# Patient Record
Sex: FEMALE | Race: WHITE | Employment: OTHER | URBAN - METROPOLITAN AREA
[De-identification: names, ages, dates, MRNs, and addresses within clinical notes are randomized per-mention and may not be internally consistent; named-entity substitution may affect disease eponyms.]

---

## 2019-03-08 PROBLEM — R30.0 DYSURIA: Status: ACTIVE | Noted: 2019-03-08

## 2019-03-08 PROBLEM — M19.90 ARTHRITIS: Status: ACTIVE | Noted: 2019-03-08

## 2019-03-08 PROBLEM — K21.00 GASTROESOPHAGEAL REFLUX DISEASE WITH ESOPHAGITIS: Status: ACTIVE | Noted: 2019-03-08

## 2019-03-08 PROBLEM — K92.1 BLOOD IN THE STOOL: Status: ACTIVE | Noted: 2019-03-08

## 2019-03-08 PROBLEM — K64.9 HEMORRHOIDS: Status: ACTIVE | Noted: 2019-03-08

## 2019-03-08 PROBLEM — K62.5 RECTAL BLEEDING: Status: ACTIVE | Noted: 2019-03-08

## 2019-08-27 ENCOUNTER — APPOINTMENT (OUTPATIENT)
Dept: CT IMAGING | Age: 69
DRG: 871 | End: 2019-08-27
Attending: EMERGENCY MEDICINE
Payer: MEDICARE

## 2019-08-27 ENCOUNTER — APPOINTMENT (OUTPATIENT)
Dept: GENERAL RADIOLOGY | Age: 69
DRG: 871 | End: 2019-08-27
Attending: EMERGENCY MEDICINE
Payer: MEDICARE

## 2019-08-27 ENCOUNTER — HOSPITAL ENCOUNTER (INPATIENT)
Age: 69
LOS: 8 days | Discharge: NURSING, REHAB OR LONG-TERM CARE | DRG: 871 | End: 2019-09-04
Attending: EMERGENCY MEDICINE | Admitting: INTERNAL MEDICINE
Payer: MEDICARE

## 2019-08-27 DIAGNOSIS — R51.9 ACUTE NONINTRACTABLE HEADACHE, UNSPECIFIED HEADACHE TYPE: ICD-10-CM

## 2019-08-27 DIAGNOSIS — N17.9 ACUTE KIDNEY INJURY (HCC): ICD-10-CM

## 2019-08-27 DIAGNOSIS — E87.6 HYPOKALEMIA: ICD-10-CM

## 2019-08-27 DIAGNOSIS — D72.829 LEUKOCYTOSIS, UNSPECIFIED TYPE: ICD-10-CM

## 2019-08-27 DIAGNOSIS — K75.0 HEPATIC ABSCESS: ICD-10-CM

## 2019-08-27 DIAGNOSIS — I21.4 NON-STEMI (NON-ST ELEVATED MYOCARDIAL INFARCTION) (HCC): Primary | ICD-10-CM

## 2019-08-27 DIAGNOSIS — E83.42 HYPOMAGNESEMIA: ICD-10-CM

## 2019-08-27 PROBLEM — R16.0 LIVER MASS: Status: ACTIVE | Noted: 2019-08-27

## 2019-08-27 PROBLEM — G45.9 TIA (TRANSIENT ISCHEMIC ATTACK): Status: ACTIVE | Noted: 2019-08-27

## 2019-08-27 PROBLEM — R77.8 ELEVATED TROPONIN: Status: ACTIVE | Noted: 2019-08-27

## 2019-08-27 PROBLEM — M79.7 FIBROMYALGIA: Status: ACTIVE | Noted: 2019-08-27

## 2019-08-27 PROBLEM — R07.9 CHEST PAIN: Status: ACTIVE | Noted: 2019-08-27

## 2019-08-27 LAB
ALBUMIN SERPL-MCNC: 1.9 G/DL (ref 3.2–4.6)
ALBUMIN/GLOB SERPL: 0.4 {RATIO} (ref 1.2–3.5)
ALP SERPL-CCNC: 166 U/L (ref 50–136)
ALT SERPL-CCNC: 25 U/L (ref 12–65)
AMMONIA PLAS-SCNC: 34 UMOL/L (ref 11–32)
ANION GAP SERPL CALC-SCNC: 11 MMOL/L (ref 7–16)
AST SERPL-CCNC: 51 U/L (ref 15–37)
ATRIAL RATE: 71 BPM
ATRIAL RATE: 98 BPM
BASOPHILS # BLD: 0 K/UL (ref 0–0.2)
BASOPHILS NFR BLD: 0 % (ref 0–2)
BILIRUB SERPL-MCNC: 0.9 MG/DL (ref 0.2–1.1)
BUN SERPL-MCNC: 34 MG/DL (ref 8–23)
CALCIUM SERPL-MCNC: 8.2 MG/DL (ref 8.3–10.4)
CALCULATED P AXIS, ECG09: 62 DEGREES
CALCULATED P AXIS, ECG09: 73 DEGREES
CALCULATED R AXIS, ECG10: 42 DEGREES
CALCULATED R AXIS, ECG10: 9 DEGREES
CALCULATED T AXIS, ECG11: 57 DEGREES
CALCULATED T AXIS, ECG11: 68 DEGREES
CHLORIDE SERPL-SCNC: 99 MMOL/L (ref 98–107)
CO2 SERPL-SCNC: 27 MMOL/L (ref 21–32)
COLLECTION COMMENT, COLCM: NORMAL
CREAT SERPL-MCNC: 2.2 MG/DL (ref 0.6–1)
DIAGNOSIS, 93000: NORMAL
DIAGNOSIS, 93000: NORMAL
DIFFERENTIAL METHOD BLD: ABNORMAL
EOSINOPHIL # BLD: 0 K/UL (ref 0–0.8)
EOSINOPHIL NFR BLD: 0 % (ref 0.5–7.8)
ERYTHROCYTE [DISTWIDTH] IN BLOOD BY AUTOMATED COUNT: 14.3 % (ref 11.9–14.6)
GLOBULIN SER CALC-MCNC: 5 G/DL (ref 2.3–3.5)
GLUCOSE SERPL-MCNC: 123 MG/DL (ref 65–100)
HCT VFR BLD AUTO: 32.1 % (ref 35.8–46.3)
HGB BLD-MCNC: 10.7 G/DL (ref 11.7–15.4)
IMM GRANULOCYTES # BLD AUTO: 0.6 K/UL (ref 0–0.5)
IMM GRANULOCYTES NFR BLD AUTO: 3 % (ref 0–5)
LACTATE BLD-SCNC: 1.36 MMOL/L (ref 0.5–1.9)
LIPASE SERPL-CCNC: 151 U/L (ref 73–393)
LYMPHOCYTES # BLD: 1.1 K/UL (ref 0.5–4.6)
LYMPHOCYTES NFR BLD: 6 % (ref 13–44)
MAGNESIUM SERPL-MCNC: 1.6 MG/DL (ref 1.8–2.4)
MCH RBC QN AUTO: 31.3 PG (ref 26.1–32.9)
MCHC RBC AUTO-ENTMCNC: 33.3 G/DL (ref 31.4–35)
MCV RBC AUTO: 93.9 FL (ref 79.6–97.8)
MONOCYTES # BLD: 0.7 K/UL (ref 0.1–1.3)
MONOCYTES NFR BLD: 4 % (ref 4–12)
NEUTS SEG # BLD: 16 K/UL (ref 1.7–8.2)
NEUTS SEG NFR BLD: 87 % (ref 43–78)
NRBC # BLD: 0 K/UL (ref 0–0.2)
P-R INTERVAL, ECG05: 120 MS
P-R INTERVAL, ECG05: 134 MS
PHOSPHATE SERPL-MCNC: 2 MG/DL (ref 2.3–3.7)
PLATELET # BLD AUTO: 168 K/UL (ref 150–450)
PLATELET COMMENTS,PCOM: ADEQUATE
PMV BLD AUTO: 8.7 FL (ref 9.4–12.3)
POTASSIUM SERPL-SCNC: 2.4 MMOL/L (ref 3.5–5.1)
PROT SERPL-MCNC: 6.9 G/DL (ref 6.3–8.2)
Q-T INTERVAL, ECG07: 412 MS
Q-T INTERVAL, ECG07: 484 MS
QRS DURATION, ECG06: 78 MS
QRS DURATION, ECG06: 92 MS
QTC CALCULATION (BEZET), ECG08: 525 MS
QTC CALCULATION (BEZET), ECG08: 525 MS
RBC # BLD AUTO: 3.42 M/UL (ref 4.05–5.2)
RBC MORPH BLD: ABNORMAL
SODIUM SERPL-SCNC: 137 MMOL/L (ref 136–145)
TROPONIN I SERPL-MCNC: 0.37 NG/ML (ref 0.02–0.05)
TROPONIN I SERPL-MCNC: 0.76 NG/ML (ref 0.02–0.05)
TROPONIN I SERPL-MCNC: 0.82 NG/ML (ref 0.02–0.05)
VENTRICULAR RATE, ECG03: 71 BPM
VENTRICULAR RATE, ECG03: 98 BPM
WBC # BLD AUTO: 18.4 K/UL (ref 4.3–11.1)
WBC MORPH BLD: ABNORMAL

## 2019-08-27 PROCEDURE — 74011250637 HC RX REV CODE- 250/637: Performed by: NURSE PRACTITIONER

## 2019-08-27 PROCEDURE — 81003 URINALYSIS AUTO W/O SCOPE: CPT | Performed by: EMERGENCY MEDICINE

## 2019-08-27 PROCEDURE — 82140 ASSAY OF AMMONIA: CPT

## 2019-08-27 PROCEDURE — 74011250636 HC RX REV CODE- 250/636: Performed by: NURSE PRACTITIONER

## 2019-08-27 PROCEDURE — 77030011943

## 2019-08-27 PROCEDURE — 84100 ASSAY OF PHOSPHORUS: CPT

## 2019-08-27 PROCEDURE — 77030019605

## 2019-08-27 PROCEDURE — 96360 HYDRATION IV INFUSION INIT: CPT | Performed by: EMERGENCY MEDICINE

## 2019-08-27 PROCEDURE — 84484 ASSAY OF TROPONIN QUANT: CPT

## 2019-08-27 PROCEDURE — 86580 TB INTRADERMAL TEST: CPT | Performed by: NURSE PRACTITIONER

## 2019-08-27 PROCEDURE — 71046 X-RAY EXAM CHEST 2 VIEWS: CPT

## 2019-08-27 PROCEDURE — 51701 INSERT BLADDER CATHETER: CPT | Performed by: EMERGENCY MEDICINE

## 2019-08-27 PROCEDURE — 93005 ELECTROCARDIOGRAM TRACING: CPT | Performed by: EMERGENCY MEDICINE

## 2019-08-27 PROCEDURE — 80053 COMPREHEN METABOLIC PANEL: CPT

## 2019-08-27 PROCEDURE — 83690 ASSAY OF LIPASE: CPT

## 2019-08-27 PROCEDURE — 83735 ASSAY OF MAGNESIUM: CPT

## 2019-08-27 PROCEDURE — 85025 COMPLETE CBC W/AUTO DIFF WBC: CPT

## 2019-08-27 PROCEDURE — 74011000302 HC RX REV CODE- 302: Performed by: NURSE PRACTITIONER

## 2019-08-27 PROCEDURE — 77030020263 HC SOL INJ SOD CL0.9% LFCR 1000ML

## 2019-08-27 PROCEDURE — 74011250636 HC RX REV CODE- 250/636: Performed by: EMERGENCY MEDICINE

## 2019-08-27 PROCEDURE — 99285 EMERGENCY DEPT VISIT HI MDM: CPT | Performed by: EMERGENCY MEDICINE

## 2019-08-27 PROCEDURE — 83605 ASSAY OF LACTIC ACID: CPT

## 2019-08-27 PROCEDURE — 70450 CT HEAD/BRAIN W/O DYE: CPT

## 2019-08-27 PROCEDURE — 36415 COLL VENOUS BLD VENIPUNCTURE: CPT

## 2019-08-27 PROCEDURE — 65660000000 HC RM CCU STEPDOWN

## 2019-08-27 RX ORDER — ONDANSETRON 2 MG/ML
4 INJECTION INTRAMUSCULAR; INTRAVENOUS
Status: DISCONTINUED | OUTPATIENT
Start: 2019-08-27 | End: 2019-09-04 | Stop reason: HOSPADM

## 2019-08-27 RX ORDER — SODIUM CHLORIDE 0.9 % (FLUSH) 0.9 %
5-40 SYRINGE (ML) INJECTION EVERY 8 HOURS
Status: DISCONTINUED | OUTPATIENT
Start: 2019-08-27 | End: 2019-09-04 | Stop reason: HOSPADM

## 2019-08-27 RX ORDER — SODIUM CHLORIDE 0.9 % (FLUSH) 0.9 %
5-40 SYRINGE (ML) INJECTION AS NEEDED
Status: DISCONTINUED | OUTPATIENT
Start: 2019-08-27 | End: 2019-09-04 | Stop reason: HOSPADM

## 2019-08-27 RX ORDER — DIPHENHYDRAMINE HCL 25 MG
25 CAPSULE ORAL
Status: DISCONTINUED | OUTPATIENT
Start: 2019-08-27 | End: 2019-08-28

## 2019-08-27 RX ORDER — HEPARIN SODIUM 5000 [USP'U]/ML
5000 INJECTION, SOLUTION INTRAVENOUS; SUBCUTANEOUS EVERY 8 HOURS
Status: DISCONTINUED | OUTPATIENT
Start: 2019-08-27 | End: 2019-08-28

## 2019-08-27 RX ORDER — LORAZEPAM 0.5 MG/1
0.5 TABLET ORAL
Status: DISCONTINUED | OUTPATIENT
Start: 2019-08-27 | End: 2019-08-28

## 2019-08-27 RX ORDER — MAGNESIUM SULFATE HEPTAHYDRATE 40 MG/ML
2 INJECTION, SOLUTION INTRAVENOUS
Status: COMPLETED | OUTPATIENT
Start: 2019-08-27 | End: 2019-08-27

## 2019-08-27 RX ORDER — ACETAMINOPHEN 325 MG/1
650 TABLET ORAL
Status: DISCONTINUED | OUTPATIENT
Start: 2019-08-27 | End: 2019-09-04 | Stop reason: HOSPADM

## 2019-08-27 RX ORDER — MONTELUKAST SODIUM 10 MG/1
10 TABLET ORAL EVERY EVENING
Status: DISCONTINUED | OUTPATIENT
Start: 2019-08-27 | End: 2019-09-04 | Stop reason: HOSPADM

## 2019-08-27 RX ORDER — SODIUM CHLORIDE 9 MG/ML
150 INJECTION, SOLUTION INTRAVENOUS CONTINUOUS
Status: DISCONTINUED | OUTPATIENT
Start: 2019-08-27 | End: 2019-08-31

## 2019-08-27 RX ORDER — PANTOPRAZOLE SODIUM 40 MG/1
40 TABLET, DELAYED RELEASE ORAL
Status: DISCONTINUED | OUTPATIENT
Start: 2019-08-27 | End: 2019-09-04 | Stop reason: HOSPADM

## 2019-08-27 RX ORDER — NALOXONE HYDROCHLORIDE 0.4 MG/ML
0.4 INJECTION, SOLUTION INTRAMUSCULAR; INTRAVENOUS; SUBCUTANEOUS AS NEEDED
Status: DISCONTINUED | OUTPATIENT
Start: 2019-08-27 | End: 2019-09-04 | Stop reason: HOSPADM

## 2019-08-27 RX ORDER — POTASSIUM CHLORIDE 29.8 MG/ML
20 INJECTION INTRAVENOUS
Status: COMPLETED | OUTPATIENT
Start: 2019-08-27 | End: 2019-08-27

## 2019-08-27 RX ORDER — HYDROCORTISONE 25 MG/G
CREAM TOPICAL 4 TIMES DAILY
Status: DISCONTINUED | OUTPATIENT
Start: 2019-08-27 | End: 2019-08-27

## 2019-08-27 RX ORDER — HYDROCODONE BITARTRATE AND ACETAMINOPHEN 5; 325 MG/1; MG/1
1 TABLET ORAL
Status: DISCONTINUED | OUTPATIENT
Start: 2019-08-27 | End: 2019-08-28

## 2019-08-27 RX ORDER — BISACODYL 5 MG
10 TABLET, DELAYED RELEASE (ENTERIC COATED) ORAL DAILY PRN
Status: DISCONTINUED | OUTPATIENT
Start: 2019-08-27 | End: 2019-09-04 | Stop reason: HOSPADM

## 2019-08-27 RX ORDER — GABAPENTIN 300 MG/1
600 CAPSULE ORAL 3 TIMES DAILY
Status: DISCONTINUED | OUTPATIENT
Start: 2019-08-27 | End: 2019-08-28

## 2019-08-27 RX ORDER — DULOXETIN HYDROCHLORIDE 30 MG/1
30 CAPSULE, DELAYED RELEASE ORAL DAILY
Status: DISCONTINUED | OUTPATIENT
Start: 2019-08-28 | End: 2019-08-29

## 2019-08-27 RX ADMIN — SODIUM CHLORIDE 100 ML/HR: 900 INJECTION, SOLUTION INTRAVENOUS at 17:40

## 2019-08-27 RX ADMIN — Medication 5 ML: at 17:44

## 2019-08-27 RX ADMIN — GABAPENTIN 600 MG: 300 CAPSULE ORAL at 21:23

## 2019-08-27 RX ADMIN — POTASSIUM CHLORIDE 20 MEQ: 29.8 INJECTION, SOLUTION INTRAVENOUS at 14:56

## 2019-08-27 RX ADMIN — MONTELUKAST SODIUM 10 MG: 10 TABLET, FILM COATED ORAL at 17:44

## 2019-08-27 RX ADMIN — MAGNESIUM SULFATE HEPTAHYDRATE 2 G: 40 INJECTION, SOLUTION INTRAVENOUS at 17:49

## 2019-08-27 RX ADMIN — TUBERCULIN PURIFIED PROTEIN DERIVATIVE 5 UNITS: 5 INJECTION, SOLUTION INTRADERMAL at 17:44

## 2019-08-27 RX ADMIN — SODIUM CHLORIDE 1000 ML: 900 INJECTION, SOLUTION INTRAVENOUS at 13:29

## 2019-08-27 RX ADMIN — PANTOPRAZOLE SODIUM 40 MG: 40 TABLET, DELAYED RELEASE ORAL at 17:43

## 2019-08-27 RX ADMIN — GABAPENTIN 600 MG: 300 CAPSULE ORAL at 17:44

## 2019-08-27 RX ADMIN — Medication 10 ML: at 21:23

## 2019-08-27 RX ADMIN — HEPARIN SODIUM 5000 UNITS: 5000 INJECTION INTRAVENOUS; SUBCUTANEOUS at 21:23

## 2019-08-27 RX ADMIN — ACETAMINOPHEN 650 MG: 325 TABLET, FILM COATED ORAL at 21:24

## 2019-08-27 NOTE — ED NOTES
PT REPORTS HAVING HER SPEECH DECLINING OVER THE LAST 2 WEEKS .  PT REPORTS HAVING HER HA FOR LAST FEW WEEKS

## 2019-08-27 NOTE — PROGRESS NOTES
08/27/19 1718   NIH Stroke Scale   Interval Other (comment)   LOC 0   LOC Questions 0   LOC Commands 0   Best Gaze 0   Visual 1   Facial Palsy 0   Motor Right Arm 0   Motor Left Arm 0   Motor Right Leg 0   Motor Left Leg 0   Limb Ataxia 0   Sensory 1   Best Language 0   Dysarthria 0   Extinction and Inattention 0   Total 2   Dual NIH with Severino Baumann RN

## 2019-08-27 NOTE — PROGRESS NOTES
08/27/19 1500   Dual Skin Pressure Injury Assessment   Dual Skin Pressure Injury Assessment WDL   Second Care Provider (Based on [address] [place] [address]) Sheryl Ko RN   Skin Integumentary   Skin Integumentary (WDL) X   Skin Integrity Abrasion   Two 2 cm circular open abrasions to inner left buttocks.

## 2019-08-27 NOTE — ED PROVIDER NOTES
71-year-old female presents from her doctor's office with complaints of headache, slurred speech and substernal chest pressure    Chest pain onset was yesterday and is been persistent overnight and through this morning. She admits to some minimal shortness of breath related to the chest pain as well but no diaphoresis  Patient's slurred speech has been an ongoing issue for several weeks. Family at bedside reports that the patient has been sluggish, fatigued over more than the last week as well they have noticed that her speech is changed somewhat. Patient also states that she has had some numbness from the right side of her mouth which is radiated across to the left. Patient was recently admitted at [facility] with an episode of bile duct stones with jaundice and pancreatitis. At that time she had a reaction to an antibiotic for UTI as well. She denies fever vomiting currently but does have episodes of nausea. The history is provided by the patient and a caregiver. Headache    This is a chronic problem. The current episode started more than 1 week ago. The problem occurs constantly. The problem has not changed since onset. The headache is aggravated by an unknown factor. The pain is located in the generalized region. The quality of the pain is described as dull and throbbing. The pain is moderate. Associated symptoms include malaise/fatigue, chest pressure, shortness of breath, weakness and nausea. Pertinent negatives include no fever, no palpitations, no visual change and no vomiting. She has tried nothing for the symptoms. History reviewed. No pertinent past medical history. Past Surgical History:   Procedure Laterality Date    HX CHOLECYSTECTOMY      HX HYSTERECTOMY      HX TUBAL LIGATION           History reviewed. No pertinent family history.     Social History     Socioeconomic History    Marital status:      Spouse name: Not on file    Number of children: Not on file    Years of education: Not on file    Highest education level: Not on file   Occupational History    Not on file   Social Needs    Financial resource strain: Not on file    Food insecurity:     Worry: Not on file     Inability: Not on file    Transportation needs:     Medical: Not on file     Non-medical: Not on file   Tobacco Use    Smoking status: Current Every Day Smoker     Packs/day: 0.50     Years: 30.00     Pack years: 15.00    Smokeless tobacco: Never Used   Substance and Sexual Activity    Alcohol use: No     Frequency: Never    Drug use: No    Sexual activity: Never   Lifestyle    Physical activity:     Days per week: Not on file     Minutes per session: Not on file    Stress: Not on file   Relationships    Social connections:     Talks on phone: Not on file     Gets together: Not on file     Attends Yazdanism service: Not on file     Active member of club or organization: Not on file     Attends meetings of clubs or organizations: Not on file     Relationship status: Not on file    Intimate partner violence:     Fear of current or ex partner: Not on file     Emotionally abused: Not on file     Physically abused: Not on file     Forced sexual activity: Not on file   Other Topics Concern    Not on file   Social History Narrative    Not on file         ALLERGIES: Sulfa (sulfonamide antibiotics); Aspirin; Celecoxib; Metronidazole; Morphine; and Naproxen    Review of Systems   Constitutional: Positive for activity change, appetite change, fatigue and malaise/fatigue. Negative for chills and fever. HENT: Positive for sore throat. Negative for congestion, drooling, ear pain, hearing loss, mouth sores, postnasal drip and rhinorrhea. Eyes: Negative for photophobia and discharge. Respiratory: Positive for chest tightness and shortness of breath. Negative for cough. Cardiovascular: Positive for chest pain. Negative for palpitations and leg swelling. Gastrointestinal: Positive for nausea. Negative for abdominal pain, constipation, diarrhea and vomiting. Endocrine: Negative for cold intolerance and heat intolerance. Genitourinary: Negative for dysuria and flank pain. Musculoskeletal: Positive for arthralgias and myalgias. Negative for neck pain. Skin: Negative for rash and wound. Allergic/Immunologic: Negative for environmental allergies and food allergies. Neurological: Positive for weakness and headaches. Negative for syncope. Hematological: Negative for adenopathy. Does not bruise/bleed easily. Psychiatric/Behavioral: Positive for dysphoric mood. The patient is not nervous/anxious. All other systems reviewed and are negative. Vitals:    08/27/19 1214   BP: 105/66   Pulse: 98   Resp: 18   Temp: 98.5 °F (36.9 °C)   SpO2: 93%   Weight: 76.7 kg (169 lb)   Height: 5' 1\" (1.549 m)            Physical Exam   Constitutional: She is oriented to person, place, and time. She appears well-developed and well-nourished. She appears distressed. HENT:   Head: Normocephalic and atraumatic. Mouth/Throat: Uvula is midline and oropharynx is clear and moist. Mucous membranes are dry. No oropharyngeal exudate. Eyes: Pupils are equal, round, and reactive to light. Conjunctivae and EOM are normal.   Neck: Normal range of motion. Neck supple. No JVD present. Cardiovascular: Normal rate, regular rhythm, normal heart sounds and intact distal pulses. Exam reveals no gallop and no friction rub. No murmur heard. Pulmonary/Chest: Effort normal and breath sounds normal.   Abdominal: Soft. Normal appearance and bowel sounds are normal. She exhibits no distension and no mass. There is no hepatosplenomegaly. There is no tenderness. Musculoskeletal: Normal range of motion. She exhibits no edema or deformity. Neurological: She is alert and oriented to person, place, and time. She has normal strength. No cranial nerve deficit or sensory deficit.  She displays a negative Romberg sign. Gait normal. GCS eye subscore is 4. GCS verbal subscore is 5. GCS motor subscore is 6. Subjective decreased sensation in the right perioral region. Also subjective numbness in the right forearm and left lower leg  No motor findings   Skin: Skin is warm and dry. Capillary refill takes less than 2 seconds. No rash noted. Psychiatric: She has a normal mood and affect. Her speech is normal and behavior is normal. Judgment and thought content normal. Cognition and memory are normal.   Nursing note and vitals reviewed. MDM  Number of Diagnoses or Management Options  Acute kidney injury [facility]): new and requires workup  Acute nonintractable headache, unspecified headache type: established and worsening  Hypokalemia: new and requires workup  Hypomagnesemia: new and requires workup  Leukocytosis, unspecified type: new and requires workup  Non-STEMI (non-ST elevated myocardial infarction) [facility]): new and requires workup  Diagnosis management comments: EKG reviewed  Sinus rhythm, diffuse low voltage  No ectopy  No acute ischemic changes    1:47 PM  Troponin resulted elevated at 0.8  We will repeat EKG. Will check a 2-hour troponin level  Reviewed with Dr. Katie Green  They will consult on the patient    Will consult hospitalist service for admission given patient's multiple medical issues.     2:52 PM  Repeat EKG reviewed  Sinus rhythm  No significant interval change  No acute ischemic changes       Amount and/or Complexity of Data Reviewed  Clinical lab tests: ordered and reviewed  Tests in the radiology section of CPT®: ordered and reviewed  Tests in the medicine section of CPT®: ordered and reviewed  Decide to obtain previous medical records or to obtain history from someone other than the patient: yes  Obtain history from someone other than the patient: yes  Review and summarize past medical records: yes  Discuss the patient with other providers: yes  Independent visualization of images, tracings, or specimens: yes    Risk of Complications, Morbidity, and/or Mortality  Presenting problems: high  Diagnostic procedures: high  Management options: high  General comments: Elements of this note have been dictated via voice recognition software. Text and phrases may be limited by the accuracy of the software. The chart has been reviewed, but errors may still be present.       Critical Care  Total time providing critical care: 30-74 minutes (65)    Patient Progress  Patient progress: stable         Procedures

## 2019-08-27 NOTE — CONSULTS
[address] Cardiology Consult                Date of  Admission: 8/27/2019 12:27 PM     Primary Care Physician: Dr Marlene Boston  Primary Cardiologist: [facility]  Referring Physician: Dr Estephania Whitfield Physician: Dr Prabha Conteh    CC/Reason for consult: chest pain      Wendy Rodriguez is a 77 y.o. female seen in the ER for chest pain. She has a h/o elevated LFT's with recently dx liver mass, couldn't undergo MRI bc couldn't lay flat and pending triple phase CT of liver. She has fibromyalgia, GERD and smokes 1/2 to 1 PPD x 40 years, quit 2017. She was seen by [facility] for CP in 2015 w nml mary. Brother with MI early 63's. She has done poorly since she was on an antibiotic for a UTI which she thinks caused multiple side effects including N/V/D, poor sleep, headache, slurred speech x 2 weeks, and weakness. She was seen by her PCP today and stated she had chest pain since last night and was referred to the ER. EKG shows ST w rate 98 w  w NSST/T wave changes, trop . 82, WBC 18.5, hgb 10.7, , K 2.8, cr 2.2, mag 1.6, /66, CT head no acute process. She states she began having pain to the left of her sternum last night, 10/10, felt like an elephant on her chest, constant all night and this morning, with dyspnea, nausea, and diaphoresis alternating with periods of feeling cold. She has a lot of dizziness but is \"a hard to pass out person\". No h/o DVT, PE, no recent injury. At PCP she was given advil and nitro and pain resolved and has not recurred. CP did not get worse w exertion. Patient Active Problem List   Diagnosis Code    Rectal bleeding K62.5    Gastroesophageal reflux disease with esophagitis K21.0    Blood in the stool K92.1    Dysuria R30.0    Hemorrhoids K64.9    Arthritis M19.90    Chest pain R07.9    Elevated troponin R74.8    Fibromyalgia M79.7    Headache R51    Liver mass R16.0    Hypokalemia E87.6    Acute renal failure (ARF) (HCC) N17.9       History reviewed.  No pertinent past medical history. Past Surgical History:   Procedure Laterality Date    HX CHOLECYSTECTOMY      HX HYSTERECTOMY      HX TUBAL LIGATION       Allergies   Allergen Reactions    Sulfa (Sulfonamide Antibiotics) Rash    Aspirin Rash and Swelling    Celecoxib Rash and Swelling    Metronidazole Rash     Unknown; possibly a rash.  Morphine Unknown (comments)    Naproxen Rash      History reviewed. No pertinent family history. Social History     Tobacco Use    Smoking status: Current Every Day Smoker     Packs/day: 0.50     Years: 30.00     Pack years: 15.00    Smokeless tobacco: Never Used   Substance Use Topics    Alcohol use: No     Frequency: Never        Current Facility-Administered Medications   Medication Dose Route Frequency    nitroglycerin (NITROBID) 2 % ointment 0.5 Inch  0.5 Inch Topical NOW    potassium chloride 20 mEq in 50 ml IVPB  20 mEq IntraVENous NOW    magnesium sulfate 2 g/50 ml IVPB (premix or compounded)  2 g IntraVENous NOW     Current Outpatient Medications   Medication Sig    tacrolimus (PROTOPIC) 0.1 % ointment Apply  to affected area two (2) times a day.  folic acid (FOLVITE) 1 mg tablet Take 1 Tab by mouth daily.  esomeprazole (NEXIUM) 40 mg capsule Take 1 Cap by mouth daily.  montelukast (SINGULAIR) 10 mg tablet Take 1 Tab by mouth every evening.  DULoxetine (CYMBALTA) 30 mg capsule Take 1 Cap by mouth daily.  gabapentin (NEURONTIN) 600 mg tablet Take 1 Tab by mouth three (3) times daily.  pravastatin (PRAVACHOL) 20 mg tablet Take 1 Tab by mouth nightly.  hydrocortisone (ANUSOL-HC) 2.5 % rectal cream Insert  into rectum four (4) times daily.        Review of Symptoms:  General: no weight change,  + weakness, no fever or chills  Skin: no rashes, lumps, or other skin changes  HEENT: + headache, dizziness  Neck: no swollen glands, goiter, pain or stiffness  Respiratory: no cough, sputum, hemoptysis, + dyspnea, wheezing  Cardiovascular: + as per HPI  Gastrointestinal: + N/V/D, abnormal LFTs and liver mass pending triple phase CT   Urinary: + recent UTI  Peripheral Vascular: no claudication, leg cramps, prior DVTs, swelling of calves, legs, or feet, color change, or swelling with redness or tenderness  Musculoskeletal: + diffuse pain w fibromyalgia   Psychiatric: no depression or excessive stress  Neurological: + slurred speech x 2 weeks   Hematologic: + h/o anemia  Endocrine: no thyroid problems, heat or cold intolerance, excessive sweating, polyuria, polydipsia, no diabetes. Physical Exam  Vitals:    08/27/19 1214   BP: 105/66   Pulse: 98   Resp: 18   Temp: 98.5 °F (36.9 °C)   SpO2: 93%   Weight: 76.7 kg (169 lb)   Height: 5' 1\" (1.549 m)       Physical Exam:  General: Well Developed, Well Nourished, No Acute Distress  HEENT: pupils equal and round, no abnormalities noted  Neck: supple, no JVD, no carotid bruits  Heart: S1S2 with RRR, very tender w palpation L of sternum  Lungs: Clear throughout auscultation bilaterally without adventitious sounds  Abd: soft, nontender, nondistended, with good bowel sounds  Ext: warm, no edema  Skin: warm and dry  Psychiatric: Flat affect  Neurologic: Normal speech, no asterixis       Labs:   Recent Labs     08/27/19  1224      K 2.4*   MG 1.6*   BUN 34*   CREA 2.20*   *   WBC 18.4*   HGB 10.7*   HCT 32.1*           Assessment/Plan:     Assessment:   Headache (8/27/2019)- w slurred speech x 2 weeks, head CT without acute process- per primary    Chest pain (8/27/2019)- constant severe CP x 12 hours, resolved after advil and nitro, w trop . 8. CP has not recurred since she has been in the ER.   Will trend troponin, check echo, cont ASA, no ACE/ARB or BB due to hypotension    Elevated troponin (8/27/2019)- probably demand ischemia, check echo    Fibromyalgia (8/27/2019)    Liver mass (8/27/2019)- large cystic mass pending triple phase CT liver     Hypokalemia (8/27/2019)- replace, recheck in AM Acute renal failure (ARF) ([facility] [address] (8/27/2019)- hydrate per primary     Tobacco abuse- 1 ppd x 40 years, quit 7-2019    Thank you very much for this referral. We appreciate the opportunity to participate in this patient's care. We will follow along with above stated plan.     Sarah Viera PA-C  Consulting MD: [place]

## 2019-08-27 NOTE — H&P
Hospitalist H&P Note     Admit Date:  2019 12:27 PM   Name:  Siena Middleton   Age:  77 y.o.  :  1950   MRN:  [id number]   PCP:  Boubacar Ruiz MD  Treatment Team: Attending Provider: Jose Martin Prasad MD; Primary Nurse: Bautista Escobar RN; Physician: Gem Penn MD    HPI:   Patient history was obtained from the ER provider prior to seeing the patient. Pt is a 78 yo female with PMH significant for liver mass, GERD, hemorrhiods. Pt presented to the ED from her [facility] office, her son is with her. Pt reports that she had chest pain since last night, described as shart and stabbing. Nothing made it better or worse. Pt also reports some mild SOB, denied any diaphoresis. Pt notes that he speech has been slurred, son reports x 2 wks or so, feels like it is more noticeable. Pt with some facial numbness on the right. Pt with recent admit to [facility], worked up for bile duct stones with jaundice and pancreatitis, noted to have a liver mass that is being worked up. Pt also reports chronic headache x 1 wk, described as dull and throbbing. 10 systems reviewed and negative except as noted in HPI. History reviewed. No pertinent past medical history. Past Surgical History:   Procedure Laterality Date    HX CHOLECYSTECTOMY      HX HYSTERECTOMY      HX TUBAL LIGATION        Allergies   Allergen Reactions    Sulfa (Sulfonamide Antibiotics) Rash    Aspirin Rash and Swelling    Celecoxib Rash and Swelling    Metronidazole Rash     Unknown; possibly a rash.  Morphine Unknown (comments)    Naproxen Rash      Social History     Tobacco Use    Smoking status: Current Every Day Smoker     Packs/day: 0.50     Years: 30.00     Pack years: 15.00    Smokeless tobacco: Never Used   Substance Use Topics    Alcohol use: No     Frequency: Never      History reviewed. No pertinent family history. There is no immunization history on file for this patient.   PTA Medications:  Prior to Admission Medications   Prescriptions Last Dose Informant Patient Reported? Taking? DULoxetine (CYMBALTA) 30 mg capsule   No No   Sig: Take 1 Cap by mouth daily. esomeprazole (NEXIUM) 40 mg capsule   No No   Sig: Take 1 Cap by mouth daily. folic acid (FOLVITE) 1 mg tablet   No No   Sig: Take 1 Tab by mouth daily. gabapentin (NEURONTIN) 600 mg tablet   No No   Sig: Take 1 Tab by mouth three (3) times daily. hydrocortisone (ANUSOL-HC) 2.5 % rectal cream   No No   Sig: Insert  into rectum four (4) times daily. montelukast (SINGULAIR) 10 mg tablet   No No   Sig: Take 1 Tab by mouth every evening. pravastatin (PRAVACHOL) 20 mg tablet   No No   Sig: Take 1 Tab by mouth nightly. tacrolimus (PROTOPIC) 0.1 % ointment   No No   Sig: Apply  to affected area two (2) times a day. Facility-Administered Medications: None       Objective:     Patient Vitals for the past 24 hrs:   Temp Pulse Resp BP SpO2   08/27/19 1526  74  124/60 92 %   08/27/19 1500  74  121/58 93 %   08/27/19 1214 98.5 °F (36.9 °C) 98 18 105/66 93 %     Oxygen Therapy  O2 Sat (%): 92 % (08/27/19 1526)  Pulse via Oximetry: 73 beats per minute (08/27/19 1526)  O2 Device: Room air (08/27/19 1214)  No intake or output data in the 24 hours ending 08/27/19 1551      Physical Exam:  General:    Well nourished. Alert and oriented x3  Eyes:   Normal sclera. Extraocular movements intact. ENT:  Normocephalic, atraumatic. Moist mucous membranes  CV:   RRR. No m/r/g. Lungs:  CTAB. No wheezing, rhonchi, or rales. Abdomen: Soft, nontender, nondistended. Extremities: Warm and dry. No cyanosis or edema. Neurologic: No focal deficit   Skin:     No rashes or jaundice. Normal coloration  Psych:  Normal mood and affect. I reviewed the labs, imaging, EKGs, telemetry, and other studies done this admission.   Data Review:   Recent Results (from the past 24 hour(s))   CBC WITH AUTOMATED DIFF    Collection Time: 08/27/19 12:24 PM   Result Value Ref Range WBC 18.4 (H) 4.3 - 11.1 K/uL    RBC 3.42 (L) 4.05 - 5.2 M/uL    HGB 10.7 (L) 11.7 - 15.4 g/dL    HCT 32.1 (L) 35.8 - 46.3 %    MCV 93.9 79.6 - 97.8 FL    MCH 31.3 26.1 - 32.9 PG    MCHC 33.3 31.4 - 35.0 g/dL    RDW 14.3 11.9 - 14.6 %    PLATELET 936 961 - 987 K/uL    MPV 8.7 (L) 9.4 - 12.3 FL    ABSOLUTE NRBC 0.00 0.0 - 0.2 K/uL    NEUTROPHILS 87 (H) 43 - 78 %    LYMPHOCYTES 6 (L) 13 - 44 %    MONOCYTES 4 4.0 - 12.0 %    EOSINOPHILS 0 (L) 0.5 - 7.8 %    BASOPHILS 0 0.0 - 2.0 %    IMMATURE GRANULOCYTES 3 0.0 - 5.0 %    ABS. NEUTROPHILS 16.0 (H) 1.7 - 8.2 K/UL    ABS. LYMPHOCYTES 1.1 0.5 - 4.6 K/UL    ABS. MONOCYTES 0.7 0.1 - 1.3 K/UL    ABS. EOSINOPHILS 0.0 0.0 - 0.8 K/UL    ABS. BASOPHILS 0.0 0.0 - 0.2 K/UL    ABS. IMM. GRANS. 0.6 (H) 0.0 - 0.5 K/UL    RBC COMMENTS NORMOCYTIC/NORMOCHROMIC      WBC COMMENTS Result Confirmed By Smear      PLATELET COMMENTS ADEQUATE      DF AUTOMATED     METABOLIC PANEL, COMPREHENSIVE    Collection Time: 08/27/19 12:24 PM   Result Value Ref Range    Sodium 137 136 - 145 mmol/L    Potassium 2.4 (LL) 3.5 - 5.1 mmol/L    Chloride 99 98 - 107 mmol/L    CO2 27 21 - 32 mmol/L    Anion gap 11 7 - 16 mmol/L    Glucose 123 (H) 65 - 100 mg/dL    BUN 34 (H) 8 - 23 MG/DL    Creatinine 2.20 (H) 0.6 - 1.0 MG/DL    GFR est AA 29 (L) >60 ml/min/1.73m2    GFR est non-AA 24 (L) >60 ml/min/1.73m2    Calcium 8.2 (L) 8.3 - 10.4 MG/DL    Bilirubin, total 0.9 0.2 - 1.1 MG/DL    ALT (SGPT) 25 12 - 65 U/L    AST (SGOT) 51 (H) 15 - 37 U/L    Alk.  phosphatase 166 (H) 50 - 136 U/L    Protein, total 6.9 6.3 - 8.2 g/dL    Albumin 1.9 (L) 3.2 - 4.6 g/dL    Globulin 5.0 (H) 2.3 - 3.5 g/dL    A-G Ratio 0.4 (L) 1.2 - 3.5     TROPONIN I    Collection Time: 08/27/19 12:24 PM   Result Value Ref Range    Troponin-I, Qt. 0.82 (HH) 0.02 - 0.05 NG/ML   LIPASE    Collection Time: 08/27/19 12:24 PM   Result Value Ref Range    Lipase 151 73 - 393 U/L   MAGNESIUM    Collection Time: 08/27/19 12:24 PM   Result Value Ref Range Magnesium 1.6 (L) 1.8 - 2.4 mg/dL   PHOSPHORUS    Collection Time: 08/27/19 12:24 PM   Result Value Ref Range    Phosphorus 2.0 (L) 2.3 - 3.7 MG/DL   EKG, 12 LEAD, INITIAL    Collection Time: 08/27/19 12:26 PM   Result Value Ref Range    Ventricular Rate 98 BPM    Atrial Rate 98 BPM    P-R Interval 120 ms    QRS Duration 92 ms    Q-T Interval 412 ms    QTC Calculation (Bezet) 525 ms    Calculated P Axis 73 degrees    Calculated R Axis 9 degrees    Calculated T Axis 57 degrees    Diagnosis       Sinus rhythm with Premature atrial complexes  Low voltage QRS  Prolonged QT  ? P pulmonale  No previous ECGs available  Confirmed by ADY PANCHAL (), Amira Carty ([id number]) on 8/27/2019 1:08:22 PM     AMMONIA    Collection Time: 08/27/19  1:36 PM   Result Value Ref Range    Ammonia 34 (H) 11 - 32 UMOL/L   TROPONIN I    Collection Time: 08/27/19  2:04 PM   Result Value Ref Range    Troponin-I, Qt. 0.76 (HH) 0.02 - 0.05 NG/ML   EKG, 12 LEAD, SUBSEQUENT    Collection Time: 08/27/19  2:50 PM   Result Value Ref Range    Ventricular Rate 71 BPM    Atrial Rate 71 BPM    P-R Interval 134 ms    QRS Duration 78 ms    Q-T Interval 484 ms    QTC Calculation (Bezet) 525 ms    Calculated P Axis 62 degrees    Calculated R Axis 42 degrees    Calculated T Axis 68 degrees    Diagnosis       !! AGE AND GENDER SPECIFIC ECG ANALYSIS !! Normal sinus rhythm  Low voltage QRS  Prolonged QT  Abnormal ECG  When compared with ECG of 27-AUG-2019 12:26,  Premature atrial complexes are no longer Present     POC LACTIC ACID    Collection Time: 08/27/19  2:53 PM   Result Value Ref Range    Lactic Acid (POC) 1.36 0.5 - 1.9 mmol/L       All Micro Results     None          Other Studies:  Xr Chest Pa Lat    Result Date: 8/27/2019  PA LATERAL CHEST  8/27/2019 12:31 PM HISTORY:  cp/shob COMPARISON: None FINDINGS:  The heart size is within normal limits. There is no lobar consolidation, pleural effusions or pulmonary edema. Lower lobe atelectasis or scarring is present. Cholecystectomy clips are present. IMPRESSION: Lower lobe atelectasis or scarring. Ct Head Wo Cont    Result Date: 8/27/2019  HEAD CT WITHOUT CONTRAST  8/27/2019 HISTORY:   Speech changes (or aphasia), new or progressive TECHNIQUE: Noncontrast axial images were obtained through the brain. All CT scans at this facility used dose modulation, interactive reconstruction and/or weight based dosing when appropriate to reduce radiation dose to as low as reasonably achievable. COMPARISON: None FINDINGS: There is no acute intracranial hemorrhage, significant mass effect or CT evidence of acute large artery territorial infarction. Please note that a hyperacute infarct or small vessel infarct may not be apparent on initial CT imaging. Mild diffuse cerebral volume loss is present. Scattered areas of low-attenuation are present in the supratentorial white matter. This pattern may be present with chronic small vessel ischemic disease. There is no hydrocephalus , intra-axial mass or abnormal extra-axial fluid collection. There are no displaced skull fractures. The mastoid air cells and paranasal sinuses are clear where imaged.      IMPRESSION: No acute findings       Assessment and Plan:     Hospital Problems as of 8/27/2019 Date Reviewed: 8/27/2019          Codes Class Noted - Resolved POA    Chest pain ICD-10-CM: R07.9  ICD-9-CM: 786.50  8/27/2019 - Present Unknown        Elevated troponin ICD-10-CM: R74.8  ICD-9-CM: 790.6  8/27/2019 - Present Unknown        Fibromyalgia ICD-10-CM: M79.7  ICD-9-CM: 729.1  8/27/2019 - Present Unknown        Headache ICD-10-CM: R51  ICD-9-CM: 784.0  8/27/2019 - Present Unknown        Liver mass ICD-10-CM: R16.0  ICD-9-CM: 573.9  8/27/2019 - Present Unknown        Hypokalemia ICD-10-CM: E87.6  ICD-9-CM: 276.8  8/27/2019 - Present Unknown        Acute renal failure (ARF) (HCC) ICD-10-CM: N17.9  ICD-9-CM: 584.9  8/27/2019 - Present Unknown        * (Principal) TIA (transient ischemic attack) ICD-10-CM: G45.9  ICD-9-CM: 435.9  8/27/2019 - Present Yes              PLAN:  TIA  -Carotid doppler  -MRI  -PT/OT/SLP/CM    Chest pain with elevated troponin  -Trend troponin  -remote tele  -Cardiology consult    Hypokalemia  -Likely due to chronic diarrhea  -Replace K+ and Mag and monitor    SUSANNAH  -Monitor Cr  -Gentle IVF  -Avoid renal toxins    Liver mass  -Workup in process per GI    Fibromyalgia  -Cont home meds    Discharge planning:  STR may be appropriate  DVT ppx:  SQH    Code status:  Full  Decision Maker: No MPOA    Admit status: Inpatient    Case reviewed with supervising physician - MYESHA Wells MD    Estimated LOS:  Greater than 2 midnights  Risk:  high    Signed:  Angelica Polk NP

## 2019-08-27 NOTE — ROUTINE PROCESS
Ischemic Stroke without Activase/TIA    VTE Prophylaxis: No    Antiplatelet: No    Statin if LDL Greater Than or Equal to100: No    BP Parameters: Less Than 220/120 for 24 hours, then consult MD for parameters    Controlled With: None    Dysphagia Screen Completed: Yes: Pass  Dysphagia Screening  Vocal Quality/Secretions: Normal  History of Dysphagia: No  O2 Saturation: Normal  Alertness: Normal  Pre-Swallow Assessment Score: 0  Purees: No difficulty noted  Water by Cup: No difficulty noted  Water by Straw: No difficulty noted    Patient has PEG, NG Tube, Feeding Tube: No    Medication orders per above route: No - Call MD; Consider consult to pharmacy    Nutrition Status: No Orders - If greater than 72 hours, call MD.    NIH Stroke Scale Complete: Yes: YES    Frequency of Vital Signs: Every 2 hours     Frequency of Neuro Checks: Every 2 hours    Daily Education/Care Plan Updated: Yes    Roderick Paul RN

## 2019-08-27 NOTE — PROGRESS NOTES
08/27/19 1915   NIH Stroke Scale   Interval Other (comment)   LOC 0   LOC Questions 0   LOC Commands 0   Best Gaze 0   Visual 1   Facial Palsy 0   Motor Right Arm 0   Motor Left Arm 0   Motor Right Leg 0   Motor Left Leg 0   Limb Ataxia 0   Sensory 1   Best Language 0   Dysarthria 0   Extinction and Inattention 0   Total 2   dual nih with wiliam lay

## 2019-08-27 NOTE — PROGRESS NOTES
TRANSFER - IN REPORT:    Verbal report received from Carolyn Cantrell RN(name) on Cachorro Gentle  being received from ED(unit) for routine progression of care      Report consisted of patients Situation, Background, Assessment and   Recommendations(SBAR). Information from the following report(s) SBAR, Kardex, ED Summary, Procedure Summary, Intake/Output, MAR and Recent Results was reviewed with the receiving nurse. Opportunity for questions and clarification was provided. Assessment completed upon patients arrival to unit and care assumed.

## 2019-08-27 NOTE — PROGRESS NOTES
CM chart review. PCP - Dr. Donovan Denton [facility]) - last office visit noted on 8-27-19.

## 2019-08-27 NOTE — ED TRIAGE NOTES
Patient states she has been sick all month due to a UTI with headaches. Patient states last night she started having chest pain all night that kept her from sleeping with nausea and vomiting yesterday as well. Patient also complains of slurred speech that she first noticed 2 weeks ago. patient states she has also had cold chills and generalized weakness but denies any focal weakness or numbness.

## 2019-08-28 LAB
ANION GAP SERPL CALC-SCNC: 10 MMOL/L (ref 7–16)
APPEARANCE UR: ABNORMAL
BACTERIA URNS QL MICRO: ABNORMAL /HPF
BASOPHILS # BLD: 0.1 K/UL (ref 0–0.2)
BASOPHILS NFR BLD: 1 % (ref 0–2)
BILIRUB UR QL: NEGATIVE
BUN SERPL-MCNC: 32 MG/DL (ref 8–23)
CALCIUM SERPL-MCNC: 7.6 MG/DL (ref 8.3–10.4)
CASTS URNS QL MICRO: ABNORMAL /LPF
CHLORIDE SERPL-SCNC: 104 MMOL/L (ref 98–107)
CO2 SERPL-SCNC: 25 MMOL/L (ref 21–32)
COLOR UR: YELLOW
CREAT SERPL-MCNC: 1.83 MG/DL (ref 0.6–1)
DIFFERENTIAL METHOD BLD: ABNORMAL
EOSINOPHIL # BLD: 0 K/UL (ref 0–0.8)
EOSINOPHIL NFR BLD: 0 % (ref 0.5–7.8)
EPI CELLS #/AREA URNS HPF: ABNORMAL /HPF
ERYTHROCYTE [DISTWIDTH] IN BLOOD BY AUTOMATED COUNT: 14.6 % (ref 11.9–14.6)
GLUCOSE BLD STRIP.AUTO-MCNC: 164 MG/DL (ref 65–100)
GLUCOSE SERPL-MCNC: 92 MG/DL (ref 65–100)
GLUCOSE UR STRIP.AUTO-MCNC: NEGATIVE MG/DL
HCT VFR BLD AUTO: 32.6 % (ref 35.8–46.3)
HGB BLD-MCNC: 10.3 G/DL (ref 11.7–15.4)
HGB UR QL STRIP: NEGATIVE
IMM GRANULOCYTES # BLD AUTO: 0.3 K/UL (ref 0–0.5)
IMM GRANULOCYTES NFR BLD AUTO: 2 % (ref 0–5)
KETONES UR QL STRIP.AUTO: NEGATIVE MG/DL
LACTATE SERPL-SCNC: 1.4 MMOL/L (ref 0.4–2)
LEUKOCYTE ESTERASE UR QL STRIP.AUTO: NEGATIVE
LYMPHOCYTES # BLD: 3.1 K/UL (ref 0.5–4.6)
LYMPHOCYTES NFR BLD: 21 % (ref 13–44)
MCH RBC QN AUTO: 31.3 PG (ref 26.1–32.9)
MCHC RBC AUTO-ENTMCNC: 31.6 G/DL (ref 31.4–35)
MCV RBC AUTO: 99.1 FL (ref 79.6–97.8)
MM INDURATION POC: 0 MM (ref 0–5)
MONOCYTES # BLD: 0.4 K/UL (ref 0.1–1.3)
MONOCYTES NFR BLD: 3 % (ref 4–12)
NEUTS SEG # BLD: 10.7 K/UL (ref 1.7–8.2)
NEUTS SEG NFR BLD: 73 % (ref 43–78)
NITRITE UR QL STRIP.AUTO: NEGATIVE
NRBC # BLD: 0 K/UL (ref 0–0.2)
PH UR STRIP: 5.5 [PH] (ref 5–9)
PLATELET # BLD AUTO: 145 K/UL (ref 150–450)
PMV BLD AUTO: 9.1 FL (ref 9.4–12.3)
POTASSIUM SERPL-SCNC: 3 MMOL/L (ref 3.5–5.1)
PPD POC: NEGATIVE NEGATIVE
PROCALCITONIN SERPL-MCNC: 185.5 NG/ML
PROT UR STRIP-MCNC: ABNORMAL MG/DL
RBC # BLD AUTO: 3.29 M/UL (ref 4.05–5.2)
RBC #/AREA URNS HPF: ABNORMAL /HPF
SODIUM SERPL-SCNC: 139 MMOL/L (ref 136–145)
SP GR UR REFRACTOMETRY: 1.01 (ref 1–1.02)
TROPONIN I SERPL-MCNC: 0.21 NG/ML (ref 0.02–0.05)
UROBILINOGEN UR QL STRIP.AUTO: 0.2 EU/DL (ref 0.2–1)
WBC # BLD AUTO: 14.6 K/UL (ref 4.3–11.1)
WBC URNS QL MICRO: ABNORMAL /HPF

## 2019-08-28 PROCEDURE — 85025 COMPLETE CBC W/AUTO DIFF WBC: CPT

## 2019-08-28 PROCEDURE — 36415 COLL VENOUS BLD VENIPUNCTURE: CPT

## 2019-08-28 PROCEDURE — 94760 N-INVAS EAR/PLS OXIMETRY 1: CPT

## 2019-08-28 PROCEDURE — 92610 EVALUATE SWALLOWING FUNCTION: CPT

## 2019-08-28 PROCEDURE — 87088 URINE BACTERIA CULTURE: CPT

## 2019-08-28 PROCEDURE — 87150 DNA/RNA AMPLIFIED PROBE: CPT

## 2019-08-28 PROCEDURE — 97530 THERAPEUTIC ACTIVITIES: CPT

## 2019-08-28 PROCEDURE — 74011000258 HC RX REV CODE- 258: Performed by: INTERNAL MEDICINE

## 2019-08-28 PROCEDURE — 74011000250 HC RX REV CODE- 250: Performed by: INTERNAL MEDICINE

## 2019-08-28 PROCEDURE — 74011000258 HC RX REV CODE- 258: Performed by: NURSE PRACTITIONER

## 2019-08-28 PROCEDURE — 83605 ASSAY OF LACTIC ACID: CPT

## 2019-08-28 PROCEDURE — 84145 PROCALCITONIN (PCT): CPT

## 2019-08-28 PROCEDURE — 74011250636 HC RX REV CODE- 250/636: Performed by: NURSE PRACTITIONER

## 2019-08-28 PROCEDURE — 87186 SC STD MICRODIL/AGAR DIL: CPT

## 2019-08-28 PROCEDURE — 87040 BLOOD CULTURE FOR BACTERIA: CPT

## 2019-08-28 PROCEDURE — 87086 URINE CULTURE/COLONY COUNT: CPT

## 2019-08-28 PROCEDURE — 97161 PT EVAL LOW COMPLEX 20 MIN: CPT

## 2019-08-28 PROCEDURE — C8929 TTE W OR WO FOL WCON,DOPPLER: HCPCS

## 2019-08-28 PROCEDURE — 80048 BASIC METABOLIC PNL TOTAL CA: CPT

## 2019-08-28 PROCEDURE — 87077 CULTURE AEROBIC IDENTIFY: CPT

## 2019-08-28 PROCEDURE — 87205 SMEAR GRAM STAIN: CPT

## 2019-08-28 PROCEDURE — 82962 GLUCOSE BLOOD TEST: CPT

## 2019-08-28 PROCEDURE — 84484 ASSAY OF TROPONIN QUANT: CPT

## 2019-08-28 PROCEDURE — 74011250637 HC RX REV CODE- 250/637: Performed by: NURSE PRACTITIONER

## 2019-08-28 PROCEDURE — 74011250636 HC RX REV CODE- 250/636: Performed by: INTERNAL MEDICINE

## 2019-08-28 PROCEDURE — 65660000000 HC RM CCU STEPDOWN

## 2019-08-28 PROCEDURE — 81003 URINALYSIS AUTO W/O SCOPE: CPT

## 2019-08-28 RX ORDER — LORAZEPAM 2 MG/ML
1 INJECTION INTRAMUSCULAR ONCE
Status: COMPLETED | OUTPATIENT
Start: 2019-08-28 | End: 2019-08-28

## 2019-08-28 RX ORDER — HEPARIN SODIUM 5000 [USP'U]/ML
5000 INJECTION, SOLUTION INTRAVENOUS; SUBCUTANEOUS EVERY 12 HOURS
Status: DISCONTINUED | OUTPATIENT
Start: 2019-08-29 | End: 2019-09-04 | Stop reason: HOSPADM

## 2019-08-28 RX ORDER — POTASSIUM CHLORIDE 14.9 MG/ML
20 INJECTION INTRAVENOUS
Status: COMPLETED | OUTPATIENT
Start: 2019-08-28 | End: 2019-08-28

## 2019-08-28 RX ADMIN — POTASSIUM CHLORIDE 20 MEQ: 200 INJECTION, SOLUTION INTRAVENOUS at 08:57

## 2019-08-28 RX ADMIN — CEFTRIAXONE SODIUM 1 G: 1 INJECTION, POWDER, FOR SOLUTION INTRAMUSCULAR; INTRAVENOUS at 12:56

## 2019-08-28 RX ADMIN — HEPARIN SODIUM 5000 UNITS: 5000 INJECTION INTRAVENOUS; SUBCUTANEOUS at 13:00

## 2019-08-28 RX ADMIN — POTASSIUM CHLORIDE 20 MEQ: 200 INJECTION, SOLUTION INTRAVENOUS at 13:28

## 2019-08-28 RX ADMIN — GABAPENTIN 600 MG: 300 CAPSULE ORAL at 08:56

## 2019-08-28 RX ADMIN — LORAZEPAM 1 MG: 2 INJECTION INTRAMUSCULAR; INTRAVENOUS at 10:18

## 2019-08-28 RX ADMIN — Medication 10 ML: at 05:10

## 2019-08-28 RX ADMIN — DULOXETINE HYDROCHLORIDE 30 MG: 30 CAPSULE, DELAYED RELEASE ORAL at 08:56

## 2019-08-28 RX ADMIN — PANTOPRAZOLE SODIUM 40 MG: 40 TABLET, DELAYED RELEASE ORAL at 15:46

## 2019-08-28 RX ADMIN — PANTOPRAZOLE SODIUM 40 MG: 40 TABLET, DELAYED RELEASE ORAL at 05:08

## 2019-08-28 RX ADMIN — PERFLUTREN 1 ML: 6.52 INJECTION, SUSPENSION INTRAVENOUS at 11:35

## 2019-08-28 RX ADMIN — HEPARIN SODIUM 5000 UNITS: 5000 INJECTION INTRAVENOUS; SUBCUTANEOUS at 22:00

## 2019-08-28 RX ADMIN — MONTELUKAST SODIUM 10 MG: 10 TABLET, FILM COATED ORAL at 17:55

## 2019-08-28 RX ADMIN — HEPARIN SODIUM 5000 UNITS: 5000 INJECTION INTRAVENOUS; SUBCUTANEOUS at 05:09

## 2019-08-28 RX ADMIN — PIPERACILLIN SODIUM,TAZOBACTAM SODIUM 3.38 G: 3; .375 INJECTION, POWDER, FOR SOLUTION INTRAVENOUS at 20:08

## 2019-08-28 RX ADMIN — ACETAMINOPHEN 650 MG: 325 TABLET, FILM COATED ORAL at 15:46

## 2019-08-28 RX ADMIN — GABAPENTIN 600 MG: 300 CAPSULE ORAL at 15:46

## 2019-08-28 RX ADMIN — HYDROCODONE BITARTRATE AND ACETAMINOPHEN 1 TABLET: 5; 325 TABLET ORAL at 14:44

## 2019-08-28 RX ADMIN — Medication 5 ML: at 13:00

## 2019-08-28 NOTE — PROGRESS NOTES
Problem: Mobility Impaired (Adult and Pediatric)  Goal: *Acute Goals and Plan of Care (Insert Text)  Description    LTG:  (1.)Ms. Hutton will move from supine to sit and sit to supine , scoot up and down and roll side to side in bed with INDEPENDENT within 7 treatment day(s). (2.)Ms. Hutton will transfer from bed to chair and chair to bed with INDEPENDENT using the least restrictive device within 7 treatment day(s). (3.)Ms. Hutton will ambulate with INDEPENDENT for 400 feet with the least restrictive device within 7 treatment day(s). ________________________________________________________________________________________________     Outcome: Progressing Towards Goal     PHYSICAL THERAPY: Initial Assessment and AM 8/28/2019  INPATIENT: PT Visit Days : 1  [place] / [place] / Product Type: Medicare /       NAME/AGE/GENDER: Kristen Johnson is a 77 y.o. female   PRIMARY DIAGNOSIS: TIA (transient ischemic attack) [G45.9] TIA (transient ischemic attack)   TIA (transient ischemic attack)          ICD-10: Treatment Diagnosis:    Generalized Muscle Weakness (M62.81)  Difficulty in walking, Not elsewhere classified (R26.2)   Precaution/Allergies:  Sulfa (sulfonamide antibiotics); Aspirin; Celecoxib; Metronidazole; Morphine; and Naproxen      ASSESSMENT:     Ms. Flynn Brasher presents supine at arrival happy she finished a good meal and surprised she ate so much. She was able to get to EOB with SBA, then stand and walk with SBA, no AD, 200ft. She did meander in halls, but controlled balance. She took 1 standing rest at 175ft from being OOB. She returned to room and sat in chair. Main c/o during session was pain in R hand at IV site. RN aware. PT reports she will be going to young son's home after admission whee there are no steps. Family members will be gone during day, but pt has no reservations about being home alone for a few hours due to pt living alone anyway.   Her impairments include decreased functional mobility, decreased balance, decreased strength, decreased safety awareness, increased risk for falls. Pt would benefit from further PT while in house to address these impairments to help improve to prior level of independence. Pt is close to IND and may only need 1-2 more PT visits before discharge of PT service. This section established at most recent assessment   PROBLEM LIST (Impairments causing functional limitations):  Decreased Strength  Decreased ADL/Functional Activities  Decreased Transfer Abilities  Decreased Ambulation Ability/Technique  Decreased Balance  Increased Pain  Decreased Activity Tolerance  Increased Shortness of Breath  Decreased Flexibility/Joint Mobility   INTERVENTIONS PLANNED: (Benefits and precautions of physical therapy have been discussed with the patient.)  Balance Exercise  Bed Mobility  Gait Training  Home Exercise Program (HEP)  Neuromuscular Re-education/Strengthening  Therapeutic Activites  Therapeutic Exercise/Strengthening  Transfer Training     TREATMENT PLAN: Frequency/Duration: 3 times a week for duration of hospital stay  Rehabilitation Potential For Stated Goals: Excellent     REHAB RECOMMENDATIONS (at time of discharge pending progress):    Placement: It is my opinion, based on this patient's performance to date, that Ms. Hutton may benefit from participating in 1-2 additional therapy sessions in order to continue to assess for rehab potential and then make recommendation for disposition at discharge. Equipment:   None at this time              HISTORY:   History of Present Injury/Illness (Reason for Referral):  81-year-old female presents from her doctor's office with complaints of headache, slurred speech and substernal chest pressure     Chest pain onset was yesterday and is been persistent overnight and through this morning.   She admits to some minimal shortness of breath related to the chest pain as well but no diaphoresis  Patient's slurred speech has been an ongoing issue for several weeks. Family at bedside reports that the patient has been sluggish, fatigued over more than the last week as well they have noticed that her speech is changed somewhat. Patient also states that she has had some numbness from the right side of her mouth which is radiated across to the left. Patient was recently admitted at [facility] with an episode of bile duct stones with jaundice and pancreatitis. At that time she had a reaction to an antibiotic for UTI as well. Past Medical History/Comorbidities:   Ms. Antonina Brown  has no past medical history on file. Ms. Antonina Brown  has a past surgical history that includes hx cholecystectomy; hx tubal ligation; and hx hysterectomy. Social History/Living Environment:   Home Environment: Private residence  One/Two Story Residence: One story  Living Alone: No  Support Systems: Child(kye), Family member(s)  Patient Expects to be Discharged to[de-identified] (young [facility])  Current DME Used/Available at Home: None  Prior Level of Function/Work/Activity:  Pt lives alone in 1 story, no AD used. After previous hospital admission she stayed at older [facility] with stairs that presented a safety challenge for her. After this discharge she plans to live at safer [facility] until comfortable to return home alone.      Number of Personal Factors/Comorbidities that affect the Plan of Care: 0: LOW COMPLEXITY   EXAMINATION:   Most Recent Physical Functioning:   Gross Assessment:  AROM: Generally decreased, functional  Strength: Generally decreased, functional  Coordination: Within functional limits  Tone: Normal  Sensation: Intact               Posture:     Balance:  Sitting: Intact  Standing: Impaired  Standing - Static: Good;Fair  Standing - Dynamic : Good;Fair Bed Mobility:  Rolling: Minimum assistance  Supine to Sit: Minimum assistance  Scooting: Supervision  Wheelchair Mobility:     Transfers:  Sit to Stand: Stand-by assistance  Stand to Sit: Stand-by assistance  Gait:            Body Structures Involved:  Nerves  Eyes and Ears  Muscles Body Functions Affected:  Mental  Sensory/Pain  Neuromusculoskeletal  Movement Related Activities and Participation Affected:  General Tasks and Demands  Mobility  [address] [place] [address] Social and Preston Lolita   Number of elements that affect the Plan of Care: 1-2: LOW COMPLEXITY   CLINICAL PRESENTATION:   Presentation: Stable and uncomplicated: LOW COMPLEXITY   CLINICAL DECISION MAKIN [address] Short Form  How much difficulty does the patient currently have. .. Unable A Lot A Little None   1. Turning over in bed (including adjusting bedclothes, sheets and blankets)? ? 1   ? 2   ? 3   ? 4   2. Sitting down on and standing up from a chair with arms ( e.g., wheelchair, bedside commode, etc.)   ? 1   ? 2   ? 3   ? 4   3. Moving from lying on back to sitting on the side of the bed?   ? 1   ? 2   ? 3   ? 4   How much help from another person does the patient currently need. .. Total A Lot A Little None   4. Moving to and from a bed to a chair (including a wheelchair)? ? 1   ? 2   ? 3   ? 4   5. Need to walk in hospital room? ? 1   ? 2   ? 3   ? 4   6. Climbing 3-5 steps with a railing? ? 1   ? 2   ? 3   ? 4   © , Trustees of [address] [place] [address] [address] [address], under license to [employer]. All rights reserved      Score:  Initial: 22 Most Recent: X (Date: -- )    Interpretation of Tool:  Represents activities that are increasingly more difficult (i.e. Bed mobility, Transfers, Gait). Medical Necessity:     Skilled intervention continues to be required due to decreased function. Reason for Services/Other Comments:  Patient continues to require skilled intervention due to medical complications and patient unable to attend/participate in therapy as expected  .    Use of outcome tool(s) and clinical judgement create a POC that gives a: Clear prediction of patient's progress: LOW COMPLEXITY            TREATMENT:   (In addition to Assessment/Re-Assessment sessions the following treatments were rendered)   Pre-treatment Symptoms/Complaints:  my hand hurts  Pain: Initial:   Pain Intensity 1: 6  Pain Location 1: Hand  Pain Orientation 1: Right  Post Session:  0     Therapeutic Activity: (    9min):  Therapeutic activities including Bed transfers, Chair transfers and Ambulation on level ground to improve mobility, strength, balance and coordination. Required minimal   to promote static and dynamic balance in standing, promote coordination of bilateral, lower extremity(s) and promote motor control of bilateral, lower extremity(s). Braces/Orthotics/Lines/Etc:   IV  O2 Device: Room air  Treatment/Session Assessment:    Response to Treatment:  see above  Interdisciplinary Collaboration:   Physical Therapist  Registered Nurse  After treatment position/precautions:   Up in chair  Call light within reach  RN notified   Compliance with Program/Exercises: Will assess as treatment progresses  Recommendations/Intent for next treatment session: \"Next visit will focus on advancements to more challenging activities and reduction in assistance provided\".   Total Treatment Duration:  PT Patient Time In/Time Out  Time In: 0910  Time Out: [id number] [facility]

## 2019-08-28 NOTE — PROGRESS NOTES
Problem: Risk for Spread of Infection  Goal: Prevent transmission of infectious organism to others  Description  Prevent the transmission of infectious organisms to other patients, staff members, and visitors. Outcome: Progressing Towards Goal     Problem: Patient Education:  Go to Education Activity  Goal: Patient/Family Education  Outcome: Progressing Towards Goal     Problem: Falls - Risk of  Goal: *Absence of Falls  Description  Document Brenda Garcia Fall Risk and appropriate interventions in the flowsheet. Outcome: Progressing Towards Goal  Note:   Fall Risk Interventions:  Mobility Interventions: Bed/chair exit alarm, Communicate number of staff needed for ambulation/transfer, Patient to call before getting OOB         Medication Interventions: Bed/chair exit alarm, Patient to call before getting OOB, Teach patient to arise slowly    Elimination Interventions: Bed/chair exit alarm, Call light in reach, Patient to call for help with toileting needs, Stay With Me (per policy), Toilet paper/wipes in reach, Toileting schedule/hourly rounds              Problem: Patient Education: Go to Patient Education Activity  Goal: Patient/Family Education  Outcome: Progressing Towards Goal     Problem: Pressure Injury - Risk of  Goal: *Prevention of pressure injury  Description  Document Randolph Scale and appropriate interventions in the flowsheet.   Outcome: Progressing Towards Goal  Note:   Pressure Injury Interventions:  Sensory Interventions: Assess changes in LOC, Avoid rigorous massage over bony prominences    Moisture Interventions: Absorbent underpads, Check for incontinence Q2 hours and as needed    Activity Interventions: Increase time out of bed, Pressure redistribution bed/mattress(bed type), PT/OT evaluation    Mobility Interventions: HOB 30 degrees or less, Pressure redistribution bed/mattress (bed type), PT/OT evaluation    Nutrition Interventions: Document food/fluid/supplement intake, Offer support with meals,snacks and hydration    Friction and Shear Interventions: HOB 30 degrees or less                Problem: Patient Education: Go to Patient Education Activity  Goal: Patient/Family Education  Outcome: Progressing Towards Goal     Problem: Patient Education: Go to Patient Education Activity  Goal: Patient/Family Education  Outcome: Progressing Towards Goal     Problem: TIA/CVA Stroke: 0-24 hours  Goal: Off Pathway (Use only if patient is Off Pathway)  Outcome: Progressing Towards Goal  Goal: Activity/Safety  Outcome: Progressing Towards Goal  Goal: Consults, if ordered  Outcome: Progressing Towards Goal  Goal: Diagnostic Test/Procedures  Outcome: Progressing Towards Goal  Goal: Nutrition/Diet  Outcome: Progressing Towards Goal  Goal: Discharge Planning  Outcome: Progressing Towards Goal  Goal: Medications  Outcome: Progressing Towards Goal  Goal: Respiratory  Outcome: Progressing Towards Goal  Goal: Treatments/Interventions/Procedures  Outcome: Progressing Towards Goal  Goal: Minimize risk of bleeding post-thrombolytic infusion  Outcome: Progressing Towards Goal  Goal: Monitor for complications post-thrombolytic infusion  Outcome: Progressing Towards Goal  Goal: Psychosocial  Outcome: Progressing Towards Goal  Goal: *Hemodynamically stable  Outcome: Progressing Towards Goal  Goal: *Neurologically stable  Description  Absence of additional neurological deficits    Outcome: Progressing Towards Goal  Goal: *Verbalizes anxiety and depression are reduced or absent  Outcome: Progressing Towards Goal  Goal: *Absence of Signs of Aspiration on Current Diet  Outcome: Progressing Towards Goal  Goal: *Absence of deep venous thrombosis signs and symptoms(Stroke Metric)  Outcome: Progressing Towards Goal  Goal: *Ability to perform ADLs and demonstrates progressive mobility and function  Outcome: Progressing Towards Goal  Goal: *Stroke education started(Stroke Metric)  Outcome: Progressing Towards Goal  Goal: *Dysphagia screen performed(Stroke Metric)  Outcome: Progressing Towards Goal  Goal: *Rehab consulted(Stroke Metric)  Outcome: Progressing Towards Goal     Problem: TIA/CVA Stroke: Day 2 Until Discharge  Goal: Off Pathway (Use only if patient is Off Pathway)  Outcome: Progressing Towards Goal  Goal: Activity/Safety  Outcome: Progressing Towards Goal  Goal: Diagnostic Test/Procedures  Outcome: Progressing Towards Goal  Goal: Nutrition/Diet  Outcome: Progressing Towards Goal  Goal: Discharge Planning  Outcome: Progressing Towards Goal  Goal: Medications  Outcome: Progressing Towards Goal  Goal: Respiratory  Outcome: Progressing Towards Goal  Goal: Treatments/Interventions/Procedures  Outcome: Progressing Towards Goal  Goal: Psychosocial  Outcome: Progressing Towards Goal  Goal: *Verbalizes anxiety and depression are reduced or absent  Outcome: Progressing Towards Goal  Goal: *Absence of aspiration  Outcome: Progressing Towards Goal  Goal: *Absence of deep venous thrombosis signs and symptoms(Stroke Metric)  Outcome: Progressing Towards Goal  Goal: *Optimal pain control at patient's stated goal  Outcome: Progressing Towards Goal  Goal: *Tolerating diet  Outcome: Progressing Towards Goal  Goal: *Ability to perform ADLs and demonstrates progressive mobility and function  Outcome: Progressing Towards Goal  Goal: *Stroke education continued(Stroke Metric)  Outcome: Progressing Towards Goal     Problem: Ischemic Stroke: Discharge Outcomes  Goal: *Verbalizes anxiety and depression are reduced or absent  Outcome: Progressing Towards Goal  Goal: *Verbalize understanding of risk factor modification(Stroke Metric)  Outcome: Progressing Towards Goal  Goal: *Hemodynamically stable  Outcome: Progressing Towards Goal  Goal: *Absence of aspiration pneumonia  Outcome: Progressing Towards Goal  Goal: *Aware of needed dietary changes  Outcome: Progressing Towards Goal  Goal: *Verbalize understanding of prescribed medications including anti-coagulants, anti-lipid, and/or anti-platelets(Stroke Metric)  Outcome: Progressing Towards Goal  Goal: *Tolerating diet  Outcome: Progressing Towards Goal  Goal: *Aware of follow-up diagnostics related to anticoagulants  Outcome: Progressing Towards Goal  Goal: *Ability to perform ADLs and demonstrates progressive mobility and function  Outcome: Progressing Towards Goal  Goal: *Absence of DVT(Stroke Metric)  Outcome: Progressing Towards Goal  Goal: *Absence of aspiration  Outcome: Progressing Towards Goal  Goal: *Optimal pain control at patient's stated goal  Outcome: Progressing Towards Goal  Goal: *Home safety concerns addressed  Outcome: Progressing Towards Goal  Goal: *Describes available resources and support systems  Outcome: Progressing Towards Goal  Goal: *Verbalizes understanding of activation of EMS(911) for stroke symptoms(Stroke Metric)  Outcome: Progressing Towards Goal  Goal: *Understands and describes signs and symptoms to report to providers(Stroke Metric)  Outcome: Progressing Towards Goal  Goal: *Neurolgocially stable (absence of additional neurological deficits)  Outcome: Progressing Towards Goal  Goal: *Verbalizes importance of follow-up with primary care physician(Stroke Metric)  Outcome: Progressing Towards Goal  Goal: *Smoking cessation discussed,if applicable(Stroke Metric)  Outcome: Progressing Towards Goal  Goal: *Depression screening completed(Stroke Metric)  Outcome: Progressing Towards Goal

## 2019-08-28 NOTE — PROGRESS NOTES
08/28/19 1934   NIH Stroke Scale   Interval Other (comment)   LOC 0   LOC Questions 0   LOC Commands 0   Best Gaze 0   Visual 1   Facial Palsy 0   Motor Right Arm 0   Motor Left Arm 0   Motor Right Leg 0   Motor Left Leg 0   Limb Ataxia 0   Sensory 0   Best Language 0   Dysarthria 0   Extinction and Inattention 0   Total 1    Dual NIH with JOSÉ ANTONIO Restrepo

## 2019-08-28 NOTE — PROGRESS NOTES
PROGRESS NOTE    I was paged earlier about the patient having persistent fever despite getting Tylenol earlier. The patient has no new complaints, but now she meets SIRS criteria with WBC 18.4 + T101.7F + HR 99. Will check:  Blood culture x 2  UA + Urine culture  Lactic Acid (normal at admission)    CXR clear  Stool sample not given yet for C. Diff testing    Will not start antibiotics at this point, but will have low threshold if fever persists.     Maryjo Freed, DO

## 2019-08-28 NOTE — PROGRESS NOTES
[facility] CARDIOLOGY PROGRESS NOTE           8/28/2019 7:07 PM    Admit Date: 8/27/2019      Subjective:   No further chest pain. Feverish    ROS:  Cardiovascular:  As noted above    Objective:      Vitals:    08/28/19 0805 08/28/19 1151 08/28/19 1207 08/28/19 1600   BP:   160/76 117/66   Pulse:   88 93   Resp:   19 19   Temp:  99 °F (37.2 °C) 99 °F (37.2 °C) (!) 102.7 °F (39.3 °C)   SpO2: 90%  90% 98%   Weight:       Height:           Physical Exam:  General- does not look well  Neck- supple, no JVD  CV- regular rate and rhythm no MRG  Lung- clear bilaterally  Abd- RUQ is tender  Ext- no edema bilaterally. Skin- warm and dry    Data Review:   Recent Labs     08/28/19  0612 08/27/19  2034  08/27/19  1224     --   --  137   K 3.0*  --   --  2.4*   MG  --   --   --  1.6*   BUN 32*  --   --  34*   CREA 1.83*  --   --  2.20*   GLU 92  --   --  123*   WBC 14.6*  --   --  18.4*   HGB 10.3*  --   --  10.7*   HCT 32.6*  --   --  32.1*   *  --   --  168   TROIQ 0.21* 0.37*   < > 0.82*    < > = values in this interval not displayed. Echo ok    Assessment/Plan:     Fever>> abdominal tenderness, recent bile stone pancreatitis, liver cystic lesion >> ? Liver abscess    ////    As per IM. Will need cardiology fu after discharge. On standby.       Doran Sandifer, MD  8/28/2019 7:07 PM

## 2019-08-28 NOTE — CDMP QUERY
Pt admitted with TIA./Pt noted to have acute organ dysfunction by having a acute kidney failure, pt has had a source of infection by having a chronic UTI, elevated WBCs, elevated tempeture and tachycardia. If possible, please document in the progress notes and d/c summary if you are evaluating and / or treating any of the following:     Sepsis, present on admission, suspected or probable causative organism (please specify)   Sepsis, now resolved, suspected or probable causative organism (please specify)   Sepsis, not present on admission, suspected or probable causative organism (please specify)   No Sepsis, suspected or probable localized infection (please specify)   Sepsis was ruled out (include corresponding diagnosis for patients clinical picture and treatment)   Other, please specify   Clinically unable to determine    The medical record reflects the following:     Risk Factors: age, history of UTI, liver mass and fibromyalgia, currently admitted with a TIA and SUSANNAH     Clinical Indicators: per H&P written on 8/27 @ 1558 by OZZY Samuels \"SUSANNAH  -Monitor Cr  -Gentle IVF  -Avoid renal toxins\", per consult written on 8/27 @ 5462 by AVERY Rich has done poorly since she was on an antibiotic for a UTI which she thinks caused multiple side effects including N/V/D, poor sleep, headache, slurred speech x 2 weeks, and weakness. \", per progress note written on 8/28 @ [phone] by Dr. Tori eLe patient has no new complaints, but now she meets SIRS criteria with WBC 18.4 + T101.7F + HR 99.\", WBC-18.4 on 8/27 @ 1224, Urinalysis showed trace bacteria on 8/28 @ 0604, WBC-14.6 on 8/28 @ 0612, T-100.9 with HR-100 on 8/27 @ 2035, T-101.7 with HR-99 on 8/27 @ 2321      Treatment: IV hydration, Blood culture x 2, UA + urine culture, lactic acid, stool sample     Thanks,  Leigh Gutiérrez, BSN, RN, CDS  Compliant Documentation Management Program  [phone]

## 2019-08-28 NOTE — PROGRESS NOTES
Hospitalist Progress Note     Admit Date:  2019 12:27 PM   Name:  Moses Sharp   Age:  77 y.o.  :  1950   MRN:  [id number]   PCP:  Magno Bahena MD  Treatment Team: Attending Provider: Carlos Velazquez MD; Physician: Maryan Jackson MD; Consulting Provider: Дмитрий Diaz MD; Utilization Review: Víctor Koo RN; Care Manager: Ca Navarrete RN    Subjective:   CC:    From Admission HPI:      Objective:     Patient Vitals for the past 24 hrs:   Temp Pulse Resp BP SpO2   19 1600 (!) 102.7 °F (39.3 °C) 93 19 117/66 98 %   19 1207 99 °F (37.2 °C) 88 19 160/76 90 %   19 1151 99 °F (37.2 °C)       19 0805     90 %   19 0800 98 °F (36.7 °C) 68 17 115/62 90 %   19 0331 98.5 °F (36.9 °C) 66 16 117/64 91 %   19 0152 98.8 °F (37.1 °C)       19 2321 (!) 101.7 °F (38.7 °C) 99 16 126/61 90 %   19 (!) 100.9 °F (38.3 °C) 100 14 137/77 91 %     Oxygen Therapy  O2 Sat (%): 98 % (19 1600)  Pulse via Oximetry: 100 beats per minute (19)  O2 Device: Room air (19 0805)    Intake/Output Summary (Last 24 hours) at 2019 1737  Last data filed at 2019 1806  Gross per 24 hour   Intake 150 ml   Output    Net 150 ml         REVIEW OF SYSTEMS: Comprehensive ROS performed and negative except as stated in HPI. Physical Examination:  General:    Well nourished. Awake and alert. Head:  Normocephalic, atraumatic  Eyes:  Extraocular movements intact, normal sclera  CV:   RRR. No  Murmurs, clicks, or gallops  Lungs:   Unlabored, no cyanosis  Abdomen:   Soft, nondistended, nontender. Extremities: Warm and dry. No cyanosis or edema. Skin:     No rashes or jaundice. Neuro:  No gross focal deficits  Psych:  Mood and affect appropriate    Data Review:  I have reviewed all labs, meds, telemetry events, and studies from the last 24 hours.     Recent Results (from the past 24 hour(s))   TROPONIN I Collection Time: 08/27/19  8:34 PM   Result Value Ref Range    Troponin-I, Qt. 0.37 (HH) 0.02 - 0.05 NG/ML   COLLECTION COMMENT    Collection Time: 08/27/19  8:34 PM   Result Value Ref Range    Collection Comment PER CHARGE RN Q6HR    LACTIC ACID    Collection Time: 08/28/19 12:30 AM   Result Value Ref Range    Lactic acid 1.4 0.4 - 2.0 MMOL/L   URINALYSIS W/ RFLX MICROSCOPIC    Collection Time: 08/28/19  6:04 AM   Result Value Ref Range    Color YELLOW      Appearance CLOUDY      Specific gravity 1.013 1.001 - 1.023      pH (UA) 5.5 5.0 - 9.0      Protein TRACE (A) NEG mg/dL    Glucose NEGATIVE  mg/dL    Ketone NEGATIVE  NEG mg/dL    Bilirubin NEGATIVE  NEG      Blood NEGATIVE  NEG      Urobilinogen 0.2 0.2 - 1.0 EU/dL    Nitrites NEGATIVE  NEG      Leukocyte Esterase NEGATIVE  NEG      WBC 20-50 0 /hpf    RBC 0-3 0 /hpf    Epithelial cells 0-3 0 /hpf    Bacteria TRACE 0 /hpf    Casts 4-48 0 /lpf   METABOLIC PANEL, BASIC    Collection Time: 08/28/19  6:12 AM   Result Value Ref Range    Sodium 139 136 - 145 mmol/L    Potassium 3.0 (L) 3.5 - 5.1 mmol/L    Chloride 104 98 - 107 mmol/L    CO2 25 21 - 32 mmol/L    Anion gap 10 7 - 16 mmol/L    Glucose 92 65 - 100 mg/dL    BUN 32 (H) 8 - 23 MG/DL    Creatinine 1.83 (H) 0.6 - 1.0 MG/DL    GFR est AA 35 (L) >60 ml/min/1.73m2    GFR est non-AA 29 (L) >60 ml/min/1.73m2    Calcium 7.6 (L) 8.3 - 10.4 MG/DL   TROPONIN I    Collection Time: 08/28/19  6:12 AM   Result Value Ref Range    Troponin-I, Qt. 0.21 (HH) 0.02 - 0.05 NG/ML   CBC WITH AUTOMATED DIFF    Collection Time: 08/28/19  6:12 AM   Result Value Ref Range    WBC 14.6 (H) 4.3 - 11.1 K/uL    RBC 3.29 (L) 4.05 - 5.2 M/uL    HGB 10.3 (L) 11.7 - 15.4 g/dL    HCT 32.6 (L) 35.8 - 46.3 %    MCV 99.1 (H) 79.6 - 97.8 FL    MCH 31.3 26.1 - 32.9 PG    MCHC 31.6 31.4 - 35.0 g/dL    RDW 14.6 11.9 - 14.6 %    PLATELET 998 (L) 092 - 450 K/uL    MPV 9.1 (L) 9.4 - 12.3 FL    ABSOLUTE NRBC 0.00 0.0 - 0.2 K/uL    NEUTROPHILS 73 43 - 78 % LYMPHOCYTES 21 13 - 44 %    MONOCYTES 3 (L) 4.0 - 12.0 %    EOSINOPHILS 0 (L) 0.5 - 7.8 %    BASOPHILS 1 0.0 - 2.0 %    IMMATURE GRANULOCYTES 2 0.0 - 5.0 %    ABS. NEUTROPHILS 10.7 (H) 1.7 - 8.2 K/UL    ABS. LYMPHOCYTES 3.1 0.5 - 4.6 K/UL    ABS. MONOCYTES 0.4 0.1 - 1.3 K/UL    ABS. EOSINOPHILS 0.0 0.0 - 0.8 K/UL    ABS. BASOPHILS 0.1 0.0 - 0.2 K/UL    ABS. IMM.  GRANS. 0.3 0.0 - 0.5 K/UL    DF AUTOMATED          All Micro Results     Procedure Component Value Units Date/Time    CULTURE, URINE [[id number]] Collected:  08/28/19 0604    Order Status:  Completed Specimen:  Urine from Clean catch Updated:  08/28/19 1037    CULTURE, BLOOD [[id number]] Collected:  08/28/19 0030    Order Status:  Completed Specimen:  Blood Updated:  08/28/19 0336    CULTURE, BLOOD [[id number]] Collected:  08/28/19 0030    Order Status:  Completed Specimen:  Blood Updated:  08/28/19 0336    C. DIFFICILE AG & TOXIN A/B [[id number]]     Order Status:  Sent Specimen:  Stool           Current Meds:  Current Facility-Administered Medications   Medication Dose Route Frequency    cefTRIAXone (ROCEPHIN) 1 g in 0.9% sodium chloride (MBP/ADV) 50 mL  1 g IntraVENous Q24H    gabapentin (NEURONTIN) capsule 600 mg  600 mg Oral TID    montelukast (SINGULAIR) tablet 10 mg  10 mg Oral QPM    DULoxetine (CYMBALTA) capsule 30 mg  30 mg Oral DAILY    sodium chloride (NS) flush 5-40 mL  5-40 mL IntraVENous Q8H    sodium chloride (NS) flush 5-40 mL  5-40 mL IntraVENous PRN    acetaminophen (TYLENOL) tablet 650 mg  650 mg Oral Q4H PRN    naloxone (NARCAN) injection 0.4 mg  0.4 mg IntraVENous PRN    diphenhydrAMINE (BENADRYL) capsule 25 mg  25 mg Oral Q4H PRN    ondansetron (ZOFRAN) injection 4 mg  4 mg IntraVENous Q4H PRN    bisacodyl (DULCOLAX) tablet 10 mg  10 mg Oral DAILY PRN    LORazepam (ATIVAN) tablet 0.5 mg  0.5 mg Oral Q4H PRN    tuberculin injection 5 Units  5 Units IntraDERMal ONCE    0.9% sodium chloride infusion  100 mL/hr IntraVENous CONTINUOUS    HYDROcodone-acetaminophen (NORCO) 5-325 mg per tablet 1 Tab  1 Tab Oral Q4H PRN    heparin (porcine) injection 5,000 Units  5,000 Units SubCUTAneous Q8H    pantoprazole (PROTONIX) tablet 40 mg  40 mg Oral ACB&D       Diet:  DIET REGULAR    Other Studies (last 24 hours):  No results found. Assessment and Plan:     Hospital Problems as of 8/28/2019 Date Reviewed: 8/27/2019          Codes Class Noted - Resolved POA    Chest pain ICD-10-CM: R07.9  ICD-9-CM: 786.50  8/27/2019 - Present Unknown        Elevated troponin ICD-10-CM: R74.8  ICD-9-CM: 790.6  8/27/2019 - Present Unknown        Fibromyalgia ICD-10-CM: M79.7  ICD-9-CM: 729.1  8/27/2019 - Present Unknown        Headache ICD-10-CM: R51  ICD-9-CM: 784.0  8/27/2019 - Present Unknown        Liver mass ICD-10-CM: R16.0  ICD-9-CM: 573.9  8/27/2019 - Present Unknown        Hypokalemia ICD-10-CM: E87.6  ICD-9-CM: 276.8  8/27/2019 - Present Unknown        Acute renal failure (ARF) (HCC) ICD-10-CM: N17.9  ICD-9-CM: 584.9  8/27/2019 - Present Unknown        * (Principal) TIA (transient ischemic attack) ICD-10-CM: G45.9  ICD-9-CM: 435.9  8/27/2019 - Present Yes              A/P:    TIA  -Carotid doppler  -MRI  -PT/OT/SLP/CM    Slurred speech  -Ammonia 30s  -CT brain with no acute disease  -Get MRI brain/stroke work up     Chest pain with elevated troponin  -Troponin trending down  -remote tele  -Pt with prolonged QT  -Cardiology consult     Hypokalemia  -Likely due to chronic diarrhea  -Replace K+ and Mag and monitor     SUSANNAH  -Monitor Cr  -Gentle IVF  -Avoid renal toxins    Chronic diarrhea  -leukocytes 18.4  -Check stool for c-diff - pt was on antibx for UTI     Liver mass  -Workup in process per GI     Fibromyalgia  -Cont home meds     Code status:  Full  Decision Maker: No MPOA    Case reviewed with supervising physician - MYESHA Sevilla MD     Signed:  MARY Perdue

## 2019-08-28 NOTE — PROGRESS NOTES
08/28/19 1600   Vital Signs   Temp (!) 102.7 °F (39.3 °C)   Temp Source Oral   Pulse (Heart Rate) 93   Heart Rate Source Monitor   Resp Rate 19   O2 Sat (%) 98 %   Level of Consciousness Alert   /66   MAP (Calculated) 83   BP 1 Method Automatic   BP 1 Location Left arm   BP Patient Position At rest   MEWS Score 3     NP notified about MEWs. Medication given for fever.

## 2019-08-28 NOTE — PROGRESS NOTES
08/28/19 0718   NIH Stroke Scale   Interval   (Dual Shift Change Assessment)   LOC 0   LOC Questions 0   LOC Commands 0   Best Gaze 0   Visual 1   Facial Palsy 0   Motor Right Arm 0   Motor Left Arm 0   Motor Right Leg 0   Motor Left Leg 0   Limb Ataxia 0   Sensory 0   Best Language 0   Dysarthria 0   Extinction and Inattention 0   Total 1

## 2019-08-28 NOTE — PROGRESS NOTES
08/27/19 2321   Vital Signs   Temp (!) 101.7 °F (38.7 °C)   Temp Source Axillary   Pulse (Heart Rate) 99   Resp Rate 16   O2 Sat (%) 90 %   Level of Consciousness Alert   /61   MAP (Calculated) 83   BP 1 Method Automatic   BP 1 Location Left arm   BP Patient Position At rest   MEWS Score 3      notified of patient's elevated temp. Despite Tylenol administrated. States that he will place orders. Will continue to monitor.

## 2019-08-28 NOTE — PROGRESS NOTES
Met with pt and sister at bedside, pt states she has currently been staying with older son Kole Rosario but after this DC she will be going to younger sons home Maria D Nunez [phone] d/t no steps in home, pt states she normally lives alone in her own 1 level home with 3 steps to enter, no current DME in home, independent with ADLs and drives. Pt is current with [facility] will resume if needed at time of DC. Per PT recommendation no current needs for PT/ST, OT eval pending. CM will continue to follow. Care Management Interventions  PCP Verified by CM:  Yes  Transition of Care Consult (CM Consult): Discharge Planning  Current Support Network: Own Home, Lives Alone  Confirm Follow Up Transport: Family  Plan discussed with Pt/Family/Caregiver: Yes  Freedom of Choice Offered: Yes  Discharge Location  Discharge Placement: Home

## 2019-08-28 NOTE — PROGRESS NOTES
Problem: Risk for Spread of Infection  Goal: Prevent transmission of infectious organism to others  Description  Prevent the transmission of infectious organisms to other patients, staff members, and visitors. Outcome: Progressing Towards Goal     Problem: Falls - Risk of  Goal: *Absence of Falls  Description  Document Kane Mayorga Fall Risk and appropriate interventions in the flowsheet. Outcome: Progressing Towards Goal  Note:   Fall Risk Interventions:  Mobility Interventions: Bed/chair exit alarm, Communicate number of staff needed for ambulation/transfer, Patient to call before getting OOB         Medication Interventions: Evaluate medications/consider consulting pharmacy, Bed/chair exit alarm, Teach patient to arise slowly, Patient to call before getting OOB    Elimination Interventions: Call light in reach, Bed/chair exit alarm, Patient to call for help with toileting needs              Problem: Pressure Injury - Risk of  Goal: *Prevention of pressure injury  Description  Document Randolph Scale and appropriate interventions in the flowsheet.   Outcome: Progressing Towards Goal  Note:   Pressure Injury Interventions:  Sensory Interventions: Assess changes in LOC         Activity Interventions: Increase time out of bed, Pressure redistribution bed/mattress(bed type), PT/OT evaluation    Mobility Interventions: HOB 30 degrees or less, Pressure redistribution bed/mattress (bed type), PT/OT evaluation    Nutrition Interventions: Document food/fluid/supplement intake

## 2019-08-28 NOTE — PROGRESS NOTES
Nutrition:  BPA for EN/PN PTA. The patient has not received tube feeding or TPN prior to admission. RD to follow up per protocol. This has been changed in MST screen flowsheet as well. Svetlana Goodman Members, [facility], [address] [place] [address], LD   [phone]

## 2019-08-28 NOTE — PROGRESS NOTES
STG: Patient will participate in speech/language/cognitive evaluation if deemed appropriate     SPEECH LANGUAGE PATHOLOGY: DYSPHAGIA- Initial Assessment    NAME/AGE/GENDER: Elliot Hayden is a 77 y.o. female  DATE: 8/28/2019  PRIMARY DIAGNOSIS: TIA (transient ischemic attack) [G45.9]      ICD-10: Treatment Diagnosis: R13.12 Dysphagia, Oropharyngeal Phase    INTERDISCIPLINARY COLLABORATION: Registered Nurse  PRECAUTIONS/ALLERGIES: Sulfa (sulfonamide antibiotics); Aspirin; Celecoxib; Metronidazole; Morphine; and Naproxen       SUBJECTIVE   Patient upright in bed, ate all of breakfast.   Reports intermittent slurred speech for a few weeks. During this encounter, patient reports speech at baseline. Diet Prior to Evaluation: regular diet/thin liquids      History of Present Injury/Illness: Ms. Deedee Mora  has no past medical history on file. . She also  has a past surgical history that includes hx cholecystectomy; hx tubal ligation; and hx hysterectomy. Previous Dysphagia: NONE REPORTED    Problem List:  (Impairments causing functional limitations):  1. Oropharyngeal dysphagia- No symptoms identified  2. Complaints of intermittent slurred speech ~2 weeks     Orientation:   Person  Place  Time  Situation     Pain: Pain Scale 1: Numeric (0 - 10)  Pain Intensity 1: 0         OBJECTIVE   Oral Motor Assessment:  · Labial: No impairment  · Dentition: Intact  · Oral Hygiene: Adequate  · Lingual: No impairment  100% intelligible    Swallow assessment:   Patient presented with thin liquid via cup and straw, puree, mixed, and solid consistencies. Appropriate oral prep with all textures. Timely swallow initiation, and single swallows upon palpation. Adequate oral clearing. No overt signs or symptoms of airway compromise observed with liquid or solid textures. ASSESSMENT   Patient presents with normal oropharyngeal swallow function. Recommend continue current diet: regular/thin liquids. Medications with liquid wash.  No dysphagia treatment warranted. Tool Used: Dysphagia Outcome and Severity Scale (TERRELL)    Score Comments   Normal Diet  [x] 7 With no strategies or extra time needed   Functional Swallow  [] 6 May have mild oral or pharyngeal delay   Mild Dysphagia  [] 5 Which may require one diet consistency restricted    Mild-Moderate Dysphagia  [] 4 With 1-2 diet consistencies restricted   Moderate Dysphagia  [] 3 With 2 or more diet consistencies restricted   Moderate-Severe Dysphagia  [] 2 With partial PO strategies (trials with ST only)   Severe Dysphagia  [] 1 With inability to tolerate any PO safely      Score:  Initial: 7 Most Recent:  (Date 08/28/19 )   Interpretation of Tool: The Dysphagia Outcome and Severity Scale (TERRELL) is a simple, easy-to-use, 7-point scale developed to systematically rate the functional severity of dysphagia based on objective assessment and make recommendations for diet level, independence level, and type of nutrition. Current Medications:   No current facility-administered medications on file prior to encounter. Current Outpatient Medications on File Prior to Encounter   Medication Sig Dispense Refill    tacrolimus (PROTOPIC) 0.1 % ointment Apply  to affected area two (2) times a day. 30 g 1    folic acid (FOLVITE) 1 mg tablet Take 1 Tab by mouth daily. 90 Tab 3    esomeprazole (NEXIUM) 40 mg capsule Take 1 Cap by mouth daily. 30 Cap 5    montelukast (SINGULAIR) 10 mg tablet Take 1 Tab by mouth every evening. 30 Tab 5    DULoxetine (CYMBALTA) 30 mg capsule Take 1 Cap by mouth daily. 90 Cap 5    gabapentin (NEURONTIN) 600 mg tablet Take 1 Tab by mouth three (3) times daily. 270 Tab 5    pravastatin (PRAVACHOL) 20 mg tablet Take 1 Tab by mouth nightly. 90 Tab 5    hydrocortisone (ANUSOL-HC) 2.5 % rectal cream Insert  into rectum four (4) times daily. 30 g 2         PLAN    FREQUENCY/DURATION: Speech therapy to follow up for assessment of cognitive-linguistic function.   if appropriate        REHABILITATION POTENTIAL FOR STATED GOALS: Excellent     COMPLIANCE WITH PROGRAM/EXERCISES: Will assess as treatment progresses    CONTINUATION OF SKILLED SERVICES/MEDICAL NECESSITY:   No dysphagia treatment indicated as oropharyngeal swallow within normal limits    May benefit from full speech/cognitive evaluation if deemed appropriate medical pending workup         RECOMMENDATIONS   DIET:    continue prescribed diet   PO:  Regular   Liquids:  regular thin    MEDICATIONS: With liquid     ASPIRATION PRECAUTIONS  · Slow rate of intake  · Small bites/sips  · Upright at 90 degrees during meal     EDUCATION:  · Recommendations discussed with Nursing  · Patient     RECOMMENDATIONS for CONTINUED SPEECH THERAPY:   · To be determined       SAFETY:  After treatment position/precautions:  · Upright in bed  · RN notified  · Call light within reach      Total Treatment Duration:   Time In: 0901  Time Out: 1000 [facility] CCC-SLP

## 2019-08-29 ENCOUNTER — APPOINTMENT (OUTPATIENT)
Dept: MRI IMAGING | Age: 69
DRG: 871 | End: 2019-08-29
Attending: NURSE PRACTITIONER
Payer: MEDICARE

## 2019-08-29 ENCOUNTER — APPOINTMENT (OUTPATIENT)
Dept: GENERAL RADIOLOGY | Age: 69
DRG: 871 | End: 2019-08-29
Attending: NURSE PRACTITIONER
Payer: MEDICARE

## 2019-08-29 LAB
MM INDURATION POC: 0 MM (ref 0–5)
PPD POC: NEGATIVE NEGATIVE

## 2019-08-29 PROCEDURE — 77030027138 HC INCENT SPIROMETER -A

## 2019-08-29 PROCEDURE — 71045 X-RAY EXAM CHEST 1 VIEW: CPT

## 2019-08-29 PROCEDURE — 77010033678 HC OXYGEN DAILY

## 2019-08-29 PROCEDURE — 97530 THERAPEUTIC ACTIVITIES: CPT

## 2019-08-29 PROCEDURE — 94760 N-INVAS EAR/PLS OXIMETRY 1: CPT

## 2019-08-29 PROCEDURE — 74011250636 HC RX REV CODE- 250/636: Performed by: INTERNAL MEDICINE

## 2019-08-29 PROCEDURE — 77030013140 HC MSK NEB VYRM -A

## 2019-08-29 PROCEDURE — 74011000258 HC RX REV CODE- 258: Performed by: INTERNAL MEDICINE

## 2019-08-29 PROCEDURE — 94640 AIRWAY INHALATION TREATMENT: CPT

## 2019-08-29 PROCEDURE — 70551 MRI BRAIN STEM W/O DYE: CPT

## 2019-08-29 PROCEDURE — 74011250637 HC RX REV CODE- 250/637: Performed by: NURSE PRACTITIONER

## 2019-08-29 PROCEDURE — 77030020255 HC SOL INJ LR 1000ML BG

## 2019-08-29 PROCEDURE — 74011250636 HC RX REV CODE- 250/636: Performed by: HOSPITALIST

## 2019-08-29 PROCEDURE — 74011250636 HC RX REV CODE- 250/636: Performed by: NURSE PRACTITIONER

## 2019-08-29 PROCEDURE — 74011000250 HC RX REV CODE- 250: Performed by: NURSE PRACTITIONER

## 2019-08-29 PROCEDURE — 77030020263 HC SOL INJ SOD CL0.9% LFCR 1000ML

## 2019-08-29 PROCEDURE — 65660000000 HC RM CCU STEPDOWN

## 2019-08-29 PROCEDURE — 97535 SELF CARE MNGMENT TRAINING: CPT

## 2019-08-29 PROCEDURE — 97166 OT EVAL MOD COMPLEX 45 MIN: CPT

## 2019-08-29 RX ORDER — HYDROCORTISONE 25 MG/G
CREAM TOPICAL AS NEEDED
Status: DISCONTINUED | OUTPATIENT
Start: 2019-08-29 | End: 2019-09-04 | Stop reason: HOSPADM

## 2019-08-29 RX ORDER — ALBUTEROL SULFATE 0.83 MG/ML
2.5 SOLUTION RESPIRATORY (INHALATION)
Status: DISCONTINUED | OUTPATIENT
Start: 2019-08-29 | End: 2019-09-03

## 2019-08-29 RX ADMIN — SODIUM CHLORIDE 150 ML/HR: 900 INJECTION, SOLUTION INTRAVENOUS at 10:56

## 2019-08-29 RX ADMIN — MONTELUKAST SODIUM 10 MG: 10 TABLET, FILM COATED ORAL at 17:14

## 2019-08-29 RX ADMIN — PANTOPRAZOLE SODIUM 40 MG: 40 TABLET, DELAYED RELEASE ORAL at 17:13

## 2019-08-29 RX ADMIN — HEPARIN SODIUM 5000 UNITS: 5000 INJECTION INTRAVENOUS; SUBCUTANEOUS at 21:49

## 2019-08-29 RX ADMIN — PIPERACILLIN SODIUM,TAZOBACTAM SODIUM 3.38 G: 3; .375 INJECTION, POWDER, FOR SOLUTION INTRAVENOUS at 11:54

## 2019-08-29 RX ADMIN — ALBUTEROL SULFATE 2.5 MG: 2.5 SOLUTION RESPIRATORY (INHALATION) at 15:00

## 2019-08-29 RX ADMIN — Medication 5 ML: at 21:50

## 2019-08-29 RX ADMIN — ALBUTEROL SULFATE 2.5 MG: 2.5 SOLUTION RESPIRATORY (INHALATION) at 23:06

## 2019-08-29 RX ADMIN — HEPARIN SODIUM 5000 UNITS: 5000 INJECTION INTRAVENOUS; SUBCUTANEOUS at 09:59

## 2019-08-29 RX ADMIN — PIPERACILLIN SODIUM,TAZOBACTAM SODIUM 3.38 G: 3; .375 INJECTION, POWDER, FOR SOLUTION INTRAVENOUS at 20:12

## 2019-08-29 RX ADMIN — SODIUM CHLORIDE 200 ML/HR: 900 INJECTION, SOLUTION INTRAVENOUS at 03:43

## 2019-08-29 RX ADMIN — PIPERACILLIN SODIUM,TAZOBACTAM SODIUM 3.38 G: 3; .375 INJECTION, POWDER, FOR SOLUTION INTRAVENOUS at 03:36

## 2019-08-29 RX ADMIN — ALBUTEROL SULFATE 2.5 MG: 2.5 SOLUTION RESPIRATORY (INHALATION) at 12:07

## 2019-08-29 RX ADMIN — ALBUTEROL SULFATE 2.5 MG: 2.5 SOLUTION RESPIRATORY (INHALATION) at 20:30

## 2019-08-29 RX ADMIN — SODIUM CHLORIDE 500 ML: 900 INJECTION, SOLUTION INTRAVENOUS at 01:23

## 2019-08-29 RX ADMIN — SODIUM CHLORIDE 500 ML: 900 INJECTION, SOLUTION INTRAVENOUS at 23:57

## 2019-08-29 RX ADMIN — PANTOPRAZOLE SODIUM 40 MG: 40 TABLET, DELAYED RELEASE ORAL at 05:25

## 2019-08-29 RX ADMIN — SODIUM CHLORIDE 500 ML: 900 INJECTION, SOLUTION INTRAVENOUS at 03:36

## 2019-08-29 RX ADMIN — Medication 10 ML: at 14:42

## 2019-08-29 RX ADMIN — HYDROCORTISONE 2.5%: 25 CREAM TOPICAL at 16:30

## 2019-08-29 RX ADMIN — SODIUM CHLORIDE 500 ML: 900 INJECTION, SOLUTION INTRAVENOUS at 00:23

## 2019-08-29 RX ADMIN — ACETAMINOPHEN 650 MG: 325 TABLET, FILM COATED ORAL at 20:12

## 2019-08-29 RX ADMIN — ONDANSETRON 4 MG: 2 INJECTION INTRAMUSCULAR; INTRAVENOUS at 20:12

## 2019-08-29 NOTE — PROGRESS NOTES
Problem: Self Care Deficits Care Plan (Adult)  Goal: *Acute Goals and Plan of Care (Insert Text)  Description  LTG 1: Pt will be I with toileting within 3 visits to prevent skin breakdown. LTG 2: Pt will be I with LB dressing within 3 visits to reduce risk of falls. LTG 3: Pt will be I with bathing within 3 visits to promote good skin integrity. LTG 4: Pt will be I with toilet transfers within 3 visits to promote quality of life. LTG 5: Pt will be able to tolerate a 30 minute session of OT without oxygen saturation dropping less than 90% within 3 visits to demonstrate activity tolerance. OCCUPATIONAL THERAPY: Initial Assessment and Daily Note 8/29/2019  INPATIENT: OT Visit Days: 1  [place] / [place] / Product Type: Medicare /      NAME/AGE/GENDER: Bill Craig is a 77 y.o. female   PRIMARY DIAGNOSIS:  TIA (transient ischemic attack) [G45.9] TIA (transient ischemic attack)   TIA (transient ischemic attack)          ICD-10: Treatment Diagnosis:    Generalized Muscle Weakness (M62.81)   Precautions/Allergies:     Sulfa (sulfonamide antibiotics); Aspirin; Celecoxib; Metronidazole; Morphine; and Naproxen      ASSESSMENT:     Ms. Toni Helton presents in bed and agreeable to tx. Pt was I with self-care including driving and medication management. Pt is planning on staying with son upon discharge. Pt implied that she was struggling with managing medications. Pt was on 2 LPM of O2, but was weaned to RA during session and remained above 90% with 2 walks and toileting. JOSÉ ANTONIO Rene notified. Pt walked approximately 27' and 100' with IV pole with no signs of dizziness. Pt had no LOB with SBA. Pt toileted with mod A for hygiene secondary to having IV in hand. Pt demonstrates some confusion saying the year is 1902 and correcting to Eamon Keenan. Pt did know the month, date, and day of the week. Pt could benefit from skilled services to improve safety at home.       This section established at most recent assessment   PROBLEM LIST (Impairments causing functional limitations):  Decreased ADL/Functional Activities  Decreased Transfer Abilities  Decreased Ambulation Ability/Technique  Decreased Balance  Increased Shortness of Breath  Decreased Cognition   INTERVENTIONS PLANNED: (Benefits and precautions of occupational therapy have been discussed with the patient.)  Activities of daily living training  Therapeutic activity  Therapeutic exercise     TREATMENT PLAN: Frequency/Duration: Follow patient 3x a week to address above goals. Rehabilitation Potential For Stated Goals: Good     REHAB RECOMMENDATIONS (at time of discharge pending progress):    Placement: It is my opinion, based on this patient's performance to date, that Ms. Hutton may benefit from participating in 1-2 additional therapy sessions in order to continue to assess for rehab potential and then make recommendation for disposition at discharge. Equipment:   None at this time              OCCUPATIONAL PROFILE AND HISTORY:   History of Present Injury/Illness (Reason for Referral):  Please see H&P  Past Medical History/Comorbidities:   Ms. Nadja Gaspar  has no past medical history on file. Ms. Nadja Gaspar  has a past surgical history that includes hx cholecystectomy; hx tubal ligation; and hx hysterectomy. Social History/Living Environment:   Home Environment: Private residence  # Steps to Enter: 2  Rails to Enter: Yes  Hand Rails : Left  Wheelchair Ramp: No  One/Two Story Residence: One story  Living Alone: No(Moving in with son; was living alone)  Support Systems: Child(kye)  Patient Expects to be Discharged to[de-identified] Private residence  Current DME Used/Available at Home: None  Tub or Shower Type: Tub/Shower combination  Prior Level of Function/Work/Activity:  Pt was I and living alone. Pt implied she was struggling with medication management.     Number of Personal Factors/Comorbidities that affect the Plan of Care: Expanded review of therapy/medical records (1-2):  MODERATE COMPLEXITY   ASSESSMENT OF OCCUPATIONAL PERFORMANCE[de-identified]   Activities of Daily Living:   Basic ADLs (From Assessment) Complex ADLs (From Assessment)   Feeding: Independent  Oral Facial Hygiene/Grooming: Supervision  Bathing: Minimum assistance  Upper Body Dressing: Supervision  Lower Body Dressing: Supervision  Toileting: Moderate assistance     Grooming/Bathing/Dressing Activities of Daily Living                       Functional Transfers  Bathroom Mobility: Supervision/set up  Toilet Transfer : Supervision     Bed/Mat Mobility  Supine to Sit: Independent  Sit to Supine: Supervision  Sit to Stand: Supervision  Stand to Sit: Independent  Bed to Chair: Supervision     Most Recent Physical Functioning:   Gross Assessment:  AROM: Within functional limits  Strength: Within functional limits  Coordination: Within functional limits  Tone: Normal  Sensation: Intact               Posture:     Balance:  Sitting: Intact  Standing: Impaired  Standing - Static: Good  Standing - Dynamic : Good Bed Mobility:  Supine to Sit: Independent  Sit to Supine: Supervision  Wheelchair Mobility:     Transfers:  Sit to Stand: Supervision  Stand to Sit: Independent  Bed to Chair: Supervision            Patient Vitals for the past 6 hrs:   BP BP Patient Position SpO2 Pulse   19 0800 99/56 At rest 96 % 63       Mental Status  Neurologic State: Alert  Orientation Level: Oriented X4  Cognition: Follows commands                          Physical Skills Involved:  Balance  Activity Tolerance Cognitive Skills Affected (resulting in the inability to perform in a timely and safe manner):  Executive Function  Long Term Memory Psychosocial Skills Affected:  N/A    Number of elements that affect the Plan of Care: 3-5:  MODERATE COMPLEXITY   CLINICAL DECISION MAKIN [facility] [address] [address] AM-PAC 6 Clicks   Daily Activity Inpatient Short Form  How much help from another person does the patient currently need. ..  Total A Lot A Little None   1. Putting on and taking off regular lower body clothing? ? 1   ? 2   ? 3   ? 4   2. Bathing (including washing, rinsing, drying)? ? 1   ? 2   ? 3   ? 4   3. Toileting, which includes using toilet, bedpan or urinal?   ? 1   ? 2   ? 3   ? 4   4. Putting on and taking off regular upper body clothing? ? 1   ? 2   ? 3   ? 4   5. Taking care of personal grooming such as brushing teeth? ? 1   ? 2   ? 3   ? 4   6. Eating meals? ? 1   ? 2   ? 3   ? 4   © 2007, Trustees of [address] [place] [address] [address] [id number], under license to [employer]. All rights reserved      Score:  Initial: 21 Most Recent: X (Date: -- )    Interpretation of Tool:  Represents activities that are increasingly more difficult (i.e. Bed mobility, Transfers, Gait). Medical Necessity:     Skilled intervention continues to be required due to being below PLOF. Reason for Services/Other Comments:  Patient continues to require skilled intervention due to inability to take care of self   . Use of outcome tool(s) and clinical judgement create a POC that gives a: MODERATE COMPLEXITY         TREATMENT:   (In addition to Assessment/Re-Assessment sessions the following treatments were rendered)     Pre-treatment Symptoms/Complaints:    Pain: Initial:     No c/o Post Session:  No c/o     Self Care: (10): Procedure(s) (per grid) utilized to improve and/or restore self-care/home management as related to toileting. Required moderate manual A to facilitate activities of daily living skills. Therapeutic Activity: (    10): Therapeutic activities including Bed transfers, Grocery shopping and Walking to mailbox to improve mobility. Required no   to promote dynamic balance in standing.      Braces/Orthotics/Lines/Etc:   IV  O2 Device: Room air  Treatment/Session Assessment:    Response to Treatment:  Agreeable  Interdisciplinary Collaboration:   Registered Nurse  After treatment position/precautions:   Supine in bed  Bed/Chair-wheels locked  Bed in low position  Call light within reach  RN notified  Side rails x 2   Compliance with Program/Exercises: Will assess as treatment progresses. Recommendations/Intent for next treatment session: \"Next visit will focus on advancements to more challenging activities and reduction in assistance provided\".   Total Treatment Duration:  OT Patient Time In/Time Out  Time In: 0923  Time Out: 9679 [facility],

## 2019-08-29 NOTE — PROGRESS NOTES
08/29/19 0725   NIH Stroke Scale   Interval   (Dual Shift Change Assessment)   LOC 0   LOC Questions 0   LOC Commands 0   Best Gaze 0   Visual 1   Facial Palsy 0   Motor Right Arm 0   Motor Left Arm 0   Motor Right Leg 0   Motor Left Leg 0   Limb Ataxia 0   Sensory 0   Best Language 0   Dysarthria 0   Extinction and Inattention 0   Total 1

## 2019-08-29 NOTE — INTERDISCIPLINARY ROUNDS
Interdisciplinary team rounds were held 8/29/2019 with the following team members:Care Management, Nursing, Nurse Practitioner and Occupational Therapy and the patient. Plan of care discussed. See clinical pathway and/or care plan for interventions and desired outcomes.

## 2019-08-29 NOTE — PROGRESS NOTES
Problem: Risk for Spread of Infection  Goal: Prevent transmission of infectious organism to others  Description  Prevent the transmission of infectious organisms to other patients, staff members, and visitors. Outcome: Progressing Towards Goal     Problem: Patient Education:  Go to Education Activity  Goal: Patient/Family Education  Outcome: Progressing Towards Goal     Problem: Falls - Risk of  Goal: *Absence of Falls  Description  Document Candida Lightning Fall Risk and appropriate interventions in the flowsheet. Outcome: Progressing Towards Goal  Note:   Fall Risk Interventions:  Mobility Interventions: Bed/chair exit alarm, Communicate number of staff needed for ambulation/transfer, Patient to call before getting OOB         Medication Interventions: Bed/chair exit alarm, Patient to call before getting OOB, Teach patient to arise slowly    Elimination Interventions: Bed/chair exit alarm, Call light in reach, Patient to call for help with toileting needs, Stay With Me (per policy), Toilet paper/wipes in reach, Toileting schedule/hourly rounds              Problem: Patient Education: Go to Patient Education Activity  Goal: Patient/Family Education  Outcome: Progressing Towards Goal     Problem: Pressure Injury - Risk of  Goal: *Prevention of pressure injury  Description  Document Randolph Scale and appropriate interventions in the flowsheet.   Outcome: Progressing Towards Goal  Note:   Pressure Injury Interventions:  Sensory Interventions: Assess changes in LOC, Avoid rigorous massage over bony prominences    Moisture Interventions: Absorbent underpads, Check for incontinence Q2 hours and as needed    Activity Interventions: Pressure redistribution bed/mattress(bed type), Increase time out of bed, PT/OT evaluation    Mobility Interventions: HOB 30 degrees or less, Pressure redistribution bed/mattress (bed type), PT/OT evaluation    Nutrition Interventions: Document food/fluid/supplement intake, Offer support with meals,snacks and hydration    Friction and Shear Interventions: HOB 30 degrees or less                Problem: Patient Education: Go to Patient Education Activity  Goal: Patient/Family Education  Outcome: Progressing Towards Goal     Problem: Patient Education: Go to Patient Education Activity  Goal: Patient/Family Education  Outcome: Progressing Towards Goal     Problem: TIA/CVA Stroke: Day 2 Until Discharge  Goal: Off Pathway (Use only if patient is Off Pathway)  Outcome: Progressing Towards Goal  Goal: Activity/Safety  Outcome: Progressing Towards Goal  Goal: Diagnostic Test/Procedures  Outcome: Progressing Towards Goal  Goal: Nutrition/Diet  Outcome: Progressing Towards Goal  Goal: Discharge Planning  Outcome: Progressing Towards Goal  Goal: Medications  Outcome: Progressing Towards Goal  Goal: Respiratory  Outcome: Progressing Towards Goal  Goal: Treatments/Interventions/Procedures  Outcome: Progressing Towards Goal  Goal: Psychosocial  Outcome: Progressing Towards Goal  Goal: *Verbalizes anxiety and depression are reduced or absent  Outcome: Progressing Towards Goal  Goal: *Absence of aspiration  Outcome: Progressing Towards Goal  Goal: *Absence of deep venous thrombosis signs and symptoms(Stroke Metric)  Outcome: Progressing Towards Goal  Goal: *Optimal pain control at patient's stated goal  Outcome: Progressing Towards Goal  Goal: *Tolerating diet  Outcome: Progressing Towards Goal  Goal: *Ability to perform ADLs and demonstrates progressive mobility and function  Outcome: Progressing Towards Goal  Goal: *Stroke education continued(Stroke Metric)  Outcome: Progressing Towards Goal     Problem: Ischemic Stroke: Discharge Outcomes  Goal: *Verbalizes anxiety and depression are reduced or absent  Outcome: Progressing Towards Goal  Goal: *Verbalize understanding of risk factor modification(Stroke Metric)  Outcome: Progressing Towards Goal  Goal: *Hemodynamically stable  Outcome: Progressing Towards Goal  Goal: *Absence of aspiration pneumonia  Outcome: Progressing Towards Goal  Goal: *Aware of needed dietary changes  Outcome: Progressing Towards Goal  Goal: *Verbalize understanding of prescribed medications including anti-coagulants, anti-lipid, and/or anti-platelets(Stroke Metric)  Outcome: Progressing Towards Goal  Goal: *Tolerating diet  Outcome: Progressing Towards Goal  Goal: *Aware of follow-up diagnostics related to anticoagulants  Outcome: Progressing Towards Goal  Goal: *Ability to perform ADLs and demonstrates progressive mobility and function  Outcome: Progressing Towards Goal  Goal: *Absence of DVT(Stroke Metric)  Outcome: Progressing Towards Goal  Goal: *Absence of aspiration  Outcome: Progressing Towards Goal  Goal: *Optimal pain control at patient's stated goal  Outcome: Progressing Towards Goal  Goal: *Home safety concerns addressed  Outcome: Progressing Towards Goal  Goal: *Describes available resources and support systems  Outcome: Progressing Towards Goal  Goal: *Verbalizes understanding of activation of EMS(911) for stroke symptoms(Stroke Metric)  Outcome: Progressing Towards Goal  Goal: *Understands and describes signs and symptoms to report to providers(Stroke Metric)  Outcome: Progressing Towards Goal  Goal: *Neurolgocially stable (absence of additional neurological deficits)  Outcome: Progressing Towards Goal  Goal: *Verbalizes importance of follow-up with primary care physician(Stroke Metric)  Outcome: Progressing Towards Goal  Goal: *Smoking cessation discussed,if applicable(Stroke Metric)  Outcome: Progressing Towards Goal  Goal: *Depression screening completed(Stroke Metric)  Outcome: Progressing Towards Goal Ervin

## 2019-08-29 NOTE — PROGRESS NOTES
Attestation signed by Melchor Potter MD at [phone]   Agreed with NP assessment plan     Sepsis ?secondary to UTI  Persistent fever  C.difficile pending. CXR neg  UA with 20-50 WBC   Follow up cultures  Switch ceftriaxone to zosyn  Add done PCT     -Chest pain  Troponin trending down  Echo with no wall motions abnormalities  Could be ?demand ischemia due to sepsis  Likely will need further work up with cardiology, ? outpatient     -SUSANNAH  Improving  Cont gentle hydration with NS     -? TIA  Symptoms resolved per NP  MRI brain pending               Hospitalist Progress Note     Admit Date:  2019 12:27 PM   Name:  Everett Arango   Age:  77 y.o.  :  1950   MRN:  [id number]   PCP:  Dick Barbour MD  Treatment Team: Attending Provider: Melchor Potter MD; Utilization Review: Alix Rizvi RN; Care Manager: Jeannette Clinton RN; Primary Nurse: Modesto Mondragon RN; Charge Nurse: Zenaida Roberts RN; Speech Language Pathologist: Charito Leonardo; Physical Therapist: Geneva Smith DPT; Occupational Therapist: Michoacano Morris OT    Subjective:   CC:  Chest pain, facial numbness    From Admission HPI:     Pt is a 76 yo female with PMH significant for liver mass, GERD, hemorrhiods. Pt presented to the ED from her [facility] office, her son is with her. Pt reports that she had chest pain since last night, described as shart and stabbing. Nothing made it better or worse. Pt also reports some mild SOB, denied any diaphoresis. Pt notes that he speech has been slurred, son reports x 2 wks or so, feels like it is more noticeable. Pt with some facial numbness on the right. Pt with recent admit to [facility], worked up for bile duct stones with jaundice and pancreatitis, noted to have a liver mass that is being worked up. Pt also reports chronic headache x 1 wk, described as dull and throbbing.      Subjective  Pt's chest pain and facial numbness have resolved but pt continues to spike fevers.   Will change from rocephin to zosyn for better coverage. Pt agitated and shivering so one dose ativan. Objective:     Patient Vitals for the past 24 hrs:   Temp Pulse Resp BP SpO2   08/29/19 0800 97.9 °F (36.6 °C) 63 16 99/56 96 %   08/29/19 0409 98.1 °F (36.7 °C) 68 16 104/55 96 %   08/29/19 0303  (!) 58 12 (!) 83/49 97 %   08/29/19 0237  (!) 57 18 91/50 97 %   08/29/19 0157 97.6 °F (36.4 °C) (!) 58 18 (!) 80/49 97 %   08/29/19 0109 97.6 °F (36.4 °C) (!) 56  (!) 73/38 97 %   08/28/19 2350 97.7 °F (36.5 °C) 62 20 (!) 85/45 96 %   08/28/19 2335  63 18 (!) 78/45 97 %   08/28/19 2025 97.4 °F (36.3 °C) 72 18 92/57 93 %   08/28/19 2000 98.8 °F (37.1 °C) 81 18 99/58 90 %   08/28/19 1600 (!) 102.7 °F (39.3 °C) 93 19 117/66 98 %   08/28/19 1207 99 °F (37.2 °C) 88 19 160/76 90 %   08/28/19 1151 99 °F (37.2 °C)         Oxygen Therapy  O2 Sat (%): 96 % (08/29/19 0800)  Pulse via Oximetry: 100 beats per minute (08/27/19 2035)  O2 Device: Room air (08/28/19 0805)  No intake or output data in the 24 hours ending 08/29/19 0806      REVIEW OF SYSTEMS: Comprehensive ROS performed and negative except as stated in HPI. Physical Examination:  General:          Well nourished. Alert and oriented x3  Eyes:               Normal sclera. Extraocular movements intact. ENT:                Normocephalic, atraumatic. Moist mucous membranes  CV:                  RRR. No m/r/g. Lungs:             CTAB. No wheezing, rhonchi, or rales. Abdomen:        Soft, nontender, nondistended. Extremities:     Warm and dry. No cyanosis or edema. Neurologic:      No focal deficit   Skin:                No rashes or jaundice. Normal coloration  Psych:             Normal mood and affect.     Data Review:  I have reviewed all labs, meds, telemetry events, and studies from the last 24 hours.     Recent Results (from the past 24 hour(s))   PLEASE READ & DOCUMENT PPD TEST IN 24 HRS    Collection Time: 08/28/19  5:45 PM   Result Value Ref Range PPD Negative Negative    mm Induration 0 0 - 5 mm   PROCALCITONIN    Collection Time: 08/28/19  8:14 PM   Result Value Ref Range    Procalcitonin 185.5 ng/mL   GLUCOSE, POC    Collection Time: 08/28/19  8:19 PM   Result Value Ref Range    Glucose (POC) 164 (H) 65 - 100 mg/dL        All Micro Results     Procedure Component Value Units Date/Time    CULTURE, BLOOD [[id number]] Collected:  08/28/19 0030    Order Status:  Completed Specimen:  Blood Updated:  08/29/19 0755     Special Requests: --        LEFT  Antecubital       Culture result: NO GROWTH 1 DAY       CULTURE, BLOOD [[id number]] Collected:  08/28/19 0030    Order Status:  Completed Specimen:  Blood Updated:  08/29/19 0755     Special Requests: --        LEFT  HAND       Culture result: NO GROWTH 1 DAY       CULTURE, URINE [[id number]]  (Abnormal) Collected:  08/28/19 0604    Order Status:  Completed Specimen:  Urine from Clean catch Updated:  08/29/19 0725     Special Requests: NO SPECIAL REQUESTS        Culture result:       >100,000 COLONIES/mL GRAM NEGATIVE RODS                  <10,000 COLONIES/mL MIXED SKIN LEISA ISOLATED          C. DIFFICILE AG & TOXIN A/B [[id number]]     Order Status:  Sent Specimen:  Stool           Current Meds:  Current Facility-Administered Medications   Medication Dose Route Frequency    piperacillin-tazobactam (ZOSYN) 3.375 g in 0.9% sodium chloride (MBP/ADV) 100 mL  3.375 g IntraVENous Q8H    heparin (porcine) injection 5,000 Units  5,000 Units SubCUTAneous Q12H    montelukast (SINGULAIR) tablet 10 mg  10 mg Oral QPM    sodium chloride (NS) flush 5-40 mL  5-40 mL IntraVENous Q8H    sodium chloride (NS) flush 5-40 mL  5-40 mL IntraVENous PRN    acetaminophen (TYLENOL) tablet 650 mg  650 mg Oral Q4H PRN    naloxone (NARCAN) injection 0.4 mg  0.4 mg IntraVENous PRN    ondansetron (ZOFRAN) injection 4 mg  4 mg IntraVENous Q4H PRN    bisacodyl (DULCOLAX) tablet 10 mg  10 mg Oral DAILY PRN    0.9% sodium chloride infusion 150 mL/hr IntraVENous CONTINUOUS    pantoprazole (PROTONIX) tablet 40 mg  40 mg Oral ACB&D       Diet:  DIET REGULAR    Other Studies (last 24 hours):  No results found. Assessment and Plan:     Hospital Problems as of 8/29/2019 Date Reviewed: 8/27/2019          Codes Class Noted - Resolved POA    Chest pain ICD-10-CM: R07.9  ICD-9-CM: 786.50  8/27/2019 - Present Unknown        Elevated troponin ICD-10-CM: R74.8  ICD-9-CM: 790.6  8/27/2019 - Present Unknown        Fibromyalgia ICD-10-CM: M79.7  ICD-9-CM: 729.1  8/27/2019 - Present Unknown        Headache ICD-10-CM: R51  ICD-9-CM: 784.0  8/27/2019 - Present Unknown        Liver mass ICD-10-CM: R16.0  ICD-9-CM: 573.9  8/27/2019 - Present Unknown        Hypokalemia ICD-10-CM: E87.6  ICD-9-CM: 276.8  8/27/2019 - Present Unknown        Acute renal failure (ARF) (HCC) ICD-10-CM: N17.9  ICD-9-CM: 584.9  8/27/2019 - Present Unknown        * (Principal) TIA (transient ischemic attack) ICD-10-CM: G45.9  ICD-9-CM: 435.9  8/27/2019 - Present Yes              A/P:    TIA  -Carotid doppler  -MRI  -PT/OT/SLP/CM    Slurred speech  -Ammonia 30s  -CT brain with no acute disease  -Get MRI brain/stroke work up     Chest pain with elevated troponin  -Troponin trending down  -remote tele  -Pt with prolonged QT  -Cardiology consult    Fever, pt with recent admit so pneumonia could be healthcare associated   -Change rocephin to zosyn      Hypokalemia  -Likely due to chronic diarrhea  -Replace K+ and Mag and monitor     SUSANNAH  -Monitor Cr  -Gentle IVF  -Avoid renal toxins    Chronic diarrhea  -leukocytes 18.4  -Check stool for c-diff - pt was on antibx for UTI     Liver mass  -Workup in process per GI     Fibromyalgia  -Cont home meds     Code status:  Full  Decision Maker: No MPOA    Case reviewed with supervising physician - MYESHA Woodall MD     Signed:  MARY Hickman

## 2019-08-29 NOTE — PROGRESS NOTES
08/29/19 0109   Vital Signs   Temp 97.6 °F (36.4 °C)   Pulse (Heart Rate) (!) 56   O2 Sat (%) 97 %   Level of Consciousness Responds to Voice   BP (!) 73/38   MAP (Calculated) (!) 50   BP 1 Method Automatic   BP 1 Location Left arm   BP Patient Position At rest     Paged MD to inform of patient's BP, he stated to give patient anther bolus of fluids

## 2019-08-29 NOTE — PROGRESS NOTES
08/28/19 1945   NIH Stroke Scale   Interval   (Q4)   LOC 1   LOC Questions 0   LOC Commands 0   Best Gaze 0   Visual 1   Facial Palsy 0   Motor Right Arm 0   Motor Left Arm 0   Motor Right Leg 0   Motor Left Leg 0   Limb Ataxia 0   Sensory 0   Best Language 0   Dysarthria 0   Extinction and Inattention 0   Total 2

## 2019-08-29 NOTE — PROGRESS NOTES
Hospitalist Progress Note     Admit Date:  2019 12:27 PM   Name:  Kesha Quinn   Age:  77 y.o.  :  1950   MRN:  [id number]   PCP:  Inna Phoenix MD  Treatment Team: Attending Provider: Isobel Bamberger, MD; Utilization Review: Delvin Camilo RN; Care Manager: Berton Ormond, RN; Occupational Therapist: Roxanna Swift OT; Speech Language Pathologist: Marina Moya, LYLE    Subjective:   CC:  Chest pain, facial numbness    From Admission HPI:     Pt is a 76 yo female with PMH significant for liver mass, GERD, hemorrhiods. Pt presented to the ED from her [facility] office, her son is with her. Pt reports that she had chest pain since last night, described as shart and stabbing. Nothing made it better or worse. Pt also reports some mild SOB, denied any diaphoresis. Pt notes that he speech has been slurred, son reports x 2 wks or so, feels like it is more noticeable. Pt with some facial numbness on the right. Pt with recent admit to [facility], worked up for bile duct stones with jaundice and pancreatitis, noted to have a liver mass that is being worked up. Pt also reports chronic headache x 1 wk, described as dull and throbbing.      Subjective  Pt's chest pain and facial numbness have resolved but pt continues to spike fevers. Pt with GNR in urine, and GPC in blood (2/2) rocephin changed to zosyn for better coverage. Urine culture growing GPC that look like staph, will ask ID to see prior to changing antibx. Pt looks and feels much better.       Objective:     Patient Vitals for the past 24 hrs:   Temp Pulse Resp BP SpO2   19 1207     95 %   19 1200 98 °F (36.7 °C) 71 16 111/51 95 %   19 0800 97.9 °F (36.6 °C) 63 16 99/56 96 %   19 0409 98.1 °F (36.7 °C) 68 16 104/55 96 %   19 0303  (!) 58 12 (!) 83/49 97 %   19 0237  (!) 57 18 91/50 97 %   19 0157 97.6 °F (36.4 °C) (!) 58 18 (!) 80/49 97 %   19 0109 97.6 °F (36.4 °C) (!) 56  (!) 73/38 97 %   08/28/19 2350 97.7 °F (36.5 °C) 62 20 (!) 85/45 96 %   08/28/19 2335  63 18 (!) 78/45 97 %   08/28/19 2025 97.4 °F (36.3 °C) 72 18 92/57 93 %   08/28/19 2000 98.8 °F (37.1 °C) 81 18 99/58 90 %   08/28/19 1600 (!) 102.7 °F (39.3 °C) 93 19 117/66 98 %     Oxygen Therapy  O2 Sat (%): 95 % (08/29/19 1207)  Pulse via Oximetry: 77 beats per minute (08/29/19 1207)  O2 Device: Room air (08/29/19 1207)  No intake or output data in the 24 hours ending 08/29/19 1424      REVIEW OF SYSTEMS: Comprehensive ROS performed and negative except as stated in HPI. Physical Examination:  General:          Well nourished. Alert and oriented x3  Eyes:               Normal sclera. Extraocular movements intact. ENT:                Normocephalic, atraumatic. Moist mucous membranes  CV:                  RRR. No m/r/g. Lungs:             CTAB. No wheezing, rhonchi, or rales. Abdomen:        Soft, nontender, nondistended. Extremities:     Warm and dry. No cyanosis or edema. Neurologic:      No focal deficit   Skin:                No rashes or jaundice. Normal coloration  Psych:             Normal mood and affect.     Data Review:  I have reviewed all labs, meds, telemetry events, and studies from the last 24 hours.     Recent Results (from the past 24 hour(s))   PLEASE READ & DOCUMENT PPD TEST IN 24 HRS    Collection Time: 08/28/19  5:45 PM   Result Value Ref Range    PPD Negative Negative    mm Induration 0 0 - 5 mm   PROCALCITONIN    Collection Time: 08/28/19  8:14 PM   Result Value Ref Range    Procalcitonin 185.5 ng/mL   GLUCOSE, POC    Collection Time: 08/28/19  8:19 PM   Result Value Ref Range    Glucose (POC) 164 (H) 65 - 100 mg/dL        All Micro Results     Procedure Component Value Units Date/Time    CULTURE, BLOOD [[id number]] Collected:  08/28/19 0030    Order Status:  Completed Specimen:  Blood Updated:  08/29/19 1236     Special Requests: --        LEFT  Antecubital       GRAM STAIN Allyssa Arellano POS COCCI IN CHAINS         ANAEROBIC BOTTLE POSITIVE               CRITICAL RESULT NOT CALLED DUE TO PREVIOUS NOTIFICATION OF CRITICAL RESULT WITHIN THE LAST 24 HOURS. Culture result:       CULTURE IN PROGRESS,FURTHER UPDATES TO FOLLOW          BLOOD CULTURE ID PANEL [[id number]]  (Abnormal) Collected:  08/28/19 0030    Order Status:  Completed Specimen:  Blood Updated:  08/29/19 1246     Acc. no. from Micro Order [id number]     Streptococcus DETECTED        Comment: RESULTS VERIFIED, PHONED TO AND READ BACK BY  Juan Rooney RN ON 8/29/19 @1220, TA          INTERPRETATION       Gram positive cocci. Identified by realtime PCR as Streptococcus species (not agalactiae, pyogenes, or pneumoniae). Clinical Consideration       Consider discontinuation of IV vancomycin and using an anti-streptococcal beta-lactam (ceftriaxone/cefotaxime (peds))           Blood Culture PCR Testing       MULTIPLEX PCR NEGATIVE:  A negative FilmArray BCID result does not exclude the possibility of bloodstream infection.           CULTURE, BLOOD [[id number]] Collected:  08/28/19 0030    Order Status:  Completed Specimen:  Blood Updated:  08/29/19 1224     Special Requests: --        LEFT  HAND       GRAM STAIN GRAM POS COCCI IN CHAINS         PEDIATRIC BOTTLE               RESULTS VERIFIED, PHONED TO AND READ BACK BY Juan Rooney RN ON 8/29/19 @1220, TA           Culture result:       CULTURE IN [facility] UPDATES TO FOLLOW            REFER TO ACCESSION [id number] FOR BCID PANEL    CULTURE, URINE [[id number]]  (Abnormal) Collected:  08/28/19 0604    Order Status:  Completed Specimen:  Urine from Clean catch Updated:  08/29/19 0725     Special Requests: NO SPECIAL REQUESTS        Culture result:       >100,000 COLONIES/mL GRAM NEGATIVE RODS                  <10,000 COLONIES/mL MIXED SKIN LEISA ISOLATED          C. DIFFICILE AG & TOXIN A/B [[id number]]     Order Status:  Sent Specimen:  Stool           Current Meds:  Current Facility-Administered Medications   Medication Dose Route Frequency    albuterol (PROVENTIL VENTOLIN) nebulizer solution 2.5 mg  2.5 mg Nebulization Q4H RT    hydrocortisone (ANUSOL-HC) 2.5 % rectal cream   PeriANAL PRN    piperacillin-tazobactam (ZOSYN) 3.375 g in 0.9% sodium chloride (MBP/ADV) 100 mL  3.375 g IntraVENous Q8H    heparin (porcine) injection 5,000 Units  5,000 Units SubCUTAneous Q12H    montelukast (SINGULAIR) tablet 10 mg  10 mg Oral QPM    sodium chloride (NS) flush 5-40 mL  5-40 mL IntraVENous Q8H    sodium chloride (NS) flush 5-40 mL  5-40 mL IntraVENous PRN    acetaminophen (TYLENOL) tablet 650 mg  650 mg Oral Q4H PRN    naloxone (NARCAN) injection 0.4 mg  0.4 mg IntraVENous PRN    ondansetron (ZOFRAN) injection 4 mg  4 mg IntraVENous Q4H PRN    bisacodyl (DULCOLAX) tablet 10 mg  10 mg Oral DAILY PRN    0.9% sodium chloride infusion  150 mL/hr IntraVENous CONTINUOUS    pantoprazole (PROTONIX) tablet 40 mg  40 mg Oral ACB&D       Diet:  DIET REGULAR    Other Studies (last 24 hours):  Xr Chest Sngl V    Result Date: 8/29/2019  CHEST X-RAY, one view. HISTORY:  Cough and wheezing. TECHNIQUE:  AP portable upright view COMPARISON: exam 2 days prior. FINDINGS:   No lobar consolidation. Decreased atelectasis at the bases. Heart and pulmonary vasculature is unremarkable. IMPRESSION:  Decreased basilar atelectasis.        Assessment and Plan:     Hospital Problems as of 8/29/2019 Date Reviewed: 8/27/2019          Codes Class Noted - Resolved POA    Chest pain ICD-10-CM: R07.9  ICD-9-CM: 786.50  8/27/2019 - Present Unknown        Elevated troponin ICD-10-CM: R74.8  ICD-9-CM: 790.6  8/27/2019 - Present Unknown        Fibromyalgia ICD-10-CM: M79.7  ICD-9-CM: 729.1  8/27/2019 - Present Unknown        Headache ICD-10-CM: R51  ICD-9-CM: 784.0  8/27/2019 - Present Unknown        Liver mass ICD-10-CM: R16.0  ICD-9-CM: 573.9  8/27/2019 - Present Unknown        Hypokalemia ICD-10-CM: E87.6  ICD-9-CM: 276.8  8/27/2019 - Present Unknown        Acute renal failure (ARF) (HCC) ICD-10-CM: N17.9  ICD-9-CM: 584.9  8/27/2019 - Present Unknown        * (Principal) TIA (transient ischemic attack) ICD-10-CM: G45.9  ICD-9-CM: 435.9  8/27/2019 - Present Yes              A/P:    TIA  -Carotid doppler  -MRI  -PT/OT/SLP/CM    Slurred speech  -CT brain with no acute disease  -MRI brain/stroke work up neg  -Speech resolved     Chest pain with elevated troponin  -Troponins trending down  -remote tele  -Pt with prolonged QT  -Cardiology consult    Fever, pt with recent admit so pneumonia could be healthcare associated   -Change rocephin to zosyn   -Procalcitonin 185.5  -Blood c/s positive with GPC and urine positive with >100K GNR  -Infectious disease consult     Hypokalemia  -Likely due to chronic diarrhea  -Replace K+ and Mag and monitor     SUSANNAH  -Monitor Cr  -Gentle IVF  -Avoid renal toxins    Chronic diarrhea  -leukocytes 18.4  -No stools, stopped isolation for poss c-diff     Liver mass  -Workup in process per GI     Fibromyalgia  -Cont home meds    Code status:  Full  Decision Maker: No MPOA    Case reviewed with supervising physician - MYESHA Garza MD      Agreed with NP assessment and plan  Patient now with bacteremia and elevated PCT. Echo did not show vegetations. Cont zosyn IV and ask ID to evaluate.   Follow up renal fucntion       Signed:  MARY Bradford

## 2019-08-29 NOTE — PROGRESS NOTES
Patient was very lethargic, she would respond to verbal command, but would not fully awake to take any medications. O2 sat was in 87%, patient was placed on 2 liter of O2 and oxygen went up to 94%. FSBS 164. Patient started to follow more commands, but would fall back asleep and unable to take any oral medications. Called and spoke with hospitalist, orders to D/C [place] and hold all oral medications until patient becomes more alert. Notify again if nothing changes.

## 2019-08-29 NOTE — PROGRESS NOTES
Problem: Mobility Impaired (Adult and Pediatric)  Goal: *Acute Goals and Plan of Care (Insert Text)  Description    LTG:  (1.)Ms. Hutton will move from supine to sit and sit to supine , scoot up and down and roll side to side in bed with INDEPENDENT within 7 treatment day(s). (2.)Ms. Hutton will transfer from bed to chair and chair to bed with INDEPENDENT using the least restrictive device within 7 treatment day(s). (3.)Ms. Hutton will ambulate with INDEPENDENT for 400 feet with the least restrictive device within 7 treatment day(s). ________________________________________________________________________________________________     Outcome: Progressing Towards Goal     PHYSICAL THERAPY: Daily Note and PM 8/29/2019  INPATIENT: PT Visit Days : 2  [place] / [place] / Product Type: Medicare /       NAME/AGE/GENDER: Bill Craig is a 77 y.o. female   PRIMARY DIAGNOSIS: TIA (transient ischemic attack) [G45.9] TIA (transient ischemic attack)   TIA (transient ischemic attack)         ICD-10: Treatment Diagnosis:    · Generalized Muscle Weakness (M62.81)  · Difficulty in walking, Not elsewhere classified (R26.2)   Precaution/Allergies:  Sulfa (sulfonamide antibiotics); Aspirin; Celecoxib; Metronidazole; Morphine; and Naproxen      ASSESSMENT:     Ms. Toni Helton was supine upon contact and somewhat agreeable to PT. Patient was shivering and reports she went to MRI where she got cold. Patient demonstrated decreased safety awareness to lines/cords and increased impulsiveness when she needed to use the bathroom. Encouraged patient to call the [facility] staff prior to feeling like she was going to \"bust\" from needing to urinate. Patient stated, Oksana Evans don't come when I call\". Patient transfers to standing with CGA and ambulates 2' x 2 with CGA-min assist demonstrating unsteady gait and +LOB. Patient then participates in static/dynamic standing balance needing assistance with wiping her backside. Patient demonstrated +LOB during standing ADL balance. Patient sits EOB where she reports she doesn't feel like she can do anything more despite encouragement to try. O2 saturations 95% on 2L. Of note, patient ambulated 200' on the initial evaluation yesterday and approximately 100' with OT this morning. This afternoon is a significant difference compared to those treatments. RN notified. Overall slow progress towards physical therapy goals. Patient's goals listed above are still appropriate. Will continue skilled PT to address remaining deficits. This section established at most recent assessment   PROBLEM LIST (Impairments causing functional limitations):  1. Decreased Strength  2. Decreased ADL/Functional Activities  3. Decreased Transfer Abilities  4. Decreased Ambulation Ability/Technique  5. Decreased Balance  6. Increased Pain  7. Decreased Activity Tolerance  8. Increased Shortness of Breath  9. Decreased Flexibility/Joint Mobility   INTERVENTIONS PLANNED: (Benefits and precautions of physical therapy have been discussed with the patient.)  1. Balance Exercise  2. Bed Mobility  3. Gait Training  4. Home Exercise Program (HEP)  5. Neuromuscular Re-education/Strengthening  6. Therapeutic Activites  7. Therapeutic Exercise/Strengthening  8. Transfer Training     TREATMENT PLAN: Frequency/Duration: 3 times a week for duration of hospital stay  Rehabilitation Potential For Stated Goals: Excellent     REHAB RECOMMENDATIONS (at time of discharge pending progress):    Placement: It is my opinion, based on this patient's performance to date, that Ms. Hutton may benefit from participating in 1-2 additional therapy sessions in order to continue to assess for rehab potential and then make recommendation for disposition at discharge.   Equipment:    None at this time              HISTORY:   History of Present Injury/Illness (Reason for Referral):  60-year-old female presents from her doctor's office with complaints of headache, slurred speech and substernal chest pressure     Chest pain onset was yesterday and is been persistent overnight and through this morning. She admits to some minimal shortness of breath related to the chest pain as well but no diaphoresis  Patient's slurred speech has been an ongoing issue for several weeks. Family at bedside reports that the patient has been sluggish, fatigued over more than the last week as well they have noticed that her speech is changed somewhat. Patient also states that she has had some numbness from the right side of her mouth which is radiated across to the left. Patient was recently admitted at [facility] with an episode of bile duct stones with jaundice and pancreatitis. At that time she had a reaction to an antibiotic for UTI as well. Past Medical History/Comorbidities:   Ms. Lona Morgan  has no past medical history on file. Ms. Lona Morgan  has a past surgical history that includes hx cholecystectomy; hx tubal ligation; and hx hysterectomy. Social History/Living Environment:   Home Environment: Private residence  # Steps to Enter: 2  Rails to Enter: Yes  Hand Rails : Left  Wheelchair Ramp: No  One/Two Story Residence: One story  Living Alone: No(Moving in with son; was living alone)  Support Systems: Child(kye)  Patient Expects to be Discharged to[de-identified] Private residence  Current DME Used/Available at Home: None  Tub or Shower Type: Tub/Shower combination  Prior Level of Function/Work/Activity:  Pt lives alone in 1 story, no AD used. After previous hospital admission she stayed at older [facility] with stairs that presented a safety challenge for her. After this discharge she plans to live at safer young [facility] until comfortable to return home alone.      Number of Personal Factors/Comorbidities that affect the Plan of Care: 0: LOW COMPLEXITY   EXAMINATION:   Most Recent Physical Functioning:   Gross Assessment:                  Posture: Balance:  Sitting: Intact  Standing: Impaired  Standing - Static: Fair;Poor  Standing - Dynamic : Poor; Fair Bed Mobility:  Supine to Sit: Supervision  Sit to Supine: Supervision  Wheelchair Mobility:     Transfers:  Sit to Stand: Contact guard assistance  Stand to Sit: Contact guard assistance  Gait:     Base of Support: Center of gravity altered  Speed/Loly: Shuffled; Slow  Step Length: Left shortened;Right shortened  Gait Abnormalities: Decreased step clearance;Shuffling gait;Trunk sway increased  Distance (ft): (2' x 2)  Assistive Device: (None)  Ambulation - Level of Assistance: Minimal assistance;Contact guard assistance  Interventions: Safety awareness training; Tactile cues; Verbal cues      Body Structures Involved:  1. Nerves  2. Eyes and Ears  3. Muscles Body Functions Affected:  1. Mental  2. Sensory/Pain  3. Neuromusculoskeletal  4. Movement Related Activities and Participation Affected:  1. General Tasks and Demands  2. Mobility  3. Self Care  4. Domestic Life  5. Community, Social and [place]   Number of elements that affect the Plan of Care: 1-2: LOW COMPLEXITY   CLINICAL PRESENTATION:   Presentation: Stable and uncomplicated: LOW COMPLEXITY   CLINICAL DECISION MAKIN [facility] Inpatient Short Form  How much difficulty does the patient currently have. .. Unable A Lot A Little None   1. Turning over in bed (including adjusting bedclothes, sheets and blankets)? ? 1   ? 2   ? 3   ? 4   2. Sitting down on and standing up from a chair with arms ( e.g., wheelchair, bedside commode, etc.)   ? 1   ? 2   ? 3   ? 4   3. Moving from lying on back to sitting on the side of the bed?   ? 1   ? 2   ? 3   ? 4   How much help from another person does the patient currently need. .. Total A Lot A Little None   4. Moving to and from a bed to a chair (including a wheelchair)? ? 1   ? 2   ? 3   ? 4   5. Need to walk in hospital room? ? 1   ? 2   ? 3   ? 4   6. Climbing 3-5 steps with a railing? ? 1   ? 2   ? 3   ? 4   © 2007, Trustees of [address] [place] [address] [address] [id number], under license to [employer]. All rights reserved      Score:  Initial: 22 Most Recent: X (Date: -- )    Interpretation of Tool:  Represents activities that are increasingly more difficult (i.e. Bed mobility, Transfers, Gait). Medical Necessity:     · Skilled intervention continues to be required due to decreased function. Reason for Services/Other Comments:  · Patient continues to require skilled intervention due to medical complications and patient unable to attend/participate in therapy as expected  · . Use of outcome tool(s) and clinical judgement create a POC that gives a: Clear prediction of patient's progress: LOW COMPLEXITY            TREATMENT:   (In addition to Assessment/Re-Assessment sessions the following treatments were rendered)   Pre-treatment Symptoms/Complaints:  cold  Pain: Initial:   Pain Intensity 1: 0  Post Session:  0     Therapeutic Activity: (    12 Minutes): Therapeutic activities including bed mobility training, transfer training from various surface heights, static/dynamic sitting/standing balance activities, ambulation on level ground, safety awareness training, and patient education to improve mobility, strength, balance and coordination. Required minimal Safety awareness training; Tactile cues; Verbal cues to promote static and dynamic balance in standing, promote coordination of bilateral, lower extremity(s) and promote motor control of bilateral, lower extremity(s).          Braces/Orthotics/Lines/Etc:   · IV  · O2 via nasal canula  Treatment/Session Assessment:    · Response to Treatment:  see above  · Interdisciplinary Collaboration:   o Physical Therapy Assistant  o Registered Nurse  · After treatment position/precautions:   o Supine in bed  o Bed/Chair-wheels locked  o Bed in low position  o Call light within reach  o RN notified  o Family at bedside   · Compliance with Program/Exercises: Will assess as treatment progresses  · Recommendations/Intent for next treatment session: \"Next visit will focus on advancements to more challenging activities and reduction in assistance provided\".   Total Treatment Duration:  PT Patient Time In/Time Out  Time In: 9889  Time Out: 1975 [address] [place], [place] [address]

## 2019-08-29 NOTE — PROGRESS NOTES
Problem: Risk for Spread of Infection  Goal: Prevent transmission of infectious organism to others  Description  Prevent the transmission of infectious organisms to other patients, staff members, and visitors. Outcome: Progressing Towards Goal     Problem: Falls - Risk of  Goal: *Absence of Falls  Description  Document Travis Boeck Fall Risk and appropriate interventions in the flowsheet. Outcome: Progressing Towards Goal  Note:   Fall Risk Interventions:  Mobility Interventions: Bed/chair exit alarm, Communicate number of staff needed for ambulation/transfer, Patient to call before getting OOB         Medication Interventions: Bed/chair exit alarm, Patient to call before getting OOB    Elimination Interventions: Bed/chair exit alarm, Call light in reach, Patient to call for help with toileting needs              Problem: Pressure Injury - Risk of  Goal: *Prevention of pressure injury  Description  Document Randolph Scale and appropriate interventions in the flowsheet.   Outcome: Progressing Towards Goal  Note:   Pressure Injury Interventions:  Sensory Interventions: Assess changes in LOC, Avoid rigorous massage over bony prominences    Moisture Interventions: Absorbent underpads, Check for incontinence Q2 hours and as needed    Activity Interventions: Increase time out of bed, Pressure redistribution bed/mattress(bed type), PT/OT evaluation    Mobility Interventions: Pressure redistribution bed/mattress (bed type), HOB 30 degrees or less, PT/OT evaluation    Nutrition Interventions: Document food/fluid/supplement intake    Friction and Shear Interventions: HOB 30 degrees or less

## 2019-08-29 NOTE — PROGRESS NOTES
08/29/19 1900   NIH Stroke Scale   Interval Other (comment)   LOC 0   LOC Questions 0   LOC Commands 0   Best Gaze 0   Visual 1   Facial Palsy 0   Motor Right Arm 0   Motor Left Arm 0   Motor Right Leg 0   Motor Left Leg 0   Limb Ataxia 0   Sensory 0   Best Language 0   Dysarthria 0   Extinction and Inattention 0   Total 1     Dual NIH with Kristopher Mckenna RN

## 2019-08-29 NOTE — PROGRESS NOTES
08/28/19 2350   Vital Signs   Temp 97.7 °F (36.5 °C)   Temp Source Axillary   Pulse (Heart Rate) 62   Resp Rate 20   O2 Sat (%) 96 %   Level of Consciousness Responds to Voice   BP (!) 85/45   MAP (Calculated) (!) 58   BP 1 Method Automatic   BP 1 Location Left arm   BP Patient Position At rest   MEWS Score 3       Called MD, notified of BP and that patient is still lethargic, stated that he would come to bedside.

## 2019-08-30 ENCOUNTER — APPOINTMENT (OUTPATIENT)
Dept: INTERVENTIONAL RADIOLOGY/VASCULAR | Age: 69
DRG: 871 | End: 2019-08-30
Attending: INTERNAL MEDICINE
Payer: MEDICARE

## 2019-08-30 LAB
ANION GAP SERPL CALC-SCNC: 10 MMOL/L (ref 7–16)
BASOPHILS # BLD: 0.1 K/UL (ref 0–0.2)
BASOPHILS NFR BLD: 1 % (ref 0–2)
BUN SERPL-MCNC: 30 MG/DL (ref 8–23)
CALCIUM SERPL-MCNC: 6.8 MG/DL (ref 8.3–10.4)
CHLORIDE SERPL-SCNC: 109 MMOL/L (ref 98–107)
CO2 SERPL-SCNC: 23 MMOL/L (ref 21–32)
CREAT SERPL-MCNC: 1.6 MG/DL (ref 0.6–1)
DIFFERENTIAL METHOD BLD: ABNORMAL
EOSINOPHIL # BLD: 0.1 K/UL (ref 0–0.8)
EOSINOPHIL NFR BLD: 1 % (ref 0.5–7.8)
ERYTHROCYTE [DISTWIDTH] IN BLOOD BY AUTOMATED COUNT: 15.2 % (ref 11.9–14.6)
GLUCOSE SERPL-MCNC: 94 MG/DL (ref 65–100)
HCT VFR BLD AUTO: 25.6 % (ref 35.8–46.3)
HGB BLD-MCNC: 8.3 G/DL (ref 11.7–15.4)
IMM GRANULOCYTES # BLD AUTO: 0.1 K/UL (ref 0–0.5)
IMM GRANULOCYTES NFR BLD AUTO: 1 % (ref 0–5)
LYMPHOCYTES # BLD: 2.5 K/UL (ref 0.5–4.6)
LYMPHOCYTES NFR BLD: 20 % (ref 13–44)
MCH RBC QN AUTO: 31.4 PG (ref 26.1–32.9)
MCHC RBC AUTO-ENTMCNC: 32.4 G/DL (ref 31.4–35)
MCV RBC AUTO: 97 FL (ref 79.6–97.8)
MM INDURATION POC: 0 MM (ref 0–5)
MONOCYTES # BLD: 0.5 K/UL (ref 0.1–1.3)
MONOCYTES NFR BLD: 4 % (ref 4–12)
NEUTS SEG # BLD: 9 K/UL (ref 1.7–8.2)
NEUTS SEG NFR BLD: 73 % (ref 43–78)
NRBC # BLD: 0 K/UL (ref 0–0.2)
PLATELET # BLD AUTO: 123 K/UL (ref 150–450)
PLATELET COMMENTS,PCOM: SLIGHT
PMV BLD AUTO: 9.9 FL (ref 9.4–12.3)
POTASSIUM SERPL-SCNC: 2.5 MMOL/L (ref 3.5–5.1)
PPD POC: NEGATIVE NEGATIVE
RBC # BLD AUTO: 2.64 M/UL (ref 4.05–5.2)
RBC MORPH BLD: ABNORMAL
SODIUM SERPL-SCNC: 142 MMOL/L (ref 136–145)
WBC # BLD AUTO: 12.3 K/UL (ref 4.3–11.1)
WBC MORPH BLD: ABNORMAL

## 2019-08-30 PROCEDURE — 77030020263 HC SOL INJ SOD CL0.9% LFCR 1000ML

## 2019-08-30 PROCEDURE — C1769 GUIDE WIRE: HCPCS

## 2019-08-30 PROCEDURE — 74011250637 HC RX REV CODE- 250/637: Performed by: INTERNAL MEDICINE

## 2019-08-30 PROCEDURE — 74011000250 HC RX REV CODE- 250: Performed by: RADIOLOGY

## 2019-08-30 PROCEDURE — C1729 CATH, DRAINAGE: HCPCS

## 2019-08-30 PROCEDURE — 74011000258 HC RX REV CODE- 258: Performed by: INTERNAL MEDICINE

## 2019-08-30 PROCEDURE — 0F9230Z DRAINAGE OF LEFT LOBE LIVER WITH DRAINAGE DEVICE, PERCUTANEOUS APPROACH: ICD-10-PCS | Performed by: RADIOLOGY

## 2019-08-30 PROCEDURE — 87040 BLOOD CULTURE FOR BACTERIA: CPT

## 2019-08-30 PROCEDURE — 85025 COMPLETE CBC W/AUTO DIFF WBC: CPT

## 2019-08-30 PROCEDURE — 74011250636 HC RX REV CODE- 250/636: Performed by: HOSPITALIST

## 2019-08-30 PROCEDURE — 94760 N-INVAS EAR/PLS OXIMETRY 1: CPT

## 2019-08-30 PROCEDURE — 77010033678 HC OXYGEN DAILY

## 2019-08-30 PROCEDURE — 74011250637 HC RX REV CODE- 250/637: Performed by: NURSE PRACTITIONER

## 2019-08-30 PROCEDURE — 74011250636 HC RX REV CODE- 250/636

## 2019-08-30 PROCEDURE — 77030027478 HC TBNG JP W BLB MRTM -B

## 2019-08-30 PROCEDURE — 74011250636 HC RX REV CODE- 250/636: Performed by: INTERNAL MEDICINE

## 2019-08-30 PROCEDURE — 74011000250 HC RX REV CODE- 250: Performed by: NURSE PRACTITIONER

## 2019-08-30 PROCEDURE — 87077 CULTURE AEROBIC IDENTIFY: CPT

## 2019-08-30 PROCEDURE — 80048 BASIC METABOLIC PNL TOTAL CA: CPT

## 2019-08-30 PROCEDURE — 77030003504 HC NDL BIOP TISS COOK -A

## 2019-08-30 PROCEDURE — 94640 AIRWAY INHALATION TREATMENT: CPT

## 2019-08-30 PROCEDURE — 75989 ABSCESS DRAINAGE UNDER X-RAY: CPT

## 2019-08-30 PROCEDURE — 77030002916 HC SUT ETHLN J&J -A

## 2019-08-30 PROCEDURE — 77030011229 HC DIL VESL COON COOK -A

## 2019-08-30 PROCEDURE — 65660000000 HC RM CCU STEPDOWN

## 2019-08-30 PROCEDURE — 87205 SMEAR GRAM STAIN: CPT

## 2019-08-30 PROCEDURE — 87186 SC STD MICRODIL/AGAR DIL: CPT

## 2019-08-30 PROCEDURE — 36415 COLL VENOUS BLD VENIPUNCTURE: CPT

## 2019-08-30 RX ORDER — SODIUM CHLORIDE 9 MG/ML
25 INJECTION, SOLUTION INTRAVENOUS CONTINUOUS
Status: DISCONTINUED | OUTPATIENT
Start: 2019-08-30 | End: 2019-09-04 | Stop reason: HOSPADM

## 2019-08-30 RX ORDER — MIDODRINE HYDROCHLORIDE 5 MG/1
5 TABLET ORAL
Status: DISCONTINUED | OUTPATIENT
Start: 2019-08-30 | End: 2019-09-04 | Stop reason: HOSPADM

## 2019-08-30 RX ORDER — POTASSIUM CHLORIDE 20 MEQ/1
60 TABLET, EXTENDED RELEASE ORAL
Status: COMPLETED | OUTPATIENT
Start: 2019-08-30 | End: 2019-08-30

## 2019-08-30 RX ORDER — MIDAZOLAM HYDROCHLORIDE 1 MG/ML
1 INJECTION, SOLUTION INTRAMUSCULAR; INTRAVENOUS ONCE
Status: COMPLETED | OUTPATIENT
Start: 2019-08-30 | End: 2019-08-30

## 2019-08-30 RX ORDER — FENTANYL CITRATE 50 UG/ML
50 INJECTION, SOLUTION INTRAMUSCULAR; INTRAVENOUS ONCE
Status: COMPLETED | OUTPATIENT
Start: 2019-08-30 | End: 2019-08-30

## 2019-08-30 RX ORDER — LIDOCAINE HYDROCHLORIDE 20 MG/ML
2-20 INJECTION, SOLUTION INFILTRATION; PERINEURAL ONCE
Status: COMPLETED | OUTPATIENT
Start: 2019-08-30 | End: 2019-08-30

## 2019-08-30 RX ORDER — POTASSIUM CHLORIDE 20 MEQ/1
40 TABLET, EXTENDED RELEASE ORAL 3 TIMES DAILY
Status: COMPLETED | OUTPATIENT
Start: 2019-08-30 | End: 2019-08-30

## 2019-08-30 RX ADMIN — Medication 5 ML: at 07:02

## 2019-08-30 RX ADMIN — POTASSIUM CHLORIDE 60 MEQ: 20 TABLET, EXTENDED RELEASE ORAL at 07:01

## 2019-08-30 RX ADMIN — ALBUTEROL SULFATE 2.5 MG: 2.5 SOLUTION RESPIRATORY (INHALATION) at 20:17

## 2019-08-30 RX ADMIN — HEPARIN SODIUM 5000 UNITS: 5000 INJECTION INTRAVENOUS; SUBCUTANEOUS at 10:01

## 2019-08-30 RX ADMIN — ALBUTEROL SULFATE 2.5 MG: 2.5 SOLUTION RESPIRATORY (INHALATION) at 11:17

## 2019-08-30 RX ADMIN — PANTOPRAZOLE SODIUM 40 MG: 40 TABLET, DELAYED RELEASE ORAL at 07:01

## 2019-08-30 RX ADMIN — MIDODRINE HYDROCHLORIDE 5 MG: 5 TABLET ORAL at 10:01

## 2019-08-30 RX ADMIN — ALBUTEROL SULFATE 2.5 MG: 2.5 SOLUTION RESPIRATORY (INHALATION) at 07:37

## 2019-08-30 RX ADMIN — MIDODRINE HYDROCHLORIDE 5 MG: 5 TABLET ORAL at 11:47

## 2019-08-30 RX ADMIN — Medication 5 ML: at 21:36

## 2019-08-30 RX ADMIN — FENTANYL CITRATE 50 MCG: 50 INJECTION, SOLUTION INTRAMUSCULAR; INTRAVENOUS at 14:59

## 2019-08-30 RX ADMIN — PIPERACILLIN SODIUM,TAZOBACTAM SODIUM 3.38 G: 3; .375 INJECTION, POWDER, FOR SOLUTION INTRAVENOUS at 21:03

## 2019-08-30 RX ADMIN — SODIUM CHLORIDE 150 ML/HR: 900 INJECTION, SOLUTION INTRAVENOUS at 11:42

## 2019-08-30 RX ADMIN — PIPERACILLIN SODIUM,TAZOBACTAM SODIUM 3.38 G: 3; .375 INJECTION, POWDER, FOR SOLUTION INTRAVENOUS at 11:42

## 2019-08-30 RX ADMIN — MONTELUKAST SODIUM 10 MG: 10 TABLET, FILM COATED ORAL at 17:22

## 2019-08-30 RX ADMIN — MIDODRINE HYDROCHLORIDE 5 MG: 5 TABLET ORAL at 05:00

## 2019-08-30 RX ADMIN — Medication 10 ML: at 14:20

## 2019-08-30 RX ADMIN — POTASSIUM CHLORIDE 40 MEQ: 20 TABLET, EXTENDED RELEASE ORAL at 17:22

## 2019-08-30 RX ADMIN — LIDOCAINE HYDROCHLORIDE 200 MG: 20 INJECTION, SOLUTION INFILTRATION; PERINEURAL at 15:02

## 2019-08-30 RX ADMIN — PIPERACILLIN SODIUM,TAZOBACTAM SODIUM 3.38 G: 3; .375 INJECTION, POWDER, FOR SOLUTION INTRAVENOUS at 03:51

## 2019-08-30 RX ADMIN — POTASSIUM CHLORIDE 40 MEQ: 20 TABLET, EXTENDED RELEASE ORAL at 21:36

## 2019-08-30 RX ADMIN — ALBUTEROL SULFATE 2.5 MG: 2.5 SOLUTION RESPIRATORY (INHALATION) at 23:25

## 2019-08-30 RX ADMIN — MIDAZOLAM HYDROCHLORIDE 1 MG: 1 INJECTION, SOLUTION INTRAMUSCULAR; INTRAVENOUS at 14:57

## 2019-08-30 RX ADMIN — PANTOPRAZOLE SODIUM 40 MG: 40 TABLET, DELAYED RELEASE ORAL at 17:22

## 2019-08-30 RX ADMIN — MIDODRINE HYDROCHLORIDE 5 MG: 5 TABLET ORAL at 17:22

## 2019-08-30 RX ADMIN — HEPARIN SODIUM 5000 UNITS: 5000 INJECTION INTRAVENOUS; SUBCUTANEOUS at 21:35

## 2019-08-30 NOTE — PROGRESS NOTES
Patient's blood pressure low. Hospitalist notified. See MAR for orders.      08/29/19 2304   Vital Signs   Temp 98.9 °F (37.2 °C)   Temp Source Oral   Pulse (Heart Rate) 79   Resp Rate 19   O2 Sat (%) 91 %   Level of Consciousness Alert   BP (!) 79/41  (nurse notified)   MAP (Calculated) (!) 54   BP 1 Method Automatic   BP 1 Location Right arm   BP Patient Position At rest   MEWS Score 3

## 2019-08-30 NOTE — PROGRESS NOTES
Patient is awake, calm  No family present    Very receptive to  presence (she said pull up a chair)    Spiritual:  Patient has active Faith chaitanya  Her chaitanya began at age 16 during a severe illness  Patient has had many personal losses recently-  Family member (2016)  Son (2017)   (2018)  Grieving process is on going  Patient lives alone  Patient was in [employer] business for about 5 years  She has two sons locally who check on her daily (phone/visit)  She has become more dependent upon them as her health deteriorates  She has lived with both of them during times of illness  Patient shared about Bel[place] Tejada out of body experience\" during her pregnancy with younger son.   She gave very vivid details about the experience    This has given her a sense of peace and assurance as she faces this transition of life  She demonstrates an inner peace    Prayer offered    Will follow closely due to prognosis and chaitanya    Dianna Oro, staff [address], [facility]  /   [email or web address]

## 2019-08-30 NOTE — PROGRESS NOTES
SPEECH PATHOLOGY NOTE:      Given imaging results and resolved speech deficits, full speech/cognitive-linguistic evaluation no longer warranted. Will discharge patient from speech therapy services at this time. Please re consult if new concerns arise.          Marietta Sanchez [address] CCC-SLP

## 2019-08-30 NOTE — PROGRESS NOTES
PT Daily Note:  HOLD PT due to ongoing low BP. Most recent BP reading is 87/46 at 12pm. Will check back on patient at a later date/time if schedule permits.   Thank you,  Wilbur Hillman, PTA

## 2019-08-30 NOTE — PROGRESS NOTES
Patient's blood pressure is low 88/44. Patient states that she occasionally feels dizzy. Hospitalist notified. See MAR for orders.       08/30/19 0432   Vital Signs   BP (!) 88/44   MAP (Calculated) (!) 59   BP 1 Method Automatic   BP 1 Location Right arm

## 2019-08-30 NOTE — PROGRESS NOTES
08/30/19 0715   NIH Stroke Scale   Interval Other (comment)  (dual)   LOC 0   LOC Questions 0   LOC Commands 0   Best Gaze 0   Visual 1   Facial Palsy 0   Motor Right Arm 0   Motor Left Arm 0   Motor Right Leg 0   Motor Left Leg 0   Limb Ataxia 0   Sensory 0   Best Language 0   Dysarthria 0   Extinction and Inattention 0   Total 1

## 2019-08-30 NOTE — PROGRESS NOTES
TRANSFER - OUT REPORT:    Verbal report given to Jaime Gottlieb RN(name) on Neisha Wilson  being transferred to 7th floor(unit) for routine progression of care       Report consisted of patients Situation, Background, Assessment and   Recommendations(SBAR). Information from the following report(s) SBAR, Kardex, Procedure Summary and MAR was reviewed with the receiving nurse. Lines:   Peripheral IV 08/27/19 Right Hand (Active)   Site Assessment Clean, dry, & intact 8/30/2019  7:45 AM   Phlebitis Assessment 0 8/30/2019  7:45 AM   Infiltration Assessment 0 8/30/2019  7:45 AM   Dressing Status Clean, dry, & intact 8/30/2019  7:45 AM   Dressing Type Tape;Transparent 8/30/2019  7:45 AM   Hub Color/Line Status Patent 8/30/2019  7:45 AM        Opportunity for questions and clarification was provided.       Patient transported with:   Registered Nurse

## 2019-08-30 NOTE — PROGRESS NOTES
Agreed with NP assessment and plan                  Hospitalist Progress Note     Admit Date:  2019 12:27 PM   Name:  Justin Route   Age:  77 y.o.  :  1950   MRN:  [id number]   PCP:  Gilberto Escobar MD  Treatment Team: Attending Provider: Sanjay Jimenez MD; Utilization Review: Jeannie Alvarez RN; Care Manager: Wendy Fuentes RN; Consulting Provider: Michael Li MD; Consulting Provider: Darleen Carrera NP    Subjective:   CC:  Chest pain, facial numbness    From Admission HPI:     Pt is a 78 yo female with PMH significant for liver mass, GERD, hemorrhiods. Pt presented to the ED from her [facility] office, her son is with her. Pt reports that she had chest pain since last night, described as shart and stabbing. Nothing made it better or worse. Pt also reports some mild SOB, denied any diaphoresis. Pt notes that he speech has been slurred, son reports x 2 wks or so, feels like it is more noticeable. Pt with some facial numbness on the right. Pt with recent admit to [facility], worked up for bile duct stones with jaundice and pancreatitis, noted to have a liver mass that is being worked up. Pt also reports chronic headache x 1 wk, described as dull and throbbing.      Subjective  Pt's chest pain and facial numbness have resolved but pt continues to spike fevers. Will change from rocephin to zosyn for better coverage. Pt feels better. ID saw pt and feels the liver mass may be causing these intermittent symptoms. Consulted general surgery who recommended having IR take a look and drain if possible. Consulted IR who was able to insert a drain into the abscess. Continuing zosyn.        Objective:     Patient Vitals for the past 24 hrs:   Temp Pulse Resp BP SpO2   19 1544 97.9 °F (36.6 °C) 98 18 107/52 94 %   19 1517     96 %   19 1434 97.8 °F (36.6 °C) 78 18 108/56 94 %   19 1200 98.6 °F (37 °C) 61 18 (!) 87/46 96 %   19 1117     97 %   19 0800 98.7 °F (37.1 °C) 70 17 100/48 92 %   08/30/19 0737     97 %   08/30/19 0432  67 17 (!) 88/44 98 %   08/30/19 0108    (!) 83/44    08/29/19 2306     94 %   08/29/19 2304 98.9 °F (37.2 °C) 79 19 (!) 79/41 91 %   08/29/19 2030     94 %   08/29/19 1929 98.1 °F (36.7 °C) (!) 108 16 103/68 91 %     Oxygen Therapy  O2 Sat (%): 94 % (08/30/19 1544)  Pulse via Oximetry: 76 beats per minute (08/30/19 1117)  O2 Device: Nasal cannula (08/30/19 1517)  O2 Flow Rate (L/min): 3 l/min (08/30/19 1517)  No intake or output data in the 24 hours ending 08/30/19 1631      REVIEW OF SYSTEMS: Comprehensive ROS performed and negative except as stated in HPI. Physical Examination:  General:          Well nourished. Alert and oriented x3  Eyes:               Normal sclera. Extraocular movements intact. ENT:                Normocephalic, atraumatic. Moist mucous membranes  CV:                  RRR. No m/r/g. Lungs:             CTAB. No wheezing, rhonchi, or rales. Abdomen:        Soft, nontender, nondistended. Extremities:     Warm and dry. No cyanosis or edema. Neurologic:      No focal deficit   Skin:                No rashes or jaundice. Normal coloration  Psych:             Normal mood and affect.     Data Review:  I have reviewed all labs, meds, telemetry events, and studies from the last 24 hours.     Recent Results (from the past 24 hour(s))   CBC WITH AUTOMATED DIFF    Collection Time: 08/30/19  5:41 AM   Result Value Ref Range    WBC 12.3 (H) 4.3 - 11.1 K/uL    RBC 2.64 (L) 4.05 - 5.2 M/uL    HGB 8.3 (L) 11.7 - 15.4 g/dL    HCT 25.6 (L) 35.8 - 46.3 %    MCV 97.0 79.6 - 97.8 FL    MCH 31.4 26.1 - 32.9 PG    MCHC 32.4 31.4 - 35.0 g/dL    RDW 15.2 (H) 11.9 - 14.6 %    PLATELET 867 (L) 962 - 450 K/uL    MPV 9.9 9.4 - 12.3 FL    ABSOLUTE NRBC 0.00 0.0 - 0.2 K/uL    NEUTROPHILS 73 43 - 78 %    LYMPHOCYTES 20 13 - 44 %    MONOCYTES 4 4.0 - 12.0 %    EOSINOPHILS 1 0.5 - 7.8 %    BASOPHILS 1 0.0 - 2.0 % IMMATURE GRANULOCYTES 1 0.0 - 5.0 %    ABS. NEUTROPHILS 9.0 (H) 1.7 - 8.2 K/UL    ABS. LYMPHOCYTES 2.5 0.5 - 4.6 K/UL    ABS. MONOCYTES 0.5 0.1 - 1.3 K/UL    ABS. EOSINOPHILS 0.1 0.0 - 0.8 K/UL    ABS. BASOPHILS 0.1 0.0 - 0.2 K/UL    ABS. IMM.  GRANS. 0.1 0.0 - 0.5 K/UL    RBC COMMENTS NORMOCYTIC/NORMOCHROMIC      WBC COMMENTS Result Confirmed By Smear      PLATELET COMMENTS SLIGHT      DF AUTOMATED     METABOLIC PANEL, BASIC    Collection Time: 08/30/19  5:41 AM   Result Value Ref Range    Sodium 142 136 - 145 mmol/L    Potassium 2.5 (LL) 3.5 - 5.1 mmol/L    Chloride 109 (H) 98 - 107 mmol/L    CO2 23 21 - 32 mmol/L    Anion gap 10 7 - 16 mmol/L    Glucose 94 65 - 100 mg/dL    BUN 30 (H) 8 - 23 MG/DL    Creatinine 1.60 (H) 0.6 - 1.0 MG/DL    GFR est AA 41 (L) >60 ml/min/1.73m2    GFR est non-AA 34 (L) >60 ml/min/1.73m2    Calcium 6.8 (L) 8.3 - 10.4 MG/DL        All Micro Results     Procedure Component Value Units Date/Time    CULTURE, BODY FLUID Charlotte Passey STAIN [[id number]]     Order Status:  Sent Specimen:  Miscellaneous Fluid     CULTURE, BLOOD [[id number]] Collected:  08/30/19 1605    Order Status:  Completed Specimen:  Blood Updated:  08/30/19 1616    CULTURE, BLOOD [[id number]]     Order Status:  Sent Specimen:  Blood     CULTURE, URINE [[id number]]  (Abnormal) Collected:  08/28/19 0604    Order Status:  Completed Specimen:  Urine from Clean catch Updated:  08/30/19 0737     Special Requests: NO SPECIAL REQUESTS        Culture result:       >100,000 COLONIES/mL GRAM NEGATIVE RODS                  <10,000 COLONIES/mL MIXED SKIN LEISA ISOLATED                  IDENTIFICATION AND SUSCEPTIBILITY TO FOLLOW          CULTURE, BLOOD [[id number]]  (Abnormal) Collected:  08/28/19 0030    Order Status:  Completed Specimen:  Blood Updated:  08/30/19 0708     Special Requests: --        LEFT  HAND       GRAM STAIN GRAM POS COCCI IN CHAINS         PEDIATRIC BOTTLE               RESULTS VERIFIED, PHONED TO AND READ BACK BY ALBERT Tod Johnston RN ON 8/29/19 @1220, TA            REFER TO ACCESSION [id number] FOR BCID PANEL     Culture result: ALPHA STREPTOCOCCUS               IDENTIFICATION AND SUSCEPTIBILITY TO FOLLOW          CULTURE, BLOOD [[id number]] Collected:  08/28/19 0030    Order Status:  Completed Specimen:  Blood Updated:  08/29/19 2304     Special Requests: --        LEFT  Antecubital       GRAM STAIN GRAM POS COCCI IN CHAINS         ANAEROBIC BOTTLE POSITIVE               CRITICAL RESULT NOT CALLED DUE TO PREVIOUS NOTIFICATION OF CRITICAL RESULT WITHIN THE LAST 24 HOURS. Culture result:       CULTURE IN PROGRESS,FURTHER UPDATES TO FOLLOW          BLOOD CULTURE ID PANEL [[id number]]  (Abnormal) Collected:  08/28/19 0030    Order Status:  Completed Specimen:  Blood Updated:  08/29/19 1246     Acc. no. from Micro Order [id number]     Streptococcus DETECTED        Comment: RESULTS VERIFIED, PHONED TO AND READ BACK BY  Mckenna Puente RN ON 8/29/19 @1220, TA          INTERPRETATION       Gram positive cocci. Identified by realtime PCR as Streptococcus species (not agalactiae, pyogenes, or pneumoniae). Clinical Consideration       Consider discontinuation of IV vancomycin and using an anti-streptococcal beta-lactam (ceftriaxone/cefotaxime (peds))           Blood Culture PCR Testing       MULTIPLEX PCR NEGATIVE:  A negative FilmArray BCID result does not exclude the possibility of bloodstream infection.           C. DIFFICILE AG & TOXIN A/B [[id number]]     Order Status:  Sent Specimen:  Stool           Current Meds:  Current Facility-Administered Medications   Medication Dose Route Frequency    midodrine (PROAMITINE) tablet 5 mg  5 mg Oral TID WITH MEALS    potassium chloride (K-DUR, KLOR-CON) SR tablet 40 mEq  40 mEq Oral TID    0.9% sodium chloride infusion  25 mL/hr IntraVENous CONTINUOUS    albuterol (PROVENTIL VENTOLIN) nebulizer solution 2.5 mg  2.5 mg Nebulization Q4H RT    hydrocortisone (ANUSOL-HC) 2.5 % rectal cream PeriANAL PRN    piperacillin-tazobactam (ZOSYN) 3.375 g in 0.9% sodium chloride (MBP/ADV) 100 mL  3.375 g IntraVENous Q8H    heparin (porcine) injection 5,000 Units  5,000 Units SubCUTAneous Q12H    montelukast (SINGULAIR) tablet 10 mg  10 mg Oral QPM    sodium chloride (NS) flush 5-40 mL  5-40 mL IntraVENous Q8H    sodium chloride (NS) flush 5-40 mL  5-40 mL IntraVENous PRN    acetaminophen (TYLENOL) tablet 650 mg  650 mg Oral Q4H PRN    naloxone (NARCAN) injection 0.4 mg  0.4 mg IntraVENous PRN    ondansetron (ZOFRAN) injection 4 mg  4 mg IntraVENous Q4H PRN    bisacodyl (DULCOLAX) tablet 10 mg  10 mg Oral DAILY PRN    0.9% sodium chloride infusion  150 mL/hr IntraVENous CONTINUOUS    pantoprazole (PROTONIX) tablet 40 mg  40 mg Oral ACB&D       Diet:  DIET CLEAR LIQUID    Other Studies (last 24 hours): Ir Drain Procedure W Cath    Result Date: 8/30/2019  Title: Percutaneous abscess drain placement with Ultrasound Guidance. History: Fevers and chills. Probable hepatic abscess. .  Consent: Informed written and oral consent was obtained from the patient after explanation of benefits and risks (including, but not limitted to: Infection, Hemorrhage, Visceral Injury). The patient's questions were answered to their satisfaction. The patient stated understanding and requested that we proceed. Procedure: Maximal sterile barrier technique (including:  cap, mask, sterile gown, sterile gloves, sterile sheet, hand hygiene, and chlorhexidene for cutaneous antisepsis) were used. Following routine prep and drape of the left lobe liver, a local field block with 1% lidocaine was achieved. Ultrasound evaluation was performed. Using real-time ultrasound guidance, with appropriate image recording, an 18-gauge needle was advanced into the abscess cavity. Thin brownish fluid was returned and as sent specimen. Using fluoroscopy, the needle was exchanged over an Amplatz wire for a 10 [employer] June multipurpose drain.   The catheter was secured with a suture and [employer]ok device. A suction bulb  was attached. Complications: None. Medications: None Specimen: Sent to Microbiology Fluoroscopy: 3 mgy. 18 seconds. Contrast: 0 ml. Findings: Lobular collection of the left lobe of the liver. Purulent fluid was obtained     Impression: Uncomplicated percutaneous hepatic abscess drain placement. Plan: Suction bulb drainage. Flush the drain each shift. Record output. Followup in interventional radiology in 11-14 days.        Assessment and Plan:     Hospital Problems as of 8/30/2019 Date Reviewed: 8/27/2019          Codes Class Noted - Resolved POA    Chest pain ICD-10-CM: R07.9  ICD-9-CM: 786.50  8/27/2019 - Present Unknown        Elevated troponin ICD-10-CM: R74.8  ICD-9-CM: 790.6  8/27/2019 - Present Unknown        Fibromyalgia ICD-10-CM: M79.7  ICD-9-CM: 729.1  8/27/2019 - Present Unknown        Headache ICD-10-CM: R51  ICD-9-CM: 784.0  8/27/2019 - Present Unknown        Liver mass ICD-10-CM: R16.0  ICD-9-CM: 573.9  8/27/2019 - Present Unknown        Hypokalemia ICD-10-CM: E87.6  ICD-9-CM: 276.8  8/27/2019 - Present Unknown        Acute renal failure (ARF) (HCC) ICD-10-CM: N17.9  ICD-9-CM: 584.9  8/27/2019 - Present Unknown        * (Principal) TIA (transient ischemic attack) ICD-10-CM: G45.9  ICD-9-CM: 435.9  8/27/2019 - Present Yes              A/P:    TIA  -Carotid doppler  -MRI  -PT/OT/SLP/CM    Slurred speech  -Ammonia 30s  -CT brain with no acute disease  -Get MRI brain/stroke work up     Chest pain with elevated troponin  -Troponin trending down  -remote tele  -Pt with prolonged QT  -Cardiology consult    Fever, pt with recent admit so pneumonia could be healthcare associated   -zosyn     Hypokalemia  -Likely due to chronic diarrhea  -Replace K+ and Mag and monitor     SUSANNAH  -Monitor Cr  -Gentle IVF  -Avoid renal toxins    Chronic diarrhea  -leukocytes 18.4  -Check stool for c-diff - pt was on antibx for UTI     Liver mass  -Workup in process per GI  -Infectious disease consult  -General surgery consult  -IR consult - abscess drain placed 8/30  -Suction bulb drainage. Flush the drain each shift.   -Record output.   -Followup in interventional radiology in 11-14 days    Fibromyalgia  -Cont home meds    Code status:  Full  Decision Maker: No MPOA    Case reviewed with supervising physician - MYESHA Richmond MD     Signed:  MARY Gonzales

## 2019-08-31 ENCOUNTER — APPOINTMENT (OUTPATIENT)
Dept: CT IMAGING | Age: 69
DRG: 871 | End: 2019-08-31
Attending: NURSE PRACTITIONER
Payer: MEDICARE

## 2019-08-31 ENCOUNTER — APPOINTMENT (OUTPATIENT)
Dept: GENERAL RADIOLOGY | Age: 69
DRG: 871 | End: 2019-08-31
Attending: NURSE PRACTITIONER
Payer: MEDICARE

## 2019-08-31 LAB
ANION GAP SERPL CALC-SCNC: 7 MMOL/L (ref 7–16)
BACTERIA SPEC CULT: ABNORMAL
BASOPHILS # BLD: 0.1 K/UL (ref 0–0.2)
BASOPHILS NFR BLD: 1 % (ref 0–2)
BNP SERPL-MCNC: 210 PG/ML
BUN SERPL-MCNC: 24 MG/DL (ref 8–23)
CALCIUM SERPL-MCNC: 7.5 MG/DL (ref 8.3–10.4)
CHLORIDE SERPL-SCNC: 114 MMOL/L (ref 98–107)
CO2 SERPL-SCNC: 23 MMOL/L (ref 21–32)
CREAT SERPL-MCNC: 1.53 MG/DL (ref 0.6–1)
DIFFERENTIAL METHOD BLD: ABNORMAL
EOSINOPHIL # BLD: 0.1 K/UL (ref 0–0.8)
EOSINOPHIL NFR BLD: 1 % (ref 0.5–7.8)
ERYTHROCYTE [DISTWIDTH] IN BLOOD BY AUTOMATED COUNT: 15.4 % (ref 11.9–14.6)
GLUCOSE SERPL-MCNC: 90 MG/DL (ref 65–100)
GRAM STN SPEC: ABNORMAL
HCT VFR BLD AUTO: 27.1 % (ref 35.8–46.3)
HGB BLD-MCNC: 8.6 G/DL (ref 11.7–15.4)
IMM GRANULOCYTES # BLD AUTO: 0.2 K/UL (ref 0–0.5)
IMM GRANULOCYTES NFR BLD AUTO: 1 % (ref 0–5)
LYMPHOCYTES # BLD: 2.2 K/UL (ref 0.5–4.6)
LYMPHOCYTES NFR BLD: 20 % (ref 13–44)
MCH RBC QN AUTO: 30.8 PG (ref 26.1–32.9)
MCHC RBC AUTO-ENTMCNC: 31.7 G/DL (ref 31.4–35)
MCV RBC AUTO: 97.1 FL (ref 79.6–97.8)
MONOCYTES # BLD: 0.4 K/UL (ref 0.1–1.3)
MONOCYTES NFR BLD: 4 % (ref 4–12)
NEUTS SEG # BLD: 8 K/UL (ref 1.7–8.2)
NEUTS SEG NFR BLD: 73 % (ref 43–78)
NRBC # BLD: 0 K/UL (ref 0–0.2)
PLATELET # BLD AUTO: 148 K/UL (ref 150–450)
PMV BLD AUTO: 9.7 FL (ref 9.4–12.3)
POTASSIUM SERPL-SCNC: 3.4 MMOL/L (ref 3.5–5.1)
RBC # BLD AUTO: 2.79 M/UL (ref 4.05–5.2)
SERVICE CMNT-IMP: ABNORMAL
SODIUM SERPL-SCNC: 144 MMOL/L (ref 136–145)
WBC # BLD AUTO: 10.9 K/UL (ref 4.3–11.1)

## 2019-08-31 PROCEDURE — 83880 ASSAY OF NATRIURETIC PEPTIDE: CPT

## 2019-08-31 PROCEDURE — 94640 AIRWAY INHALATION TREATMENT: CPT

## 2019-08-31 PROCEDURE — 36415 COLL VENOUS BLD VENIPUNCTURE: CPT

## 2019-08-31 PROCEDURE — 74011250637 HC RX REV CODE- 250/637: Performed by: HOSPITALIST

## 2019-08-31 PROCEDURE — 80048 BASIC METABOLIC PNL TOTAL CA: CPT

## 2019-08-31 PROCEDURE — 85025 COMPLETE CBC W/AUTO DIFF WBC: CPT

## 2019-08-31 PROCEDURE — 74011250637 HC RX REV CODE- 250/637: Performed by: NURSE PRACTITIONER

## 2019-08-31 PROCEDURE — 74011250636 HC RX REV CODE- 250/636: Performed by: NURSE PRACTITIONER

## 2019-08-31 PROCEDURE — 74011000250 HC RX REV CODE- 250: Performed by: NURSE PRACTITIONER

## 2019-08-31 PROCEDURE — 77030038269 HC DRN EXT URIN PURWCK BARD -A

## 2019-08-31 PROCEDURE — 74011250636 HC RX REV CODE- 250/636: Performed by: INTERNAL MEDICINE

## 2019-08-31 PROCEDURE — 94760 N-INVAS EAR/PLS OXIMETRY 1: CPT

## 2019-08-31 PROCEDURE — 71045 X-RAY EXAM CHEST 1 VIEW: CPT

## 2019-08-31 PROCEDURE — 74011000258 HC RX REV CODE- 258: Performed by: INTERNAL MEDICINE

## 2019-08-31 PROCEDURE — 77010033678 HC OXYGEN DAILY

## 2019-08-31 PROCEDURE — 74011250637 HC RX REV CODE- 250/637: Performed by: INTERNAL MEDICINE

## 2019-08-31 PROCEDURE — 65660000000 HC RM CCU STEPDOWN

## 2019-08-31 PROCEDURE — 74011250636 HC RX REV CODE- 250/636: Performed by: HOSPITALIST

## 2019-08-31 RX ORDER — OXYCODONE AND ACETAMINOPHEN 7.5; 325 MG/1; MG/1
1 TABLET ORAL
Status: DISCONTINUED | OUTPATIENT
Start: 2019-08-31 | End: 2019-09-04 | Stop reason: HOSPADM

## 2019-08-31 RX ORDER — FUROSEMIDE 10 MG/ML
20 INJECTION INTRAMUSCULAR; INTRAVENOUS ONCE
Status: COMPLETED | OUTPATIENT
Start: 2019-08-31 | End: 2019-08-31

## 2019-08-31 RX ADMIN — MIDODRINE HYDROCHLORIDE 5 MG: 5 TABLET ORAL at 08:27

## 2019-08-31 RX ADMIN — PIPERACILLIN SODIUM,TAZOBACTAM SODIUM 3.38 G: 3; .375 INJECTION, POWDER, FOR SOLUTION INTRAVENOUS at 11:48

## 2019-08-31 RX ADMIN — MIDODRINE HYDROCHLORIDE 5 MG: 5 TABLET ORAL at 17:48

## 2019-08-31 RX ADMIN — ONDANSETRON 4 MG: 2 INJECTION INTRAMUSCULAR; INTRAVENOUS at 03:15

## 2019-08-31 RX ADMIN — MIDODRINE HYDROCHLORIDE 5 MG: 5 TABLET ORAL at 11:47

## 2019-08-31 RX ADMIN — SALINE NASAL SPRAY 2 SPRAY: 1.5 SOLUTION NASAL at 06:37

## 2019-08-31 RX ADMIN — FUROSEMIDE 20 MG: 10 INJECTION, SOLUTION INTRAMUSCULAR; INTRAVENOUS at 11:47

## 2019-08-31 RX ADMIN — Medication 10 ML: at 22:25

## 2019-08-31 RX ADMIN — MONTELUKAST SODIUM 10 MG: 10 TABLET, FILM COATED ORAL at 17:48

## 2019-08-31 RX ADMIN — OXYCODONE HYDROCHLORIDE AND ACETAMINOPHEN 1 TABLET: 7.5; 325 TABLET ORAL at 22:20

## 2019-08-31 RX ADMIN — PANTOPRAZOLE SODIUM 40 MG: 40 TABLET, DELAYED RELEASE ORAL at 17:48

## 2019-08-31 RX ADMIN — ALBUTEROL SULFATE 2.5 MG: 2.5 SOLUTION RESPIRATORY (INHALATION) at 07:41

## 2019-08-31 RX ADMIN — ALBUTEROL SULFATE 2.5 MG: 2.5 SOLUTION RESPIRATORY (INHALATION) at 15:32

## 2019-08-31 RX ADMIN — PANTOPRAZOLE SODIUM 40 MG: 40 TABLET, DELAYED RELEASE ORAL at 06:37

## 2019-08-31 RX ADMIN — PIPERACILLIN SODIUM,TAZOBACTAM SODIUM 3.38 G: 3; .375 INJECTION, POWDER, FOR SOLUTION INTRAVENOUS at 03:19

## 2019-08-31 RX ADMIN — HEPARIN SODIUM 5000 UNITS: 5000 INJECTION INTRAVENOUS; SUBCUTANEOUS at 11:47

## 2019-08-31 RX ADMIN — ALBUTEROL SULFATE 2.5 MG: 2.5 SOLUTION RESPIRATORY (INHALATION) at 11:25

## 2019-08-31 RX ADMIN — HEPARIN SODIUM 5000 UNITS: 5000 INJECTION INTRAVENOUS; SUBCUTANEOUS at 22:20

## 2019-08-31 RX ADMIN — ALBUTEROL SULFATE 2.5 MG: 2.5 SOLUTION RESPIRATORY (INHALATION) at 19:38

## 2019-08-31 RX ADMIN — Medication 10 ML: at 13:19

## 2019-08-31 RX ADMIN — PIPERACILLIN SODIUM,TAZOBACTAM SODIUM 3.38 G: 3; .375 INJECTION, POWDER, FOR SOLUTION INTRAVENOUS at 21:22

## 2019-08-31 RX ADMIN — OXYCODONE HYDROCHLORIDE AND ACETAMINOPHEN 1 TABLET: 7.5; 325 TABLET ORAL at 03:15

## 2019-08-31 RX ADMIN — ALBUTEROL SULFATE 2.5 MG: 2.5 SOLUTION RESPIRATORY (INHALATION) at 23:27

## 2019-08-31 RX ADMIN — Medication 10 ML: at 06:37

## 2019-08-31 NOTE — PROGRESS NOTES
08/31/19 0724   NIH Stroke Scale   Interval Other (comment)   LOC 0   LOC Questions 0   LOC Commands 0   Best Gaze 0   Visual 1   Facial Palsy 0   Motor Right Arm 0   Motor Left Arm 0   Motor Right Leg 0   Motor Left Leg 0   Limb Ataxia 0   Sensory 0   Best Language 0   Dysarthria 0   Extinction and Inattention 0   Total 1

## 2019-08-31 NOTE — PROGRESS NOTES
Attestation signed by Sarita Garcia MD at [id number]   Agreed with NP assessment plan     Sepsis ?secondary to UTI  Persistent fever  C.difficile pending. CXR neg  UA with 20-50 WBC   Follow up cultures  Switch ceftriaxone to zosyn  Add done PCT     -Chest pain  Troponin trending down  Echo with no wall motions abnormalities  Could be ?demand ischemia due to sepsis  Likely will need further work up with cardiology, ? outpatient     -SUSANNAH  Improving  Cont gentle hydration with NS     -? TIA  Symptoms resolved per NP  MRI brain pending               Hospitalist Progress Note     Admit Date:  2019 12:27 PM   Name:  Bari    Age:  77 y.o.  :  1950   MRN:  [id number]   PCP:  Marychuy Arambula MD  Treatment Team: Attending Provider: Sarita Garcia MD; Utilization Review: Sonal Stauffer RN; Care Manager: Onelia Bosch RN; Consulting Provider: Jeni Jacobs MD; Consulting Provider: Liliana Dutta NP    Subjective:   CC:  Chest pain, facial numbness    From Admission HPI:     Pt is a 78 yo female with PMH significant for liver mass, GERD, hemorrhiods. Pt presented to the ED from her [facility] office, her son is with her. Pt reports that she had chest pain since last night, described as shart and stabbing. Nothing made it better or worse. Pt also reports some mild SOB, denied any diaphoresis. Pt notes that he speech has been slurred, son reports x 2 wks or so, feels like it is more noticeable. Pt with some facial numbness on the right. Pt with recent admit to [facility], worked up for bile duct stones with jaundice and pancreatitis, noted to have a liver mass that is being worked up. Pt also reports chronic headache x 1 wk, described as dull and throbbing.      Subjective  Pt's chest pain and facial numbness have resolved but pt continues to spike fevers. Will change from rocephin to zosyn for better coverage. Pt feels better.   ID saw pt and feels the liver mass may be causing these intermittent symptoms. Consulted general surgery who recommended having IR take a look and drain if possible. Consulted IR who was able to insert a drain into the abscess. Continuing zosyn. Pt's urine >100K klebsiella pneumoniae, sensitive to zosyn. Pt with 50 out of her drain last evening, fluid very cloudy, no blood noted. Objective:     Patient Vitals for the past 24 hrs:   Temp Pulse Resp BP SpO2   08/31/19 1600 98 °F (36.7 °C) 73 18 99/55 98 %   08/31/19 1533     98 %   08/31/19 1200 97.9 °F (36.6 °C) 80 18 116/65 97 %   08/31/19 1127     97 %   08/31/19 0800 97.9 °F (36.6 °C) 73 18 92/53 97 %   08/31/19 0743     97 %   08/31/19 0400 98.2 °F (36.8 °C) 90 17 107/54 95 %   08/31/19 0000 98.1 °F (36.7 °C) 97 17 106/55 95 %   08/30/19 2328     96 %   08/30/19 2020     97 %   08/30/19 2000 98.4 °F (36.9 °C) 91 18 123/66 96 %     Oxygen Therapy  O2 Sat (%): 98 % (08/31/19 1600)  Pulse via Oximetry: 72 beats per minute (08/31/19 1533)  O2 Device: Nasal cannula (08/31/19 1533)  O2 Flow Rate (L/min): 2 l/min (08/31/19 1533)    Intake/Output Summary (Last 24 hours) at 8/31/2019 1734  Last data filed at 8/30/2019 2328  Gross per 24 hour   Intake    Output 50 ml   Net -50 ml         REVIEW OF SYSTEMS: Comprehensive ROS performed and negative except as stated in HPI. Physical Examination:  General:          Well nourished. Alert and oriented x3  Eyes:               Normal sclera. Extraocular movements intact. ENT:                Normocephalic, atraumatic. Moist mucous membranes  CV:                  RRR. No m/r/g. Lungs:             CTAB. No wheezing, rhonchi, or rales. Abdomen:        Soft, nontender, nondistended. Drain in place with dressing dry and intact  Extremities:     Warm and dry. No cyanosis or edema. Neurologic:      No focal deficit   Skin:                No rashes or jaundice.   Normal coloration  Psych:             Normal mood and affect.     Data Review:  I have reviewed all labs, meds, telemetry events, and studies from the last 24 hours. Recent Results (from the past 24 hour(s))   CBC WITH AUTOMATED DIFF    Collection Time: 08/31/19  6:13 AM   Result Value Ref Range    WBC 10.9 4.3 - 11.1 K/uL    RBC 2.79 (L) 4.05 - 5.2 M/uL    HGB 8.6 (L) 11.7 - 15.4 g/dL    HCT 27.1 (L) 35.8 - 46.3 %    MCV 97.1 79.6 - 97.8 FL    MCH 30.8 26.1 - 32.9 PG    MCHC 31.7 31.4 - 35.0 g/dL    RDW 15.4 (H) 11.9 - 14.6 %    PLATELET 524 (L) 981 - 450 K/uL    MPV 9.7 9.4 - 12.3 FL    ABSOLUTE NRBC 0.00 0.0 - 0.2 K/uL    DF AUTOMATED      NEUTROPHILS 73 43 - 78 %    LYMPHOCYTES 20 13 - 44 %    MONOCYTES 4 4.0 - 12.0 %    EOSINOPHILS 1 0.5 - 7.8 %    BASOPHILS 1 0.0 - 2.0 %    IMMATURE GRANULOCYTES 1 0.0 - 5.0 %    ABS. NEUTROPHILS 8.0 1.7 - 8.2 K/UL    ABS. LYMPHOCYTES 2.2 0.5 - 4.6 K/UL    ABS. MONOCYTES 0.4 0.1 - 1.3 K/UL    ABS. EOSINOPHILS 0.1 0.0 - 0.8 K/UL    ABS. BASOPHILS 0.1 0.0 - 0.2 K/UL    ABS. IMM. GRANS. 0.2 0.0 - 0.5 K/UL   METABOLIC PANEL, BASIC    Collection Time: 08/31/19  6:13 AM   Result Value Ref Range    Sodium 144 136 - 145 mmol/L    Potassium 3.4 (L) 3.5 - 5.1 mmol/L    Chloride 114 (H) 98 - 107 mmol/L    CO2 23 21 - 32 mmol/L    Anion gap 7 7 - 16 mmol/L    Glucose 90 65 - 100 mg/dL    BUN 24 (H) 8 - 23 MG/DL    Creatinine 1.53 (H) 0.6 - 1.0 MG/DL    GFR est AA 43 (L) >60 ml/min/1.73m2    GFR est non-AA 36 (L) >60 ml/min/1.73m2    Calcium 7.5 (L) 8.3 - 10.4 MG/DL   BNP    Collection Time: 08/31/19  6:13 AM   Result Value Ref Range     (H) 0 pg/mL        All Micro Results     Procedure Component Value Units Date/Time    CULTURE, BODY FLUID Oleg Mercury STAIN [[id number]] Collected:  08/30/19 1510    Order Status:  Completed Specimen:  Abscess Updated:  08/31/19 1010     Special Requests: NO SPECIAL REQUESTS        GRAM STAIN PENDING     Culture result:       NO GROWTH AFTER SHORT PERIOD OF INCUBATION. FURTHER RESULTS TO FOLLOW AFTER OVERNIGHT INCUBATION. CULTURE, URINE [[id number]]  (Abnormal)  (Susceptibility) Collected:  08/28/19 0604    Order Status:  Completed Specimen:  Urine from Clean catch Updated:  08/31/19 0744     Special Requests: NO SPECIAL REQUESTS        Culture result:       >100,000 COLONIES/mL KLEBSIELLA PNEUMONIAE                  <10,000 COLONIES/mL MIXED SKIN LEISA ISOLATED          CULTURE, BLOOD [[id number]]  (Abnormal) Collected:  08/28/19 0030    Order Status:  Completed Specimen:  Blood Updated:  08/31/19 0742     Special Requests: --        LEFT  Antecubital       GRAM STAIN GRAM POS COCCI IN CHAINS         ANAEROBIC BOTTLE POSITIVE               CRITICAL RESULT NOT CALLED DUE TO PREVIOUS NOTIFICATION OF CRITICAL RESULT WITHIN THE LAST 24 HOURS.            Culture result: ALPHA STREPTOCOCCUS         FOR ID AND SUSCEPTIBILITY  REFER TO [facility] [id number]    CULTURE, BLOOD [[id number]]  (Abnormal)  (Susceptibility) Collected:  08/28/19 0030    Order Status:  Completed Specimen:  Blood Updated:  08/31/19 0740     Special Requests: --        LEFT  HAND       GRAM STAIN GRAM POS COCCI IN CHAINS         PEDIATRIC BOTTLE               RESULTS VERIFIED, PHONED TO AND READ BACK BY Jos Oliveros RN ON 8/29/19 @1220, TA            REFER TO ACCESSION [id number] FOR BCID PANEL     Culture result: STREPTOCOCCUS INTERMEDIUS       CULTURE, BLOOD [[id number]] Collected:  08/30/19 1555    Order Status:  Completed Specimen:  Blood Updated:  08/31/19 0634     Special Requests: --        LEFT  Antecubital       Culture result: NO GROWTH AFTER 13 HOURS       CULTURE, BLOOD [[id number]] Collected:  08/30/19 1605    Order Status:  Completed Specimen:  Blood Updated:  08/31/19 0634     Special Requests: --        RIGHT  Antecubital       Culture result: NO GROWTH AFTER 13 HOURS       BLOOD CULTURE ID PANEL [[id number]]  (Abnormal) Collected:  08/28/19 0030    Order Status:  Completed Specimen:  Blood Updated:  08/29/19 1246     Acc. no. from Micro Order [id number] Streptococcus DETECTED        Comment: RESULTS VERIFIED, PHONED TO AND READ BACK BY  Jos Oliveros RN ON 8/29/19 @1220, TA          INTERPRETATION       Gram positive cocci. Identified by realtime PCR as Streptococcus species (not agalactiae, pyogenes, or pneumoniae). Clinical Consideration       Consider discontinuation of IV vancomycin and using an anti-streptococcal beta-lactam (ceftriaxone/cefotaxime (peds))           Blood Culture PCR Testing       MULTIPLEX PCR NEGATIVE:  A negative FilmArray BCID result does not exclude the possibility of bloodstream infection.           C. DIFFICILE AG & TOXIN A/B [[id number]] Collected:  08/27/19 2247    Order Status:  Canceled Specimen:  Stool           Current Meds:  Current Facility-Administered Medications   Medication Dose Route Frequency    oxyCODONE-acetaminophen (PERCOCET 7.5) 7.5-325 mg per tablet 1 Tab  1 Tab Oral Q6H PRN    sodium chloride (OCEAN) 0.65 % nasal squeeze bottle 2 Spray  2 Spray Both Nostrils Q2H PRN    midodrine (PROAMITINE) tablet 5 mg  5 mg Oral TID WITH MEALS    0.9% sodium chloride infusion  25 mL/hr IntraVENous CONTINUOUS    albuterol (PROVENTIL VENTOLIN) nebulizer solution 2.5 mg  2.5 mg Nebulization Q4H RT    hydrocortisone (ANUSOL-HC) 2.5 % rectal cream   PeriANAL PRN    piperacillin-tazobactam (ZOSYN) 3.375 g in 0.9% sodium chloride (MBP/ADV) 100 mL  3.375 g IntraVENous Q8H    heparin (porcine) injection 5,000 Units  5,000 Units SubCUTAneous Q12H    montelukast (SINGULAIR) tablet 10 mg  10 mg Oral QPM    sodium chloride (NS) flush 5-40 mL  5-40 mL IntraVENous Q8H    sodium chloride (NS) flush 5-40 mL  5-40 mL IntraVENous PRN    acetaminophen (TYLENOL) tablet 650 mg  650 mg Oral Q4H PRN    naloxone (NARCAN) injection 0.4 mg  0.4 mg IntraVENous PRN    ondansetron (ZOFRAN) injection 4 mg  4 mg IntraVENous Q4H PRN    bisacodyl (DULCOLAX) tablet 10 mg  10 mg Oral DAILY PRN    pantoprazole (PROTONIX) tablet 40 mg  40 mg Oral ACB&D       Diet:  DIET CLEAR LIQUID    Other Studies (last 24 hours):  Xr Chest Sngl V    Result Date: 8/31/2019  SEMIERECT AP CHEST X-RAY HISTORY: Shortness of breath COMPARISON: 8/29/2019 FINDINGS: EKG leads are present. Lower lobe atelectasis is present. Interstitial markings are thickened in the middle and lower lung zones. This may be caused by interstitial edema. IMPRESSION: Lower lobe atelectasis with suspected interstitial edema.       Assessment and Plan:     Hospital Problems as of 8/31/2019 Date Reviewed: 8/27/2019          Codes Class Noted - Resolved POA    Chest pain ICD-10-CM: R07.9  ICD-9-CM: 786.50  8/27/2019 - Present Unknown        Elevated troponin ICD-10-CM: R74.8  ICD-9-CM: 790.6  8/27/2019 - Present Unknown        Fibromyalgia ICD-10-CM: M79.7  ICD-9-CM: 729.1  8/27/2019 - Present Unknown        Headache ICD-10-CM: R51  ICD-9-CM: 784.0  8/27/2019 - Present Unknown        Liver mass ICD-10-CM: R16.0  ICD-9-CM: 573.9  8/27/2019 - Present Unknown        Hypokalemia ICD-10-CM: E87.6  ICD-9-CM: 276.8  8/27/2019 - Present Unknown        Acute renal failure (ARF) (HCC) ICD-10-CM: N17.9  ICD-9-CM: 584.9  8/27/2019 - Present Unknown        * (Principal) TIA (transient ischemic attack) ICD-10-CM: G45.9  ICD-9-CM: 435.9  8/27/2019 - Present Yes              A/P:    TIA  -Carotid doppler  -MRI  -PT/OT/SLP/CM    Slurred speech  -Ammonia 30s  -CT brain with no acute disease  -Get MRI brain/stroke work up     Chest pain with elevated troponin  -Troponin trending down  -remote tele  -Pt with prolonged QT  -Cardiology consult    Fever, pt with recent admit so pneumonia could be healthcare associated   -zosyn     UTI  -Klebsiella pneumoniae  -Susceptible to Zosyn    Hypokalemia  -Likely due to chronic diarrhea  -Replace K+ and Mag and monitor     SUSANNAH  -Monitor Cr  -Gentle IVF  -Avoid renal toxins    Chronic diarrhea  -leukocytes 10.9  -Check stool for c-diff - pt was on antibx for UTI     Liver mass/abscess  -Workup in process per GI  -Infectious disease consult  -General surgery consult  -IR consult - abscess drain placed 8/30  -Suction bulb drainage. Flush the drain each shift.   -Record output.   -Followup in interventional radiology in 11-14 days    Fibromyalgia  -Cont home meds    Code status:  Full  Decision Maker: No MPOA    Case reviewed with supervising physician - MYESHA Tejada MD     Signed:  MARY Myers

## 2019-09-01 LAB
ANION GAP SERPL CALC-SCNC: 8 MMOL/L (ref 7–16)
BASOPHILS # BLD: 0.1 K/UL (ref 0–0.2)
BASOPHILS NFR BLD: 1 % (ref 0–2)
BUN SERPL-MCNC: 18 MG/DL (ref 8–23)
CALCIUM SERPL-MCNC: 8 MG/DL (ref 8.3–10.4)
CHLORIDE SERPL-SCNC: 110 MMOL/L (ref 98–107)
CO2 SERPL-SCNC: 24 MMOL/L (ref 21–32)
CREAT SERPL-MCNC: 1.45 MG/DL (ref 0.6–1)
DIFFERENTIAL METHOD BLD: ABNORMAL
EOSINOPHIL # BLD: 0.1 K/UL (ref 0–0.8)
EOSINOPHIL NFR BLD: 2 % (ref 0.5–7.8)
ERYTHROCYTE [DISTWIDTH] IN BLOOD BY AUTOMATED COUNT: 15.5 % (ref 11.9–14.6)
GLUCOSE SERPL-MCNC: 85 MG/DL (ref 65–100)
HCT VFR BLD AUTO: 26.8 % (ref 35.8–46.3)
HGB BLD-MCNC: 8.6 G/DL (ref 11.7–15.4)
IMM GRANULOCYTES # BLD AUTO: 0.1 K/UL (ref 0–0.5)
IMM GRANULOCYTES NFR BLD AUTO: 1 % (ref 0–5)
LYMPHOCYTES # BLD: 1.8 K/UL (ref 0.5–4.6)
LYMPHOCYTES NFR BLD: 25 % (ref 13–44)
MCH RBC QN AUTO: 31.2 PG (ref 26.1–32.9)
MCHC RBC AUTO-ENTMCNC: 32.1 G/DL (ref 31.4–35)
MCV RBC AUTO: 97.1 FL (ref 79.6–97.8)
MONOCYTES # BLD: 0.3 K/UL (ref 0.1–1.3)
MONOCYTES NFR BLD: 4 % (ref 4–12)
NEUTS SEG # BLD: 4.7 K/UL (ref 1.7–8.2)
NEUTS SEG NFR BLD: 67 % (ref 43–78)
NRBC # BLD: 0 K/UL (ref 0–0.2)
PLATELET # BLD AUTO: 157 K/UL (ref 150–450)
PMV BLD AUTO: 9.5 FL (ref 9.4–12.3)
POTASSIUM SERPL-SCNC: 2.9 MMOL/L (ref 3.5–5.1)
RBC # BLD AUTO: 2.76 M/UL (ref 4.05–5.2)
SODIUM SERPL-SCNC: 142 MMOL/L (ref 136–145)
WBC # BLD AUTO: 7.1 K/UL (ref 4.3–11.1)

## 2019-09-01 PROCEDURE — 65660000000 HC RM CCU STEPDOWN

## 2019-09-01 PROCEDURE — 74011250637 HC RX REV CODE- 250/637: Performed by: HOSPITALIST

## 2019-09-01 PROCEDURE — 74011000258 HC RX REV CODE- 258: Performed by: NURSE PRACTITIONER

## 2019-09-01 PROCEDURE — 74011000250 HC RX REV CODE- 250: Performed by: NURSE PRACTITIONER

## 2019-09-01 PROCEDURE — 36415 COLL VENOUS BLD VENIPUNCTURE: CPT

## 2019-09-01 PROCEDURE — 74011250636 HC RX REV CODE- 250/636: Performed by: HOSPITALIST

## 2019-09-01 PROCEDURE — 74011250636 HC RX REV CODE- 250/636: Performed by: NURSE PRACTITIONER

## 2019-09-01 PROCEDURE — 94640 AIRWAY INHALATION TREATMENT: CPT

## 2019-09-01 PROCEDURE — 74011250637 HC RX REV CODE- 250/637: Performed by: NURSE PRACTITIONER

## 2019-09-01 PROCEDURE — 77010033678 HC OXYGEN DAILY

## 2019-09-01 PROCEDURE — 74011000258 HC RX REV CODE- 258: Performed by: INTERNAL MEDICINE

## 2019-09-01 PROCEDURE — 77030038269 HC DRN EXT URIN PURWCK BARD -A

## 2019-09-01 PROCEDURE — 94760 N-INVAS EAR/PLS OXIMETRY 1: CPT

## 2019-09-01 PROCEDURE — 85025 COMPLETE CBC W/AUTO DIFF WBC: CPT

## 2019-09-01 PROCEDURE — 80048 BASIC METABOLIC PNL TOTAL CA: CPT

## 2019-09-01 PROCEDURE — 74011250636 HC RX REV CODE- 250/636: Performed by: INTERNAL MEDICINE

## 2019-09-01 PROCEDURE — 74011250637 HC RX REV CODE- 250/637: Performed by: INTERNAL MEDICINE

## 2019-09-01 RX ORDER — POTASSIUM CHLORIDE 20 MEQ/1
60 TABLET, EXTENDED RELEASE ORAL
Status: COMPLETED | OUTPATIENT
Start: 2019-09-01 | End: 2019-09-01

## 2019-09-01 RX ADMIN — MONTELUKAST SODIUM 10 MG: 10 TABLET, FILM COATED ORAL at 18:18

## 2019-09-01 RX ADMIN — ALBUTEROL SULFATE 2.5 MG: 2.5 SOLUTION RESPIRATORY (INHALATION) at 19:50

## 2019-09-01 RX ADMIN — POTASSIUM CHLORIDE 60 MEQ: 20 TABLET, EXTENDED RELEASE ORAL at 08:33

## 2019-09-01 RX ADMIN — HEPARIN SODIUM 5000 UNITS: 5000 INJECTION INTRAVENOUS; SUBCUTANEOUS at 20:57

## 2019-09-01 RX ADMIN — AMPICILLIN SODIUM AND SULBACTAM SODIUM 3 G: 2; 1 INJECTION, POWDER, FOR SOLUTION INTRAMUSCULAR; INTRAVENOUS at 20:50

## 2019-09-01 RX ADMIN — OXYCODONE HYDROCHLORIDE AND ACETAMINOPHEN 1 TABLET: 7.5; 325 TABLET ORAL at 21:00

## 2019-09-01 RX ADMIN — ALBUTEROL SULFATE 2.5 MG: 2.5 SOLUTION RESPIRATORY (INHALATION) at 02:53

## 2019-09-01 RX ADMIN — PANTOPRAZOLE SODIUM 40 MG: 40 TABLET, DELAYED RELEASE ORAL at 08:33

## 2019-09-01 RX ADMIN — ALBUTEROL SULFATE 2.5 MG: 2.5 SOLUTION RESPIRATORY (INHALATION) at 07:54

## 2019-09-01 RX ADMIN — MIDODRINE HYDROCHLORIDE 5 MG: 5 TABLET ORAL at 08:33

## 2019-09-01 RX ADMIN — Medication 10 ML: at 08:40

## 2019-09-01 RX ADMIN — PANTOPRAZOLE SODIUM 40 MG: 40 TABLET, DELAYED RELEASE ORAL at 18:18

## 2019-09-01 RX ADMIN — MIDODRINE HYDROCHLORIDE 5 MG: 5 TABLET ORAL at 12:46

## 2019-09-01 RX ADMIN — ALBUTEROL SULFATE 2.5 MG: 2.5 SOLUTION RESPIRATORY (INHALATION) at 23:44

## 2019-09-01 RX ADMIN — PIPERACILLIN SODIUM,TAZOBACTAM SODIUM 3.38 G: 3; .375 INJECTION, POWDER, FOR SOLUTION INTRAVENOUS at 12:46

## 2019-09-01 RX ADMIN — ALBUTEROL SULFATE 2.5 MG: 2.5 SOLUTION RESPIRATORY (INHALATION) at 16:35

## 2019-09-01 RX ADMIN — PIPERACILLIN SODIUM,TAZOBACTAM SODIUM 3.38 G: 3; .375 INJECTION, POWDER, FOR SOLUTION INTRAVENOUS at 03:24

## 2019-09-01 RX ADMIN — MIDODRINE HYDROCHLORIDE 5 MG: 5 TABLET ORAL at 18:18

## 2019-09-01 RX ADMIN — ALBUTEROL SULFATE 2.5 MG: 2.5 SOLUTION RESPIRATORY (INHALATION) at 11:30

## 2019-09-01 RX ADMIN — Medication 5 ML: at 12:51

## 2019-09-01 RX ADMIN — Medication 10 ML: at 20:57

## 2019-09-01 RX ADMIN — HEPARIN SODIUM 5000 UNITS: 5000 INJECTION INTRAVENOUS; SUBCUTANEOUS at 09:30

## 2019-09-01 NOTE — PROGRESS NOTES
Hospitalist Progress Note      Admit Date:     2019 12:27 PM   Name:              Justin Route   Age:                 77 y.o.  :               1950   MRN:               [id number]   PCP:                Gilberto Escobar MD  Treatment Team: Attending Provider: Sanjay Jimenez MD; Utilization Review: Jeannie Alvarez RN; Care Manager: Wendy Fuentes RN; Consulting Provider: Michael Li MD; Consulting Provider: Darleen Carrera NP     Subjective:   CC:  Chest pain, facial numbness     From Admission HPI:     Pt is a 76 yo female with PMH significant for liver mass, GERD, hemorrhiods.  Pt presented to the ED from her [facility] office, her son is with her.  Pt reports that she had chest pain since last night, described as shart and stabbing.  Nothing made it better or worse.  Pt also reports some mild SOB, denied any diaphoresis.  Pt notes that he speech has been slurred, son reports x 2 wks or so, feels like it is more noticeable.  Pt with some facial numbness on the right.  Pt with recent admit to [facility], worked up for bile duct stones with jaundice and pancreatitis, noted to have a liver mass that is being worked up. [employer] also reports chronic headache x 1 wk, described as dull and throbbing.      Subjective  Pt's chest pain and facial numbness have resolved but pt continues to spike fevers. Will change from rocephin to zosyn for better coverage. Pt feels better. ID saw pt and feels the liver mass may be causing these intermittent symptoms. Consulted general surgery who recommended having IR take a look and drain if possible. Consulted IR who was able to insert a drain into the abscess. Continuing zosyn. Surgery was consulted to assess whether any further treatment needed for liver mass/abscess - they do not feel there is any role for surgery at this time.  Inf disease monitoring cultures - changing zosyn to unasyn -Blood cultures  with alpha strep intermedius, urine culture with klebsiella.         Objective:      Patient Vitals for the past 24 hrs:    Temp Pulse Resp BP SpO2   08/30/19 1544 97.9 °F (36.6 °C) 98 18 107/52 94 %   08/30/19 1517     96 %   08/30/19 1434 97.8 °F (36.6 °C) 78 18 108/56 94 %   08/30/19 1200 98.6 °F (37 °C) 61 18 (!) 87/46 96 %   08/30/19 1117     97 %   08/30/19 0800 98.7 °F (37.1 °C) 70 17 100/48 92 %   08/30/19 0737     97 %   08/30/19 0432  67 17 (!) 88/44 98 %   08/30/19 0108    (!) 83/44    08/29/19 2306     94 %   08/29/19 2304 98.9 °F (37.2 °C) 79 19 (!) 79/41 91 %   08/29/19 2030     94 %   08/29/19 1929 98.1 °F (36.7 °C) (!) 108 16 103/68 91 %      Oxygen Therapy  O2 Sat (%): 94 % (08/30/19 1544)  Pulse via Oximetry: 76 beats per minute (08/30/19 1117)  O2 Device: Nasal cannula (08/30/19 1517)  O2 Flow Rate (L/min): 3 l/min (08/30/19 1517)  No intake or output data in the 24 hours ending 08/30/19 1631       REVIEW OF SYSTEMS: Comprehensive ROS performed and negative except as stated in HPI.     Physical Examination:  General:          Well nourished.  Alert and oriented x3  Eyes:               Normal sclera.  Extraocular movements intact. ENT:                Normocephalic, atraumatic.  Moist mucous membranes  CV:                  RRR.  No m/r/g. Lungs:             CTAB.  No wheezing, rhonchi, or rales. Abdomen:        Soft, nontender, drain in place, drainage less cloudy   Extremities:     Warm and dry.  No cyanosis or edema.   Neurologic:      No focal deficit   Skin:                No rashes or jaundice.  Normal coloration  Psych:             Normal mood and affect.     Data Review:  I have reviewed all labs, meds, telemetry events, and studies from the last 24 hours.     Recent Results         Recent Results (from the past 24 hour(s))   CBC WITH AUTOMATED DIFF     Collection Time: 08/30/19  5:41 AM   Result Value Ref Range     WBC 12.3 (H) 4.3 - 11.1 K/uL     RBC 2.64 (L) 4.05 - 5.2 M/uL     HGB 8.3 (L) 11.7 - 15.4 g/dL     HCT 25.6 (L) 35.8 - 46.3 %     MCV 97.0 79.6 - 97.8 FL     MCH 31.4 26.1 - 32.9 PG     MCHC 32.4 31.4 - 35.0 g/dL     RDW 15.2 (H) 11.9 - 14.6 %     PLATELET 631 (L) 957 - 450 K/uL     MPV 9.9 9.4 - 12.3 FL     ABSOLUTE NRBC 0.00 0.0 - 0.2 K/uL     NEUTROPHILS 73 43 - 78 %     LYMPHOCYTES 20 13 - 44 %     MONOCYTES 4 4.0 - 12.0 %     EOSINOPHILS 1 0.5 - 7.8 %     BASOPHILS 1 0.0 - 2.0 %     IMMATURE GRANULOCYTES 1 0.0 - 5.0 %     ABS. NEUTROPHILS 9.0 (H) 1.7 - 8.2 K/UL     ABS. LYMPHOCYTES 2.5 0.5 - 4.6 K/UL     ABS. MONOCYTES 0.5 0.1 - 1.3 K/UL     ABS. EOSINOPHILS 0.1 0.0 - 0.8 K/UL     ABS. BASOPHILS 0.1 0.0 - 0.2 K/UL     ABS. IMM.  GRANS. 0.1 0.0 - 0.5 K/UL     RBC COMMENTS NORMOCYTIC/NORMOCHROMIC       WBC COMMENTS Result Confirmed By Smear       PLATELET COMMENTS SLIGHT       DF AUTOMATED     METABOLIC PANEL, BASIC     Collection Time: 08/30/19  5:41 AM   Result Value Ref Range     Sodium 142 136 - 145 mmol/L     Potassium 2.5 (LL) 3.5 - 5.1 mmol/L     Chloride 109 (H) 98 - 107 mmol/L     CO2 23 21 - 32 mmol/L     Anion gap 10 7 - 16 mmol/L     Glucose 94 65 - 100 mg/dL     BUN 30 (H) 8 - 23 MG/DL     Creatinine 1.60 (H) 0.6 - 1.0 MG/DL     GFR est AA 41 (L) >60 ml/min/1.73m2     GFR est non-AA 34 (L) >60 ml/min/1.73m2     Calcium 6.8 (L) 8.3 - 10.4 MG/DL                     All Micro Results      Procedure Component Value Units Date/Time     CULTURE, BODY FLUID Germania Rangeley STAIN [[id number]]       Order Status:  Sent Specimen:  Miscellaneous Fluid       CULTURE, BLOOD [[id number]] Collected:  08/30/19 1605     Order Status:  Completed Specimen:  Blood Updated:  08/30/19 1616     CULTURE, BLOOD [[id number]]       Order Status:  Sent Specimen:  Blood       CULTURE, URINE [[id number]]  (Abnormal) Collected:  08/28/19 0604     Order Status:  Completed Specimen:  Urine from Clean catch Updated:  08/30/19 0737       Special Requests: NO SPECIAL REQUESTS           Culture result:           >100,000 COLONIES/mL GRAM NEGATIVE RODS                           <10,000 COLONIES/mL MIXED SKIN LEISA ISOLATED                           IDENTIFICATION AND SUSCEPTIBILITY TO FOLLOW             CULTURE, BLOOD [[id number]]  (Abnormal) Collected:  08/28/19 0030     Order Status:  Completed Specimen:  Blood Updated:  08/30/19 0708       Special Requests: --           LEFT  HAND          GRAM STAIN GRAM POS COCCI IN CHAINS             PEDIATRIC BOTTLE                       RESULTS VERIFIED, PHONED TO AND READ BACK BY Juan Rooney RN ON 8/29/19 @1220, TA                 REFER TO ACCESSION [id number] FOR BCID PANEL       Culture result: ALPHA STREPTOCOCCUS                       IDENTIFICATION AND SUSCEPTIBILITY TO FOLLOW             CULTURE, BLOOD [[id number]] Collected:  08/28/19 0030     Order Status:  Completed Specimen:  Blood Updated:  08/29/19 1334       Special Requests: --           LEFT  Antecubital          GRAM STAIN GRAM POS COCCI IN CHAINS             ANAEROBIC BOTTLE POSITIVE                       CRITICAL RESULT NOT CALLED DUE TO PREVIOUS NOTIFICATION OF CRITICAL RESULT WITHIN THE LAST 24 HOURS.               Culture result:           CULTURE IN PROGRESS,FURTHER UPDATES TO FOLLOW             BLOOD CULTURE ID PANEL [[id number]]  (Abnormal) Collected:  08/28/19 0030     Order Status:  Completed Specimen:  Blood Updated:  08/29/19 1246       Acc. no. from Micro Order [id number]       Streptococcus DETECTED           Comment: RESULTS VERIFIED, PHONED TO AND READ BACK BY  Juan Rooney RN ON 8/29/19 @1220, TA             INTERPRETATION           Gram positive cocci. Identified by realtime PCR as Streptococcus species (not agalactiae, pyogenes, or pneumoniae).                Clinical Consideration           Consider discontinuation of IV vancomycin and using an anti-streptococcal beta-lactam (ceftriaxone/cefotaxime (peds))               Blood Culture PCR Testing           MULTIPLEX PCR NEGATIVE:  A negative FilmArray BCID result does not exclude the possibility of bloodstream infection.             C. DIFFICILE AG & TOXIN A/B [[id number]]       Order Status:  Sent Specimen:  Stool               Current Meds:         Current Facility-Administered Medications   Medication Dose Route Frequency    midodrine (PROAMITINE) tablet 5 mg  5 mg Oral TID WITH MEALS    potassium chloride (K-DUR, KLOR-CON) SR tablet 40 mEq  40 mEq Oral TID    0.9% sodium chloride infusion  25 mL/hr IntraVENous CONTINUOUS    albuterol (PROVENTIL VENTOLIN) nebulizer solution 2.5 mg  2.5 mg Nebulization Q4H RT    hydrocortisone (ANUSOL-HC) 2.5 % rectal cream   PeriANAL PRN    piperacillin-tazobactam (ZOSYN) 3.375 g in 0.9% sodium chloride (MBP/ADV) 100 mL  3.375 g IntraVENous Q8H    heparin (porcine) injection 5,000 Units  5,000 Units SubCUTAneous Q12H    montelukast (SINGULAIR) tablet 10 mg  10 mg Oral QPM    sodium chloride (NS) flush 5-40 mL  5-40 mL IntraVENous Q8H    sodium chloride (NS) flush 5-40 mL  5-40 mL IntraVENous PRN    acetaminophen (TYLENOL) tablet 650 mg  650 mg Oral Q4H PRN    naloxone (NARCAN) injection 0.4 mg  0.4 mg IntraVENous PRN    ondansetron (ZOFRAN) injection 4 mg  4 mg IntraVENous Q4H PRN    bisacodyl (DULCOLAX) tablet 10 mg  10 mg Oral DAILY PRN    0.9% sodium chloride infusion  150 mL/hr IntraVENous CONTINUOUS    pantoprazole (PROTONIX) tablet 40 mg  40 mg Oral ACB&D         Diet:  DIET CLEAR LIQUID     Other Studies (last 24 hours): Ir Drain Procedure W Cath     Result Date: 8/30/2019  Title: Percutaneous abscess drain placement with Ultrasound Guidance. History: Fevers and chills. Probable hepatic abscess. .  Consent: Informed written and oral consent was obtained from the patient after explanation of benefits and risks (including, but not limitted to: Infection, Hemorrhage, Visceral Injury). The patient's questions were answered to their satisfaction. The patient stated understanding and requested that we proceed. Procedure: Maximal sterile barrier technique (including:  cap, mask, sterile gown, sterile gloves, sterile sheet, hand hygiene, and chlorhexidene for cutaneous antisepsis) were used. Following routine prep and drape of the left lobe liver, a local field block with 1% lidocaine was achieved. Ultrasound evaluation was performed. Using real-time ultrasound guidance, with appropriate image recording, an 18-gauge needle was advanced into the abscess cavity. Thin brownish fluid was returned and as sent specimen. Using fluoroscopy, the needle was exchanged over an Amplatz wire for a 10 [employer] June multipurpose drain. The catheter was secured with a suture and [employer]Lok device. A suction bulb  was attached. Complications: None. Medications: None Specimen: Sent to Microbiology Fluoroscopy: 3 mgy. 18 seconds. Contrast: 0 ml. Findings: Lobular collection of the left lobe of the liver. Purulent fluid was obtained      Impression: Uncomplicated percutaneous hepatic abscess drain placement. Plan: Suction bulb drainage. Flush the drain each shift. Record output. Followup in interventional radiology in 11-14 days.         Assessment and Plan:                  Hospital Problems as of 8/30/2019 Date Reviewed: 8/27/2019           Codes Class Noted - Resolved POA     Chest pain ICD-10-CM: R07.9  ICD-9-CM: 786.50   8/27/2019 - Present Unknown           Elevated troponin ICD-10-CM: R74.8  ICD-9-CM: 790. 6   8/27/2019 - Present Unknown           Fibromyalgia ICD-10-CM: M79.7  ICD-9-CM: 729.1   8/27/2019 - Present Unknown           Headache ICD-10-CM: R51  ICD-9-CM: 836. 0   8/27/2019 - Present Unknown           Liver mass ICD-10-CM: R16.0  ICD-9-CM: 470. 9   8/27/2019 - Present Unknown           Hypokalemia ICD-10-CM: E87.6  ICD-9-CM: 276.8   8/27/2019 - Present Unknown           Acute renal failure (ARF) (HCC) ICD-10-CM: N17.9  ICD-9-CM: 584. 9   8/27/2019 - Present Unknown           * (Principal) TIA (transient ischemic attack) ICD-10-CM: G45.9  ICD-9-CM: 026. 9   8/27/2019 - Present Yes                   A/P:    TIA  -Carotid doppler  -MRI  -PT/OT/SLP/CM     Slurred speech  -Pt feels  Mostly resolved. -CT brain with no acute disease  -MRI neg for acute process     Chest pain with elevated troponin  -Troponin trending down  -remote tele     Infection as noted in HPI   -zosyn changed to unasyn     Hypokalemia  -Likely due to chronic diarrhea  -Replace K+ and Mag and monitor     SUSANNAH  -Monitor Cr  -Gentle IVF  -Avoid renal toxins     Chronic diarrhea  -lresolved     Liver mass  -Workup in process per GI  -Infectious disease following culture  -IR consult - abscess drain placed 8/30  -Suction bulb drainage. Flush the drain each shift.   -Record output.   -Followup in interventional radiology in 11-14 days     Fibromyalgia  -Cont home meds    Discharge plan - Likely STR    Code status:  Full  Decision Maker: No MPOA     Case reviewed with supervising physician - MYESHA Byrd MD     Signed:  MARY Osuna

## 2019-09-01 NOTE — PROGRESS NOTES
Surgery received another consult on this patient this morning  She has had successful percutaneous drainage of her previously, spontaneously-resolving hepatic abscess. She has had normalization of her leukocytosis. She is afebrile. Note made of (+) blood and urine cultures. There is no role for surgery; please do not re-consult surgery unless this first-line percutaneous and antibiotic therapy fails.

## 2019-09-01 NOTE — PROGRESS NOTES
Hospitalist Progress Note      Admit Date:     2019 12:27 PM   Name:              Bari    Age:                 77 y.o.  :               1950   MRN:               [id number]   PCP:                Marychuy Arambula MD  Treatment Team: Attending Provider: Sarita Garcia MD; Utilization Review: Sonal Stauffer RN; Care Manager: Onelia Bosch RN; Consulting Provider: Jeni Jacobs MD; Consulting Provider: Liliana Dutta NP     Subjective:   CC:  Chest pain, facial numbness     From Admission HPI:     Pt is a 76 yo female with PMH significant for liver mass, GERD, hemorrhiods.  Pt presented to the ED from her [facility] office, her son is with her.  Pt reports that she had chest pain since last night, described as shart and stabbing.  Nothing made it better or worse.  Pt also reports some mild SOB, denied any diaphoresis.  Pt notes that he speech has been slurred, son reports x 2 wks or so, feels like it is more noticeable.  Pt with some facial numbness on the right.  Pt with recent admit to [facility], worked up for bile duct stones with jaundice and pancreatitis, noted to have a liver mass that is being worked up. [employer] also reports chronic headache x 1 wk, described as dull and throbbing.      Subjective  Pt's chest pain and facial numbness have resolved but pt continues to spike fevers. Will change from rocephin to zosyn for better coverage. Pt feels better. ID saw pt and feels the liver mass may be causing these intermittent symptoms. Consulted general surgery who recommended having IR take a look and drain if possible. Consulted IR who was able to insert a drain into the abscess.   Continuing zosyn.        Objective:      Patient Vitals for the past 24 hrs:    Temp Pulse Resp BP SpO2   19 1544 97.9 °F (36.6 °C) 98 18 107/52 94 %   19 1517     96 %   19 1434 97.8 °F (36.6 °C) 78 18 108/56 94 %   19 1200 98.6 °F (37 °C) 61 18 (!) 87/46 96 %   08/30/19 1117     97 %   08/30/19 0800 98.7 °F (37.1 °C) 70 17 100/48 92 %   08/30/19 0737     97 %   08/30/19 0432  67 17 (!) 88/44 98 %   08/30/19 0108    (!) 83/44    08/29/19 2306     94 %   08/29/19 2304 98.9 °F (37.2 °C) 79 19 (!) 79/41 91 %   08/29/19 2030     94 %   08/29/19 1929 98.1 °F (36.7 °C) (!) 108 16 103/68 91 %      Oxygen Therapy  O2 Sat (%): 94 % (08/30/19 1544)  Pulse via Oximetry: 76 beats per minute (08/30/19 1117)  O2 Device: Nasal cannula (08/30/19 1517)  O2 Flow Rate (L/min): 3 l/min (08/30/19 1517)  No intake or output data in the 24 hours ending 08/30/19 1631       REVIEW OF SYSTEMS: Comprehensive ROS performed and negative except as stated in HPI.     Physical Examination:  General:          Well nourished.  Alert and oriented x3  Eyes:               Normal sclera.  Extraocular movements intact. ENT:                Normocephalic, atraumatic.  Moist mucous membranes  CV:                  RRR.  No m/r/g. Lungs:             CTAB.  No wheezing, rhonchi, or rales. Abdomen:        Soft, nontender, nondistended. Extremities:     Warm and dry.  No cyanosis or edema.   Neurologic:      No focal deficit   Skin:                No rashes or jaundice.  Normal coloration  Psych:             Normal mood and affect.     Data Review:  I have reviewed all labs, meds, telemetry events, and studies from the last 24 hours.     Recent Results   Recent Results (from the past 24 hour(s))   CBC WITH AUTOMATED DIFF     Collection Time: 08/30/19  5:41 AM   Result Value Ref Range     WBC 12.3 (H) 4.3 - 11.1 K/uL     RBC 2.64 (L) 4.05 - 5.2 M/uL     HGB 8.3 (L) 11.7 - 15.4 g/dL     HCT 25.6 (L) 35.8 - 46.3 %     MCV 97.0 79.6 - 97.8 FL     MCH 31.4 26.1 - 32.9 PG     MCHC 32.4 31.4 - 35.0 g/dL     RDW 15.2 (H) 11.9 - 14.6 %     PLATELET 728 (L) 600 - 450 K/uL     MPV 9.9 9.4 - 12.3 FL     ABSOLUTE NRBC 0.00 0.0 - 0.2 K/uL     NEUTROPHILS 73 43 - 78 %     LYMPHOCYTES 20 13 - 44 %     MONOCYTES 4 4.0 - 12.0 %     EOSINOPHILS 1 0.5 - 7.8 %     BASOPHILS 1 0.0 - 2.0 %     IMMATURE GRANULOCYTES 1 0.0 - 5.0 %     ABS. NEUTROPHILS 9.0 (H) 1.7 - 8.2 K/UL     ABS. LYMPHOCYTES 2.5 0.5 - 4.6 K/UL     ABS. MONOCYTES 0.5 0.1 - 1.3 K/UL     ABS. EOSINOPHILS 0.1 0.0 - 0.8 K/UL     ABS. BASOPHILS 0.1 0.0 - 0.2 K/UL     ABS. IMM.  GRANS. 0.1 0.0 - 0.5 K/UL     RBC COMMENTS NORMOCYTIC/NORMOCHROMIC       WBC COMMENTS Result Confirmed By Smear       PLATELET COMMENTS SLIGHT       DF AUTOMATED     METABOLIC PANEL, BASIC     Collection Time: 08/30/19  5:41 AM   Result Value Ref Range     Sodium 142 136 - 145 mmol/L     Potassium 2.5 (LL) 3.5 - 5.1 mmol/L     Chloride 109 (H) 98 - 107 mmol/L     CO2 23 21 - 32 mmol/L     Anion gap 10 7 - 16 mmol/L     Glucose 94 65 - 100 mg/dL     BUN 30 (H) 8 - 23 MG/DL     Creatinine 1.60 (H) 0.6 - 1.0 MG/DL     GFR est AA 41 (L) >60 ml/min/1.73m2     GFR est non-AA 34 (L) >60 ml/min/1.73m2     Calcium 6.8 (L) 8.3 - 10.4 MG/DL                     All Micro Results      Procedure Component Value Units Date/Time     CULTURE, BODY FLUID [employer] STAIN [[id number]]       Order Status:  Sent Specimen:  Miscellaneous Fluid       CULTURE, BLOOD [[id number]] Collected:  08/30/19 1605     Order Status:  Completed Specimen:  Blood Updated:  08/30/19 1616     CULTURE, BLOOD [[id number]]       Order Status:  Sent Specimen:  Blood       CULTURE, URINE [[id number]]  (Abnormal) Collected:  08/28/19 0604     Order Status:  Completed Specimen:  Urine from Clean catch Updated:  08/30/19 0737       Special Requests: NO SPECIAL REQUESTS           Culture result:           >100,000 COLONIES/mL GRAM NEGATIVE RODS                           <10,000 COLONIES/mL MIXED SKIN LEISA ISOLATED                           IDENTIFICATION AND SUSCEPTIBILITY TO FOLLOW             CULTURE, BLOOD [[id number]]  (Abnormal) Collected:  08/28/19 0030     Order Status:  Completed Specimen:  Blood Updated:  08/30/19 0708       Special Requests: --           LEFT  HAND          GRAM STAIN GRAM POS COCCI IN CHAINS             PEDIATRIC BOTTLE                       RESULTS VERIFIED, PHONED TO AND READ BACK BY Wyatt Castellon RN ON 8/29/19 @1220, TA                 REFER TO ACCESSION [id number] FOR BCID PANEL       Culture result: ALPHA STREPTOCOCCUS                       IDENTIFICATION AND SUSCEPTIBILITY TO FOLLOW             CULTURE, BLOOD [[id number]] Collected:  08/28/19 0030     Order Status:  Completed Specimen:  Blood Updated:  08/29/19 1334       Special Requests: --           LEFT  Antecubital          GRAM STAIN GRAM POS COCCI IN CHAINS             ANAEROBIC BOTTLE POSITIVE                       CRITICAL RESULT NOT CALLED DUE TO PREVIOUS NOTIFICATION OF CRITICAL RESULT WITHIN THE LAST 24 HOURS.               Culture result:           CULTURE IN PROGRESS,FURTHER UPDATES TO FOLLOW             BLOOD CULTURE ID PANEL [[id number]]  (Abnormal) Collected:  08/28/19 0030     Order Status:  Completed Specimen:  Blood Updated:  08/29/19 1246       Acc. no. from Micro Order [id number]       Streptococcus DETECTED           Comment: RESULTS VERIFIED, PHONED TO AND READ BACK BY  Wyatt Castellon RN ON 8/29/19 @1220, TA             INTERPRETATION           Gram positive cocci. Identified by realtime PCR as Streptococcus species (not agalactiae, pyogenes, or pneumoniae).             Clinical Consideration           Consider discontinuation of IV vancomycin and using an anti-streptococcal beta-lactam (ceftriaxone/cefotaxime (peds))               Blood Culture PCR Testing           MULTIPLEX PCR NEGATIVE:  A negative FilmArray BCID result does not exclude the possibility of bloodstream infection.             C.  DIFFICILE AG & TOXIN A/B [[id number]]       Order Status:  Sent Specimen:  Stool               Current Meds:         Current Facility-Administered Medications   Medication Dose Route Frequency    midodrine (PROAMITINE) tablet 5 mg  5 mg Oral TID WITH MEALS    potassium chloride (K-DUR, KLOR-CON) SR tablet 40 mEq  40 mEq Oral TID    0.9% sodium chloride infusion  25 mL/hr IntraVENous CONTINUOUS    albuterol (PROVENTIL VENTOLIN) nebulizer solution 2.5 mg  2.5 mg Nebulization Q4H RT    hydrocortisone (ANUSOL-HC) 2.5 % rectal cream   PeriANAL PRN    piperacillin-tazobactam (ZOSYN) 3.375 g in 0.9% sodium chloride (MBP/ADV) 100 mL  3.375 g IntraVENous Q8H    heparin (porcine) injection 5,000 Units  5,000 Units SubCUTAneous Q12H    montelukast (SINGULAIR) tablet 10 mg  10 mg Oral QPM    sodium chloride (NS) flush 5-40 mL  5-40 mL IntraVENous Q8H    sodium chloride (NS) flush 5-40 mL  5-40 mL IntraVENous PRN    acetaminophen (TYLENOL) tablet 650 mg  650 mg Oral Q4H PRN    naloxone (NARCAN) injection 0.4 mg  0.4 mg IntraVENous PRN    ondansetron (ZOFRAN) injection 4 mg  4 mg IntraVENous Q4H PRN    bisacodyl (DULCOLAX) tablet 10 mg  10 mg Oral DAILY PRN    0.9% sodium chloride infusion  150 mL/hr IntraVENous CONTINUOUS    pantoprazole (PROTONIX) tablet 40 mg  40 mg Oral ACB&D         Diet:  DIET CLEAR LIQUID     Other Studies (last 24 hours): Ir Drain Procedure W Cath     Result Date: 8/30/2019  Title: Percutaneous abscess drain placement with Ultrasound Guidance. History: Fevers and chills. Probable hepatic abscess. .  Consent: Informed written and oral consent was obtained from the patient after explanation of benefits and risks (including, but not limitted to: Infection, Hemorrhage, Visceral Injury). The patient's questions were answered to their satisfaction. The patient stated understanding and requested that we proceed. Procedure: Maximal sterile barrier technique (including:  cap, mask, sterile gown, sterile gloves, sterile sheet, hand hygiene, and chlorhexidene for cutaneous antisepsis) were used. Following routine prep and drape of the left lobe liver, a local field block with 1% lidocaine was achieved. Ultrasound evaluation was performed. Using real-time ultrasound guidance, with appropriate image recording, an 18-gauge needle was advanced into the abscess cavity. Thin brownish fluid was returned and as sent specimen. Using fluoroscopy, the needle was exchanged over an Amplatz wire for a 10 [employer] June multipurpose drain. The catheter was secured with a suture and StatLok device. A suction bulb  was attached. Complications: None. Medications: None Specimen: Sent to Microbiology Fluoroscopy: 3 mgy. 18 seconds. Contrast: 0 ml. Findings: Lobular collection of the left lobe of the liver. Purulent fluid was obtained      Impression: Uncomplicated percutaneous hepatic abscess drain placement. Plan: Suction bulb drainage. Flush the drain each shift. Record output. Followup in interventional radiology in 11-14 days.         Assessment and Plan:                  Hospital Problems as of 8/30/2019 Date Reviewed: 8/27/2019           Codes Class Noted - Resolved POA     Chest pain ICD-10-CM: R07.9  ICD-9-CM: 786.50   8/27/2019 - Present Unknown           Elevated troponin ICD-10-CM: R74.8  ICD-9-CM: 790. 6   8/27/2019 - Present Unknown           Fibromyalgia ICD-10-CM: M79.7  ICD-9-CM: 729.1   8/27/2019 - Present Unknown           Headache ICD-10-CM: R51  ICD-9-CM: 912. 0   8/27/2019 - Present Unknown           Liver mass ICD-10-CM: R16.0  ICD-9-CM: 827. 9   8/27/2019 - Present Unknown           Hypokalemia ICD-10-CM: E87.6  ICD-9-CM: 276.8   8/27/2019 - Present Unknown           Acute renal failure (ARF) (HCC) ICD-10-CM: N17.9  ICD-9-CM: 584. 9   8/27/2019 - Present Unknown           * (Principal) TIA (transient ischemic attack) ICD-10-CM: G45.9  ICD-9-CM: 435. 9   8/27/2019 - Present Yes                   A/P:    TIA  -Carotid doppler  -MRI  -PT/OT/SLP/CM     Slurred speech  -Ammonia 30s  -CT brain with no acute disease  -Get MRI brain/stroke work up     Chest pain with elevated troponin  -Troponin trending down  -remote tele  -Pt with prolonged QT  -Cardiology consult     Fever, pt with recent admit so pneumonia could be healthcare associated   -zosyn      Hypokalemia  -Likely due to chronic diarrhea  -Replace K+ and Mag and monitor     SUSANNAH  -Monitor Cr  -Gentle IVF  -Avoid renal toxins     Chronic diarrhea  -leukocytes 18.4  -Check stool for c-diff - pt was on antibx for UTI     Liver mass  -Workup in process per GI  -Infectious disease consult  -General surgery consult  -IR consult - abscess drain placed 8/30  -Suction bulb drainage. Flush the drain each shift.   -Record output.   -Followup in interventional radiology in 11-14 days     Fibromyalgia  -Cont home meds     Code status:  Full  Decision Maker: No MPOA     Case reviewed with supervising physician - MYESHA Horta MD     Signed:  MARY Kearns

## 2019-09-01 NOTE — PROGRESS NOTES
ID Chart Check:    Patient NOT seen today but chart reviewed. Blood cultures 8/28 with alpha strep intermedius, urine culture with klebsiella. Repeat blood cultures 8/30 NGTD. Abscess culture NGTD. Will change zosyn to unasyn. No further surgical plans at this time.

## 2019-09-02 LAB
ANION GAP SERPL CALC-SCNC: 7 MMOL/L (ref 7–16)
BASOPHILS # BLD: 0.1 K/UL (ref 0–0.2)
BASOPHILS NFR BLD: 1 % (ref 0–2)
BUN SERPL-MCNC: 14 MG/DL (ref 8–23)
CALCIUM SERPL-MCNC: 8.2 MG/DL (ref 8.3–10.4)
CHLORIDE SERPL-SCNC: 111 MMOL/L (ref 98–107)
CO2 SERPL-SCNC: 25 MMOL/L (ref 21–32)
CREAT SERPL-MCNC: 1.26 MG/DL (ref 0.6–1)
DIFFERENTIAL METHOD BLD: ABNORMAL
EOSINOPHIL # BLD: 0.1 K/UL (ref 0–0.8)
EOSINOPHIL NFR BLD: 2 % (ref 0.5–7.8)
ERYTHROCYTE [DISTWIDTH] IN BLOOD BY AUTOMATED COUNT: 15.7 % (ref 11.9–14.6)
GLUCOSE SERPL-MCNC: 92 MG/DL (ref 65–100)
HCT VFR BLD AUTO: 28.9 % (ref 35.8–46.3)
HGB BLD-MCNC: 9 G/DL (ref 11.7–15.4)
IMM GRANULOCYTES # BLD AUTO: 0.1 K/UL (ref 0–0.5)
IMM GRANULOCYTES NFR BLD AUTO: 1 % (ref 0–5)
LYMPHOCYTES # BLD: 1.6 K/UL (ref 0.5–4.6)
LYMPHOCYTES NFR BLD: 26 % (ref 13–44)
MCH RBC QN AUTO: 30.7 PG (ref 26.1–32.9)
MCHC RBC AUTO-ENTMCNC: 31.1 G/DL (ref 31.4–35)
MCV RBC AUTO: 98.6 FL (ref 79.6–97.8)
MONOCYTES # BLD: 0.2 K/UL (ref 0.1–1.3)
MONOCYTES NFR BLD: 4 % (ref 4–12)
NEUTS SEG # BLD: 4.2 K/UL (ref 1.7–8.2)
NEUTS SEG NFR BLD: 67 % (ref 43–78)
NRBC # BLD: 0 K/UL (ref 0–0.2)
PLATELET # BLD AUTO: 174 K/UL (ref 150–450)
PMV BLD AUTO: 9.4 FL (ref 9.4–12.3)
POTASSIUM SERPL-SCNC: 3.3 MMOL/L (ref 3.5–5.1)
RBC # BLD AUTO: 2.93 M/UL (ref 4.05–5.2)
SODIUM SERPL-SCNC: 143 MMOL/L (ref 136–145)
WBC # BLD AUTO: 6.3 K/UL (ref 4.3–11.1)

## 2019-09-02 PROCEDURE — 02HV33Z INSERTION OF INFUSION DEVICE INTO SUPERIOR VENA CAVA, PERCUTANEOUS APPROACH: ICD-10-PCS | Performed by: INTERNAL MEDICINE

## 2019-09-02 PROCEDURE — 97530 THERAPEUTIC ACTIVITIES: CPT

## 2019-09-02 PROCEDURE — 36573 INSJ PICC RS&I 5 YR+: CPT | Performed by: INTERNAL MEDICINE

## 2019-09-02 PROCEDURE — 74011250636 HC RX REV CODE- 250/636: Performed by: NURSE PRACTITIONER

## 2019-09-02 PROCEDURE — 74011250636 HC RX REV CODE- 250/636: Performed by: INTERNAL MEDICINE

## 2019-09-02 PROCEDURE — 65660000000 HC RM CCU STEPDOWN

## 2019-09-02 PROCEDURE — 80048 BASIC METABOLIC PNL TOTAL CA: CPT

## 2019-09-02 PROCEDURE — 94760 N-INVAS EAR/PLS OXIMETRY 1: CPT

## 2019-09-02 PROCEDURE — 76937 US GUIDE VASCULAR ACCESS: CPT

## 2019-09-02 PROCEDURE — 77010033678 HC OXYGEN DAILY

## 2019-09-02 PROCEDURE — 74011000250 HC RX REV CODE- 250: Performed by: NURSE PRACTITIONER

## 2019-09-02 PROCEDURE — 85025 COMPLETE CBC W/AUTO DIFF WBC: CPT

## 2019-09-02 PROCEDURE — C1751 CATH, INF, PER/CENT/MIDLINE: HCPCS

## 2019-09-02 PROCEDURE — 74011250637 HC RX REV CODE- 250/637: Performed by: NURSE PRACTITIONER

## 2019-09-02 PROCEDURE — 94640 AIRWAY INHALATION TREATMENT: CPT

## 2019-09-02 PROCEDURE — 36415 COLL VENOUS BLD VENIPUNCTURE: CPT

## 2019-09-02 PROCEDURE — 74011000258 HC RX REV CODE- 258: Performed by: NURSE PRACTITIONER

## 2019-09-02 PROCEDURE — 74011250636 HC RX REV CODE- 250/636: Performed by: HOSPITALIST

## 2019-09-02 PROCEDURE — 74011250637 HC RX REV CODE- 250/637: Performed by: INTERNAL MEDICINE

## 2019-09-02 RX ORDER — SODIUM CHLORIDE 0.9 % (FLUSH) 0.9 %
10 SYRINGE (ML) INJECTION EVERY 8 HOURS
Status: DISCONTINUED | OUTPATIENT
Start: 2019-09-02 | End: 2019-09-04 | Stop reason: HOSPADM

## 2019-09-02 RX ORDER — POTASSIUM CHLORIDE 20 MEQ/1
20 TABLET, EXTENDED RELEASE ORAL 2 TIMES DAILY
Status: DISCONTINUED | OUTPATIENT
Start: 2019-09-02 | End: 2019-09-03

## 2019-09-02 RX ORDER — HEPARIN 100 UNIT/ML
300 SYRINGE INTRAVENOUS AS NEEDED
Status: DISCONTINUED | OUTPATIENT
Start: 2019-09-02 | End: 2019-09-04 | Stop reason: HOSPADM

## 2019-09-02 RX ORDER — HEPARIN 100 UNIT/ML
300 SYRINGE INTRAVENOUS EVERY 8 HOURS
Status: DISCONTINUED | OUTPATIENT
Start: 2019-09-02 | End: 2019-09-04 | Stop reason: HOSPADM

## 2019-09-02 RX ORDER — SODIUM CHLORIDE 0.9 % (FLUSH) 0.9 %
10 SYRINGE (ML) INJECTION AS NEEDED
Status: DISCONTINUED | OUTPATIENT
Start: 2019-09-02 | End: 2019-09-04 | Stop reason: HOSPADM

## 2019-09-02 RX ADMIN — AMPICILLIN SODIUM AND SULBACTAM SODIUM 3 G: 2; 1 INJECTION, POWDER, FOR SOLUTION INTRAMUSCULAR; INTRAVENOUS at 20:00

## 2019-09-02 RX ADMIN — POTASSIUM CHLORIDE 20 MEQ: 20 TABLET, EXTENDED RELEASE ORAL at 18:40

## 2019-09-02 RX ADMIN — PANTOPRAZOLE SODIUM 40 MG: 40 TABLET, DELAYED RELEASE ORAL at 04:34

## 2019-09-02 RX ADMIN — MONTELUKAST SODIUM 10 MG: 10 TABLET, FILM COATED ORAL at 18:40

## 2019-09-02 RX ADMIN — MIDODRINE HYDROCHLORIDE 5 MG: 5 TABLET ORAL at 12:26

## 2019-09-02 RX ADMIN — MIDODRINE HYDROCHLORIDE 5 MG: 5 TABLET ORAL at 18:40

## 2019-09-02 RX ADMIN — MIDODRINE HYDROCHLORIDE 5 MG: 5 TABLET ORAL at 08:02

## 2019-09-02 RX ADMIN — Medication 10 ML: at 22:11

## 2019-09-02 RX ADMIN — AMPICILLIN SODIUM AND SULBACTAM SODIUM 3 G: 2; 1 INJECTION, POWDER, FOR SOLUTION INTRAMUSCULAR; INTRAVENOUS at 04:33

## 2019-09-02 RX ADMIN — ALBUTEROL SULFATE 2.5 MG: 2.5 SOLUTION RESPIRATORY (INHALATION) at 11:17

## 2019-09-02 RX ADMIN — SODIUM CHLORIDE, PRESERVATIVE FREE 300 UNITS: 5 INJECTION INTRAVENOUS at 22:10

## 2019-09-02 RX ADMIN — POTASSIUM CHLORIDE 20 MEQ: 20 TABLET, EXTENDED RELEASE ORAL at 12:26

## 2019-09-02 RX ADMIN — ALBUTEROL SULFATE 2.5 MG: 2.5 SOLUTION RESPIRATORY (INHALATION) at 19:30

## 2019-09-02 RX ADMIN — HEPARIN SODIUM 5000 UNITS: 5000 INJECTION INTRAVENOUS; SUBCUTANEOUS at 10:03

## 2019-09-02 RX ADMIN — HEPARIN SODIUM 5000 UNITS: 5000 INJECTION INTRAVENOUS; SUBCUTANEOUS at 22:10

## 2019-09-02 RX ADMIN — Medication 10 ML: at 04:34

## 2019-09-02 RX ADMIN — ALBUTEROL SULFATE 2.5 MG: 2.5 SOLUTION RESPIRATORY (INHALATION) at 16:19

## 2019-09-02 RX ADMIN — Medication 5 ML: at 18:41

## 2019-09-02 RX ADMIN — ALBUTEROL SULFATE 2.5 MG: 2.5 SOLUTION RESPIRATORY (INHALATION) at 07:37

## 2019-09-02 RX ADMIN — PANTOPRAZOLE SODIUM 40 MG: 40 TABLET, DELAYED RELEASE ORAL at 18:40

## 2019-09-02 RX ADMIN — AMPICILLIN SODIUM AND SULBACTAM SODIUM 3 G: 2; 1 INJECTION, POWDER, FOR SOLUTION INTRAMUSCULAR; INTRAVENOUS at 12:26

## 2019-09-02 NOTE — PROGRESS NOTES
PICC Placement Note    PRE-PROCEDURE VERIFICATION  Correct Procedure: yes. Time out completed with assistant Vimal Allen, RN VAT and all persons present in agreement with time out. Correct Site:  yes  Temperature: Temp: 98.6 °F (37 °C), Temperature Source: Temp Source: Oral  Recent Labs     09/02/19  0652   BUN 14   CREA 1.26*      WBC 6.3     Allergies: Sulfa (sulfonamide antibiotics); Aspirin; Celecoxib; Metronidazole; Morphine; and Naproxen  Education materials for [facility] ([place], [place]) Care given to patient or family. PROCEDURE DETAIL  A single lumen PICC line was started for antibiotic therapy. The following documentation is in addition to the PICC properties in the lines/airways flowsheet :  Lot #: [id number]  xylocaine used: yes  Mid-Arm Circumference: 30 (cm)  Internal Catheter Length: 42 (cm)  Internal Catheter Total Length: 42 (cm)  Vein Selection for PICC:right basilic  Central Line Bundle followed yes  Complication Related to Insertion: none  Both the insertion guidewire and ECG guidewire were removed intact all ports have positive blood return and were flush well with normal saline. The location of the tip of the PICC is verified using ECG technology. The tip is in the SVC per ECG reading. See image below.      Line is okay to use: yes

## 2019-09-02 NOTE — PROGRESS NOTES
Problem: Risk for Spread of Infection  Goal: Prevent transmission of infectious organism to others  Description  Prevent the transmission of infectious organisms to other patients, staff members, and visitors. Outcome: Progressing Towards Goal     Problem: Patient Education:  Go to Education Activity  Goal: Patient/Family Education  Outcome: Progressing Towards Goal     Problem: Falls - Risk of  Goal: *Absence of Falls  Description  Document Julienne Payment Fall Risk and appropriate interventions in the flowsheet. Outcome: Progressing Towards Goal  Note:   Fall Risk Interventions:  Mobility Interventions: Bed/chair exit alarm         Medication Interventions: Bed/chair exit alarm    Elimination Interventions: Call light in reach, Bed/chair exit alarm              Problem: Patient Education: Go to Patient Education Activity  Goal: Patient/Family Education  Outcome: Progressing Towards Goal     Problem: Pressure Injury - Risk of  Goal: *Prevention of pressure injury  Description  Document Randolph Scale and appropriate interventions in the flowsheet.   Outcome: Progressing Towards Goal  Note:   Pressure Injury Interventions:  Sensory Interventions: Assess changes in LOC, Assess need for specialty bed, Discuss PT/OT consult with provider, Float heels, Keep linens dry and wrinkle-free    Moisture Interventions: Absorbent underpads, Maintain skin hydration (lotion/cream), Minimize layers    Activity Interventions: Increase time out of bed    Mobility Interventions: Pressure redistribution bed/mattress (bed type)    Nutrition Interventions: Document food/fluid/supplement intake, Offer support with meals,snacks and hydration    Friction and Shear Interventions: HOB 30 degrees or less, Foam dressings/transparent film/skin sealants                Problem: Patient Education: Go to Patient Education Activity  Goal: Patient/Family Education  Outcome: Progressing Towards Goal     Problem: Patient Education: Go to Patient Education Activity  Goal: Patient/Family Education  Outcome: Progressing Towards Goal     Problem: Ischemic Stroke: Discharge Outcomes  Goal: *Verbalizes anxiety and depression are reduced or absent  Outcome: Progressing Towards Goal  Goal: *Verbalize understanding of risk factor modification(Stroke Metric)  Outcome: Progressing Towards Goal  Goal: *Hemodynamically stable  Outcome: Progressing Towards Goal  Goal: *Absence of aspiration pneumonia  Outcome: Progressing Towards Goal  Goal: *Aware of needed dietary changes  Outcome: Progressing Towards Goal  Goal: *Verbalize understanding of prescribed medications including anti-coagulants, anti-lipid, and/or anti-platelets(Stroke Metric)  Outcome: Progressing Towards Goal  Goal: *Tolerating diet  Outcome: Progressing Towards Goal  Goal: *Aware of follow-up diagnostics related to anticoagulants  Outcome: Progressing Towards Goal  Goal: *Ability to perform ADLs and demonstrates progressive mobility and function  Outcome: Progressing Towards Goal  Goal: *Absence of DVT(Stroke Metric)  Outcome: Progressing Towards Goal  Goal: *Absence of aspiration  Outcome: Progressing Towards Goal  Goal: *Optimal pain control at patient's stated goal  Outcome: Progressing Towards Goal  Goal: *Home safety concerns addressed  Outcome: Progressing Towards Goal  Goal: *Describes available resources and support systems  Outcome: Progressing Towards Goal  Goal: *Verbalizes understanding of activation of EMS(911) for stroke symptoms(Stroke Metric)  Outcome: Progressing Towards Goal  Goal: *Understands and describes signs and symptoms to report to providers(Stroke Metric)  Outcome: Progressing Towards Goal  Goal: *Neurolgocially stable (absence of additional neurological deficits)  Outcome: Progressing Towards Goal  Goal: *Verbalizes importance of follow-up with primary care physician(Stroke Metric)  Outcome: Progressing Towards Goal     Problem: Breathing Pattern - Ineffective  Goal: *Absence of hypoxia  Outcome: Progressing Towards Goal  Goal: *Use of effective breathing techniques  Outcome: Progressing Towards Goal

## 2019-09-02 NOTE — PROGRESS NOTES
Hospitalist Progress Note      Admit Date:     2019 12:27 PM   Name:              Eduin Abrams   Age:                 77 y.o.  :               1950   MRN:               [id number]   PCP:                Stella Hobson MD  Treatment Team: Attending Provider: Delaney Mayo MD; Utilization Review: Ora Akers RN; Care Manager: Jered Rios RN; Consulting Provider: Maura Slaughter MD; Consulting Provider: Divya Avery NP     Subjective:   CC:  Chest pain, facial numbness     From Admission HPI:     Pt is a 78 yo female with PMH significant for liver mass, GERD, hemorrhiods.  Pt presented to the ED from her [facility] office, her son is with her.  Pt reports that she had chest pain since last night, described as shart and stabbing.  Nothing made it better or worse.  Pt also reports some mild SOB, denied any diaphoresis.  Pt notes that he speech has been slurred, son reports x 2 wks or so, feels like it is more noticeable.  Pt with some facial numbness on the right.  Pt with recent admit to [facility], worked up for bile duct stones with jaundice and pancreatitis, noted to have a liver mass that is being worked up. [employer] also reports chronic headache x 1 wk, described as dull and throbbing.      Subjective  Pt's chest pain and facial numbness have resolved but pt continues to spike fevers. Will change from rocephin to zosyn for better coverage. Pt feels better. ID saw pt and feels the liver mass may be causing these intermittent symptoms. Consulted general surgery who recommended having IR take a look and drain if possible. Consulted IR who was able to insert a drain into the abscess. Continuing zosyn. Surgery was consulted to assess whether any further treatment needed for liver mass/abscess - they do not feel there is any role for surgery at this time.  Inf disease monitoring cultures - changing zosyn to unasyn -Blood cultures  with alpha strep intermedius, urine culture with klebsiella. 9/2 pt sitting up in bed, reports that she is really tired of clear liquids. Will advance to full and see how she tolerates. Depending on how long the drain stays in place pt may benefit from STR. CM working with pt. Continues antibx per Inf Disease. Objective:      Patient Vitals for the past 24 hrs:    Temp Pulse Resp BP SpO2   08/30/19 1544 97.9 °F (36.6 °C) 98 18 107/52 94 %   08/30/19 1517     96 %   08/30/19 1434 97.8 °F (36.6 °C) 78 18 108/56 94 %   08/30/19 1200 98.6 °F (37 °C) 61 18 (!) 87/46 96 %   08/30/19 1117     97 %   08/30/19 0800 98.7 °F (37.1 °C) 70 17 100/48 92 %   08/30/19 0737     97 %   08/30/19 0432  67 17 (!) 88/44 98 %   08/30/19 0108    (!) 83/44    08/29/19 2306     94 %   08/29/19 2304 98.9 °F (37.2 °C) 79 19 (!) 79/41 91 %   08/29/19 2030     94 %   08/29/19 1929 98.1 °F (36.7 °C) (!) 108 16 103/68 91 %      Oxygen Therapy  O2 Sat (%): 94 % (08/30/19 1544)  Pulse via Oximetry: 76 beats per minute (08/30/19 1117)  O2 Device: Nasal cannula (08/30/19 1517)  O2 Flow Rate (L/min): 3 l/min (08/30/19 1517)  No intake or output data in the 24 hours ending 08/30/19 1631       REVIEW OF SYSTEMS: Comprehensive ROS performed and negative except as stated in HPI.     Physical Examination:  General:          Well nourished.  Alert and oriented x3  Eyes:               Normal sclera.  Extraocular movements intact. ENT:                Normocephalic, atraumatic.  Moist mucous membranes  CV:                  RRR.  No m/r/g. Lungs:             CTAB.  No wheezing, rhonchi, or rales. Abdomen:        Soft, nontender, drain in place, drainage less cloudy   Extremities:     Warm and dry.  No cyanosis or edema.   Neurologic:      No focal deficit   Skin:                No rashes or jaundice.  Normal coloration  Psych:             Normal mood and affect.     Data Review:  I have reviewed all labs, meds, telemetry events, and studies from the last 24 hours.     Recent Results         Recent Results (from the past 24 hour(s))   CBC WITH AUTOMATED DIFF     Collection Time: 08/30/19  5:41 AM   Result Value Ref Range     WBC 12.3 (H) 4.3 - 11.1 K/uL     RBC 2.64 (L) 4.05 - 5.2 M/uL     HGB 8.3 (L) 11.7 - 15.4 g/dL     HCT 25.6 (L) 35.8 - 46.3 %     MCV 97.0 79.6 - 97.8 FL     MCH 31.4 26.1 - 32.9 PG     MCHC 32.4 31.4 - 35.0 g/dL     RDW 15.2 (H) 11.9 - 14.6 %     PLATELET 957 (L) 573 - 450 K/uL     MPV 9.9 9.4 - 12.3 FL     ABSOLUTE NRBC 0.00 0.0 - 0.2 K/uL     NEUTROPHILS 73 43 - 78 %     LYMPHOCYTES 20 13 - 44 %     MONOCYTES 4 4.0 - 12.0 %     EOSINOPHILS 1 0.5 - 7.8 %     BASOPHILS 1 0.0 - 2.0 %     IMMATURE GRANULOCYTES 1 0.0 - 5.0 %     ABS. NEUTROPHILS 9.0 (H) 1.7 - 8.2 K/UL     ABS. LYMPHOCYTES 2.5 0.5 - 4.6 K/UL     ABS. MONOCYTES 0.5 0.1 - 1.3 K/UL     ABS. EOSINOPHILS 0.1 0.0 - 0.8 K/UL     ABS. BASOPHILS 0.1 0.0 - 0.2 K/UL     ABS. IMM.  GRANS. 0.1 0.0 - 0.5 K/UL     RBC COMMENTS NORMOCYTIC/NORMOCHROMIC       WBC COMMENTS Result Confirmed By Smear       PLATELET COMMENTS SLIGHT       DF AUTOMATED     METABOLIC PANEL, BASIC     Collection Time: 08/30/19  5:41 AM   Result Value Ref Range     Sodium 142 136 - 145 mmol/L     Potassium 2.5 (LL) 3.5 - 5.1 mmol/L     Chloride 109 (H) 98 - 107 mmol/L     CO2 23 21 - 32 mmol/L     Anion gap 10 7 - 16 mmol/L     Glucose 94 65 - 100 mg/dL     BUN 30 (H) 8 - 23 MG/DL     Creatinine 1.60 (H) 0.6 - 1.0 MG/DL     GFR est AA 41 (L) >60 ml/min/1.73m2     GFR est non-AA 34 (L) >60 ml/min/1.73m2     Calcium 6.8 (L) 8.3 - 10.4 MG/DL                     All Micro Results      Procedure Component Value Units Date/Time     CULTURE, BODY FLUID Sheldon Passey STAIN [[id number]]       Order Status:  Sent Specimen:  Miscellaneous Fluid       CULTURE, BLOOD [[id number]] Collected:  08/30/19 1605     Order Status:  Completed Specimen:  Blood Updated:  08/30/19 1616     CULTURE, BLOOD [[id number]]       Order Status: Sent Specimen:  Blood       CULTURE, URINE [[id number]]  (Abnormal) Collected:  08/28/19 0604     Order Status:  Completed Specimen:  Urine from Clean catch Updated:  08/30/19 0737       Special Requests: NO SPECIAL REQUESTS           Culture result:           >100,000 COLONIES/mL GRAM NEGATIVE RODS                           <10,000 COLONIES/mL MIXED SKIN LEISA ISOLATED                           IDENTIFICATION AND SUSCEPTIBILITY TO FOLLOW             CULTURE, BLOOD [[id number]]  (Abnormal) Collected:  08/28/19 0030     Order Status:  Completed Specimen:  Blood Updated:  08/30/19 0708       Special Requests: --           LEFT  HAND          GRAM STAIN GRAM POS COCCI IN CHAINS             PEDIATRIC BOTTLE                       RESULTS VERIFIED, PHONED TO AND READ BACK BY Iris Hall RN ON 8/29/19 @1220, TA                 REFER TO ACCESSION [id number] FOR BCID PANEL       Culture result: ALPHA STREPTOCOCCUS                       IDENTIFICATION AND SUSCEPTIBILITY TO FOLLOW             CULTURE, BLOOD [[id number]] Collected:  08/28/19 0030     Order Status:  Completed Specimen:  Blood Updated:  08/29/19 1334       Special Requests: --           LEFT  Antecubital          GRAM STAIN GRAM POS COCCI IN CHAINS             ANAEROBIC BOTTLE POSITIVE                       CRITICAL RESULT NOT CALLED DUE TO PREVIOUS NOTIFICATION OF CRITICAL RESULT WITHIN THE LAST 24 HOURS.               Culture result:           CULTURE IN PROGRESS,FURTHER UPDATES TO FOLLOW             BLOOD CULTURE ID PANEL [[id number]]  (Abnormal) Collected:  08/28/19 0030     Order Status:  Completed Specimen:  Blood Updated:  08/29/19 1246       Acc. no. from Micro Order [id number]       Streptococcus DETECTED           Comment: RESULTS VERIFIED, PHONED TO AND READ BACK BY  Iris Hall RN ON 8/29/19 @1220, TA             INTERPRETATION           Gram positive cocci. Identified by realtime PCR as Streptococcus species (not agalactiae, pyogenes, or pneumoniae).            Clinical Consideration           Consider discontinuation of IV vancomycin and using an anti-streptococcal beta-lactam (ceftriaxone/cefotaxime (peds))               Blood Culture PCR Testing           MULTIPLEX PCR NEGATIVE:  A negative FilmArray BCID result does not exclude the possibility of bloodstream infection.             C. DIFFICILE AG & TOXIN A/B [[id number]]       Order Status:  Sent Specimen:  Stool               Current Meds:         Current Facility-Administered Medications   Medication Dose Route Frequency    midodrine (PROAMITINE) tablet 5 mg  5 mg Oral TID WITH MEALS    potassium chloride (K-DUR, KLOR-CON) SR tablet 40 mEq  40 mEq Oral TID    0.9% sodium chloride infusion  25 mL/hr IntraVENous CONTINUOUS    albuterol (PROVENTIL VENTOLIN) nebulizer solution 2.5 mg  2.5 mg Nebulization Q4H RT    hydrocortisone (ANUSOL-HC) 2.5 % rectal cream   PeriANAL PRN    piperacillin-tazobactam (ZOSYN) 3.375 g in 0.9% sodium chloride (MBP/ADV) 100 mL  3.375 g IntraVENous Q8H    heparin (porcine) injection 5,000 Units  5,000 Units SubCUTAneous Q12H    montelukast (SINGULAIR) tablet 10 mg  10 mg Oral QPM    sodium chloride (NS) flush 5-40 mL  5-40 mL IntraVENous Q8H    sodium chloride (NS) flush 5-40 mL  5-40 mL IntraVENous PRN    acetaminophen (TYLENOL) tablet 650 mg  650 mg Oral Q4H PRN    naloxone (NARCAN) injection 0.4 mg  0.4 mg IntraVENous PRN    ondansetron (ZOFRAN) injection 4 mg  4 mg IntraVENous Q4H PRN    bisacodyl (DULCOLAX) tablet 10 mg  10 mg Oral DAILY PRN    0.9% sodium chloride infusion  150 mL/hr IntraVENous CONTINUOUS    pantoprazole (PROTONIX) tablet 40 mg  40 mg Oral ACB&D         Diet:  DIET CLEAR LIQUID     Other Studies (last 24 hours): Ir Drain Procedure W Cath     Result Date: 8/30/2019  Title: Percutaneous abscess drain placement with Ultrasound Guidance. History: Fevers and chills. Probable hepatic abscess. .  Consent: Informed written and oral consent was obtained from the patient after explanation of benefits and risks (including, but not limitted to: Infection, Hemorrhage, Visceral Injury). The patient's questions were answered to their satisfaction. The patient stated understanding and requested that we proceed. Procedure: Maximal sterile barrier technique (including:  cap, mask, sterile gown, sterile gloves, sterile sheet, hand hygiene, and chlorhexidene for cutaneous antisepsis) were used. Following routine prep and drape of the left lobe liver, a local field block with 1% lidocaine was achieved. Ultrasound evaluation was performed. Using real-time ultrasound guidance, with appropriate image recording, an 18-gauge needle was advanced into the abscess cavity. Thin brownish fluid was returned and as sent specimen. Using fluoroscopy, the needle was exchanged over an Amplatz wire for a 10 [employer] June multipurpose drain. The catheter was secured with a suture and [employer]ok device. A suction bulb  was attached. Complications: None. Medications: None Specimen: Sent to Microbiology Fluoroscopy: 3 mgy. 18 seconds. Contrast: 0 ml. Findings: Lobular collection of the left lobe of the liver. Purulent fluid was obtained      Impression: Uncomplicated percutaneous hepatic abscess drain placement. Plan: Suction bulb drainage. Flush the drain each shift. Record output. Followup in interventional radiology in 11-14 days.         Assessment and Plan:                  Hospital Problems as of 8/30/2019 Date Reviewed: 8/27/2019           Codes Class Noted - Resolved POA     Chest pain ICD-10-CM: R07.9  ICD-9-CM: 786.50   8/27/2019 - Present Unknown           Elevated troponin ICD-10-CM: R74.8  ICD-9-CM: 790. 6   8/27/2019 - Present Unknown           Fibromyalgia ICD-10-CM: M79.7  ICD-9-CM: 729.1   8/27/2019 - Present Unknown           Headache ICD-10-CM: R51  ICD-9-CM: 567. 0   8/27/2019 - Present Unknown           Liver mass ICD-10-CM: R16.0  ICD-9-CM: 377. 9   8/27/2019 - Present Unknown         Hypokalemia ICD-10-CM: E87.6  ICD-9-CM: 276.8   8/27/2019 - Present Unknown           Acute renal failure (ARF) (HCC) ICD-10-CM: N17.9  ICD-9-CM: 584. 9   8/27/2019 - Present Unknown           * (Principal) TIA (transient ischemic attack) ICD-10-CM: G45.9  ICD-9-CM: 435. 9   8/27/2019 - Present Yes                   A/P:    TIA  -Carotid doppler  -MRI  -PT/OT/SLP/CM     Slurred speech  -Pt feels this has mostly resolved. -CT brain with no acute disease  -MRI neg for acute process     Chest pain with elevated troponin  -Troponin trending down  -remote tele     Infection as noted in HPI   -zosyn changed to unasyn     Hypokalemia  -Likely due to chronic diarrhea  -Replace K+ and Mag and monitor     USSANNAH  -Monitor Cr  -Gentle IVF  -Avoid renal toxins     Chronic diarrhea  -resolved     Liver mass  -Workup in process per GI  -Infectious disease following culture  -IR following - abscess drain placed 8/30  -Suction bulb drainage. Flush the drain each shift.   -Record output.   -Followup in interventional radiology in 11-14 days     Fibromyalgia  -Cont home meds    Discharge plan - Likely STR    Code status:  Full  Decision Maker: No MPOA     Case reviewed with supervising physician - MYESHA Clobert MD     Signed:  MARY Meza

## 2019-09-02 NOTE — PROGRESS NOTES
Problem: Self Care Deficits Care Plan (Adult)  Goal: *Acute Goals and Plan of Care (Insert Text)  Description  LTG 1: Pt will be I with toileting within 3 visits to prevent skin breakdown. LTG 2: Pt will be I with LB dressing within 3 visits to reduce risk of falls. LTG 3: Pt will be I with bathing within 3 visits to promote good skin integrity. LTG 4: Pt will be I with toilet transfers within 3 visits to promote quality of life. LTG 5: Pt will be able to tolerate a 30 minute session of OT without oxygen saturation dropping less than 90% within 3 visits to demonstrate activity tolerance. OCCUPATIONAL THERAPY: Daily Note 9/2/2019  INPATIENT: OT Visit Days: 2  [place] / [place] / Product Type: Medicare /      NAME/AGE/GENDER: Michelle Wayne is a 77 y.o. female   PRIMARY DIAGNOSIS:  TIA (transient ischemic attack) [G45.9] TIA (transient ischemic attack)   TIA (transient ischemic attack)         ICD-10: Treatment Diagnosis:    · Generalized Muscle Weakness (M62.81)   Precautions/Allergies:     Sulfa (sulfonamide antibiotics); Aspirin; Celecoxib; Metronidazole; Morphine; and Naproxen      ASSESSMENT:     Ms. René Molina presents in bed and agreeable to tx. Pt was I with self-care including driving and medication management. Pt is planning on staying with son upon discharge. Pt implied that she was struggling with managing medications. 9/2/19 Pt was supine in bed upon arrival. Pt completed bed mobility with min A. Pt completed functional transfer with supervision. Pt sat in chair and completed grooming. Good progress made. Continue POC. This section established at most recent assessment   PROBLEM LIST (Impairments causing functional limitations):  1. Decreased ADL/Functional Activities  2. Decreased Transfer Abilities  3. Decreased Ambulation Ability/Technique  4. Decreased Balance  5. Increased Shortness of Breath  6.  Decreased Cognition   INTERVENTIONS PLANNED: (Benefits and precautions of occupational therapy have been discussed with the patient.)  1. Activities of daily living training  2. Therapeutic activity  3. Therapeutic exercise     TREATMENT PLAN: Frequency/Duration: Follow patient 3x a week to address above goals. Rehabilitation Potential For Stated Goals: Good     REHAB RECOMMENDATIONS (at time of discharge pending progress):    Placement: It is my opinion, based on this patient's performance to date, that Ms. Hutton may benefit from participating in 1-2 additional therapy sessions in order to continue to assess for rehab potential and then make recommendation for disposition at discharge. Equipment:    None at this time              OCCUPATIONAL PROFILE AND HISTORY:   History of Present Injury/Illness (Reason for Referral):  Please see H&P  Past Medical History/Comorbidities:   Ms. Zan Song  has no past medical history on file. Ms. Zan Song  has a past surgical history that includes hx cholecystectomy; hx tubal ligation; hx hysterectomy; and ir drain procedure w cath (8/30/2019). Social History/Living Environment:   Home Environment: Private residence  # Steps to Enter: 2  Rails to Enter: Yes  Hand Rails : Left  Wheelchair Ramp: No  One/Two Story Residence: One story  Living Alone: No(Moving in with son; was living alone)  Support Systems: Child(kye)  Patient Expects to be Discharged to[de-identified] Private residence  Current DME Used/Available at Home: None  Tub or Shower Type: Tub/Shower combination  Prior Level of Function/Work/Activity:  Pt was I and living alone. Pt implied she was struggling with medication management.     Number of Personal Factors/Comorbidities that affect the Plan of Care: Expanded review of therapy/medical records (1-2):  MODERATE COMPLEXITY   ASSESSMENT OF OCCUPATIONAL PERFORMANCE[de-identified]   Activities of Daily Living:   Basic ADLs (From Assessment) Complex ADLs (From Assessment)   Feeding: Independent  Oral Facial Hygiene/Grooming: Supervision  Bathing: Minimum assistance  Upper Body Dressing: Supervision  Lower Body Dressing: Supervision  Toileting: Moderate assistance    Grooming/Bathing/Dressing Activities of Daily Living   Grooming  Washing Face: Supervision  Brushing/Combing Hair: Supervision Cognitive Retraining  Safety/Judgement: Fall prevention      Bed mobility: min A. Functional transfer: Supervision                        Most Recent Physical Functioning:   Gross Assessment:                  Posture:     Balance:    Bed Mobility:     Wheelchair Mobility:     Transfers:               Patient Vitals for the past 6 hrs:   BP BP Patient Position SpO2 O2 Flow Rate (L/min) Pulse   19 0739   98 % 1 l/min    19 0800 124/72 At rest 97 %  85   19 1130   93 %     19 1200 127/73 Sitting 91 %  79       Mental Status  Neurologic State: Alert  Orientation Level: Oriented X4  Cognition: Appropriate decision making, Appropriate for age attention/concentration  Perception: Appears intact  Perseveration: No perseveration noted  Safety/Judgement: Fall prevention                          Physical Skills Involved:  1. Balance  2. Activity Tolerance Cognitive Skills Affected (resulting in the inability to perform in a timely and safe manner):  1. Executive Function  2. Long Term Memory Psychosocial Skills Affected:  1. N/A    Number of elements that affect the Plan of Care: 3-5:  MODERATE COMPLEXITY   CLINICAL DECISION MAKIN [facility] [address] [address] AM-PAC 6 Clicks   Daily Activity Inpatient Short Form  How much help from another person does the patient currently need. .. Total A Lot A Little None   1. Putting on and taking off regular lower body clothing? ? 1   ? 2   ? 3   ? 4   2. Bathing (including washing, rinsing, drying)? ? 1   ? 2   ? 3   ? 4   3. Toileting, which includes using toilet, bedpan or urinal?   ? 1   ? 2   ? 3   ? 4   4. Putting on and taking off regular upper body clothing?    ? 1   ? 2   ? 3 ? 4   5. Taking care of personal grooming such as brushing teeth? ? 1   ? 2   ? 3   ? 4   6. Eating meals? ? 1   ? 2   ? 3   ? 4   © 2007, Trustees of [address] [place] [address] [address] [address], under license to [employer]. All rights reserved      Score:  Initial: 21 Most Recent: X (Date: -- )    Interpretation of Tool:  Represents activities that are increasingly more difficult (i.e. Bed mobility, Transfers, Gait). Medical Necessity:     · Skilled intervention continues to be required due to being below PLOF. Reason for Services/Other Comments:  · Patient continues to require skilled intervention due to inability to take care of self   · . Use of outcome tool(s) and clinical judgement create a POC that gives a: MODERATE COMPLEXITY         TREATMENT:   (In addition to Assessment/Re-Assessment sessions the following treatments were rendered)     Pre-treatment Symptoms/Complaints:    Pain: Initial:   Pain Intensity 1: 0 No c/o Post Session:  No c/o     Therapeutic Activity: (    24): Therapeutic activities including bed mobility, functional transfer and grooming  to improve mobility and strength. Required min verbal cues    to promote motor control of bilateral, upper extremity(s), lower extremity(s). Braces/Orthotics/Lines/Etc:   · IV  · O2 Device: Room air  Treatment/Session Assessment:    · Response to Treatment:  Agreeable  · Interdisciplinary Collaboration:   o Registered Nurse  · After treatment position/precautions:   o Up in chair  o Bed/Chair-wheels locked  o Bed in low position  o Call light within reach  o RN notified   · Compliance with Program/Exercises: Will assess as treatment progresses. · Recommendations/Intent for next treatment session: \"Next visit will focus on advancements to more challenging activities and reduction in assistance provided\".   Total Treatment Duration:  OT Patient Time In/Time Out  Time In: 1031  Time Out: 1200 [facility]

## 2019-09-02 NOTE — PROGRESS NOTES
Infectious Disease Progress Note    Impression:   · Alpha strep bacteremia () source liver mass vs abscess   · CT completed at [facility] () noted large complex 15x9 cm mass concerning for biliary malignancy vs abscess. No fever/leukocytosis/blood cultures. Outpatient GI follow up completed, 3-phase CT ordered-unable to tolerate MRI  · Large complex liver mass  · GNR UTI  · Fever/leukocytosis  · Stroke like symptoms on admission, MRI negative    Plan:   · Continue Unasyn  · S/P drainage () with cx showing strep  · Follow cultures  · Rehab is planned at this time    Anti-infectives:   1. Zosyn -  2. Unasyn (-    Subjective: Allergies   Allergen Reactions    Sulfa (Sulfonamide Antibiotics) Rash    Aspirin Rash and Swelling    Celecoxib Rash and Swelling    Metronidazole Rash     Unknown; possibly a rash.  Morphine Unknown (comments)    Naproxen Rash        Review of Systems:  A comprehensive review of systems was negative except for that written in the History of Present Illness. Objective:   Blood pressure 124/72, pulse 85, temperature 97.9 °F (36.6 °C), resp. rate 20, height 5' 1\" (1.549 m), weight 76.7 kg (169 lb), SpO2 93 %.   Temp (24hrs), Av.1 °F (36.7 °C), Min:97.8 °F (36.6 °C), Max:98.6 °F (37 °C)       Lines: peripheral IVs bilaterally    Exam:     General: NC/AT, alert and cooperative, looks stated age, in NAD   HEENT: PERRL, non-icteric sclera, no splinter hemorrhages   Neck: supple, symmetric, no masses or lymphadenopathy   Cardiac:Nl S1/S2, no murmurs/rubs/gallops/clicks, no LE edema   Pulmonary:clear bilaterally no rales/wheezes, good air movement, NC O2    Abdomen: soft, NT, non-distended,  Mild tenderness to upper quadrants, LLQ, BS normal, no CVA tenderness   Gentital:external genitalia normal    Skin:no rashes, lesions or wounds   MSK:no enlarged or swollen joints in limbs or sternoclavicular area   Extremities: no cords/clots/cyanosis, pulses palpable and symmetric Psychiatric: mood and affect appropriate to situation     Microbiology:    All Micro Results     Procedure Component Value Units Date/Time    CULTURE, BLOOD [[id number]] Collected:  08/30/19 1555    Order Status:  Completed Specimen:  Blood Updated:  09/02/19 1115     Special Requests: --        LEFT  Antecubital       Culture result: NO GROWTH 3 DAYS       CULTURE, BLOOD [[id number]] Collected:  08/30/19 1605    Order Status:  Completed Specimen:  Blood Updated:  09/02/19 1115     Special Requests: --        RIGHT  Antecubital       Culture result: NO GROWTH 3 DAYS       CULTURE, BODY FLUID Xochitl [place] STAIN [[id number]]  (Abnormal) Collected:  08/30/19 1510    Order Status:  Completed Specimen:  Abscess Updated:  09/02/19 0807     Special Requests: NO SPECIAL REQUESTS        GRAM STAIN PENDING     Culture result:       STREPTOCOCCI, ALPHA HEMOLYTIC ISOLATED FROM BROTH ONLY          CULTURE, BLOOD [[id number]]  (Abnormal) Collected:  08/28/19 0030    Order Status:  Completed Specimen:  Blood Updated:  08/31/19 1920     Special Requests: --        LEFT  Antecubital       GRAM STAIN GRAM POS COCCI IN CHAINS               AEROBIC AND ANAEROBIC BOTTLES                  CRITICAL RESULT NOT CALLED DUE TO PREVIOUS NOTIFICATION OF CRITICAL RESULT WITHIN THE LAST 24 HOURS.            Culture result: ALPHA STREPTOCOCCUS         FOR ID AND SUSCEPTIBILITY  REFER TO ACC [id number]    CULTURE, URINE [[id number]]  (Abnormal)  (Susceptibility) Collected:  08/28/19 0604    Order Status:  Completed Specimen:  Urine from Clean catch Updated:  08/31/19 0744     Special Requests: NO SPECIAL REQUESTS        Culture result:       >100,000 COLONIES/mL KLEBSIELLA PNEUMONIAE                  <10,000 COLONIES/mL MIXED SKIN LEISA ISOLATED          CULTURE, BLOOD [[id number]]  (Abnormal)  (Susceptibility) Collected:  08/28/19 0030    Order Status:  Completed Specimen:  Blood Updated:  08/31/19 0740     Special Requests: --        LEFT  HAND       Aurelia Dietz STAIN GRAM POS COCCI IN CHAINS         PEDIATRIC BOTTLE               RESULTS VERIFIED, PHONED TO AND READ BACK BY Amy Heart RN ON 8/29/19 @1220, TA            REFER TO ACCESSION [id number] FOR BCID PANEL     Culture result: STREPTOCOCCUS INTERMEDIUS       BLOOD CULTURE ID PANEL [[id number]]  (Abnormal) Collected:  08/28/19 0030    Order Status:  Completed Specimen:  Blood Updated:  08/29/19 1246     Acc. no. from Micro Order [id number]     Streptococcus DETECTED        Comment: RESULTS VERIFIED, PHONED TO AND READ BACK BY  Amy Heart RN ON 8/29/19 @1220, TA          INTERPRETATION       Gram positive cocci. Identified by realtime PCR as Streptococcus species (not agalactiae, pyogenes, or pneumoniae). Clinical Consideration       Consider discontinuation of IV vancomycin and using an anti-streptococcal beta-lactam (ceftriaxone/cefotaxime (peds))           Blood Culture PCR Testing       MULTIPLEX PCR NEGATIVE:  A negative FilmArray BCID result does not exclude the possibility of bloodstream infection. C. DIFFICILE AG & TOXIN A/B [[id number]] Collected:  08/27/19 1730    Order Status:  Canceled Specimen:  Stool           Studies/Imaging:      Status Exam Begun  Exam Ended    Final [99] 8/29/2019 11:00 8/29/2019 11:13   Study Result     CHEST X-RAY, one view.     HISTORY:  Cough and wheezing.     TECHNIQUE:  AP portable upright view     COMPARISON: exam 2 days prior.      FINDINGS:   No lobar consolidation. Decreased atelectasis at the bases. Heart  and pulmonary vasculature is unremarkable.     IMPRESSION  IMPRESSION:  Decreased basilar atelectasis.       Signed by     Signed Date/Time  Phone Pager   Jazmin Stovall 8/29/2019 15:28 [phone]    Exam Information     Status Exam Begun  Exam Ended    Final [99] 8/29/2019 14:04 8/29/2019 14:07   Study Result     Clinical History: The patient is a 77years year old Female presenting with  symptoms of TIA with history of liver mass.     Comparison:  Head CT 8/27/2019     Technique:  Axial T2, axial FLAIR, axial diffusion-weighted,  sagittal T1 and  coronal gradient-echo scans were performed. Study is degraded by patient motion.     Findings:      There is no evidence of acute intracranial abnormality . Mild chronic  periventricular white matter changes are seen with scattered lacunae throughout  the corona radiata and centrum semiovale. Normal gray-white matter  differentiation is seen for age. There are no abnormal extra-axial fluid  collections. No evidence of mass or mass effect is seen. There is no diffusion  signal abnormality. Expected flow voids are maintained in the major intracranial  vessels.     The cerebellum and brainstem are unremarkable. There is no evidence of Chiari  malformation.     The ventricular system and CSF containing spaces are unremarkable in appearance.     Visualized extracranial soft tissues are unremarkable.     The paranasal sinuses are well pneumatized and aerated.     IMPRESSION  Impression:    Mild chronic microvascular disease without evidence of acute intracranial  abnormality.     CPT code(s) 28313          ADMISSION DATE: 08/10/2019 13:27          MULTIDETECTOR CONTRAST CT CHEST, CT ABDOMEN, AND CT PELVIS:    COMPARISON: CT abdomen pelvis for stone protocol of 07/21/2015    HISTORY: 21-year-old female with liver mass. Presumed metastatic disease. No known primary. Technique:  Spiral acquisition of the chest, abdomen, and pelvis was obtained from above the thoracic inlets through the symphysis pubis with oral and 100 mL Omnipaque 350 IV contrast administration. FINDINGS:    On bone windows, spondylosis is seen in the thoracic and lumbar spine. No destructive bone lesion is seen. No definite fracture or malalignment is demonstrated. CT Chest:    Heart and Mediastinum: The heart is normal in size. Atherosclerotic calcifications are noted in the aorta, coronary arteries, and great vessels.   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 The anterior chest wall soft tissues are unremarkable. No definite thyroid nodule is seen. I see no axillary, hilar or mediastinal lymphadenopathy by size criteria. Lungs:  Opacification in the posterior lower lobes with air bronchograms more apparent on the right is seen. This may be related to dependent atelectasis or infiltrate. No pulmonary nodule or pneumothorax is seen. Chest Wall:  There are no chest wall abnormalities or pleural effusions. CT abdomen:     Liver:  A large 15.5 x 8.8 cm heterogeneous cystic lesion with peripheral nodular and septal enhancement is centered in the medial and lateral segments of the left hepatic lobe. Possible biliary enhancement near the hepatic duct is noted. No findings suggesting hepatocellular disease or cirrhosis are seen within the liver. Hepatocellular carcinoma is not likely. This may be related to a biliary malignancy such as cholangiocarcinoma or perhaps metastatic disease. Contrast MRCP is recommended for further characterization. This may also represent an abscess. Correlation with white blood cell count is recommended. If this represents an abscess, the patient would be very sickly. 2 other lesions are noted in segment 2 of the lateral segment of the left hepatic lobe. 1 on image 69 of series 2 most likely reflects a transient hepatic attenuation difference. 1 on image 61 of series 2 may be related to a small enhancing liver lesion or transient hepatic attenuation difference. A tiny peripheral enhancing possible lesion is noted in segment 5 of the right hepatic lobe on image 51 of series 2. Gallbladder:  The patient is status post cholecystectomy. Common duct is prominent. This may be related to the previous cholecystectomy. Spleen:  Normal.    Pancreas:  Normal.    Adrenal glands: The right adrenal gland is normal.    Nodularity in the left adrenal gland most likely reflects nodular hyperplasia. Kidneys:  Normal.    Small bowel:  Normal.    Colon:  Diverticulosis is most apparent in the sigmoid colon. No diverticulitis is seen. No definite lesion is identified. No adenopathy, pneumotosis, or free air is identified in the upper abdomen or pelvis. A few atherosclerotic calcifications are noted in the aorta and branching vessels. A small amount of free fluid is noted in the abdomen and pelvis. CT pelvis: The patient is status post appendectomy. The terminal ileum is unremarkable. The patient is status post hysterectomy. Normal appearing small ovaries are likely present bilaterally. Small free fluid is noted in the pelvis. Small air bubbles are noted in the anterior left pelvis and lower abdomen subcutaneous region. This may be related to injection sites. IMPRESSION:    Large complex nodular enhancing cystic lesion centered in the left hepatic lobe. May be related to a primary tumor of the liver such as a biliary tumor or abscess. Other smaller liver lesions or transient hepatic attenuation differences in the left and right hepatic lobes. Small free fluid in the abdomen and pelvis. No evidence of malignancy in the chest and pelvis. Status post cholecystectomy, hysterectomy and appendectomy. : marielle  TRANSCRIBE TIME/DATE: 08/12/2019 02:01 pm  READ BY: LARRY Mendoza MD

## 2019-09-02 NOTE — PROGRESS NOTES
Met with pt about SNF choices agreeable for referral to [facility] and [facility], referrals placed in CC link will await response.

## 2019-09-02 NOTE — INTERDISCIPLINARY ROUNDS
Interdisciplinary team rounds were held 9/2/2019 with the following team members: Care Management, Physical Therapy and . Plan of care discussed. See clinical pathway and/or care plan for interventions and desired outcomes.

## 2019-09-02 NOTE — PROGRESS NOTES
09/02/19 1900   NIH Stroke Scale   Interval Other (comment)   LOC 0   LOC Questions 0   LOC Commands 0   Best Gaze 0   Visual 1   Facial Palsy 0   Motor Right Arm 0   Motor Left Arm 0   Motor Right Leg 0   Motor Left Leg 0   Limb Ataxia 0   Sensory 0   Best Language 0   Dysarthria 0   Extinction and Inattention 0   Total 1   Dual NIH with Aziza RN

## 2019-09-03 ENCOUNTER — APPOINTMENT (OUTPATIENT)
Dept: CT IMAGING | Age: 69
DRG: 871 | End: 2019-09-03
Attending: NURSE PRACTITIONER
Payer: MEDICARE

## 2019-09-03 LAB
ANION GAP SERPL CALC-SCNC: 6 MMOL/L (ref 7–16)
BASOPHILS # BLD: 0.1 K/UL (ref 0–0.2)
BASOPHILS NFR BLD MANUAL: 2 % (ref 0–2)
BUN SERPL-MCNC: 9 MG/DL (ref 8–23)
CALCIUM SERPL-MCNC: 8.1 MG/DL (ref 8.3–10.4)
CHLORIDE SERPL-SCNC: 108 MMOL/L (ref 98–107)
CO2 SERPL-SCNC: 28 MMOL/L (ref 21–32)
CREAT SERPL-MCNC: 1.08 MG/DL (ref 0.6–1)
DIFFERENTIAL METHOD BLD: ABNORMAL
ERYTHROCYTE [DISTWIDTH] IN BLOOD BY AUTOMATED COUNT: 15.8 % (ref 11.9–14.6)
GLUCOSE SERPL-MCNC: 105 MG/DL (ref 65–100)
HCT VFR BLD AUTO: 32.9 % (ref 35.8–46.3)
HGB BLD-MCNC: 10.5 G/DL (ref 11.7–15.4)
LYMPHOCYTES # BLD: 2.4 K/UL (ref 0.5–4.6)
LYMPHOCYTES NFR BLD MANUAL: 42 % (ref 16–44)
MAGNESIUM SERPL-MCNC: 1.5 MG/DL (ref 1.8–2.4)
MCH RBC QN AUTO: 30.8 PG (ref 26.1–32.9)
MCHC RBC AUTO-ENTMCNC: 31.9 G/DL (ref 31.4–35)
MCV RBC AUTO: 96.5 FL (ref 79.6–97.8)
MONOCYTES # BLD: 0.2 K/UL (ref 0.1–1.3)
MONOCYTES NFR BLD MANUAL: 4 % (ref 3–9)
NEUTS BAND NFR BLD MANUAL: 2 % (ref 0–10)
NEUTS SEG # BLD: 3 K/UL (ref 1.7–8.2)
NEUTS SEG NFR BLD MANUAL: 50 % (ref 47–75)
NRBC # BLD: 0 K/UL (ref 0–0.2)
PLATELET # BLD AUTO: 211 K/UL (ref 150–450)
PLATELET COMMENTS,PCOM: ADEQUATE
PMV BLD AUTO: 9.1 FL (ref 9.4–12.3)
POTASSIUM SERPL-SCNC: 3.1 MMOL/L (ref 3.5–5.1)
RBC # BLD AUTO: 3.41 M/UL (ref 4.05–5.2)
RBC MORPH BLD: ABNORMAL
SODIUM SERPL-SCNC: 142 MMOL/L (ref 136–145)
TOTAL CELLS COUNTED SPEC: 50
WBC # BLD AUTO: 5.7 K/UL (ref 4.3–11.1)
WBC MORPH BLD: ABNORMAL

## 2019-09-03 PROCEDURE — 77030020263 HC SOL INJ SOD CL0.9% LFCR 1000ML

## 2019-09-03 PROCEDURE — 94760 N-INVAS EAR/PLS OXIMETRY 1: CPT

## 2019-09-03 PROCEDURE — 74011250637 HC RX REV CODE- 250/637: Performed by: NURSE PRACTITIONER

## 2019-09-03 PROCEDURE — 74011250636 HC RX REV CODE- 250/636: Performed by: INTERNAL MEDICINE

## 2019-09-03 PROCEDURE — 74011000258 HC RX REV CODE- 258: Performed by: NURSE PRACTITIONER

## 2019-09-03 PROCEDURE — 94640 AIRWAY INHALATION TREATMENT: CPT

## 2019-09-03 PROCEDURE — 80048 BASIC METABOLIC PNL TOTAL CA: CPT

## 2019-09-03 PROCEDURE — 74011000250 HC RX REV CODE- 250: Performed by: NURSE PRACTITIONER

## 2019-09-03 PROCEDURE — 74011250636 HC RX REV CODE- 250/636: Performed by: HOSPITALIST

## 2019-09-03 PROCEDURE — 83735 ASSAY OF MAGNESIUM: CPT

## 2019-09-03 PROCEDURE — 74160 CT ABDOMEN W/CONTRAST: CPT

## 2019-09-03 PROCEDURE — 74011250636 HC RX REV CODE- 250/636: Performed by: NURSE PRACTITIONER

## 2019-09-03 PROCEDURE — 74011000258 HC RX REV CODE- 258: Performed by: INTERNAL MEDICINE

## 2019-09-03 PROCEDURE — 74170 CT ABD WO CNTRST FLWD CNTRST: CPT

## 2019-09-03 PROCEDURE — 74011000250 HC RX REV CODE- 250: Performed by: INTERNAL MEDICINE

## 2019-09-03 PROCEDURE — 77030038269 HC DRN EXT URIN PURWCK BARD -A

## 2019-09-03 PROCEDURE — 74011250636 HC RX REV CODE- 250/636: Performed by: RADIOLOGY

## 2019-09-03 PROCEDURE — 85025 COMPLETE CBC W/AUTO DIFF WBC: CPT

## 2019-09-03 PROCEDURE — 74011250637 HC RX REV CODE- 250/637: Performed by: INTERNAL MEDICINE

## 2019-09-03 PROCEDURE — 65660000000 HC RM CCU STEPDOWN

## 2019-09-03 PROCEDURE — 74011636320 HC RX REV CODE- 636/320: Performed by: INTERNAL MEDICINE

## 2019-09-03 RX ORDER — ALBUTEROL SULFATE 0.83 MG/ML
2.5 SOLUTION RESPIRATORY (INHALATION)
Status: DISCONTINUED | OUTPATIENT
Start: 2019-09-03 | End: 2019-09-04 | Stop reason: HOSPADM

## 2019-09-03 RX ORDER — ALBUTEROL SULFATE 0.83 MG/ML
2.5 SOLUTION RESPIRATORY (INHALATION)
Status: DISCONTINUED | OUTPATIENT
Start: 2019-09-03 | End: 2019-09-03

## 2019-09-03 RX ORDER — MAGNESIUM SULFATE HEPTAHYDRATE 40 MG/ML
2 INJECTION, SOLUTION INTRAVENOUS ONCE
Status: COMPLETED | OUTPATIENT
Start: 2019-09-03 | End: 2019-09-03

## 2019-09-03 RX ORDER — SODIUM CHLORIDE 0.9 % (FLUSH) 0.9 %
10 SYRINGE (ML) INJECTION
Status: COMPLETED | OUTPATIENT
Start: 2019-09-03 | End: 2019-09-03

## 2019-09-03 RX ORDER — POTASSIUM CHLORIDE 20 MEQ/1
40 TABLET, EXTENDED RELEASE ORAL 2 TIMES DAILY
Status: DISCONTINUED | OUTPATIENT
Start: 2019-09-03 | End: 2019-09-04 | Stop reason: HOSPADM

## 2019-09-03 RX ADMIN — MIDODRINE HYDROCHLORIDE 5 MG: 5 TABLET ORAL at 08:17

## 2019-09-03 RX ADMIN — SODIUM CHLORIDE 25 ML/HR: 900 INJECTION, SOLUTION INTRAVENOUS at 11:34

## 2019-09-03 RX ADMIN — MAGNESIUM SULFATE HEPTAHYDRATE 2 G: 40 INJECTION, SOLUTION INTRAVENOUS at 11:24

## 2019-09-03 RX ADMIN — MIDODRINE HYDROCHLORIDE 5 MG: 5 TABLET ORAL at 17:20

## 2019-09-03 RX ADMIN — PANTOPRAZOLE SODIUM 40 MG: 40 TABLET, DELAYED RELEASE ORAL at 17:21

## 2019-09-03 RX ADMIN — Medication 10 ML: at 19:31

## 2019-09-03 RX ADMIN — POTASSIUM CHLORIDE 40 MEQ: 20 TABLET, EXTENDED RELEASE ORAL at 12:00

## 2019-09-03 RX ADMIN — ALBUTEROL SULFATE 2.5 MG: 2.5 SOLUTION RESPIRATORY (INHALATION) at 20:50

## 2019-09-03 RX ADMIN — ALBUTEROL SULFATE 2.5 MG: 2.5 SOLUTION RESPIRATORY (INHALATION) at 08:22

## 2019-09-03 RX ADMIN — MIDODRINE HYDROCHLORIDE 5 MG: 5 TABLET ORAL at 11:27

## 2019-09-03 RX ADMIN — IOPAMIDOL 100 ML: 755 INJECTION, SOLUTION INTRAVENOUS at 19:31

## 2019-09-03 RX ADMIN — HEPARIN SODIUM 5000 UNITS: 5000 INJECTION INTRAVENOUS; SUBCUTANEOUS at 21:15

## 2019-09-03 RX ADMIN — SODIUM CHLORIDE, PRESERVATIVE FREE 300 UNITS: 5 INJECTION INTRAVENOUS at 21:15

## 2019-09-03 RX ADMIN — AMPICILLIN SODIUM AND SULBACTAM SODIUM 3 G: 2; 1 INJECTION, POWDER, FOR SOLUTION INTRAMUSCULAR; INTRAVENOUS at 03:56

## 2019-09-03 RX ADMIN — ACETAMINOPHEN 650 MG: 325 TABLET, FILM COATED ORAL at 11:33

## 2019-09-03 RX ADMIN — SODIUM CHLORIDE, PRESERVATIVE FREE 300 UNITS: 5 INJECTION INTRAVENOUS at 06:01

## 2019-09-03 RX ADMIN — MONTELUKAST SODIUM 10 MG: 10 TABLET, FILM COATED ORAL at 17:21

## 2019-09-03 RX ADMIN — AMPICILLIN SODIUM AND SULBACTAM SODIUM 3 G: 2; 1 INJECTION, POWDER, FOR SOLUTION INTRAMUSCULAR; INTRAVENOUS at 12:33

## 2019-09-03 RX ADMIN — Medication 10 ML: at 06:01

## 2019-09-03 RX ADMIN — ONDANSETRON 4 MG: 2 INJECTION INTRAMUSCULAR; INTRAVENOUS at 20:09

## 2019-09-03 RX ADMIN — POTASSIUM CHLORIDE 20 MEQ: 20 TABLET, EXTENDED RELEASE ORAL at 08:17

## 2019-09-03 RX ADMIN — SODIUM CHLORIDE 100 ML: 900 INJECTION, SOLUTION INTRAVENOUS at 19:31

## 2019-09-03 RX ADMIN — CEFTRIAXONE 2 G: 2 INJECTION, POWDER, FOR SOLUTION INTRAMUSCULAR; INTRAVENOUS at 17:25

## 2019-09-03 RX ADMIN — Medication 10 ML: at 21:16

## 2019-09-03 RX ADMIN — HEPARIN SODIUM 5000 UNITS: 5000 INJECTION INTRAVENOUS; SUBCUTANEOUS at 08:17

## 2019-09-03 RX ADMIN — PANTOPRAZOLE SODIUM 40 MG: 40 TABLET, DELAYED RELEASE ORAL at 06:01

## 2019-09-03 RX ADMIN — POTASSIUM CHLORIDE 40 MEQ: 20 TABLET, EXTENDED RELEASE ORAL at 17:20

## 2019-09-03 RX ADMIN — Medication 5 ML: at 21:16

## 2019-09-03 NOTE — PROGRESS NOTES
09/03/19 0715   NIH Stroke Scale   Interval Other (comment)  (dual assessment w/ Josemanuel Ahumada)   LOC 0   LOC Questions 0   LOC Commands 0   Best Gaze 0   Visual 1   Facial Palsy 0   Motor Right Arm 0   Motor Left Arm 0   Motor Right Leg 0   Motor Left Leg 0   Limb Ataxia 0   Sensory 0   Best Language 0   Dysarthria 0   Extinction and Inattention 0   Total 1

## 2019-09-03 NOTE — PROGRESS NOTES
Respiratory Care Services Policy Number: -[id number]    Title: Aerosolized Medication Protocol    Effective Date: 10/1998    Revised Date: 06/13, 03/16, 11/17, 07/19     Reviewed Date: 05/14/ 03/15 , 06/17, 5/18   I. Policy: The Aerosolized Medication Protocol shall by implemented by Respiratory Care Practitioners (RCP) for patients with orders to receive aerosol therapy with medication. II. Purpose: To open and maintain obstructed airways, the RCP, will utilize the following   protocol to select the indicated aerosolized medication(s) and determine the most effective method of delivery to the patient. III. Patient Type: All patients who are determined to meet aerosolized medication criteria as          outlined in this protocol. IV. Responsibility: Director, [address], registered Respiratory Care Practitioners (RCP's) with documented competency in the performance of respiratory therapeutic techniques. V. Equipment needed:  A. Stethoscope  B. Pulse oximeter  C. AeroEclipse nebulizer  D. Dry Powder Inhaler (DPI)     VI. Protocol:   A. The following conditions are accepted indications for aerosolized medication therapy. 1. Bronchospasm/wheezing  2. Impaired mucociliary clearance  3. Tracheobronchial mucosal congestion/and laryngeal stridor  4. Diseases which commonly require aerosolized medication therapy include, but are not limited to:  a. Asthma/reactive airway disease  b. Bronchitis/emphysema (COPD)  c. Cystic fibrosis  d. Severe laryngitis/tracheitis  e. Bronchiectasis  f. Smoke inhalation or chemical trauma to the lung or upper airway  g. Physical trauma to the upper airway  h. Laryngotracheobronchitis  i. Bronchiolitis  j. Non-specific wheezing              B. Indications for bronchodilator medications will include:  a. Bronchospasm/ wheezing  b. Asthma/reactive airway disease  c. Chronic obstructive pulmonary disease  d.  Obstructive defect on pulmonary function testing  C. Administration of medications  1. If a bronchodilator or any other type of respiratory medication is needed, a physician order must be indicated in the medication section in the patient's EMR. 2. When the physician specifies the medication and dosage at the time of request, the ordered medication will be used as part of the care plan. D. The following guidelines will be utilized in the evaluation and selection of the appropriate delivery device for indicated medication(s):  1. Unassisted aerosol (UA) is the preferred method of aerosol delivery and indicated if  a. Ventilation is inadequate  b. Patient demonstrates wheezing   c. Routine treatments shall be given via the AeroEclipse nebulizer. d. The Aerogen nebulizer shall be used in the following circumstances:  i. ER patients and they will continue with this nebulizer if admitted to 8th floor or ICU.  ii. Patients in ICU   iii. Patients on 8th floor with severe wheezing (at the RCP's discretion)  2. Dry PowderInhaler (DPI)   a. Patient should be alert/cooperative  b. Able to perform 3 second breath hold. c. Patient has used DPI therapy previously, either at home or in the hospital.  d. Note: The only approved inhaler on formulary is Spiriva. VII. Guidelines:   Monitor patient's vital signs and evaluate patient's clinical status. The need to change medication and/or modality may be indicated by:  1. A pulse greater than 120 bpm, or if a pulse increase of 20 bpm occurs with bronchodilator medications. 2. Significant worsening of dyspnea or wheezing occurring during or within 30 minutes of discontinuing therapy. 3. Worsening of patient's sensorium (e.g. patient becomes confused or obtunded, and unable to follow directions). 4. Worsening of patient's chest x-ray. 5. Change in sputum (e.g. increased pulmonary infiltrate, which might indicate need for volume expansion therapy).   6. Patient has difficulty coughing up secretions, which might indicate need for acetylcysteine and/or bronchial hygiene therapy. 7. Call physician immediately if dyspnea worsens and is not responsive to modifications allowed by protocol. VIII. Clinical Responsibility:  1. The therapy assessment guidelines will be used to evaluate all patients receiving aerosolized medications with the exception of critical care areas. 1. RCP's will perform changes in therapy per protocol. 2. It will be the responsibility of RCP to provide instruction regarding respiratory medications, possible side effects, aerosol therapy and proper DPI technique, as well as, spacer usage to patients ordered DPI therapy. 3. Current therapy that is part of a patient's home regimen will not be discontinued. a. Provide spacer and educate patient on proper inhaler technique if needed. IX. Documentation  A. Document assessment findings in the respiratory assessment section of the patient's EMR. B. Document changes in therapy per protocol in the respiratory orders section and in the care plan section of the patient's EMR. C. Document patient education in the patient education section of the patient's EMR. X. Outcome Criteria:  A. Relief of wheezes and obstruction  B. Improved cough and sputum color and consistency  C. Improved chest x-ray  D. Improved arterial oxygen tension and or SaO2  E. Improved Peak Flow on asthmatic patients        XI. Related Protocols:  A. Respiratory Patient Care Protocols  B. Bronchial Hygiene Therapy  C. Oxygen Protocol    Reference:  L - Respiratory Care Department Policy, Procedure and Protocol Guideline Manual, 1995, ETHAN Lima. L -  Therapist Driven Respiratory Care Protocols  A Practitioner's Guide for Criteria-Based Respiratory Care by Chad Rick M.D., and ETHAN Lutz RRT. L - The rationale for therapist-driven protocols: an update. Respiratory Care 1998; [id number]. N -[facility] Clinical Practice Guidelines.

## 2019-09-03 NOTE — PROGRESS NOTES
Infectious Disease Progress Note    Impression:   · Alpha strep bacteremia () source liver mass vs abscess   · CT completed at [facility] () noted large complex 15x9 cm mass concerning for biliary malignancy vs abscess. No fever/leukocytosis/blood cultures. Outpatient GI follow up completed, 3-phase CT ordered-unable to tolerate MRI  · Large complex liver mass  · GNR UTI  · Fever/leukocytosis  · Stroke like symptoms on admission, MRI negative    Plan:   · Stop unasyn and start ceftriaxone  · S/P drainage () with cx showing strep  · Get followup appointment in 4 weeks with repeat CT scan prior to that visit. · Discharge anticipated tomorrow to SNF    Anti-infectives:   1. Zosyn -  2. Unasyn (-    Subjective: Allergies   Allergen Reactions    Sulfa (Sulfonamide Antibiotics) Rash    Aspirin Rash and Swelling    Celecoxib Rash and Swelling    Metronidazole Rash     Unknown; possibly a rash.  Morphine Unknown (comments)    Naproxen Rash        Review of Systems:  A comprehensive review of systems was negative except for that written in the History of Present Illness. Objective:   Blood pressure 132/68, pulse 78, temperature 98.1 °F (36.7 °C), resp. rate 19, height 5' 1\" (1.549 m), weight 82.8 kg (182 lb 8 oz), SpO2 92 %.   Temp (24hrs), Av.4 °F (36.9 °C), Min:98.1 °F (36.7 °C), Max:98.8 °F (37.1 °C)       Lines: peripheral IVs bilaterally    Exam:     General: NC/AT, alert and cooperative, looks stated age, in NAD   HEENT: PERRL, non-icteric sclera, no splinter hemorrhages   Neck: supple, symmetric, no masses or lymphadenopathy   Cardiac:Nl S1/S2, no murmurs/rubs/gallops/clicks, no LE edema   Pulmonary:clear bilaterally no rales/wheezes, good air movement, NC O2    Abdomen: soft, NT, non-distended,  Mild tenderness to upper quadrants, LLQ, BS normal, no CVA tenderness   Gentital:external genitalia normal   Skin:no rashes, lesions or wounds   MSK:no enlarged or swollen joints in limbs or sternoclavicular area   Extremities: no cords/clots/cyanosis, pulses palpable and symmetric   Psychiatric: mood and affect appropriate to situation     Microbiology:    All Micro Results     Procedure Component Value Units Date/Time    CULTURE, BLOOD [[id number]] Collected:  08/30/19 1555    Order Status:  Completed Specimen:  Blood Updated:  09/03/19 1005     Special Requests: --        LEFT  Antecubital       Culture result: NO GROWTH 4 DAYS       CULTURE, BLOOD [[id number]] Collected:  08/30/19 1605    Order Status:  Completed Specimen:  Blood Updated:  09/03/19 1005     Special Requests: --        RIGHT  Antecubital       Culture result: NO GROWTH 4 DAYS       CULTURE, BODY FLUID Ranjan Leonore STAIN [[id number]]  (Abnormal) Collected:  08/30/19 1510    Order Status:  Completed Specimen:  Abscess Updated:  09/03/19 0653     Special Requests: NO SPECIAL REQUESTS        GRAM STAIN       TOO NUMEROUS TO COUNT WBCS/OIF                  FEW GRAM POS COCCI IN CHAINS           Culture result:       STREPTOCOCCI, ALPHA HEMOLYTIC ISOLATED FROM BROTH ONLY                  IDENTIFICATION AND SUSCEPTIBILITY TO FOLLOW          CULTURE, BLOOD [[id number]]  (Abnormal) Collected:  08/28/19 0030    Order Status:  Completed Specimen:  Blood Updated:  08/31/19 1920     Special Requests: --        LEFT  Antecubital       GRAM STAIN GRAM POS COCCI IN CHAINS               AEROBIC AND ANAEROBIC BOTTLES                  CRITICAL RESULT NOT CALLED DUE TO PREVIOUS NOTIFICATION OF CRITICAL RESULT WITHIN THE LAST 24 HOURS.            Culture result: ALPHA STREPTOCOCCUS         FOR ID AND SUSCEPTIBILITY  REFER TO [facility] PD.[id number]    CULTURE, URINE [[id number]]  (Abnormal)  (Susceptibility) Collected:  08/28/19 0604    Order Status:  Completed Specimen:  Urine from Clean catch Updated:  08/31/19 0744     Special Requests: NO SPECIAL REQUESTS        Culture result:       >100,000 COLONIES/mL KLEBSIELLA PNEUMONIAE                  <10,000 COLONIES/mL MIXED SKIN LEISA ISOLATED          CULTURE, BLOOD [[id number]]  (Abnormal)  (Susceptibility) Collected:  08/28/19 0030    Order Status:  Completed Specimen:  Blood Updated:  08/31/19 0740     Special Requests: --        LEFT  HAND       GRAM STAIN GRAM POS COCCI IN CHAINS         PEDIATRIC BOTTLE               RESULTS VERIFIED, PHONED TO AND READ BACK BY Mckenna Puente RN ON 8/29/19 @1220, TA            REFER TO ACCESSION [id number] FOR BCID PANEL     Culture result: STREPTOCOCCUS INTERMEDIUS       BLOOD CULTURE ID PANEL [[id number]]  (Abnormal) Collected:  08/28/19 0030    Order Status:  Completed Specimen:  Blood Updated:  08/29/19 1246     Acc. no. from Micro Order [id number]     Streptococcus DETECTED        Comment: RESULTS VERIFIED, PHONED TO AND READ BACK BY  Mckenna Puente RN ON 8/29/19 @1220, TA          INTERPRETATION       Gram positive cocci. Identified by realtime PCR as Streptococcus species (not agalactiae, pyogenes, or pneumoniae). Clinical Consideration       Consider discontinuation of IV vancomycin and using an anti-streptococcal beta-lactam (ceftriaxone/cefotaxime (peds))           Blood Culture PCR Testing       MULTIPLEX PCR NEGATIVE:  A negative FilmArray BCID result does not exclude the possibility of bloodstream infection. C. DIFFICILE AG & TOXIN A/B [[id number]] Collected:  08/27/19 7410    Order Status:  Canceled Specimen:  Stool           Studies/Imaging:      Status Exam Begun  Exam Ended    Final [99] 8/29/2019 11:00 8/29/2019 11:13   Study Result     CHEST X-RAY, one view.     HISTORY:  Cough and wheezing.     TECHNIQUE:  AP portable upright view     COMPARISON: exam 2 days prior.      FINDINGS:   No lobar consolidation. Decreased atelectasis at the bases. Heart  and pulmonary vasculature is unremarkable.     IMPRESSION  IMPRESSION:  Decreased basilar atelectasis.       Signed by     Signed Date/Time  Phone Pager   Iveth Franky 8/29/2019 15:28 [phone]    Exam Information Status Exam Begun  Exam Ended    Final [99] 8/29/2019 14:04 8/29/2019 14:07   Study Result     Clinical History: The patient is a 77years year old Female presenting with  symptoms of TIA with history of liver mass.     Comparison:  Head CT 8/27/2019     Technique:  Axial T2, axial FLAIR, axial diffusion-weighted,  sagittal T1 and  coronal gradient-echo scans were performed. Study is degraded by patient motion.     Findings:      There is no evidence of acute intracranial abnormality . Mild chronic  periventricular white matter changes are seen with scattered lacunae throughout  the corona radiata and centrum semiovale. Normal gray-white matter  differentiation is seen for age. There are no abnormal extra-axial fluid  collections. No evidence of mass or mass effect is seen. There is no diffusion  signal abnormality. Expected flow voids are maintained in the major intracranial  vessels.     The cerebellum and brainstem are unremarkable. There is no evidence of Chiari  malformation.     The ventricular system and CSF containing spaces are unremarkable in appearance.     Visualized extracranial soft tissues are unremarkable.     The paranasal sinuses are well pneumatized and aerated.     IMPRESSION  Impression:    Mild chronic microvascular disease without evidence of acute intracranial  abnormality.     CPT code(s) 20820          ADMISSION DATE: 08/10/2019 13:27          MULTIDETECTOR CONTRAST CT CHEST, CT ABDOMEN, AND CT PELVIS:    COMPARISON: CT abdomen pelvis for stone protocol of 07/21/2015    HISTORY: 81-year-old female with liver mass. Presumed metastatic disease. No known primary. Technique:  Spiral acquisition of the chest, abdomen, and pelvis was obtained from above the thoracic inlets through the symphysis pubis with oral and 100 mL Omnipaque 350 IV contrast administration. FINDINGS:    On bone windows, spondylosis is seen in the thoracic and lumbar spine. No destructive bone lesion is seen. No definite fracture or malalignment is demonstrated. CT Chest:    Heart and Mediastinum: The heart is normal in size. Atherosclerotic calcifications are noted in the aorta, coronary arteries, and great vessels. 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 The anterior chest wall soft tissues are unremarkable. No definite thyroid nodule is seen. I see no axillary, hilar or mediastinal lymphadenopathy by size criteria. Lungs:  Opacification in the posterior lower lobes with air bronchograms more apparent on the right is seen. This may be related to dependent atelectasis or infiltrate. No pulmonary nodule or pneumothorax is seen. Chest Wall:  There are no chest wall abnormalities or pleural effusions. CT abdomen:     Liver:  A large 15.5 x 8.8 cm heterogeneous cystic lesion with peripheral nodular and septal enhancement is centered in the medial and lateral segments of the left hepatic lobe. Possible biliary enhancement near the hepatic duct is noted. No findings suggesting hepatocellular disease or cirrhosis are seen within the liver. Hepatocellular carcinoma is not likely. This may be related to a biliary malignancy such as cholangiocarcinoma or perhaps metastatic disease. Contrast MRCP is recommended for further characterization. This may also represent an abscess. Correlation with white blood cell count is recommended. If this represents an abscess, the patient would be very sickly. 2 other lesions are noted in segment 2 of the lateral segment of the left hepatic lobe. 1 on image 69 of series 2 most likely reflects a transient hepatic attenuation difference. 1 on image 61 of series 2 may be related to a small enhancing liver lesion or transient hepatic attenuation difference. A tiny peripheral enhancing possible lesion is noted in segment 5 of the right hepatic lobe on image 51 of series 2. Gallbladder:  The patient is status post cholecystectomy. Common duct is prominent. This may be related to the previous cholecystectomy. Spleen:  Normal.    Pancreas:  Normal.    Adrenal glands: The right adrenal gland is normal.    Nodularity in the left adrenal gland most likely reflects nodular hyperplasia. Kidneys:  Normal.    Small bowel:  Normal.    Colon:  Diverticulosis is most apparent in the sigmoid colon. No diverticulitis is seen. No definite lesion is identified. No adenopathy, pneumotosis, or free air is identified in the upper abdomen or pelvis. A few atherosclerotic calcifications are noted in the aorta and branching vessels. A small amount of free fluid is noted in the abdomen and pelvis. CT pelvis: The patient is status post appendectomy. The terminal ileum is unremarkable. The patient is status post hysterectomy. Normal appearing small ovaries are likely present bilaterally. Small free fluid is noted in the pelvis. Small air bubbles are noted in the anterior left pelvis and lower abdomen subcutaneous region. This may be related to injection sites. IMPRESSION:    Large complex nodular enhancing cystic lesion centered in the left hepatic lobe. May be related to a primary tumor of the liver such as a biliary tumor or abscess. Other smaller liver lesions or transient hepatic attenuation differences in the left and right hepatic lobes. Small free fluid in the abdomen and pelvis. No evidence of malignancy in the chest and pelvis. Status post cholecystectomy, hysterectomy and appendectomy. : marielle  TRANSCRIBE TIME/DATE: 08/12/2019 02:01 pm  READ BY: LARRY Sanchez MD

## 2019-09-03 NOTE — PROGRESS NOTES
Bed offer from [facility] and accepted d/t pts 1st choice, pt will be ready for DC in AM 9/4/19  Pt will be going to room 305- report [phone]

## 2019-09-03 NOTE — PROGRESS NOTES
Hospitalist Progress Note      Admit Date:     2019 12:27 PM   Name:              Michelle Wayne   Age:                 77 y.o.  :               1950   MRN:               [id number]   PCP:                Dalia Bass MD  Treatment Team: Attending Provider: Becky Boswell MD; Utilization Review: Alyssa Vidal RN; Care Manager: Kevin Alaniz RN; Consulting Provider: Richy Penny MD; Consulting Provider: Edward Alba NP     Subjective:   CC:  Chest pain, facial numbness        HPI - Patient is a 78 yo female with PMH significant for liver mass, GERD, and hemorrhiods who came into the ER from her PCP office with reports of sharp stabbing chest pain, mild SOB, and slurred speech x 2 weeks with right facial numbness, and chronic headache x 1 week. Patient was recently admitted at [facility] and completed work up for bile duct stones with jaundice and pancreatitis. Patient was  noted to have a liver mass that is being worked up. Patient CP and facial numbness resolved. Spiking fevers. Started on rocephin and zosyn. ID consulted. IR placed drain in abscess. Zosyn changed to unasyn. Blood cultures with alpha strep intermedius. Urine cultures with klebsiella. Gen surg consulted, no intervention at this time. Subjective  Patient alert and oriented. Denies pain or fever/chills, n/v/d.      Objective:      Patient Vitals for the past 24 hrs:    Temp Pulse Resp BP SpO2   19 1544 97.9 °F (36.6 °C) 98 18 107/52 94 %   19 1517     96 %   19 1434 97.8 °F (36.6 °C) 78 18 108/56 94 %   19 1200 98.6 °F (37 °C) 61 18 (!) 87/46 96 %   19 1117     97 %   19 0800 98.7 °F (37.1 °C) 70 17 100/48 92 %   19 0737     97 %   19 0432  67 17 (!) 88/44 98 %   19 0108    (!) 83/44    19 2306     94 %   19 2304 98.9 °F (37.2 °C) 79 19 (!) 79/41 91 %   19 2030     94 %   19 1929 98.1 °F (36.7 °C) (!) 108 16 103/68 91 %      Oxygen Therapy  O2 Sat (%): 94 % (08/30/19 1544)  Pulse via Oximetry: 76 beats per minute (08/30/19 1117)  O2 Device: Nasal cannula (08/30/19 1517)  O2 Flow Rate (L/min): 3 l/min (08/30/19 1517)  No intake or output data in the 24 hours ending 08/30/19 1631       REVIEW OF SYSTEMS: Comprehensive ROS performed and negative except as stated in HPI.     Physical Examination:  General:          Well nourished.  Alert and oriented x3  Eyes:               Normal sclera.  Extraocular movements intact. ENT:                Normocephalic, atraumatic.  Moist mucous membranes  CV:                  RRR.  No m/r/g. Lungs:             CTAB.  No wheezing, rhonchi, or rales. Abdomen:        Soft, nontender, drain in place, drainage less cloudy   Extremities:     Warm and dry.  No cyanosis or edema. Neurologic:      No focal deficit   Skin:                No rashes or jaundice.  Normal coloration  Psych:             Normal mood and affect.     Data Review:  I have reviewed all labs, meds, telemetry events, and studies from the last 24 hours.     Recent Results         Recent Results (from the past 24 hour(s))   CBC WITH AUTOMATED DIFF     Collection Time: 08/30/19  5:41 AM   Result Value Ref Range     WBC 12.3 (H) 4.3 - 11.1 K/uL     RBC 2.64 (L) 4.05 - 5.2 M/uL     HGB 8.3 (L) 11.7 - 15.4 g/dL     HCT 25.6 (L) 35.8 - 46.3 %     MCV 97.0 79.6 - 97.8 FL     MCH 31.4 26.1 - 32.9 PG     MCHC 32.4 31.4 - 35.0 g/dL     RDW 15.2 (H) 11.9 - 14.6 %     PLATELET 112 (L) 485 - 450 K/uL     MPV 9.9 9.4 - 12.3 FL     ABSOLUTE NRBC 0.00 0.0 - 0.2 K/uL     NEUTROPHILS 73 43 - 78 %     LYMPHOCYTES 20 13 - 44 %     MONOCYTES 4 4.0 - 12.0 %     EOSINOPHILS 1 0.5 - 7.8 %     BASOPHILS 1 0.0 - 2.0 %     IMMATURE GRANULOCYTES 1 0.0 - 5.0 %     ABS. NEUTROPHILS 9.0 (H) 1.7 - 8.2 K/UL     ABS. LYMPHOCYTES 2.5 0.5 - 4.6 K/UL     ABS. MONOCYTES 0.5 0.1 - 1.3 K/UL     ABS.  EOSINOPHILS 0.1 0.0 - 0.8 K/UL   ABS. BASOPHILS 0.1 0.0 - 0.2 K/UL     ABS. IMM.  GRANS. 0.1 0.0 - 0.5 K/UL     RBC COMMENTS NORMOCYTIC/NORMOCHROMIC       WBC COMMENTS Result Confirmed By Smear       PLATELET COMMENTS SLIGHT       DF AUTOMATED     METABOLIC PANEL, BASIC     Collection Time: 08/30/19  5:41 AM   Result Value Ref Range     Sodium 142 136 - 145 mmol/L     Potassium 2.5 (LL) 3.5 - 5.1 mmol/L     Chloride 109 (H) 98 - 107 mmol/L     CO2 23 21 - 32 mmol/L     Anion gap 10 7 - 16 mmol/L     Glucose 94 65 - 100 mg/dL     BUN 30 (H) 8 - 23 MG/DL     Creatinine 1.60 (H) 0.6 - 1.0 MG/DL     GFR est AA 41 (L) >60 ml/min/1.73m2     GFR est non-AA 34 (L) >60 ml/min/1.73m2     Calcium 6.8 (L) 8.3 - 10.4 MG/DL                     All Micro Results      Procedure Component Value Units Date/Time     CULTURE, BODY FLUID Sonda Spore STAIN [[id number]]       Order Status:  Sent Specimen:  Miscellaneous Fluid       CULTURE, BLOOD [[id number]] Collected:  08/30/19 1605     Order Status:  Completed Specimen:  Blood Updated:  08/30/19 1616     CULTURE, BLOOD [[id number]]       Order Status:  Sent Specimen:  Blood       CULTURE, URINE [[id number]]  (Abnormal) Collected:  08/28/19 0604     Order Status:  Completed Specimen:  Urine from Clean catch Updated:  08/30/19 0737       Special Requests: NO SPECIAL REQUESTS           Culture result:           >100,000 COLONIES/mL GRAM NEGATIVE RODS                           <10,000 COLONIES/mL MIXED SKIN LEISA ISOLATED                           IDENTIFICATION AND SUSCEPTIBILITY TO FOLLOW             CULTURE, BLOOD [[id number]]  (Abnormal) Collected:  08/28/19 0030     Order Status:  Completed Specimen:  Blood Updated:  08/30/19 0708       Special Requests: --           LEFT  HAND          GRAM STAIN GRAM POS COCCI IN CHAINS             PEDIATRIC BOTTLE                       RESULTS VERIFIED, PHONED TO AND READ BACK BY Nery Hernandes RN ON 8/29/19 @1220, TA                 REFER TO ACCESSION [id number] FOR BCID PANEL       Culture result: ALPHA STREPTOCOCCUS                       IDENTIFICATION AND SUSCEPTIBILITY TO FOLLOW             CULTURE, BLOOD [[id number]] Collected:  08/28/19 0030     Order Status:  Completed Specimen:  Blood Updated:  08/29/19 9791       Special Requests: --           LEFT  Antecubital          GRAM STAIN GRAM POS COCCI IN CHAINS             ANAEROBIC BOTTLE POSITIVE                       CRITICAL RESULT NOT CALLED DUE TO PREVIOUS NOTIFICATION OF CRITICAL RESULT WITHIN THE LAST 24 HOURS.               Culture result:           CULTURE IN PROGRESS,FURTHER UPDATES TO FOLLOW             BLOOD CULTURE ID PANEL [[id number]]  (Abnormal) Collected:  08/28/19 0030     Order Status:  Completed Specimen:  Blood Updated:  08/29/19 1246       Acc. no. from Micro Order [id number]       Streptococcus DETECTED           Comment: RESULTS VERIFIED, PHONED TO AND READ BACK BY  Amy Heart RN ON 8/29/19 @1220, TA             INTERPRETATION           Gram positive cocci. Identified by realtime PCR as Streptococcus species (not agalactiae, pyogenes, or pneumoniae).             Clinical Consideration           Consider discontinuation of IV vancomycin and using an anti-streptococcal beta-lactam (ceftriaxone/cefotaxime (peds))               Blood Culture PCR Testing           MULTIPLEX PCR NEGATIVE:  A negative FilmArray BCID result does not exclude the possibility of bloodstream infection.             C.  DIFFICILE AG & TOXIN A/B [[id number]]       Order Status:  Sent Specimen:  Stool               Current Meds:         Current Facility-Administered Medications   Medication Dose Route Frequency    midodrine (PROAMITINE) tablet 5 mg  5 mg Oral TID WITH MEALS    potassium chloride (K-DUR, KLOR-CON) SR tablet 40 mEq  40 mEq Oral TID    0.9% sodium chloride infusion  25 mL/hr IntraVENous CONTINUOUS    albuterol (PROVENTIL VENTOLIN) nebulizer solution 2.5 mg  2.5 mg Nebulization Q4H RT    hydrocortisone (ANUSOL-HC) 2.5 % rectal cream PeriANAL PRN    piperacillin-tazobactam (ZOSYN) 3.375 g in 0.9% sodium chloride (MBP/ADV) 100 mL  3.375 g IntraVENous Q8H    heparin (porcine) injection 5,000 Units  5,000 Units SubCUTAneous Q12H    montelukast (SINGULAIR) tablet 10 mg  10 mg Oral QPM    sodium chloride (NS) flush 5-40 mL  5-40 mL IntraVENous Q8H    sodium chloride (NS) flush 5-40 mL  5-40 mL IntraVENous PRN    acetaminophen (TYLENOL) tablet 650 mg  650 mg Oral Q4H PRN    naloxone (NARCAN) injection 0.4 mg  0.4 mg IntraVENous PRN    ondansetron (ZOFRAN) injection 4 mg  4 mg IntraVENous Q4H PRN    bisacodyl (DULCOLAX) tablet 10 mg  10 mg Oral DAILY PRN    0.9% sodium chloride infusion  150 mL/hr IntraVENous CONTINUOUS    pantoprazole (PROTONIX) tablet 40 mg  40 mg Oral ACB&D         Diet:  DIET CLEAR LIQUID     Other Studies (last 24 hours): Ir Drain Procedure W Cath     Result Date: 8/30/2019  Title: Percutaneous abscess drain placement with Ultrasound Guidance. History: Fevers and chills. Probable hepatic abscess. .  Consent: Informed written and oral consent was obtained from the patient after explanation of benefits and risks (including, but not limitted to: Infection, Hemorrhage, Visceral Injury). The patient's questions were answered to their satisfaction. The patient stated understanding and requested that we proceed. Procedure: Maximal sterile barrier technique (including:  cap, mask, sterile gown, sterile gloves, sterile sheet, hand hygiene, and chlorhexidene for cutaneous antisepsis) were used. Following routine prep and drape of the left lobe liver, a local field block with 1% lidocaine was achieved. Ultrasound evaluation was performed. Using real-time ultrasound guidance, with appropriate image recording, an 18-gauge needle was advanced into the abscess cavity. Thin brownish fluid was returned and as sent specimen. Using fluoroscopy, the needle was exchanged over an Amplatz wire for a 10 [employer] June multipurpose drain.   The catheter was secured with a suture and StatLok device. A suction bulb  was attached. Complications: None. Medications: None Specimen: Sent to Microbiology Fluoroscopy: 3 mgy. 18 seconds. Contrast: 0 ml. Findings: Lobular collection of the left lobe of the liver. Purulent fluid was obtained      Impression: Uncomplicated percutaneous hepatic abscess drain placement. Plan: Suction bulb drainage. Flush the drain each shift. Record output. Followup in interventional radiology in 11-14 days.         Assessment and Plan:                  Hospital Problems as of 8/30/2019 Date Reviewed: 8/27/2019           Codes Class Noted - Resolved POA     Chest pain ICD-10-CM: R07.9  ICD-9-CM: 786.50   8/27/2019 - Present Unknown           Elevated troponin ICD-10-CM: R74.8  ICD-9-CM: 790. 6   8/27/2019 - Present Unknown           Fibromyalgia ICD-10-CM: M79.7  ICD-9-CM: 729.1   8/27/2019 - Present Unknown           Headache ICD-10-CM: R51  ICD-9-CM: 757. 0   8/27/2019 - Present Unknown           Liver mass ICD-10-CM: R16.0  ICD-9-CM: 349. 9   8/27/2019 - Present Unknown           Hypokalemia ICD-10-CM: E87.6  ICD-9-CM: 276.8   8/27/2019 - Present Unknown           Acute renal failure (ARF) (HCC) ICD-10-CM: N17.9  ICD-9-CM: 584. 9   8/27/2019 - Present Unknown           * (Principal) TIA (transient ischemic attack) ICD-10-CM: G45.9  ICD-9-CM: 435. 9   8/27/2019 - Present Yes                   A/P:    TIA  -Carotid doppler  -MRI negative  -PT/OT/SLP/CM     Slurred speech  -Pt feels this has mostly resolved.   -CT brain with no acute disease  -MRI neg for acute process     Chest pain with elevated troponin- pain resolved  -Troponin trending down  -remote tele     Infection as noted in HPI   -zosyn changed to unasyn  ID consulting     Hypokalemia 3.1 today  -Likely due to chronic diarrhea  -Replace K+ with BMp in am    Hypomagnesemia- 1.5  -replace and recheck in am      SUSANNAH- resoved   -Monitor Cr  -Gentle IVF  -Avoid renal toxins     Chronic diarrhea  -resolved     Liver mass  -Workup in process per GI  -Infectious disease following culture  -IR following - abscess drain placed 8/30  -Suction bulb drainage.  Flush the drain each shift.   -Record output.   -Followup in interventional radiology in 11-14 days     Fibromyalgia  -Cont home meds    Discharge plan - Likely STR    Code status:  Full  Decision Maker: No MPOA     Case reviewed with supervising physician - Dr. Raimundo Tellez      Signed:  MARY Nobles

## 2019-09-03 NOTE — PROGRESS NOTES
Problem: Risk for Spread of Infection  Goal: Prevent transmission of infectious organism to others  Description  Prevent the transmission of infectious organisms to other patients, staff members, and visitors. Outcome: Progressing Towards Goal     Problem: Patient Education:  Go to Education Activity  Goal: Patient/Family Education  Outcome: Progressing Towards Goal     Problem: Falls - Risk of  Goal: *Absence of Falls  Description  Document Celio Elizabeth Fall Risk and appropriate interventions in the flowsheet. Outcome: Progressing Towards Goal  Note:   Fall Risk Interventions:  Mobility Interventions: Bed/chair exit alarm, Patient to call before getting OOB         Medication Interventions: Bed/chair exit alarm, Assess postural VS orthostatic hypotension, Patient to call before getting OOB, Teach patient to arise slowly    Elimination Interventions: Bed/chair exit alarm, Call light in reach, Patient to call for help with toileting needs, Toileting schedule/hourly rounds              Problem: Patient Education: Go to Patient Education Activity  Goal: Patient/Family Education  Outcome: Progressing Towards Goal     Problem: Patient Education: Go to Patient Education Activity  Goal: Patient/Family Education  Outcome: Progressing Towards Goal     Problem: Pressure Injury - Risk of  Goal: *Prevention of pressure injury  Description  Document Randolph Scale and appropriate interventions in the flowsheet.   Outcome: Progressing Towards Goal  Note:   Pressure Injury Interventions:  Sensory Interventions: Assess changes in LOC, Assess need for specialty bed, Discuss PT/OT consult with provider, Float heels, Keep linens dry and wrinkle-free    Moisture Interventions: Absorbent underpads, Internal/External urinary devices, Check for incontinence Q2 hours and as needed    Activity Interventions: Increase time out of bed, Pressure redistribution bed/mattress(bed type)    Mobility Interventions: Pressure redistribution bed/mattress (bed type), HOB 30 degrees or less    Nutrition Interventions: Offer support with meals,snacks and hydration, Document food/fluid/supplement intake    Friction and Shear Interventions: HOB 30 degrees or less, Foam dressings/transparent film/skin sealants                Problem: TIA/CVA Stroke: Day 2 Until Discharge  Goal: Activity/Safety  Outcome: Progressing Towards Goal  Goal: Diagnostic Test/Procedures  Outcome: Progressing Towards Goal  Goal: Nutrition/Diet  Outcome: Progressing Towards Goal  Goal: Discharge Planning  Outcome: Progressing Towards Goal  Goal: Medications  Outcome: Progressing Towards Goal  Goal: Respiratory  Outcome: Progressing Towards Goal  Goal: Treatments/Interventions/Procedures  Outcome: Progressing Towards Goal  Goal: Psychosocial  Outcome: Progressing Towards Goal  Goal: *Verbalizes anxiety and depression are reduced or absent  Outcome: Progressing Towards Goal  Goal: *Absence of aspiration  Outcome: Progressing Towards Goal  Goal: *Absence of deep venous thrombosis signs and symptoms(Stroke Metric)  Outcome: Progressing Towards Goal  Goal: *Optimal pain control at patient's stated goal  Outcome: Progressing Towards Goal  Goal: *Tolerating diet  Outcome: Progressing Towards Goal  Goal: *Ability to perform ADLs and demonstrates progressive mobility and function  Outcome: Progressing Towards Goal  Goal: *Stroke education continued(Stroke Metric)  Outcome: Progressing Towards Goal     Problem: Breathing Pattern - Ineffective  Goal: *Absence of hypoxia  Outcome: Progressing Towards Goal  Goal: *Use of effective breathing techniques  Outcome: Progressing Towards Goal

## 2019-09-03 NOTE — INTERDISCIPLINARY ROUNDS
Interdisciplinary team rounds were held 9/3/2019 with the following team members:Care Management, Nursing, Nurse Practitioner and Physical Therapy and the patient. Plan of care discussed. See clinical pathway and/or care plan for interventions and desired outcomes.

## 2019-09-04 VITALS
WEIGHT: 177.5 LBS | HEIGHT: 61 IN | DIASTOLIC BLOOD PRESSURE: 75 MMHG | OXYGEN SATURATION: 90 % | SYSTOLIC BLOOD PRESSURE: 131 MMHG | RESPIRATION RATE: 20 BRPM | BODY MASS INDEX: 33.51 KG/M2 | HEART RATE: 62 BPM | TEMPERATURE: 98.2 F

## 2019-09-04 LAB
ANION GAP SERPL CALC-SCNC: 7 MMOL/L (ref 7–16)
BACTERIA SPEC CULT: ABNORMAL
BACTERIA SPEC CULT: NORMAL
BACTERIA SPEC CULT: NORMAL
BASOPHILS # BLD: 0 K/UL (ref 0–0.2)
BASOPHILS NFR BLD: 1 % (ref 0–2)
BUN SERPL-MCNC: 8 MG/DL (ref 8–23)
CALCIUM SERPL-MCNC: 8.6 MG/DL (ref 8.3–10.4)
CHLORIDE SERPL-SCNC: 105 MMOL/L (ref 98–107)
CO2 SERPL-SCNC: 28 MMOL/L (ref 21–32)
CREAT SERPL-MCNC: 1.06 MG/DL (ref 0.6–1)
DIFFERENTIAL METHOD BLD: ABNORMAL
EOSINOPHIL # BLD: 0.1 K/UL (ref 0–0.8)
EOSINOPHIL NFR BLD: 1 % (ref 0.5–7.8)
ERYTHROCYTE [DISTWIDTH] IN BLOOD BY AUTOMATED COUNT: 15.9 % (ref 11.9–14.6)
GLUCOSE SERPL-MCNC: 95 MG/DL (ref 65–100)
GRAM STN SPEC: ABNORMAL
GRAM STN SPEC: ABNORMAL
HCT VFR BLD AUTO: 29.1 % (ref 35.8–46.3)
HGB BLD-MCNC: 9.4 G/DL (ref 11.7–15.4)
IMM GRANULOCYTES # BLD AUTO: 0.1 K/UL (ref 0–0.5)
IMM GRANULOCYTES NFR BLD AUTO: 1 % (ref 0–5)
LYMPHOCYTES # BLD: 2.2 K/UL (ref 0.5–4.6)
LYMPHOCYTES NFR BLD: 34 % (ref 13–44)
MAGNESIUM SERPL-MCNC: 1.6 MG/DL (ref 1.8–2.4)
MCH RBC QN AUTO: 31.3 PG (ref 26.1–32.9)
MCHC RBC AUTO-ENTMCNC: 32.3 G/DL (ref 31.4–35)
MCV RBC AUTO: 97 FL (ref 79.6–97.8)
MONOCYTES # BLD: 0.3 K/UL (ref 0.1–1.3)
MONOCYTES NFR BLD: 4 % (ref 4–12)
NEUTS SEG # BLD: 3.9 K/UL (ref 1.7–8.2)
NEUTS SEG NFR BLD: 60 % (ref 43–78)
NRBC # BLD: 0 K/UL (ref 0–0.2)
PLATELET # BLD AUTO: 190 K/UL (ref 150–450)
PMV BLD AUTO: 8.9 FL (ref 9.4–12.3)
POTASSIUM SERPL-SCNC: 3.2 MMOL/L (ref 3.5–5.1)
RBC # BLD AUTO: 3 M/UL (ref 4.05–5.2)
SERVICE CMNT-IMP: ABNORMAL
SERVICE CMNT-IMP: NORMAL
SERVICE CMNT-IMP: NORMAL
SODIUM SERPL-SCNC: 140 MMOL/L (ref 136–145)
WBC # BLD AUTO: 6.5 K/UL (ref 4.3–11.1)

## 2019-09-04 PROCEDURE — 74011000258 HC RX REV CODE- 258: Performed by: INTERNAL MEDICINE

## 2019-09-04 PROCEDURE — 97110 THERAPEUTIC EXERCISES: CPT

## 2019-09-04 PROCEDURE — 74011250637 HC RX REV CODE- 250/637: Performed by: INTERNAL MEDICINE

## 2019-09-04 PROCEDURE — 74011250636 HC RX REV CODE- 250/636: Performed by: HOSPITALIST

## 2019-09-04 PROCEDURE — 85025 COMPLETE CBC W/AUTO DIFF WBC: CPT

## 2019-09-04 PROCEDURE — 80048 BASIC METABOLIC PNL TOTAL CA: CPT

## 2019-09-04 PROCEDURE — 74011250636 HC RX REV CODE- 250/636: Performed by: INTERNAL MEDICINE

## 2019-09-04 PROCEDURE — 74011250636 HC RX REV CODE- 250/636: Performed by: NURSE PRACTITIONER

## 2019-09-04 PROCEDURE — 74011250637 HC RX REV CODE- 250/637: Performed by: NURSE PRACTITIONER

## 2019-09-04 PROCEDURE — 97530 THERAPEUTIC ACTIVITIES: CPT

## 2019-09-04 PROCEDURE — 74011000250 HC RX REV CODE- 250: Performed by: INTERNAL MEDICINE

## 2019-09-04 PROCEDURE — 94760 N-INVAS EAR/PLS OXIMETRY 1: CPT

## 2019-09-04 PROCEDURE — 83735 ASSAY OF MAGNESIUM: CPT

## 2019-09-04 PROCEDURE — 94640 AIRWAY INHALATION TREATMENT: CPT

## 2019-09-04 RX ORDER — ACETAMINOPHEN 325 MG/1
650 TABLET ORAL
Qty: 20 TAB | Refills: 0 | Status: SHIPPED
Start: 2019-09-04 | End: 2019-09-09

## 2019-09-04 RX ORDER — PANTOPRAZOLE SODIUM 40 MG/1
40 TABLET, DELAYED RELEASE ORAL DAILY
Qty: 30 TAB | Refills: 0 | Status: SHIPPED
Start: 2019-09-04 | End: 2019-09-04

## 2019-09-04 RX ORDER — LANOLIN ALCOHOL/MO/W.PET/CERES
400 CREAM (GRAM) TOPICAL 2 TIMES DAILY
Qty: 14 TAB | Refills: 0 | Status: SHIPPED
Start: 2019-09-04 | End: 2019-09-11

## 2019-09-04 RX ORDER — MIDODRINE HYDROCHLORIDE 5 MG/1
5 TABLET ORAL
Qty: 90 TAB | Refills: 0 | Status: SHIPPED
Start: 2019-09-04 | End: 2019-09-04 | Stop reason: SDUPTHER

## 2019-09-04 RX ORDER — MIDODRINE HYDROCHLORIDE 5 MG/1
5 TABLET ORAL
Qty: 60 TAB | Refills: 0 | Status: SHIPPED
Start: 2019-09-04 | End: 2019-10-04

## 2019-09-04 RX ORDER — POTASSIUM CHLORIDE 20 MEQ/1
40 TABLET, EXTENDED RELEASE ORAL DAILY
Qty: 7 TAB | Refills: 0 | Status: SHIPPED | OUTPATIENT
Start: 2019-09-04 | End: 2019-09-11

## 2019-09-04 RX ORDER — MAGNESIUM SULFATE HEPTAHYDRATE 40 MG/ML
2 INJECTION, SOLUTION INTRAVENOUS ONCE
Status: COMPLETED | OUTPATIENT
Start: 2019-09-04 | End: 2019-09-04

## 2019-09-04 RX ORDER — POTASSIUM CHLORIDE 14.9 MG/ML
20 INJECTION INTRAVENOUS
Status: COMPLETED | OUTPATIENT
Start: 2019-09-04 | End: 2019-09-04

## 2019-09-04 RX ADMIN — POTASSIUM CHLORIDE 40 MEQ: 20 TABLET, EXTENDED RELEASE ORAL at 08:17

## 2019-09-04 RX ADMIN — PANTOPRAZOLE SODIUM 40 MG: 40 TABLET, DELAYED RELEASE ORAL at 08:17

## 2019-09-04 RX ADMIN — MIDODRINE HYDROCHLORIDE 5 MG: 5 TABLET ORAL at 08:17

## 2019-09-04 RX ADMIN — Medication 5 ML: at 06:32

## 2019-09-04 RX ADMIN — MAGNESIUM SULFATE HEPTAHYDRATE 2 G: 40 INJECTION, SOLUTION INTRAVENOUS at 09:36

## 2019-09-04 RX ADMIN — HEPARIN SODIUM 5000 UNITS: 5000 INJECTION INTRAVENOUS; SUBCUTANEOUS at 08:18

## 2019-09-04 RX ADMIN — Medication 10 ML: at 06:32

## 2019-09-04 RX ADMIN — ONDANSETRON 4 MG: 2 INJECTION INTRAMUSCULAR; INTRAVENOUS at 12:51

## 2019-09-04 RX ADMIN — POTASSIUM CHLORIDE 20 MEQ: 200 INJECTION, SOLUTION INTRAVENOUS at 12:41

## 2019-09-04 RX ADMIN — MIDODRINE HYDROCHLORIDE 5 MG: 5 TABLET ORAL at 12:41

## 2019-09-04 RX ADMIN — ALBUTEROL SULFATE 2.5 MG: 2.5 SOLUTION RESPIRATORY (INHALATION) at 08:44

## 2019-09-04 RX ADMIN — SODIUM CHLORIDE, PRESERVATIVE FREE 300 UNITS: 5 INJECTION INTRAVENOUS at 06:32

## 2019-09-04 RX ADMIN — POTASSIUM CHLORIDE 20 MEQ: 200 INJECTION, SOLUTION INTRAVENOUS at 10:31

## 2019-09-04 RX ADMIN — CEFTRIAXONE 2 G: 2 INJECTION, POWDER, FOR SOLUTION INTRAMUSCULAR; INTRAVENOUS at 10:32

## 2019-09-04 NOTE — DISCHARGE SUMMARY
Hospitalist Discharge Summary     Admit Date:  2019 12:27 PM   Name:  Cachorro Yeboah   Age:  77 y.o.  :  1950   MRN:  [id number]   PCP:  Shaunna Bermudez MD  Treatment Team: Attending Provider: Elba Hill MD; Utilization Review: Dave Garcia RN; Care Manager: Gil Alvares RN; Consulting Provider: Lesly Allen MD; Primary Nurse: Annabella Ureña RN; Charge Nurse: Sesar Avitia; Physical Therapy Assistant: Erica Cheatham PTA    Problem List for this Hospitalization:  Hospital Problems as of 2019 Date Reviewed: 2019          Codes Class Noted - Resolved POA    Chest pain ICD-10-CM: R07.9  ICD-9-CM: 786.50  2019 - Present Unknown        Elevated troponin ICD-10-CM: R74.8  ICD-9-CM: 790.6  2019 - Present Unknown        Fibromyalgia ICD-10-CM: M79.7  ICD-9-CM: 729.1  2019 - Present Unknown        Headache ICD-10-CM: R51  ICD-9-CM: 784.0  2019 - Present Unknown        Liver mass ICD-10-CM: R16.0  ICD-9-CM: 573.9  2019 - Present Unknown        Hypokalemia ICD-10-CM: E87.6  ICD-9-CM: 276.8  2019 - Present Unknown        Acute renal failure (ARF) ([address] ICD-10-CM: N17.9  ICD-9-CM: 584.9  2019 - Present Unknown        * (Principal) TIA (transient ischemic attack) ICD-10-CM: G45.9  ICD-9-CM: 435.9  2019 - Present Yes                Admission HPI from 2019:    \"Review H&P for details of admission  [facility] Hospital Course:  Patient is a 76 yo female with PMH significant for liver mass, GERD, and hemorrhiods who came into the ER from her PCP office with reports of sharp stabbing chest pain, mild SOB, and slurred speech x 2 weeks with right facial numbness, and chronic headache x 1 week. Patient was recently admitted at [facility] and completed work up for bile duct stones with jaundice and pancreatitis. Patient was  noted to have a liver mass that is being worked up.    Patient CP and facial numbness resolved.  Patient to follow up with cardiology as OP. Patient was spiking fevers and started on rocephin and zosyn. Sepsis was suspected, but could not be ruled out. Blood cultures negative. Lactic acid negative. ID consulted. IR placed drain in liver abscess. Zosyn changed to unasyn then to ceftriaxone to with EOT 10/1. Ralene Bossman Blood cultures with alpha strep intermedius. Urine cultures with klebsiella. Patient will follow up with infectious disease as OP on 10/1. Gen surg consulted, no intervention at this time. Patient to discharge to [facility] for rehab and antibiotic therapy. Potassium and magnesium replaced. Patient to discharge on supplements and have BMP and magnesium checked daily until WNL.                Follow up instructions and discharge meds at bottom of this note. Plan was discussed with patient. All questions answered. Patient was stable at time of discharge. Diagnostic Imaging/Tests:   Xr Chest Pa Lat    Result Date: 8/27/2019  PA LATERAL CHEST  8/27/2019 12:31 PM HISTORY:  cp/shob COMPARISON: None FINDINGS:  The heart size is within normal limits. There is no lobar consolidation, pleural effusions or pulmonary edema. Lower lobe atelectasis or scarring is present. Cholecystectomy clips are present. IMPRESSION: Lower lobe atelectasis or scarring. Ct Head Wo Cont    Result Date: 8/27/2019  HEAD CT WITHOUT CONTRAST  8/27/2019 HISTORY:   Speech changes (or aphasia), new or progressive TECHNIQUE: Noncontrast axial images were obtained through the brain. All CT scans at this facility used dose modulation, interactive reconstruction and/or weight based dosing when appropriate to reduce radiation dose to as low as reasonably achievable. COMPARISON: None FINDINGS: There is no acute intracranial hemorrhage, significant mass effect or CT evidence of acute large artery territorial infarction. Please note that a hyperacute infarct or small vessel infarct may not be apparent on initial CT imaging.  Mild diffuse cerebral volume loss is present. Scattered areas of low-attenuation are present in the supratentorial white matter. This pattern may be present with chronic small vessel ischemic disease. There is no hydrocephalus , intra-axial mass or abnormal extra-axial fluid collection. There are no displaced skull fractures. The mastoid air cells and paranasal sinuses are clear where imaged. IMPRESSION: No acute findings       Echocardiogram results:  Results for orders placed or performed during the hospital encounter of 19   2D ECHO COMPLETE ADULT (TTE) W OR WO CONTR    Narrative    [facility]  [facility] [address] [facility], [address] [facility]  [phone]    Transthoracic Echocardiogram  2D, M-mode, Doppler, and Color Doppler    Patient: Mauricio Salazar  MR #: [id number]  : 1950  Age: 77 years  Gender: Female  Study date: 28-Aug-2019  Account #: [de-identified]  Height: 61 in  Weight: 168.7 lb  BSA: 1.76 mï¾²  Status:Routine  Location: 722  BP: 117/ 64    Allergies: SULFA (SULFONAMIDE ANTIBIOTICS), ASPIRIN, CELECOXIB,   METRONIDAZOLE,  MORPHINE, NAPROXEN    Sonographer:  Ngoc Kwon [facility]  Group:  [facility]  Referring Physician:  Xochilt Rubin PA-C  Reading Physician:  Amy Subramanian. [address] Drake MD [facility]    INDICATIONS: Chest Pain    PROCEDURE: This was a routine study. A transthoracic echocardiogram was  performed. The study included complete 2D imaging, M-mode, complete spectral  Doppler, and color Doppler. Intravenous contrast (Definity, 2 ml) was  administered. Echocardiographic views were limited by poor acoustic window  availability and poor body habitus. This was a technically difficult study. LEFT VENTRICLE: Size was normal. Systolic function was normal. Ejection  fraction was estimated in the range of 60 % to 65 %. There were no regional  wall motion abnormalities. Wall thickness was normal. Left ventricular  diastolic function was normal. Average E/e' of 11.6.     RIGHT VENTRICLE: The size was normal. Systolic function was normal.    LEFT ATRIUM: Size was normal.    RIGHT ATRIUM: Size was normal.    SYSTEMIC VEINS: IVC: The inferior vena cava was not well visualized. AORTIC VALVE: The valve was trileaflet. The noncoronary cusp demonstrated  mildly increased thickness. There was no evidence for stenosis. There was no  insufficiency. MITRAL VALVE: Valve structure was normal. There was no evidence for stenosis. There was no regurgitation. TRICUSPID VALVE: The valve structure was normal. There was no evidence for  stenosis. There was trivial regurgitation. PULMONIC VALVE: Not well visualized. There was physiologic regurgitation  present. PERICARDIUM: There was no pericardial effusion. AORTA: The root exhibited normal size. SUMMARY:    -  Left ventricle: Systolic function was normal. Ejection fraction was  estimated in the range of 60 % to 65 %. There were no regional wall motion  abnormalities. SYSTEM MEASUREMENT TABLES    2D mode  AoR Diam (2D): 2.5 cm  LA Dimension (2D): 3 cm  Left Atrium Systolic Volume Index; Method of Disks, Biplane; 2D mode;: 21   ml/m2  IVS/LVPW (2D): 1  IVSd (2D): 0.9 cm  LVIDd (2D): 3.9 cm  LVIDs (2D): 2.7 cm  LVPWd (2D): 1 cm  RVIDd (2D): 3 cm    Unspecified Scan Mode  Peak Grad; Mean; Antegrade Flow: 15 mm[Hg]  Vmax; Antegrade Flow: 194 cm/s    Prepared and signed by    Maya Laughlin.  Ruy Gaines MD [facility]  Signed 28-Aug-2019 14:33:47           All Micro Results     Procedure Component Value Units Date/Time    CULTURE, BODY FLUID Mya Seth STAIN [[id number]]  (Abnormal)  (Susceptibility) Collected:  08/30/19 1510    Order Status:  Completed Specimen:  Abscess Updated:  09/04/19 9374     Special Requests: NO SPECIAL REQUESTS        GRAM STAIN       TOO NUMEROUS TO COUNT WBCS/OIF                  FEW GRAM POS COCCI IN CHAINS           Culture result:       LIGHT STREPTOCOCCUS INTERMEDIUS          CULTURE, BLOOD [[id number]] Collected:  08/30/19 1555    Order Status:  Completed Specimen:  Blood Updated:  09/04/19 0617     Special Requests: --        LEFT  Antecubital       Culture result: NO GROWTH 5 DAYS       CULTURE, BLOOD [[id number]] Collected:  08/30/19 1605    Order Status:  Completed Specimen:  Blood Updated:  09/04/19 0617     Special Requests: --        RIGHT  Antecubital       Culture result: NO GROWTH 5 DAYS       CULTURE, BLOOD [[id number]]  (Abnormal) Collected:  08/28/19 0030    Order Status:  Completed Specimen:  Blood Updated:  08/31/19 1920     Special Requests: --        LEFT  Antecubital       GRAM STAIN GRAM POS COCCI IN CHAINS               AEROBIC AND ANAEROBIC BOTTLES                  CRITICAL RESULT NOT CALLED DUE TO PREVIOUS NOTIFICATION OF CRITICAL RESULT WITHIN THE LAST 24 HOURS.            Culture result: ALPHA STREPTOCOCCUS         FOR ID AND SUSCEPTIBILITY  REFER TO ACC OO.[id number]    CULTURE, URINE [[id number]]  (Abnormal)  (Susceptibility) Collected:  08/28/19 0604    Order Status:  Completed Specimen:  Urine from Clean catch Updated:  08/31/19 0744     Special Requests: NO SPECIAL REQUESTS        Culture result:       >100,000 COLONIES/mL KLEBSIELLA PNEUMONIAE                  <10,000 COLONIES/mL MIXED SKIN LEISA ISOLATED          CULTURE, BLOOD [[id number]]  (Abnormal)  (Susceptibility) Collected:  08/28/19 0030    Order Status:  Completed Specimen:  Blood Updated:  08/31/19 0740     Special Requests: --        LEFT  HAND       GRAM STAIN GRAM POS COCCI IN CHAINS         PEDIATRIC BOTTLE               RESULTS VERIFIED, PHONED TO AND READ BACK BY Caryn Salgado RN ON 8/29/19 @1220, TA            REFER TO ACCESSION [id number] FOR BCID PANEL     Culture result: STREPTOCOCCUS INTERMEDIUS       BLOOD CULTURE ID PANEL [[id number]]  (Abnormal) Collected:  08/28/19 0030    Order Status:  Completed Specimen:  Blood Updated:  08/29/19 1246     Acc. no. from Micro Order [id number]     Streptococcus DETECTED        Comment: RESULTS VERIFIED, PHONED TO AND READ BACK BY  Ayaz Burris RN ON 8/29/19 @1220, TA          INTERPRETATION       Gram positive cocci. Identified by realtime PCR as Streptococcus species (not agalactiae, pyogenes, or pneumoniae). Clinical Consideration       Consider discontinuation of IV vancomycin and using an anti-streptococcal beta-lactam (ceftriaxone/cefotaxime (peds))           Blood Culture PCR Testing       MULTIPLEX PCR NEGATIVE:  A negative FilmArray BCID result does not exclude the possibility of bloodstream infection. C. DIFFICILE AG & TOXIN A/B [[id number]] Collected:  08/27/19 6221    Order Status:  Canceled Specimen:  Stool           Labs: Results:       BMP, Mg, Phos Recent Labs     09/04/19  0747 09/03/19  0804 09/02/19  0652    142 143   K 3.2* 3.1* 3.3*    108* 111*   CO2 28 28 25   AGAP 7 6* 7   BUN 8 9 14   CREA 1.06* 1.08* 1.26*   CA 8.6 8.1* 8.2*   GLU 95 105* 92   MG 1.6* 1.5*  --       CBC Recent Labs     09/04/19 0747 09/03/19  0804 09/02/19  0652   WBC 6.5 5.7 6.3   RBC 3.00* 3.41* 2.93*   HGB 9.4* 10.5* 9.0*   HCT 29.1* 32.9* 28.9*    211 174   GRANS 60 50 67   LYMPH 34 42 26   EOS 1  --  2   MONOS 4 4 4   BASOS 1 2 1   IG 1  --  1   ANEU 3.9 3.0 4.2   ABL 2.2 2.4 1.6   JIM 0.1  --  0.1   ABM 0.3 0.2 0.2   ABB 0.0 0.1 0.1   AIG 0.1  --  0.1      LFT No results for input(s): SGOT, ALT, TBIL, AP, TP, ALB, GLOB, AGRAT, GPT in the last 72 hours.    Cardiac Testing Lab Results   Component Value Date/Time     (H) 08/31/2019 06:13 AM    Troponin-I, Qt. 0.21 () 08/28/2019 06:12 AM    Troponin-I, Qt. 0.37 () 08/27/2019 08:34 PM    Troponin-I, Qt. 0.76 () 08/27/2019 02:04 PM      Coagulation Tests No results found for: PTP, INR, APTT   A1c No results found for: HBA1C, HGBE8, XCE9HKVE   Lipid Panel Lab Results   Component Value Date/Time    Cholesterol, total 169 05/30/2019 02:56 PM    HDL Cholesterol 57 05/30/2019 02:56 PM    LDL, calculated 82 05/30/2019 02:56 PM    VLDL, calculated 30 05/30/2019 02:56 PM    Triglyceride 148 05/30/2019 02:56 PM      Thyroid Panel Lab Results   Component Value Date/Time    TSH 3.600 05/30/2019 02:56 PM    T4, Free 1.23 05/30/2019 02:56 PM        Most Recent UA Lab Results   Component Value Date/Time    Color YELLOW 08/28/2019 06:04 AM    Appearance CLOUDY 08/28/2019 06:04 AM    Specific gravity 1.013 08/28/2019 06:04 AM    pH (UA) 5.5 08/28/2019 06:04 AM    Protein TRACE (A) 08/28/2019 06:04 AM    Glucose NEGATIVE  08/28/2019 06:04 AM    Ketone NEGATIVE  08/28/2019 06:04 AM    Bilirubin NEGATIVE  08/28/2019 06:04 AM    Blood NEGATIVE  08/28/2019 06:04 AM    Urobilinogen 0.2 08/28/2019 06:04 AM    Nitrites NEGATIVE  08/28/2019 06:04 AM    Leukocyte Esterase NEGATIVE  08/28/2019 06:04 AM        Allergies   Allergen Reactions    Sulfa (Sulfonamide Antibiotics) Rash    Aspirin Rash and Swelling    Celecoxib Rash and Swelling    Metronidazole Rash     Unknown; possibly a rash.  Morphine Unknown (comments)    Naproxen Rash     Immunization History   Administered Date(s) Administered    TB Skin Test (PPD) Intradermal 08/27/2019       All Labs from Last 24 Hrs:  Recent Results (from the past 24 hour(s))   CBC WITH AUTOMATED DIFF    Collection Time: 09/04/19  7:47 AM   Result Value Ref Range    WBC 6.5 4.3 - 11.1 K/uL    RBC 3.00 (L) 4.05 - 5.2 M/uL    HGB 9.4 (L) 11.7 - 15.4 g/dL    HCT 29.1 (L) 35.8 - 46.3 %    MCV 97.0 79.6 - 97.8 FL    MCH 31.3 26.1 - 32.9 PG    MCHC 32.3 31.4 - 35.0 g/dL    RDW 15.9 (H) 11.9 - 14.6 %    PLATELET 250 915 - 048 K/uL    MPV 8.9 (L) 9.4 - 12.3 FL    ABSOLUTE NRBC 0.00 0.0 - 0.2 K/uL    DF AUTOMATED      NEUTROPHILS 60 43 - 78 %    LYMPHOCYTES 34 13 - 44 %    MONOCYTES 4 4.0 - 12.0 %    EOSINOPHILS 1 0.5 - 7.8 %    BASOPHILS 1 0.0 - 2.0 %    IMMATURE GRANULOCYTES 1 0.0 - 5.0 %    ABS. NEUTROPHILS 3.9 1.7 - 8.2 K/UL    ABS. LYMPHOCYTES 2.2 0.5 - 4.6 K/UL    ABS. MONOCYTES 0.3 0.1 - 1.3 K/UL    ABS.  EOSINOPHILS 0.1 0.0 - 0.8 K/UL ABS. BASOPHILS 0.0 0.0 - 0.2 K/UL    ABS. IMM. GRANS. 0.1 0.0 - 0.5 K/UL   METABOLIC PANEL, BASIC    Collection Time: 09/04/19  7:47 AM   Result Value Ref Range    Sodium 140 136 - 145 mmol/L    Potassium 3.2 (L) 3.5 - 5.1 mmol/L    Chloride 105 98 - 107 mmol/L    CO2 28 21 - 32 mmol/L    Anion gap 7 7 - 16 mmol/L    Glucose 95 65 - 100 mg/dL    BUN 8 8 - 23 MG/DL    Creatinine 1.06 (H) 0.6 - 1.0 MG/DL    GFR est AA >60 >60 ml/min/1.73m2    GFR est non-AA 55 (L) >60 ml/min/1.73m2    Calcium 8.6 8.3 - 10.4 MG/DL   MAGNESIUM    Collection Time: 09/04/19  7:47 AM   Result Value Ref Range    Magnesium 1.6 (L) 1.8 - 2.4 mg/dL       Discharge Exam:  Patient Vitals for the past 24 hrs:   Temp Pulse Resp BP SpO2   09/04/19 0844     92 %   09/04/19 0800 98.2 °F (36.8 °C) 82 18 152/77 93 %   09/04/19 0400 98.1 °F (36.7 °C) 82 20 152/78 91 %   09/04/19 0000 98.1 °F (36.7 °C) 78 16 106/67 92 %   09/03/19 2050 98 °F (36.7 °C) 74 22 154/69 94 %   09/03/19 1600 97.6 °F (36.4 °C) 74 20 161/75 91 %   09/03/19 1200 98.1 °F (36.7 °C) 78 19 132/68 92 %     Oxygen Therapy  O2 Sat (%): 92 % (09/04/19 0844)  Pulse via Oximetry: 78 beats per minute (09/04/19 0844)  O2 Device: Room air (09/04/19 0844)  O2 Flow Rate (L/min): 0 l/min (09/03/19 0824)    Intake/Output Summary (Last 24 hours) at 9/4/2019 0911  Last data filed at 9/4/2019 0634  Gross per 24 hour   Intake 240 ml   Output 975 ml   Net -735 ml       General:    Well nourished. Alert. No distress. Eyes:   Normal sclera. Extraocular movements intact. ENT:  Normocephalic, atraumatic. Moist mucous membranes  CV:   Regular rate and rhythm. No murmur, rub, or gallop. Lungs:  Clear to auscultation bilaterally. No wheezing, rhonchi, or rales. Abdomen: Soft, nontender, nondistended. Bowel sounds normal.   Extremities: Warm and dry. No cyanosis or edema. Neurologic: CN II-XII grossly intact. Sensation intact. Skin:     No rashes or jaundice.     Psych:  Normal mood and affect. Discharge Info:   Current Discharge Medication List      START taking these medications    Details   acetaminophen (TYLENOL) 325 mg tablet Take 2 Tabs by mouth every six (6) hours as needed for Pain or Fever for up to 5 days. Qty: 20 Tab, Refills: 0      cefTRIAXone 2 gram 2 g, ADDaptor 1 Device IVPB 2 g by IntraVENous route every twenty-four (24) hours for 27 days. Qty: 27 Dose, Refills: 0      potassium chloride (K-DUR, KLOR-CON) 20 mEq tablet Take 2 Tabs by mouth daily for 7 days. Qty: 7 Tab, Refills: 0      magnesium oxide (MAG-OX) 400 mg tablet Take 1 Tab by mouth two (2) times a day for 7 days. Qty: 14 Tab, Refills: 0      midodrine (PROAMITINE) 5 mg tablet Take 1 Tab by mouth two (2) times daily (after meals) for 30 days. Qty: 60 Tab, Refills: 0         CONTINUE these medications which have NOT CHANGED    Details   tacrolimus (PROTOPIC) 0.1 % ointment Apply  to affected area two (2) times a day. Qty: 30 g, Refills: 1    Associated Diagnoses: Eczema, unspecified type      folic acid (FOLVITE) 1 mg tablet Take 1 Tab by mouth daily. Qty: 90 Tab, Refills: 3    Associated Diagnoses: Anemia due to folic acid deficiency, unspecified deficiency type      esomeprazole (NEXIUM) 40 mg capsule Take 1 Cap by mouth daily. Qty: 30 Cap, Refills: 5    Associated Diagnoses: Gastroesophageal reflux disease with esophagitis      montelukast (SINGULAIR) 10 mg tablet Take 1 Tab by mouth every evening. Qty: 30 Tab, Refills: 5    Associated Diagnoses: Allergic rhinitis, unspecified seasonality, unspecified trigger      DULoxetine (CYMBALTA) 30 mg capsule Take 1 Cap by mouth daily. Qty: 90 Cap, Refills: 5    Associated Diagnoses: Fibromyalgia; Depression, unspecified depression type      gabapentin (NEURONTIN) 600 mg tablet Take 1 Tab by mouth three (3) times daily. Qty: 270 Tab, Refills: 5    Associated Diagnoses: Fibromyalgia      pravastatin (PRAVACHOL) 20 mg tablet Take 1 Tab by mouth nightly.   Qty: 90 Tab, Refills: 5    Associated Diagnoses: Mixed hyperlipidemia      hydrocortisone (ANUSOL-HC) 2.5 % rectal cream Insert  into rectum four (4) times daily. Qty: 30 g, Refills: 2               Disposition: STR    Activity: PT/OT Eval and Treat  Diet: DIET FULL LIQUID    Follow-up Appointments   Procedures    FOLLOW UP VISIT Appointment in: 3 - 5 Days     Standing Status:   Standing     Number of Occurrences:   1     Order Specific Question:   Appointment in     Answer:   3 - 5 Days         Follow-up Information     Follow up With Specialties Details Why Contact Info    Corinne Gross MD Family Practice In 3 days with [facility] [address]  [address] [facility], [id number] [place] [address] Internal Medicine Infectious Disease  On 10/1/2019 [place] at 2:30 [place] [facility] [employer] [address] [address]  [phone]       CT abd pelvis before appointment           Time spent in patient discharge planning and coordination 35 minutes.   Discharge plan discussed with dr. Jorge Pfeiffer,   Signed:  MARY Samano

## 2019-09-04 NOTE — PROGRESS NOTES
Problem: Mobility Impaired (Adult and Pediatric)  Goal: *Acute Goals and Plan of Care (Insert Text)  Description    LTG:  (1.)Ms. Hutton will move from supine to sit and sit to supine , scoot up and down and roll side to side in bed with INDEPENDENT within 7 treatment day(s). (2.)Ms. Hutton will transfer from bed to chair and chair to bed with INDEPENDENT using the least restrictive device within 7 treatment day(s). (3.)Ms. Hutton will ambulate with INDEPENDENT for 400 feet with the least restrictive device within 7 treatment day(s). ________________________________________________________________________________________________     Outcome: Progressing Towards Goal     PHYSICAL THERAPY: Daily Note and AM 9/4/2019  INPATIENT: PT Visit Days : 3  [place] / [place] / Product Type: Medicare /       NAME/AGE/GENDER: Palmira Perry is a 77 y.o. female   PRIMARY DIAGNOSIS: TIA (transient ischemic attack) [G45.9] TIA (transient ischemic attack)   TIA (transient ischemic attack)         ICD-10: Treatment Diagnosis:    · Generalized Muscle Weakness (M62.81)  · Difficulty in walking, Not elsewhere classified (R26.2)   Precaution/Allergies:  Sulfa (sulfonamide antibiotics); Aspirin; Celecoxib; Metronidazole; Morphine; and Naproxen      ASSESSMENT:     Ms. Millie Taveras was supine upon contact and agreeable to PT but reporting needing a brief change. Patient rolled right to left to allow the therapist to remove and replace the brief. The patient had a purewick and it was removed prior to beginning ambulation. Patient performed supine to sit with minimal handheld assist reporting \"I need help\". The patient then demonstrated good static/dynmaic sitting balance while lines were managed and gait belt donned. Patient performed sit to stand with CGA requiring verbal cues for hand placement and technique.   Patient then ambulated 100' with rolling walker, CGA at a slow but steady pace and verbal cues for posture and technique. The patient performed stand to sit to the recliner chair with CGA requiring heavy cuing for proper hand placement and controlling the descent into the chair. Patient participates in seated therapeutic strengthening exercises to improve functional strength for transfers, gait and overall mobility. Patient requires cues minimal verbal cues to perform exercises correctly. The patient reported feeling much better today and it is reflected in her performance. Overall the patient is making slow progress towards physical therapy goals as indicated by increased gait distances and activity tolerance today. Will continue efforts as patient is still below functional baseline. This section established at most recent assessment   PROBLEM LIST (Impairments causing functional limitations):  1. Decreased Strength  2. Decreased ADL/Functional Activities  3. Decreased Transfer Abilities  4. Decreased Ambulation Ability/Technique  5. Decreased Balance  6. Increased Pain  7. Decreased Activity Tolerance  8. Increased Shortness of Breath  9. Decreased Flexibility/Joint Mobility   INTERVENTIONS PLANNED: (Benefits and precautions of physical therapy have been discussed with the patient.)  1. Balance Exercise  2. Bed Mobility  3. Gait Training  4. Home Exercise Program (HEP)  5. Neuromuscular Re-education/Strengthening  6. Therapeutic Activites  7. Therapeutic Exercise/Strengthening  8. Transfer Training     TREATMENT PLAN: Frequency/Duration: 3 times a week for duration of hospital stay  Rehabilitation Potential For Stated Goals: Excellent     REHAB RECOMMENDATIONS (at time of discharge pending progress):    Placement: It is my opinion, based on this patient's performance to date, that Ms. Hutton may benefit from participating in 1-2 additional therapy sessions in order to continue to assess for rehab potential and then make recommendation for disposition at discharge.   Equipment:  None at this time              HISTORY:   History of Present Injury/Illness (Reason for Referral):  60-year-old female presents from her doctor's office with complaints of headache, slurred speech and substernal chest pressure     Chest pain onset was yesterday and is been persistent overnight and through this morning. She admits to some minimal shortness of breath related to the chest pain as well but no diaphoresis  Patient's slurred speech has been an ongoing issue for several weeks. Family at bedside reports that the patient has been sluggish, fatigued over more than the last week as well they have noticed that her speech is changed somewhat. Patient also states that she has had some numbness from the right side of her mouth which is radiated across to the left. Patient was recently admitted at [facility] with an episode of bile duct stones with jaundice and pancreatitis. At that time she had a reaction to an antibiotic for UTI as well. Past Medical History/Comorbidities:   Ms. Rosanna Noriega  has no past medical history on file. Ms. Rosanna Noriega  has a past surgical history that includes hx cholecystectomy; hx tubal ligation; hx hysterectomy; and ir drain procedure w cath (8/30/2019). Social History/Living Environment:   Home Environment: Private residence  # Steps to Enter: 2  Rails to Enter: Yes  Hand Rails : Left  Wheelchair Ramp: No  One/Two Story Residence: One story  Living Alone: No(Moving in with son; was living alone)  Support Systems: Child(kye)  Patient Expects to be Discharged to[de-identified] Private residence  Current DME Used/Available at Home: None  Tub or Shower Type: Tub/Shower combination  Prior Level of Function/Work/Activity:  Pt lives alone in 1 story, no AD used. After previous hospital admission she stayed at older sons [place] with stairs that presented a safety challenge for her.   After this discharge she plans to live at safer young [facility] until comfortable to return home alone.     Number of Personal Factors/Comorbidities that affect the Plan of Care: 0: LOW COMPLEXITY   EXAMINATION:   Most Recent Physical Functioning:   Gross Assessment:                  Posture:     Balance:  Sitting: Intact  Standing: Impaired  Standing - Static: Fair;Occasional  Standing - Dynamic : Fair Bed Mobility:  Supine to Sit: Minimum assistance  Scooting: Stand-by assistance  Wheelchair Mobility:     Transfers:  Sit to Stand: Contact guard assistance  Stand to Sit: Contact guard assistance  Gait:     Base of Support: Center of gravity altered  Speed/Loly: Slow;Shuffled  Step Length: Right shortened;Left shortened  Gait Abnormalities: Decreased step clearance;Shuffling gait;Trunk sway increased  Distance (ft): 100 Feet (ft)  Assistive Device: Walker, rolling  Ambulation - Level of Assistance: Contact guard assistance  Interventions: Safety awareness training;Verbal cues; Tactile cues      Body Structures Involved:  1. Nerves  2. Eyes and Ears  3. Muscles Body Functions Affected:  1. Mental  2. Sensory/Pain  3. Neuromusculoskeletal  4. Movement Related Activities and Participation Affected:  1. General Tasks and Demands  2. Mobility  3. Self Care  4. Domestic Life  5. Community, Social and [place]   Number of elements that affect the Plan of Care: 1-2: LOW COMPLEXITY   CLINICAL PRESENTATION:   Presentation: Stable and uncomplicated: LOW COMPLEXITY   CLINICAL DECISION MAKIN [facility] Inpatient Short Form  How much difficulty does the patient currently have. .. Unable A Lot A Little None   1. Turning over in bed (including adjusting bedclothes, sheets and blankets)? ? 1   ? 2   ? 3   ? 4   2. Sitting down on and standing up from a chair with arms ( e.g., wheelchair, bedside commode, etc.)   ? 1   ? 2   ? 3   ? 4   3.   Moving from lying on back to sitting on the side of the bed?   ? 1   ? 2   ? 3   ? 4   How much help from another person does the patient currently need. .. Total A Lot A Little None   4. Moving to and from a bed to a chair (including a wheelchair)? ? 1   ? 2   ? 3   ? 4   5. Need to walk in hospital room? ? 1   ? 2   ? 3   ? 4   6. Climbing 3-5 steps with a railing? ? 1   ? 2   ? 3   ? 4   © 2007, Trustees of [address] [place] [address] [address] [id number], under license to [employer]. All rights reserved      Score:  Initial: 22 Most Recent: X (Date: -- )    Interpretation of Tool:  Represents activities that are increasingly more difficult (i.e. Bed mobility, Transfers, Gait). Medical Necessity:     · Skilled intervention continues to be required due to decreased function. Reason for Services/Other Comments:  · Patient continues to require skilled intervention due to medical complications and patient unable to attend/participate in therapy as expected  · . Use of outcome tool(s) and clinical judgement create a POC that gives a: Clear prediction of patient's progress: LOW COMPLEXITY            TREATMENT:   (In addition to Assessment/Re-Assessment sessions the following treatments were rendered)   Pre-treatment Symptoms/Complaints:  none  Pain: Initial:   Pain Intensity 1: 0  Post Session:  0     Therapeutic Activity: (    30 Minutes): Therapeutic activities including bed mobility training, transfer training, static/dynamic sitting/standing balance activities, ambulation on level ground, safety awareness training, and patient education to improve mobility, strength, balance and coordination. Required minimal Safety awareness training;Verbal cues; Tactile cues to promote static and dynamic balance in standing, promote coordination of bilateral, lower extremity(s) and promote motor control of bilateral, lower extremity(s). Therapeutic Exercise: (  10 Minutes):  Exercises per grid below to improve mobility, strength and balance.   Required minimal verbal and tactile cues to promote proper body alignment, promote proper body posture and promote proper body mechanics. Progressed repetitions as indicated. Date:  9/4/19 Date:   Date:     Activity/Exercise Parameters Parameters Parameters   Ankle pumps x20     LAQ x20     Seated abduction x20     Seated marching x20                               Braces/Orthotics/Lines/Etc:   · IV  · drain BECKY  Treatment/Session Assessment:    · Response to Treatment:  see above  · Interdisciplinary Collaboration:   o Physical Therapy Assistant  o Registered Nurse  o SPTA  · After treatment position/precautions:   o Up in chair  o Bed/Chair-wheels locked  o Bed in low position  o Call light within reach  o RN notified   · Compliance with Program/Exercises: Will assess as treatment progresses  · Recommendations/Intent for next treatment session: \"Next visit will focus on advancements to more challenging activities and reduction in assistance provided\".   Total Treatment Duration:  PT Patient Time In/Time Out  Time In: 0855  Time Out: Margie Wheeler [address], PTA

## 2019-09-04 NOTE — PROGRESS NOTES
TRANSFER - OUT REPORT:    Verbal report given to Sherri(name) on Siena Colon  being transferred to [facility]) for routine progression of care       Report consisted of patients Situation, Background, Assessment and   Recommendations(SBAR). Information from the following report(s) SBAR, Kardex, Procedure Summary, MAR, Recent Results and Cardiac Rhythm NSR/Tach was reviewed with the receiving nurse. Lines:   PICC Single Lumen 02/03/70 Right;Basilic (Active)   Central Line Being Utilized Yes 9/4/2019  8:12 AM   Criteria for Appropriate Use Long term IV/antibiotic administration 9/4/2019  8:12 AM   Site Assessment Clean, dry, & intact 9/4/2019  8:12 AM   Phlebitis Assessment 0 9/4/2019  8:12 AM   Infiltration Assessment 0 9/4/2019  8:12 AM   Arm Circumference (cm) 30 cm 9/2/2019  6:46 PM   Date of Last Dressing Change 09/02/19 9/4/2019  8:12 AM   Dressing Status Clean, dry, & intact 9/4/2019  8:12 AM   External Catheter Length (cm) 0 centimeters 9/2/2019  6:46 PM   Dressing Type Disk with Chlorhexadine gluconate (CHG) 9/4/2019  8:12 AM   Hub Color/Line Status Infusing 9/4/2019  8:12 AM   Action Taken Other (comment) 9/3/2019  2:38 PM   Positive Blood Return (Site #1) Yes 9/3/2019  8:08 AM   Alcohol Cap Used No 9/4/2019  8:12 AM       Peripheral IV 08/27/19 Right Hand (Active)   Site Assessment Clean, dry, & intact 9/2/2019  8:00 PM   Phlebitis Assessment 0 9/2/2019  8:00 PM   Infiltration Assessment 0 9/2/2019  8:00 PM   Dressing Status Clean, dry, & intact 9/2/2019  8:00 PM   Dressing Type Tape;Transparent 9/2/2019  8:00 PM   Hub Color/Line Status Capped 9/2/2019  7:14 AM        Opportunity for questions and clarification was provided.       Patient transported with:   O2 @ 2 liters

## 2019-09-04 NOTE — PROGRESS NOTES
Infectious Disease Progress Note    Impression:   · Alpha strep bacteremia () source liver mass vs abscess   · CT completed at [facility] () noted large complex 15x9 cm mass concerning for biliary malignancy vs abscess. No fever/leukocytosis/blood cultures. Outpatient GI follow up completed, 3-phase CT ordered-unable to tolerate MRI  · Large complex liver mass  · GNR UTI  · Fever/leukocytosis  · Stroke like symptoms on admission, MRI negative    Plan:   · Continue Ceftriaxone 2 gm IV Q 24 hrs  X 4 weeks with EOT 10/1/19  · S/P drainage () with cx showing strep  · ID office follow-up is scheduled for 10/1/19 @ 1030  · ID office will schedule CT abd prior to this office follow-up  · Discharge to [facility] is planned for today    Anti-infectives:   1. Zosyn -  2. Unasyn (-9/3)  3. Rocephin (9/3-    Subjective:   C/O mild nausea. ID plan reviewed in detail with patient this AM.  Allergies   Allergen Reactions    Sulfa (Sulfonamide Antibiotics) Rash    Aspirin Rash and Swelling    Celecoxib Rash and Swelling    Metronidazole Rash     Unknown; possibly a rash.  Morphine Unknown (comments)    Naproxen Rash        Review of Systems:  A comprehensive review of systems was negative except for that written in the History of Present Illness. Objective:   Blood pressure 152/77, pulse 82, temperature 98.2 °F (36.8 °C), resp. rate 18, height 5' 1\" (1.549 m), weight 80.5 kg (177 lb 8 oz), SpO2 92 %.   Temp (24hrs), Av °F (36.7 °C), Min:97.6 °F (36.4 °C), Max:98.2 °F (36.8 °C)       Lines: peripheral IVs bilaterally    Exam:     General: NC/AT, alert and cooperative, looks stated age, in NAD   HEENT: PERRL, non-icteric sclera, no splinter hemorrhages   Neck: supple, symmetric, no masses or lymphadenopathy   Cardiac:Nl S1/S2, no murmurs/rubs/gallops/clicks, no LE edema   Pulmonary:clear bilaterally no rales/wheezes, good air movement, NC O2    Abdomen: soft, NT, non-distended,  Mild tenderness to upper quadrants, LLQ, BS normal, no CVA tenderness   Gentital:external genitalia normal   Skin:no rashes, lesions or wounds   MSK:no enlarged or swollen joints in limbs or sternoclavicular area   Extremities: no cords/clots/cyanosis, pulses palpable and symmetric   Psychiatric: mood and affect appropriate to situation     Microbiology:    All Micro Results     Procedure Component Value Units Date/Time    CULTURE, BODY FLUID Stewart Little STAIN [[id number]]  (Abnormal)  (Susceptibility) Collected:  08/30/19 1510    Order Status:  Completed Specimen:  Abscess Updated:  09/04/19 0751     Special Requests: NO SPECIAL REQUESTS        GRAM STAIN       TOO NUMEROUS TO COUNT WBCS/OIF                  FEW GRAM POS COCCI IN CHAINS           Culture result:       LIGHT STREPTOCOCCUS INTERMEDIUS          CULTURE, BLOOD [[id number]] Collected:  08/30/19 1555    Order Status:  Completed Specimen:  Blood Updated:  09/04/19 0617     Special Requests: --        LEFT  Antecubital       Culture result: NO GROWTH 5 DAYS       CULTURE, BLOOD [[id number]] Collected:  08/30/19 1605    Order Status:  Completed Specimen:  Blood Updated:  09/04/19 0617     Special Requests: --        RIGHT  Antecubital       Culture result: NO GROWTH 5 DAYS       CULTURE, BLOOD [[id number]]  (Abnormal) Collected:  08/28/19 0030    Order Status:  Completed Specimen:  Blood Updated:  08/31/19 1920     Special Requests: --        LEFT  Antecubital       GRAM STAIN GRAM POS COCCI IN CHAINS               AEROBIC AND ANAEROBIC BOTTLES                  CRITICAL RESULT NOT CALLED DUE TO PREVIOUS NOTIFICATION OF CRITICAL RESULT WITHIN THE LAST 24 HOURS.            Culture result: ALPHA STREPTOCOCCUS         FOR ID AND SUSCEPTIBILITY  REFER TO ACC [id number]    CULTURE, URINE [[id number]]  (Abnormal)  (Susceptibility) Collected:  08/28/19 0604    Order Status:  Completed Specimen:  Urine from Clean catch Updated:  08/31/19 0744     Special Requests: NO SPECIAL REQUESTS        Culture result:       >100,000 COLONIES/mL KLEBSIELLA PNEUMONIAE                  <10,000 COLONIES/mL MIXED SKIN LEISA ISOLATED          CULTURE, BLOOD [[id number]]  (Abnormal)  (Susceptibility) Collected:  08/28/19 0030    Order Status:  Completed Specimen:  Blood Updated:  08/31/19 0740     Special Requests: --        LEFT  HAND       GRAM STAIN GRAM POS COCCI IN CHAINS         PEDIATRIC BOTTLE               RESULTS VERIFIED, PHONED TO AND READ BACK BY Amy Heart RN ON 8/29/19 @1220, TA            REFER TO ACCESSION [id number] FOR BCID PANEL     Culture result: STREPTOCOCCUS INTERMEDIUS       BLOOD CULTURE ID PANEL [[id number]]  (Abnormal) Collected:  08/28/19 0030    Order Status:  Completed Specimen:  Blood Updated:  08/29/19 1246     Acc. no. from Micro Order [id number]     Streptococcus DETECTED        Comment: RESULTS VERIFIED, PHONED TO AND READ BACK BY  Amy Heart RN ON 8/29/19 @1220, TA          INTERPRETATION       Gram positive cocci. Identified by realtime PCR as Streptococcus species (not agalactiae, pyogenes, or pneumoniae). Clinical Consideration       Consider discontinuation of IV vancomycin and using an anti-streptococcal beta-lactam (ceftriaxone/cefotaxime (peds))           Blood Culture PCR Testing       MULTIPLEX PCR NEGATIVE:  A negative FilmArray BCID result does not exclude the possibility of bloodstream infection. C. DIFFICILE AG & TOXIN A/B [[id number]] Collected:  08/27/19 0390    Order Status:  Canceled Specimen:  Stool           Studies/Imaging:      Status Exam Begun  Exam Ended    Final [99] 8/29/2019 11:00 8/29/2019 11:13   Study Result     CHEST X-RAY, one view.     HISTORY:  Cough and wheezing.     TECHNIQUE:  AP portable upright view     COMPARISON: exam 2 days prior.      FINDINGS:   No lobar consolidation. Decreased atelectasis at the bases. Heart  and pulmonary vasculature is unremarkable.     IMPRESSION  IMPRESSION:  Decreased basilar atelectasis.       Signed by Signed Date/Time  Phone Pager   [facility] 8/29/2019 15:28 [phone]    Exam Information     Status Exam Begun  Exam Ended    Final [99] 8/29/2019 14:04 8/29/2019 14:07   Study Result     Clinical History: The patient is a 77years year old Female presenting with  symptoms of TIA with history of liver mass.     Comparison:  Head CT 8/27/2019     Technique:  Axial T2, axial FLAIR, axial diffusion-weighted,  sagittal T1 and  coronal gradient-echo scans were performed. Study is degraded by patient motion.     Findings:      There is no evidence of acute intracranial abnormality . Mild chronic  periventricular white matter changes are seen with scattered lacunae throughout  the corona radiata and centrum semiovale. Normal gray-white matter  differentiation is seen for age. There are no abnormal extra-axial fluid  collections. No evidence of mass or mass effect is seen. There is no diffusion  signal abnormality. Expected flow voids are maintained in the major intracranial  vessels.     The cerebellum and brainstem are unremarkable. There is no evidence of Chiari  malformation.     The ventricular system and CSF containing spaces are unremarkable in appearance.     Visualized extracranial soft tissues are unremarkable.     The paranasal sinuses are well pneumatized and aerated.     IMPRESSION  Impression:    Mild chronic microvascular disease without evidence of acute intracranial  abnormality.     CPT code(s) 52573          ADMISSION DATE: 08/10/2019 13:27          MULTIDETECTOR CONTRAST CT CHEST, CT ABDOMEN, AND CT PELVIS:    COMPARISON: CT abdomen pelvis for stone protocol of 07/21/2015    HISTORY: 26-year-old female with liver mass. Presumed metastatic disease. No known primary. Technique:  Spiral acquisition of the chest, abdomen, and pelvis was obtained from above the thoracic inlets through the symphysis pubis with oral and 100 mL Omnipaque 350 IV contrast administration.       FINDINGS:    On bone windows, spondylosis is seen in the thoracic and lumbar spine. No destructive bone lesion is seen. No definite fracture or malalignment is demonstrated. CT Chest:    Heart and Mediastinum: The heart is normal in size. Atherosclerotic calcifications are noted in the aorta, coronary arteries, and great vessels. 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 The anterior chest wall soft tissues are unremarkable. No definite thyroid nodule is seen. I see no axillary, hilar or mediastinal lymphadenopathy by size criteria. Lungs:  Opacification in the posterior lower lobes with air bronchograms more apparent on the right is seen. This may be related to dependent atelectasis or infiltrate. No pulmonary nodule or pneumothorax is seen. Chest Wall:  There are no chest wall abnormalities or pleural effusions. CT abdomen:     Liver:  A large 15.5 x 8.8 cm heterogeneous cystic lesion with peripheral nodular and septal enhancement is centered in the medial and lateral segments of the left hepatic lobe. Possible biliary enhancement near the hepatic duct is noted. No findings suggesting hepatocellular disease or cirrhosis are seen within the liver. Hepatocellular carcinoma is not likely. This may be related to a biliary malignancy such as cholangiocarcinoma or perhaps metastatic disease. Contrast MRCP is recommended for further characterization. This may also represent an abscess. Correlation with white blood cell count is recommended. If this represents an abscess, the patient would be very sickly. 2 other lesions are noted in segment 2 of the lateral segment of the left hepatic lobe. 1 on image 69 of series 2 most likely reflects a transient hepatic attenuation difference. 1 on image 61 of series 2 may be related to a small enhancing liver lesion or transient hepatic attenuation difference.   A tiny peripheral enhancing possible lesion is noted in segment 5 of the right hepatic lobe on image 51 of series 2. Gallbladder:  The patient is status post cholecystectomy. Common duct is prominent. This may be related to the previous cholecystectomy. Spleen:  Normal.    Pancreas:  Normal.    Adrenal glands: The right adrenal gland is normal.    Nodularity in the left adrenal gland most likely reflects nodular hyperplasia. Kidneys:  Normal.    Small bowel:  Normal.    Colon:  Diverticulosis is most apparent in the sigmoid colon. No diverticulitis is seen. No definite lesion is identified. No adenopathy, pneumotosis, or free air is identified in the upper abdomen or pelvis. A few atherosclerotic calcifications are noted in the aorta and branching vessels. A small amount of free fluid is noted in the abdomen and pelvis. CT pelvis: The patient is status post appendectomy. The terminal ileum is unremarkable. The patient is status post hysterectomy. Normal appearing small ovaries are likely present bilaterally. Small free fluid is noted in the pelvis. Small air bubbles are noted in the anterior left pelvis and lower abdomen subcutaneous region. This may be related to injection sites. IMPRESSION:    Large complex nodular enhancing cystic lesion centered in the left hepatic lobe. May be related to a primary tumor of the liver such as a biliary tumor or abscess. Other smaller liver lesions or transient hepatic attenuation differences in the left and right hepatic lobes. Small free fluid in the abdomen and pelvis. No evidence of malignancy in the chest and pelvis. Status post cholecystectomy, hysterectomy and appendectomy. : marielle  TRANSCRIBE TIME/DATE: 08/12/2019 02:01 pm  READ BY: LARRY Arzola MD

## 2019-09-04 NOTE — PROGRESS NOTES
09/04/19 0747   NIH Stroke Scale   Interval Other (comment)  (dual assessment w/ Chiqui Hernandez RN)   LOC 0   LOC Questions 0   LOC Commands 0   Best Gaze 0   Visual 1   Facial Palsy 0   Motor Right Arm 0   Motor Left Arm 0   Motor Right Leg 0   Motor Left Leg 0   Limb Ataxia 0   Sensory 0   Best Language 0   Dysarthria 0   Extinction and Inattention 0   Total 1

## 2019-09-04 NOTE — PROGRESS NOTES
Pt medically ready for DC to [facility] pt will be going to room 304, report [phone], ems transport with [facility] for 2 pm, message left for son Abdiaziz Robison and spoke with Samantha gAuero via phone to notify of pt moving to [facility] today.  Pt also agreeable to pending transport

## 2019-09-04 NOTE — DISCHARGE INSTRUCTIONS
Routine picc care    BMP and Magnesium daily until potassium and magnesium wnl    Stroke: After Your Visit     Your Care Instructions     You have had a stroke. Risk factors for stroke include being overweight, smoking, and sedentary lifestyle. This means that the blood flow to a part of your brain was blocked for some time, which damages the nerve cells in that part of the brain. The part of your body controlled by that part of your brain may not function properly now. The brain is an amazing organ that can heal itself to some degree. The stroke you had damaged part of your brain, but other parts of your brain may take over in some way for the damaged areas. You have already started this process. Going home may be hard for you and your family. The more you can try to do for yourself, the better. Remember to take each day one at a time. Follow-up care is a key part of your treatment and safety. Be sure to make and go to all appointments, and call your doctor if you are having problems. Its also a good idea to know your test results and keep a list of the medicines you take. How can you care for yourself at home? Enter a stroke rehabilitation (rehab) program, if your doctor recommends it. Physical, speech, and occupational therapies can help you manage bathing, dressing, eating, and other basics of daily living. Eat a heart-healthy diet that is low in cholesterol, saturated fat, and salt. Eat lots of fresh fruits and vegetables and foods high in fiber. Increase your activities slowly. Take short rest breaks when you get tired. Gradually increase the amount you walk. Start out by walking a little more than you did the day before. Do not drive until your doctor says it is okay. It is normal to feel sad or depressed after a stroke. If the blues last, talk to your doctor.   If you are having problems with urine leakage, go to the bathroom at regular times, including when you first wake up and at bedtime. Also, limit fluids after dinner. If you are constipated, drink plenty of fluids, enough so that your urine is light yellow or clear like water. If you have kidney, heart, or liver disease and have to limit fluids, talk with your doctor before you increase the amount of fluids you drink. Set up a regular time for using the toilet. If you continue to have constipation, your doctor may suggest using a bulking agent, such as Metamucil, or a stool softener, laxative, or enema. Medicines  Take your medicines exactly as prescribed. Call your doctor if you think you are having a problem with your medicine. You may be taking several medicines. ACE (angiotensin-converting enzyme) inhibitors, angiotensin II receptor blockers (ARBs), beta-blockers, diuretics (water pills), and calcium channel blockers control your blood pressure. Statins help lower cholesterol. Your doctor may also prescribe medicines for depression, pain, sleep problems, anxiety, or agitation. If your doctor has given you medicine that prevents blood clots, such as warfarin (Coumadin), aspirin combined with extended-release dipyridamole (Aggrenox), clopidogrel (Plavix), or aspirin to prevent another stroke, you should:  Tell your dentist, pharmacist, and other health professionals that you take these medicines. Watch for unusual bruising or bleeding, such as blood in your urine, red or black stools, or bleeding from your nose or gums. Get regular blood tests to check your clotting time if you are taking Coumadin. Wear medical alert jewelry that says you take blood thinners. You can buy this at most drugstores. Do not take any over-the-counter medicines or herbal products without talking to your doctor first.  If you take birth control pills or hormone replacement therapy, talk to your doctor about whether they are right for you. For family members and caregivers  Make the home safe.  Set up a room so that your loved one does not have to climb stairs. Be sure the bathroom is on the same floor. Move throw rugs and furniture that could cause falls, and make sure that the lighting is good. Put grab bars and seats in tubs and showers. Find out what your loved one can do and what he or she needs help with. Try not to do things for your loved one that your loved one can do on his or her own. Help him or her learn and practice new skills. Visit and talk with your loved one often. Try doing activities together that you both enjoy, such as playing cards or board games. Keep in touch with your loved one's friends as much as you can, and encourage them to visit. Take care of yourself. Do not try to do everything yourself. Ask other family members to help. Eat well, get enough rest, and take time to do things that you enjoy. Keep up with your own doctor visits, and make sure to take your medicines regularly. Get out of the house as much as you can. Join a local support group. Find out if you qualify for home health care visits to help with rehab or for adult day care. When should you call for help? Call 911 anytime you think you may need emergency care. For example, call if:  You have signs of another stroke. These may include:  Sudden numbness, paralysis, or weakness in your face, arm, or leg, especially on only one side of your body. New problems with walking or balance. Sudden vision changes. Drooling or slurred speech. New problems speaking or understanding simple statements, or you feel confused. A sudden, severe headache that is different from past headaches. Call 911 even if these symptoms go away in a few minutes. You cough up blood. You vomit blood or what looks like coffee grounds. You pass maroon or very bloody stools. Call your doctor now or seek immediate medical care if:  You have new bruises or blood spots under your skin. You have a nosebleed. Your gums bleed when you brush your teeth. You have blood in your urine.   Your stools are black and tarlike or have streaks of blood. You have vaginal bleeding when you are not having your period, or heavy period bleeding. You have new symptoms that may be related to your stroke, such as falls or trouble swallowing. Watch closely for changes in your health, and be sure to contact your doctor if you have any problems. Where can you learn more? Go to [employer]    Enter C294  in the search box to learn more about \"Stroke: After Your Visit\". © 4944-2687 Healthwise, Incorporated. Care instructions adapted under license by [facility] (which disclaims liability or warranty for this information). This care instruction is for use with your licensed healthcare professional. If you have questions about a medical condition or this instruction, always ask your healthcare professional. Cierra Melo any warranty or liability for your use of this information.

## 2019-09-04 NOTE — PROGRESS NOTES
Patient to be discharged to SNF after MG and K+ infusion completed, refused flu vaccine at this time and primary RN will call report and remove IV at time of discharge.

## 2019-09-05 ENCOUNTER — PATIENT OUTREACH (OUTPATIENT)
Dept: CASE MANAGEMENT | Age: 69
End: 2019-09-05

## 2019-09-05 NOTE — CDMP QUERY
Patient admitted with TIA. Documentation reflects sepsis in note(s) dated 8/28 @ 1736 by Dr. Migdalia Gabriel.   If possible, please specify in the d/c summary if sepsis was:     Ruled out after study   Still Suspected after study   Confirmed after study    The medical record reflects the following:    Risk Factors: age, history of liver mass, was admitted with TIA symptoms and work up, SUSANNAH and chest pain    Clinical Indicators: per progress note written on 8/28 @ 1736 by Dr. Migdalia Gabriel \"Sepsis ?secondary to UTI\"    Treatment: Switch ceftriaxone to zosyn     Dalila Dickinson BSN, RN, CDS  [facility] [employer] Management [facility]  [phone]

## 2019-09-05 NOTE — PROGRESS NOTES
This note will not be viewable in [address] KENDRA outreach postponed for 21 days due to discharge to non-preferred network SNF.

## 2019-09-15 ENCOUNTER — HOSPITAL ENCOUNTER (OUTPATIENT)
Dept: LAB | Age: 69
Discharge: HOME | End: 2019-09-15

## 2019-09-15 LAB
ANION GAP SERPL CALC-SCNC: 6 MMOL/L (ref 7–16)
BUN SERPL-MCNC: 14 MG/DL (ref 8–23)
CALCIUM SERPL-MCNC: 9.2 MG/DL (ref 8.3–10.4)
CHLORIDE SERPL-SCNC: 106 MMOL/L (ref 98–107)
CO2 SERPL-SCNC: 28 MMOL/L (ref 21–32)
CREAT SERPL-MCNC: 1.1 MG/DL (ref 0.6–1)
GLUCOSE SERPL-MCNC: 87 MG/DL (ref 65–100)
POTASSIUM SERPL-SCNC: 4.9 MMOL/L (ref 3.5–5.1)
SODIUM SERPL-SCNC: 140 MMOL/L (ref 136–145)

## 2019-09-15 PROCEDURE — 80048 BASIC METABOLIC PNL TOTAL CA: CPT

## 2019-09-26 VITALS — WEIGHT: 161 LBS | BODY MASS INDEX: 30.42 KG/M2

## 2019-09-27 LAB
ACC. NO. FROM MICRO ORDER, ACCP: ABNORMAL
CLINICAL CONSIDERATION: ABNORMAL
INTERPRETATION: ABNORMAL
STREPTOCOCCUS , STPSP: DETECTED

## 2019-09-30 ENCOUNTER — HOSPITAL ENCOUNTER (OUTPATIENT)
Dept: INTERVENTIONAL RADIOLOGY/VASCULAR | Age: 69
Discharge: HOME | End: 2019-09-30
Attending: RADIOLOGY

## 2019-10-07 RX ORDER — CEPHALEXIN 500 MG/1
500 CAPSULE ORAL 3 TIMES DAILY
COMMUNITY
End: 2020-01-28

## 2019-10-07 RX ORDER — SAME BUTANEDISULFONATE/BETAINE 400-600 MG
250 POWDER IN PACKET (EA) ORAL 2 TIMES DAILY
COMMUNITY
End: 2020-07-14

## 2019-10-09 ENCOUNTER — HOSPITAL ENCOUNTER (OUTPATIENT)
Dept: INTERVENTIONAL RADIOLOGY/VASCULAR | Age: 69
Discharge: HOME | End: 2019-10-09
Attending: RADIOLOGY
Payer: MEDICARE

## 2019-10-09 VITALS
HEART RATE: 70 BPM | RESPIRATION RATE: 17 BRPM | TEMPERATURE: 98.8 F | SYSTOLIC BLOOD PRESSURE: 111 MMHG | WEIGHT: 162 LBS | DIASTOLIC BLOOD PRESSURE: 60 MMHG | BODY MASS INDEX: 30.61 KG/M2 | OXYGEN SATURATION: 92 %

## 2019-10-09 PROCEDURE — C1769 GUIDE WIRE: HCPCS

## 2019-10-09 PROCEDURE — C1729 CATH, DRAINAGE: HCPCS

## 2019-10-09 PROCEDURE — 74011250636 HC RX REV CODE- 250/636: Performed by: RADIOLOGY

## 2019-10-09 PROCEDURE — 76080 X-RAY EXAM OF FISTULA: CPT

## 2019-10-09 PROCEDURE — 74011000250 HC RX REV CODE- 250: Performed by: RADIOLOGY

## 2019-10-09 PROCEDURE — 74011636320 HC RX REV CODE- 636/320: Performed by: RADIOLOGY

## 2019-10-09 PROCEDURE — 77030027478 HC TBNG JP W BLB MRTM -B

## 2019-10-09 PROCEDURE — 76380 CAT SCAN FOLLOW-UP STUDY: CPT

## 2019-10-09 PROCEDURE — 49406 IMAGE CATH FLUID PERI/RETRO: CPT

## 2019-10-09 PROCEDURE — 77030011229 HC DIL VESL COON COOK -A

## 2019-10-09 RX ORDER — SODIUM CHLORIDE 9 MG/ML
25 INJECTION, SOLUTION INTRAVENOUS ONCE
Status: COMPLETED | OUTPATIENT
Start: 2019-10-09 | End: 2019-10-09

## 2019-10-09 RX ORDER — CEFAZOLIN SODIUM/WATER 2 G/20 ML
2 SYRINGE (ML) INTRAVENOUS ONCE
Status: COMPLETED | OUTPATIENT
Start: 2019-10-09 | End: 2019-10-09

## 2019-10-09 RX ORDER — MIDAZOLAM HYDROCHLORIDE 1 MG/ML
.5-2 INJECTION, SOLUTION INTRAMUSCULAR; INTRAVENOUS
Status: DISCONTINUED | OUTPATIENT
Start: 2019-10-09 | End: 2019-10-12 | Stop reason: HOSPADM

## 2019-10-09 RX ORDER — LIDOCAINE HYDROCHLORIDE 20 MG/ML
1-15 INJECTION, SOLUTION INFILTRATION; PERINEURAL ONCE
Status: COMPLETED | OUTPATIENT
Start: 2019-10-09 | End: 2019-10-09

## 2019-10-09 RX ORDER — FENTANYL CITRATE 50 UG/ML
25-50 INJECTION, SOLUTION INTRAMUSCULAR; INTRAVENOUS
Status: DISCONTINUED | OUTPATIENT
Start: 2019-10-09 | End: 2019-10-12 | Stop reason: HOSPADM

## 2019-10-09 RX ADMIN — MIDAZOLAM 1 MG: 1 INJECTION INTRAMUSCULAR; INTRAVENOUS at 10:33

## 2019-10-09 RX ADMIN — SODIUM CHLORIDE 25 ML/HR: 900 INJECTION, SOLUTION INTRAVENOUS at 10:00

## 2019-10-09 RX ADMIN — Medication 2 G: at 11:01

## 2019-10-09 RX ADMIN — IOPAMIDOL 3 ML: 612 INJECTION, SOLUTION INTRAVENOUS at 11:00

## 2019-10-09 RX ADMIN — FENTANYL CITRATE 50 MCG: 50 INJECTION, SOLUTION INTRAMUSCULAR; INTRAVENOUS at 10:39

## 2019-10-09 RX ADMIN — MIDAZOLAM 1 MG: 1 INJECTION INTRAMUSCULAR; INTRAVENOUS at 10:39

## 2019-10-09 RX ADMIN — FENTANYL CITRATE 50 MCG: 50 INJECTION, SOLUTION INTRAMUSCULAR; INTRAVENOUS at 10:33

## 2019-10-09 RX ADMIN — LIDOCAINE HYDROCHLORIDE 200 MG: 20 INJECTION, SOLUTION INFILTRATION; PERINEURAL at 10:40

## 2019-10-09 NOTE — Clinical Note
Pt's initial abscess drain removed per Dr. Rakel Joseph. Will do US guided drain placement for second part of procedure.

## 2019-10-09 NOTE — PROGRESS NOTES
Recovery period without difficulty. Pt alert and oriented and denies pain. Dressing is clean, dry, and intact. Reviewed discharge instructions with patient and daughter in law, both verbalized understanding. Pt escorted to lobby discharge area via wheelchair. Vital signs and Aayush score completed.

## 2019-10-09 NOTE — PROGRESS NOTES
TRANSFER - IN REPORT:    Verbal report received from [facility] RN(name) on General Amos  being received from IR(unit) for routine progression of care      Report consisted of patients Situation, Background, Assessment and   Recommendations(SBAR). Information from the following report(s) Procedure Summary was reviewed with the receiving nurse. Opportunity for questions and clarification was provided. Assessment completed upon patients arrival to unit and care assumed.

## 2019-10-09 NOTE — DISCHARGE INSTRUCTIONS
[address]akia [address]  Department of Interventional Radiology  [facility]  [phone] Office  [phone] Fax    DRAIN/PORT/CATHETER REMOVAL  DISCHARGE INSTRUCTIONS    General Information:     Your doctor has ordered for us to remove your drain, port, or catheter. This could be that you do not need it anymore, it is not doing its job, your physician has decided on another plan for your treatment and/or it may need replacing. Home Care Instructions: You can resume your regular diet and medication regimen. Do not drink alcohol, drive, or make any important legal decisions in the next 24 hours. Do not lift anything heavier than a gallon of milk, or do anything strenuous for the next 24 hours. You will notice a dressing over the site of the removal. This dressing should stay in place until the site is healed. The dressing should be changed at least every 48 hours. You should change the dressing sooner if it becomes soiled or wet. The nurse who discharges you to home should review with you any wound care instructions. Resume your normal level of activity slowly. You may shower after 24 hours, but do not take a bath, swim or immerse yourself in water until the site has healed, and keep the dressing dry with plastic wrap while showering. The site may ooze for a couple of days. This drainage should lessen with each passing day. Call If:     You should call your Physician and/or the Radiology Nurse if you have any bleeding other than a small spot on your bandage. Call if you have any signs of infection, fever, or increased pain at the site of the tube. Call if the oozing increases, if it changes color, or begins to have an odor. Follow-Up Instructions: Please see your ordering doctor as he/she has requested. To Reach Us: If you have any questions about your procedure, please call the Interventional Radiology department at [phone].  After business hours (5pm) and weekends, call the answering service at [phone] and ask for the Radiologist on call to be paged. Interventional Radiology General Nurse Discharge    After general anesthesia or intravenous sedation, for 24 hours or while taking prescription Narcotics:  · Limit your activities  · Do not drive and operate hazardous machinery  · Do not make important personal or business decisions  · Do  not drink alcoholic beverages  · If you have not urinated within 8 hours after discharge, please contact your surgeon on call. * Please give a list of your current medications to your Primary Care Provider. * Please update this list whenever your medications are discontinued, doses are     changed, or new medications (including over-the-counter products) are added. * Please carry medication information at all times in case of emergency situations. These are general instructions for a healthy lifestyle:    No smoking/ No tobacco products/ Avoid exposure to second hand smoke  Surgeon General's Warning:  Quitting smoking now greatly reduces serious risk to your health. Obesity, smoking, and sedentary lifestyle greatly increases your risk for illness  A healthy diet, regular physical exercise & weight monitoring are important for maintaining a healthy lifestyle    You may be retaining fluid if you have a history of heart failure or if you experience any of the following symptoms:  Weight gain of 3 pounds or more overnight or 5 pounds in a week, increased swelling in our hands or feet or shortness of breath while lying flat in bed. Please call your doctor as soon as you notice any of these symptoms; do not wait until your next office visit.     Recognize signs and symptoms of STROKE:  F-face looks uneven    A-arms unable to move or move unevenly    S-speech slurred or non-existent    T-time-call 911 as soon as signs and symptoms begin-DO NOT go       Back to bed or wait to see if you get better-TIME IS BRAIN.             Patient Signature:  Date: 10/9/2019  Discharging Nurse: Francis Price

## 2019-10-09 NOTE — H&P
Department of Interventional Radiology  [phone]    History and Physical    Patient:  Makeda Izaguirre MRN:  [id number]  SSN:  [id number]    YOB: 1950  Age:  77 y.o. Sex:  female      Primary Care Provider:  Nessa Steel MD  Referring Physician:  Liborio Peres MD    Subjective:     Chief Complaint: liver abscess    History of the Present Illness: The patient is a 77 y.o. female who presents for evaluation of her liver abscess drain. She continues to take Keflex. She is not flushing her drain, no output. Feels well. NPO. Patrica Tillman History reviewed. No pertinent past medical history. Past Surgical History:   Procedure Laterality Date    HX CHOLECYSTECTOMY      HX HYSTERECTOMY      HX TUBAL LIGATION      IR DRAIN PROCEDURE W CATH  8/30/2019        Review of Systems:    Pertinent items are noted in the History of Present Illness. Prior to Admission medications    Medication Sig Start Date End Date Taking? Authorizing Provider   Saccharomyces boulardii (FLORASTOR) 250 mg capsule Take 250 mg by mouth two (2) times a day. Yes Provider, Historical   cephALEXin (KEFLEX) 500 mg capsule Take 500 mg by mouth three (3) times daily. Indications: No stop date as of now. Used for liver abscess. Yes Provider, Historical   folic acid (FOLVITE) 1 mg tablet Take 1 Tab by mouth daily. 10/4/19  Yes Nessa Steel MD   esomeprazole (NEXIUM) 40 mg capsule Take 1 Cap by mouth daily. 10/4/19  Yes Nessa Steel MD   DULoxetine (CYMBALTA) 30 mg capsule Take 1 Cap by mouth daily. 10/4/19  Yes Nessa Steel MD   gabapentin (NEURONTIN) 600 mg tablet Take 1 Tab by mouth three (3) times daily. 10/4/19  Yes Nessa Steel MD   pravastatin (PRAVACHOL) 20 mg tablet Take 1 Tab by mouth nightly. 10/4/19  Yes Nessa Steel MD   folic acid (FOLVITE) 1 mg tablet Take 1 Tab by mouth daily. 10/4/19  Yes Nessa Steel MD   magnesium oxide (MAG-OX) 400 mg tablet Take 1 Tab by mouth two (2) times a day.  10/4/19 Yes Chi Miller MD   montelukast (SINGULAIR) 10 mg tablet Take 1 Tab by mouth daily. 10/4/19  Yes Chi Miller MD   potassium chloride (K-DUR, KLOR-CON) 20 mEq tablet Take 1 Tab by mouth daily. 10/4/19  Yes Chi Miller MD   tacrolimus (PROTOPIC) 0.1 % ointment Apply  to affected area two (2) times a day. 6/6/19  Yes Chi Miller MD   montelukast (SINGULAIR) 10 mg tablet Take 1 Tab by mouth every evening. 10/4/19   Chi Miller MD   hydrocortisone (ANUSOL-HC) 2.5 % rectal cream Insert  into rectum four (4) times daily. 10/4/19   Chi Miller MD   metoclopramide HCl (REGLAN) 5 mg tablet Take 1 Tab by mouth four (4) times daily for 10 days. 10/4/19 10/14/19  Chi Miller MD   ondansetron (ZOFRAN ODT) 4 mg disintegrating tablet Take 1 Tab by mouth every eight (8) hours as needed for Nausea. 10/4/19   Chi Miller MD   hydrocortisone (ANUSOL-HC) 2.5 % rectal cream Insert  into rectum four (4) times daily. 3/8/19   Chi Miller MD        Allergies   Allergen Reactions    Demerol [Meperidine] Unknown (comments)     Nausea and ill    Macrodantin [Nitrofurantoin Macrocrystal] Nausea and Vomiting    Red Dye Rash    Sulfa (Sulfonamide Antibiotics) Rash    Aspirin Rash and Swelling    Celecoxib Rash and Swelling    Metronidazole Rash     Unknown; possibly a rash.  Morphine Unknown (comments)    Naproxen Rash       History reviewed. No pertinent family history.   Social History     Tobacco Use    Smoking status: Current Every Day Smoker     Packs/day: 0.50     Years: 30.00     Pack years: 15.00    Smokeless tobacco: Never Used   Substance Use Topics    Alcohol use: No     Frequency: Never        Objective:       Physical Examination:    Vitals:    10/09/19 0923 10/09/19 0935   BP: 115/60    Pulse: 80    Resp: 18    Temp: 98.8 °F (37.1 °C)    SpO2: 94%    Weight:  73.5 kg (162 lb)       Pain Assessment  Pain Intensity 1: 0 (10/09/19 0927)        Patient Stated Pain Goal: 0      HEART: regular rate and rhythm  LUNG: clear to auscultation bilaterally  ABDOMEN: normal findings: soft, nontender, obese, drain site benign  EXTREMITIES: normal strength, tone, and muscle mass    Laboratory:     Lab Results   Component Value Date/Time    Sodium 140 09/15/2019 08:10 AM    Sodium 140 09/04/2019 07:47 AM    Potassium 4.9 09/15/2019 08:10 AM    Potassium 3.2 (L) 09/04/2019 07:47 AM    Chloride 106 09/15/2019 08:10 AM    Chloride 105 09/04/2019 07:47 AM    CO2 28 09/15/2019 08:10 AM    CO2 28 09/04/2019 07:47 AM    Anion gap 6 (L) 09/15/2019 08:10 AM    Anion gap 7 09/04/2019 07:47 AM    Glucose 87 09/15/2019 08:10 AM    Glucose 95 09/04/2019 07:47 AM    BUN 14 09/15/2019 08:10 AM    BUN 8 09/04/2019 07:47 AM    Creatinine 1.10 (H) 09/15/2019 08:10 AM    Creatinine 1.06 (H) 09/04/2019 07:47 AM    GFR est AA >60 09/15/2019 08:10 AM    GFR est AA >60 09/04/2019 07:47 AM    GFR est non-AA 53 (L) 09/15/2019 08:10 AM    GFR est non-AA 55 (L) 09/04/2019 07:47 AM    Calcium 9.2 09/15/2019 08:10 AM    Calcium 8.6 09/04/2019 07:47 AM    Magnesium 1.6 (L) 09/04/2019 07:47 AM    Magnesium 1.5 (L) 09/03/2019 08:04 AM    Phosphorus 2.0 (L) 08/27/2019 12:24 PM    Albumin 1.9 (L) 08/27/2019 12:24 PM    Albumin 4.4 05/30/2019 02:56 PM    Protein, total 6.9 08/27/2019 12:24 PM    Protein, total 6.7 05/30/2019 02:56 PM    Globulin 5.0 (H) 08/27/2019 12:24 PM    A-G Ratio 0.4 (L) 08/27/2019 12:24 PM    A-G Ratio 1.9 05/30/2019 02:56 PM    AST (SGOT) 51 (H) 08/27/2019 12:24 PM    AST (SGOT) 13 05/30/2019 02:56 PM    ALT (SGPT) 25 08/27/2019 12:24 PM    ALT (SGPT) 8 05/30/2019 02:56 PM     Lab Results   Component Value Date/Time    WBC 6.5 09/04/2019 07:47 AM    WBC 5.7 09/03/2019 08:04 AM    HGB 9.4 (L) 09/04/2019 07:47 AM    HGB 10.5 (L) 09/03/2019 08:04 AM    HCT 29.1 (L) 09/04/2019 07:47 AM    HCT 32.9 (L) 09/03/2019 08:04 AM    PLATELET 564 41/53/2577 07:47 AM    PLATELET 945 29/49/0810 08:04 AM     No results found for: APTT, PTP, INR, INREXT    Assessment:     Liver abscess    Hospital Problems  Date Reviewed: 10/4/2019    None          Plan:     Planned Procedure:  Drain check    Risks, benefits, and alternatives reviewed with patient and she agrees to proceed with the procedure.       Signed By: Bertha Navarro PA-C     October 9, 2019

## 2019-10-16 ENCOUNTER — PATIENT OUTREACH (OUTPATIENT)
Dept: CASE MANAGEMENT | Age: 69
End: 2019-10-16

## 2019-10-16 NOTE — PROGRESS NOTES
This note will not be viewable in [address] Transitions of Care  Follow up Outreach Note   Outreach type  Phone call: Spoke with patient     Date/Time of Outreach:  10/16/19  3:37 PM     Has patient attended PCP or specialist follow-up appointments since last contact? What was outcome of appointment? When is next follow-up scheduled? Patient completed appointments with follow ups scheduled appropriately. Has upcoming appointment for a CT Scan of her liver on 10/24/19 @ 11 AM to see if liver abscess is gone. Review medications. Any medication changes since last outreach? Does patient have any questions or issues related to their medications? Yes    No medication changes since she left the hospital/rehab, per patient    No questions from patient at time of phone call     Home health active? If yes  any issue? Progress? Patient states she is doing fine. Referrals needed?  (CM, SW, HH, etc )   None   Other issues/Miscellaneous? (Transportation, access to meals, ability to perform ADLs, adequate caregiver support, etc.)        No further questions or concerns at this time. Care Coordinator contact information provided to patient should a need arise. Next Outreach Scheduled?     Graduation from program?   15 - 30 days    No     Next Steps/Goals (if applicable):   F/U in 2 weeks     Outreach completed by:   Georgi Carbone LPN  Care Coordinator

## 2019-10-24 ENCOUNTER — HOSPITAL ENCOUNTER (OUTPATIENT)
Dept: CT IMAGING | Age: 69
Discharge: HOME | End: 2019-10-24
Attending: RADIOLOGY
Payer: MEDICARE

## 2019-10-24 ENCOUNTER — HOSPITAL ENCOUNTER (OUTPATIENT)
Dept: INTERVENTIONAL RADIOLOGY/VASCULAR | Age: 69
Discharge: HOME | End: 2019-10-24
Attending: RADIOLOGY
Payer: MEDICARE

## 2019-10-24 DIAGNOSIS — K75.0 LIVER ABSCESS: ICD-10-CM

## 2019-10-24 LAB — CREAT BLD-MCNC: 1.2 MG/DL (ref 0.8–1.5)

## 2019-10-24 PROCEDURE — 74011636320 HC RX REV CODE- 636/320: Performed by: RADIOLOGY

## 2019-10-24 PROCEDURE — 74160 CT ABDOMEN W/CONTRAST: CPT

## 2019-10-24 PROCEDURE — 99211 OFF/OP EST MAY X REQ PHY/QHP: CPT

## 2019-10-24 PROCEDURE — 82565 ASSAY OF CREATININE: CPT

## 2019-10-24 PROCEDURE — 74011000258 HC RX REV CODE- 258: Performed by: RADIOLOGY

## 2019-10-24 RX ORDER — SODIUM CHLORIDE 0.9 % (FLUSH) 0.9 %
10 SYRINGE (ML) INJECTION
Status: COMPLETED | OUTPATIENT
Start: 2019-10-24 | End: 2019-10-24

## 2019-10-24 RX ADMIN — IOPAMIDOL 100 ML: 755 INJECTION, SOLUTION INTRAVENOUS at 11:59

## 2019-10-24 RX ADMIN — Medication 10 ML: at 11:59

## 2019-10-24 RX ADMIN — SODIUM CHLORIDE 100 ML: 900 INJECTION, SOLUTION INTRAVENOUS at 11:59

## 2019-11-12 NOTE — PROGRESS NOTES
This note will not be viewable in [address] Due to length of time since hospitalization and no additional needs on FU call no further [facility] outreach will be made at this time. Episode closed.

## 2020-08-23 NOTE — PROCEDURES
Department of Interventional Radiology  [phone]        Interventional Radiology Brief Procedure Note    Patient: Florinda Cameron MRN: [id number]  SSN: [id number]    YOB: 1950  Age: 77 y.o. Sex: female      Date of Procedure: 10/9/2019    Pre-Procedure Diagnosis: Liver abscess, left hepatic lobe    Post-Procedure Diagnosis: SAME    Procedure(s): Abscessogram. US guided aspiratoin    Brief Description of Procedure: Abscessogram reveals collapsed abscess cavity. Contrast tracks along the catheter to the skin surface. US reveals residual abscess in the left hepatic lobe, not communicated with the old drain. Old drain was removed. Under US and fluoroscopic guidance, an 8 Fr drain was placed into the residual abscess. Contrast was injected which did not opacify any significant abscess cavity. Cone beam CT was performed which showed the drain in the region of residual abscess. The drain was aspirated but no fluid returned. The drain was removed and pressure was held for hemostasis. Performed By: Kavin Easton MD     Assistants: None    Anesthesia:Moderate Sedation    Estimated Blood Loss: Less than 10ml    Specimens:  None    Implants:  None    Findings: As above. Complications: None    Recommendations: Follow up CT in 2 weeks.       Signed By: Kavin Easton MD     October 9, 2019 Pulm/CC    D/w Dr Aissatou Diana in am- heparin not recommended since patient post-op, unless there is an indication. Recent CTA chest neg for PEs    US LEs  Interpretation Summary     · No evidence of deep vein thrombosis in the right lower extremity veins assessed. · No evidence of deep vein thrombosis in the left lower extremity veins assessed.           Plan-SCDs    Antoinette Carrizales MD

## 2022-03-18 PROBLEM — N17.9 ACUTE RENAL FAILURE (ARF) (HCC): Status: ACTIVE | Noted: 2019-08-27

## 2022-03-18 PROBLEM — K21.00 GASTROESOPHAGEAL REFLUX DISEASE WITH ESOPHAGITIS: Status: ACTIVE | Noted: 2019-03-08

## 2022-03-18 PROBLEM — R30.0 DYSURIA: Status: ACTIVE | Noted: 2019-03-08

## 2022-03-19 PROBLEM — G45.9 TIA (TRANSIENT ISCHEMIC ATTACK): Status: ACTIVE | Noted: 2019-08-27

## 2022-03-19 PROBLEM — E87.6 HYPOKALEMIA: Status: ACTIVE | Noted: 2019-08-27

## 2022-03-19 PROBLEM — R77.8 ELEVATED TROPONIN: Status: ACTIVE | Noted: 2019-08-27

## 2022-03-19 PROBLEM — M79.7 FIBROMYALGIA: Status: ACTIVE | Noted: 2019-08-27

## 2022-03-19 PROBLEM — K62.5 RECTAL BLEEDING: Status: ACTIVE | Noted: 2019-03-08

## 2022-03-19 PROBLEM — R79.89 ELEVATED TROPONIN: Status: ACTIVE | Noted: 2019-08-27

## 2022-03-19 PROBLEM — R51.9 HEADACHE: Status: ACTIVE | Noted: 2019-08-27

## 2022-03-19 PROBLEM — R16.0 LIVER MASS: Status: ACTIVE | Noted: 2019-08-27

## 2022-03-19 PROBLEM — K92.1 BLOOD IN THE STOOL: Status: ACTIVE | Noted: 2019-03-08

## 2022-03-19 PROBLEM — K64.9 HEMORRHOIDS: Status: ACTIVE | Noted: 2019-03-08

## 2022-03-20 PROBLEM — R07.9 CHEST PAIN: Status: ACTIVE | Noted: 2019-08-27

## 2022-03-20 PROBLEM — M19.90 ARTHRITIS: Status: ACTIVE | Noted: 2019-03-08

## 2022-05-20 ENCOUNTER — HOSPITAL ENCOUNTER (OUTPATIENT)
Dept: MAMMOGRAPHY | Age: 72
Discharge: HOME | End: 2022-05-20
Attending: FAMILY MEDICINE
Payer: MEDICARE

## 2022-05-20 DIAGNOSIS — Z12.31 BREAST CANCER SCREENING BY MAMMOGRAM: ICD-10-CM

## 2022-05-20 PROCEDURE — 77067 SCR MAMMO BI INCL CAD: CPT

## 2022-08-15 ENCOUNTER — NURSE TRIAGE (OUTPATIENT)
Dept: OTHER | Facility: CLINIC | Age: 72
End: 2022-08-15

## 2022-08-15 NOTE — TELEPHONE ENCOUNTER
Received call from Camden Mosley at [facility] with Red Flag Complaint. Current Symptoms: intermittent low back pain, pain radiates to hips and legs- states pain is from a fall 2 years ago    States was seen by CORY 2 days ago and was told to follow up with regular MD. [place] rays done at [address], diagnosed with arthritis and inflammation, was given muscle relaxer, steroid and pain medication    No new or worsening symptoms since being seen by CORY. Onset: 2 years ago;     Pain Severity: 7/10; intermittent    Temperature: denies     Denies - new urinary incontinence / blood in urine / new numbness or tingling       Recommended disposition: See PCP within 3 Days    Care advice provided, patient verbalizes understanding; denies any other questions or concerns; instructed to call back for any new or worsening symptoms. Patient/Caller agrees with recommended disposition; writer provided warm transfer to [facility] for appointment scheduling     Attention Provider: Thank you for allowing me to participate in the care of your patient. The patient was connected to triage in response to information provided to the ECC/PSC. Please do not respond through this encounter as the response is not directed to a shared pool.         Reason for Disposition   Patient wants to be seen    Protocols used: Back Pain-ADULT-[place]

## 2022-08-16 ENCOUNTER — OFFICE VISIT (OUTPATIENT)
Dept: FAMILY MEDICINE CLINIC | Facility: CLINIC | Age: 72
End: 2022-08-16
Payer: MEDICARE

## 2022-08-16 VITALS
DIASTOLIC BLOOD PRESSURE: 80 MMHG | SYSTOLIC BLOOD PRESSURE: 126 MMHG | WEIGHT: 183 LBS | RESPIRATION RATE: 16 BRPM | OXYGEN SATURATION: 95 % | TEMPERATURE: 96.7 F | HEART RATE: 85 BPM | HEIGHT: 60 IN | BODY MASS INDEX: 35.93 KG/M2

## 2022-08-16 DIAGNOSIS — G89.29 CHRONIC MIDLINE THORACIC BACK PAIN: Primary | ICD-10-CM

## 2022-08-16 DIAGNOSIS — M54.6 CHRONIC MIDLINE THORACIC BACK PAIN: Primary | ICD-10-CM

## 2022-08-16 PROCEDURE — G8427 DOCREV CUR MEDS BY ELIG CLIN: HCPCS | Performed by: PHYSICIAN ASSISTANT

## 2022-08-16 PROCEDURE — 99213 OFFICE O/P EST LOW 20 MIN: CPT | Performed by: PHYSICIAN ASSISTANT

## 2022-08-16 PROCEDURE — 1036F TOBACCO NON-USER: CPT | Performed by: PHYSICIAN ASSISTANT

## 2022-08-16 PROCEDURE — 3017F COLORECTAL CA SCREEN DOC REV: CPT | Performed by: PHYSICIAN ASSISTANT

## 2022-08-16 PROCEDURE — 1090F PRES/ABSN URINE INCON ASSESS: CPT | Performed by: PHYSICIAN ASSISTANT

## 2022-08-16 PROCEDURE — G8417 CALC BMI ABV UP PARAM F/U: HCPCS | Performed by: PHYSICIAN ASSISTANT

## 2022-08-16 PROCEDURE — G8400 PT W/DXA NO RESULTS DOC: HCPCS | Performed by: PHYSICIAN ASSISTANT

## 2022-08-16 PROCEDURE — 1123F ACP DISCUSS/DSCN MKR DOCD: CPT | Performed by: PHYSICIAN ASSISTANT

## 2022-08-16 RX ORDER — CYCLOBENZAPRINE HCL 5 MG
TABLET ORAL
COMMUNITY
Start: 2022-08-13 | End: 2022-08-16 | Stop reason: SDUPTHER

## 2022-08-16 RX ORDER — CYCLOBENZAPRINE HCL 5 MG
TABLET ORAL
Qty: 15 TABLET | Refills: 0 | Status: SHIPPED | OUTPATIENT
Start: 2022-08-16

## 2022-08-16 RX ORDER — ACETAMINOPHEN 500 MG
1 TABLET ORAL 2 TIMES DAILY PRN
COMMUNITY

## 2022-08-16 RX ORDER — PREDNISONE 20 MG/1
TABLET ORAL
COMMUNITY
Start: 2022-08-13

## 2022-08-16 RX ORDER — TRAMADOL HYDROCHLORIDE 50 MG/1
TABLET ORAL
COMMUNITY
Start: 2022-08-13 | End: 2022-08-16 | Stop reason: SDUPTHER

## 2022-08-16 RX ORDER — TRAMADOL HYDROCHLORIDE 50 MG/1
50 TABLET ORAL EVERY 6 HOURS PRN
Qty: 20 TABLET | Refills: 0 | Status: SHIPPED | OUTPATIENT
Start: 2022-08-16 | End: 2022-08-21

## 2022-08-16 RX ORDER — MAGNESIUM OXIDE 400 MG/1
TABLET ORAL
COMMUNITY
Start: 2022-07-20

## 2022-08-16 ASSESSMENT — PATIENT HEALTH QUESTIONNAIRE - PHQ9
10. IF YOU CHECKED OFF ANY PROBLEMS, HOW DIFFICULT HAVE THESE PROBLEMS MADE IT FOR YOU TO DO YOUR WORK, TAKE CARE OF THINGS AT HOME, OR GET ALONG WITH OTHER PEOPLE: 0
2. FEELING DOWN, DEPRESSED OR HOPELESS: 0
SUM OF ALL RESPONSES TO PHQ QUESTIONS 1-9: 0
5. POOR APPETITE OR OVEREATING: 0
8. MOVING OR SPEAKING SO SLOWLY THAT OTHER PEOPLE COULD HAVE NOTICED. OR THE OPPOSITE, BEING SO FIGETY OR RESTLESS THAT YOU HAVE BEEN MOVING AROUND A LOT MORE THAN USUAL: 0
SUM OF ALL RESPONSES TO PHQ QUESTIONS 1-9: 0
6. FEELING BAD ABOUT YOURSELF - OR THAT YOU ARE A FAILURE OR HAVE LET YOURSELF OR YOUR FAMILY DOWN: 0
4. FEELING TIRED OR HAVING LITTLE ENERGY: 0
1. LITTLE INTEREST OR PLEASURE IN DOING THINGS: 0
7. TROUBLE CONCENTRATING ON THINGS, SUCH AS READING THE NEWSPAPER OR WATCHING TELEVISION: 0
9. THOUGHTS THAT YOU WOULD BE BETTER OFF DEAD, OR OF HURTING YOURSELF: 0
SUM OF ALL RESPONSES TO PHQ QUESTIONS 1-9: 0
SUM OF ALL RESPONSES TO PHQ QUESTIONS 1-9: 0
3. TROUBLE FALLING OR STAYING ASLEEP: 0
SUM OF ALL RESPONSES TO PHQ9 QUESTIONS 1 & 2: 0

## 2022-08-16 ASSESSMENT — ENCOUNTER SYMPTOMS
BACK PAIN: 1
SHORTNESS OF BREATH: 0

## 2022-08-16 NOTE — PROGRESS NOTES
[facility]  Phone [phone]      Patient: Art Vernon  YOB: 1950  Age 71 y.o. Sex female  Medical Record:  [id number]  Visit Date: 08/16/22  Author:  Allison Crockett PA-C    Family Practice Clinic Note    Chief Complaint   Patient presents with    Back Pain       History of Present Illness  This is a 41-year-old female who presents today with an acute flareup of her chronic mid back pain. She reports that she initially injured her back 2 years ago during a fall. She has had recurrent pain in the same area on and off since that time. Typically pain is in the center of her thoracic region and radiates around to the lower ribs. She had another flareup which has been present for the last 2 to 3 weeks. She was seen at a local urgent care 3 days ago. X-ray of the T-spine showed no acute process. Degenerative changes noted. She was given a 10-day prednisone taper, Flexeril and 2 days of Ultram.  She states that the medications have helped. She is concerned due to the recurrence of the pain. She has done physical therapy in the past.  Has also followed with a chiropractor in the past.  Requesting referral to back specialist.    Past History:    Past Medical history   Past Medical History:   Diagnosis Date    Fibromyalgia     Liver abscess     Pancreatitis        Current Problem List:   Patient Active Problem List   Diagnosis    Gastroesophageal reflux disease with esophagitis    Acute renal failure (ARF) (HCC)    Dysuria    Rectal bleeding    Fibromyalgia    Hemorrhoids    Blood in the stool    Headache    Hypokalemia    Liver mass    TIA (transient ischemic attack)    Elevated troponin    Arthritis    Chest pain       Current Medications: .   Current Outpatient Medications   Medication Sig Dispense Refill    predniSONE (DELTASONE) 20 MG tablet TAKE 1 TABLET TWICE A DAY FOR 5 DAYS THEN 1 TABLET DAILY      acetaminophen (TYLENOL) 500 MG tablet Take 1 tablet by mouth 2 times daily as needed      cyclobenzaprine (FLEXERIL) 5 MG tablet TAKE 1 TABLET (5 MG) BY MOUTH 3 (THREE) TIMES A DAY FOR MUSCLE SPASM 15 tablet 0    traMADol (ULTRAM) 50 MG tablet Take 1 tablet by mouth every 6 hours as needed for Pain for up to 5 days. Intended supply: 5 days. Take lowest dose possible to manage pain 20 tablet 0    albuterol sulfate  (90 Base) MCG/ACT inhaler Inhale 1 puff into the lungs every 4 hours as needed      albuterol (PROVENTIL) (2.5 MG/3ML) 0.083% nebulizer solution Inhale 2.5 mg into the lungs every 4 hours as needed      Cholecalciferol 50 MCG (2000 UT) TABS Take by mouth      cyanocobalamin 1000 MCG tablet Take 1,000 mcg by mouth daily      DULoxetine (CYMBALTA) 30 MG extended release capsule Take 30 mg by mouth daily      esomeprazole (NEXIUM) 40 MG delayed release capsule TAKE 1 CAPSULE BY MOUTH EVERY DAY      folic acid (FOLVITE) 1 MG tablet TAKE 1 TABLET BY MOUTH EVERY DAY      hydrocortisone 2.5 % cream Place rectally 4 times daily      montelukast (SINGULAIR) 10 MG tablet Take 10 mg by mouth every evening      pravastatin (PRAVACHOL) 20 MG tablet Take 20 mg by mouth      tacrolimus (PROTOPIC) 0.1 % ointment Apply topically 2 times daily      triamcinolone (ARISTOCORT) 0.5 % ointment APPLY TO AFFECTED AREA TWICE A DAY * USE THIN LAYER      magnesium oxide (MAG-OX) 400 MG tablet TAKE 1 TABLET BY MOUTH TWICE A DAY (Patient not taking: Reported on 8/16/2022)      gabapentin (NEURONTIN) 600 MG tablet Take 600 mg by mouth 3 times daily. (Patient not taking: Reported on 8/16/2022)      potassium chloride (KLOR-CON M) 20 MEQ extended release tablet Take 20 mEq by mouth daily (Patient not taking: Reported on 8/16/2022)       No current facility-administered medications for this visit. Allergies:   Allergies   Allergen Reactions    Meperidine Other (See Comments)     Nausea and ill    Aspirin Rash and Swelling    Celecoxib Rash and Swelling Cuff Size: Large Adult)   Pulse 85   Temp (!) 96.7 °F (35.9 °C) (Temporal)   Resp 16   Ht 5' (1.524 m)   Wt 183 lb (83 kg)   SpO2 95%   BMI 35.74 kg/m²   Body mass index is 35.74 kg/m². Physical Exam    Physical Exam  Vitals and nursing note reviewed. Constitutional:       Appearance: Normal appearance. She is not ill-appearing. HENT:      Head: Normocephalic. Cardiovascular:      Rate and Rhythm: Normal rate and regular rhythm. Heart sounds: Normal heart sounds. Pulmonary:      Effort: Pulmonary effort is normal.      Breath sounds: Normal breath sounds. Musculoskeletal:      Cervical back: Normal range of motion and neck supple. Comments: There is some mild tenderness palpation of the paraspinal muscles of the lower thoracic spine. No tenderness to palpation over the bony prominences. Full range of motion of the upper and lower extremities. Skin:     General: Skin is warm and dry. Findings: No rash. Neurological:      Mental Status: She is alert. Psychiatric:         Mood and Affect: Mood normal.         Behavior: Behavior normal.         Thought Content: Thought content normal.       ASSESSMENT & PLAN    ICD-10-CM    1. Chronic midline thoracic back pain  M54.6 cyclobenzaprine (FLEXERIL) 5 MG tablet    G89.29 traMADol (ULTRAM) 50 MG tablet     [facility] [employer], [address] [place] [address]           1. Chronic midline thoracic back pain  *We will arrange referral to orthopedic back specialist for evaluation due to chronic, recurrent nature of her symptoms. *Refill of Flexeril provided. To use as needed. Patient cautioned about potential for sedation. *Agreed to refill of tramadol (5-day supply) to be used for pain otherwise controlled. Informed patient that would not continue to refill this medication.   *I have reviewed the patients controlled substance prescription history, as maintained in the [address] [place] [address] prescription monitoring program, so that

## 2022-08-16 NOTE — PATIENT INSTRUCTIONS
*A referral has been placed to an orthopedic back specialist. They should contact you in the next 7-10 days to schedule. Please let us know if you do not hear from them. *Use the Flexeril as needed only. Keep in mind that it can cause sedation. *Finish the course of Prednisone as prescribed. *Use the Tramadol only as needed.  The prescription has to last until you see the specialist.

## 2022-08-23 ENCOUNTER — OFFICE VISIT (OUTPATIENT)
Dept: ORTHOPEDIC SURGERY | Age: 72
End: 2022-08-23
Payer: MEDICARE

## 2022-08-23 DIAGNOSIS — M51.34 DDD (DEGENERATIVE DISC DISEASE), THORACIC: ICD-10-CM

## 2022-08-23 DIAGNOSIS — G89.29 CHRONIC MIDLINE THORACIC BACK PAIN: Primary | ICD-10-CM

## 2022-08-23 DIAGNOSIS — M54.6 CHRONIC MIDLINE THORACIC BACK PAIN: Primary | ICD-10-CM

## 2022-08-23 DIAGNOSIS — M54.6 THORACIC SPINE PAIN: ICD-10-CM

## 2022-08-23 PROCEDURE — 1123F ACP DISCUSS/DSCN MKR DOCD: CPT | Performed by: PHYSICIAN ASSISTANT

## 2022-08-23 PROCEDURE — 99203 OFFICE O/P NEW LOW 30 MIN: CPT | Performed by: PHYSICIAN ASSISTANT

## 2022-08-23 PROCEDURE — 1090F PRES/ABSN URINE INCON ASSESS: CPT | Performed by: PHYSICIAN ASSISTANT

## 2022-08-23 PROCEDURE — G8417 CALC BMI ABV UP PARAM F/U: HCPCS | Performed by: PHYSICIAN ASSISTANT

## 2022-08-23 PROCEDURE — G8400 PT W/DXA NO RESULTS DOC: HCPCS | Performed by: PHYSICIAN ASSISTANT

## 2022-08-23 PROCEDURE — G8427 DOCREV CUR MEDS BY ELIG CLIN: HCPCS | Performed by: PHYSICIAN ASSISTANT

## 2022-08-23 PROCEDURE — 3017F COLORECTAL CA SCREEN DOC REV: CPT | Performed by: PHYSICIAN ASSISTANT

## 2022-08-23 PROCEDURE — 1036F TOBACCO NON-USER: CPT | Performed by: PHYSICIAN ASSISTANT

## 2022-08-23 NOTE — LETTER
Travis Reymundo Edmundo LEWIS  1950  MRN [id number]                                              ROOM NUMBER______      Radiographic Studies:    Cervical MRI      Thoracic MRI         Lumbar MRI          Pelvis MRI        CONTRAST    CT Myelogram: _______________   NCS/EMG ________________ ( UE  /  LE )     MRI of ___________________          Other: ____________________      Injections:    KNEE    HIP  Depomedrol _____ mg Euflexxa _____    _______________ TFESI/SNRB  _______________ SI Joint  _______________ BROOKLYN    _______________ Facet  _______________Piriformis/ Sciatica      Medications:    Oral Steroids _______________  NSAIDS _______________    Muscle Relaxers _______________  Neurontin/Lyrica _______________    Pain Medicine _______________  Other _______________                       Physical Therapy:    Lumbar     Thoracic      Cervical     Hip       Knee       Shoulder               Traction          Ultrasound          Dry Needling      Referral:    Pain referral:  [facility]   [facility]   Other: ______________________________    Follow-up/ Refer__________________________________________________    Authorization to hold blood thinners:___________________________________

## 2022-08-23 NOTE — PROGRESS NOTES
Name: Yanique Ellis  YOB: 1950  Gender: female  MRN: [id number]    CC: Back Pain (Mid back pain that radiates into upper and lower extremities.)       HPI: This is a 71y.o. year old female who has a chronic history of mid back pain. Over the past year she has had several episodes of acute on chronic pain. Another episode occurred in July and then again earlier in August.  Pain is severe in the mid back it can radiate into the anterior chest.  It can radiate up to the neck and down into the lower back. She has had treatment with 10 days prednisone taper, Flexeril, tramadol. She does report that during those severe episodes, she has had bladder incontinence. She went to urgent care and T-spine x-rays showed no acute process. PCP has referred her to us for further evaluation. This patient  has not had lumbar surgery in the past.      AMB PAIN ASSESSMENT 8/23/2022   Location of Pain Back   Severity of Pain 8   Quality of Pain Aching; Sharp   Duration of Pain Persistent   Frequency of Pain Constant   Date Pain First Started 7/31/2022   Aggravating Factors Standing;Walking   Limiting Behavior Some   Relieving Factors Other (Comment); Nsaids   Result of Injury No   Work-Related Injury No   Are there other pain locations you wish to document? No              ROS/Meds/PSH/PMH/FH/SH: I personally reviewed the patient's collected intake data. Below are the pertinents:    Allergies   Allergen Reactions    Meperidine Other (See Comments)     Nausea and ill    Aspirin Rash and Swelling    Celecoxib Rash and Swelling    Metronidazole Rash     Unknown; possibly a rash.     Morphine Other (See Comments)    Naproxen Rash    Nitrofurantoin Macrocrystal Nausea And Vomiting    Red Dye Rash    Sulfa Antibiotics Rash         Current Outpatient Medications:     predniSONE (DELTASONE) 20 MG tablet, TAKE 1 TABLET TWICE A DAY FOR 5 DAYS THEN 1 TABLET DAILY, Disp: , Rfl:     magnesium oxide (MAG-OX) 400 MG tablet, TAKE 1 TABLET BY MOUTH TWICE A DAY (Patient not taking: Reported on 8/16/2022), Disp: , Rfl:     acetaminophen (TYLENOL) 500 MG tablet, Take 1 tablet by mouth 2 times daily as needed, Disp: , Rfl:     cyclobenzaprine (FLEXERIL) 5 MG tablet, TAKE 1 TABLET (5 MG) BY MOUTH 3 (THREE) TIMES A DAY FOR MUSCLE SPASM, Disp: 15 tablet, Rfl: 0    albuterol sulfate  (90 Base) MCG/ACT inhaler, Inhale 1 puff into the lungs every 4 hours as needed, Disp: , Rfl:     albuterol (PROVENTIL) (2.5 MG/3ML) 0.083% nebulizer solution, Inhale 2.5 mg into the lungs every 4 hours as needed, Disp: , Rfl:     Cholecalciferol 50 MCG (2000 UT) TABS, Take by mouth, Disp: , Rfl:     cyanocobalamin 1000 MCG tablet, Take 1,000 mcg by mouth daily, Disp: , Rfl:     DULoxetine (CYMBALTA) 30 MG extended release capsule, Take 30 mg by mouth daily, Disp: , Rfl:     esomeprazole (NEXIUM) 40 MG delayed release capsule, TAKE 1 CAPSULE BY MOUTH EVERY DAY, Disp: , Rfl:     folic acid (FOLVITE) 1 MG tablet, TAKE 1 TABLET BY MOUTH EVERY DAY, Disp: , Rfl:     gabapentin (NEURONTIN) 600 MG tablet, Take 600 mg by mouth 3 times daily.  (Patient not taking: Reported on 8/16/2022), Disp: , Rfl:     hydrocortisone 2.5 % cream, Place rectally 4 times daily, Disp: , Rfl:     montelukast (SINGULAIR) 10 MG tablet, Take 10 mg by mouth every evening, Disp: , Rfl:     potassium chloride (KLOR-CON M) 20 MEQ extended release tablet, Take 20 mEq by mouth daily (Patient not taking: Reported on 8/16/2022), Disp: , Rfl:     pravastatin (PRAVACHOL) 20 MG tablet, Take 20 mg by mouth, Disp: , Rfl:     tacrolimus (PROTOPIC) 0.1 % ointment, Apply topically 2 times daily, Disp: , Rfl:     triamcinolone (ARISTOCORT) 0.5 % ointment, APPLY TO AFFECTED AREA TWICE A DAY * USE THIN LAYER, Disp: , Rfl:     Past Surgical History:   Procedure Laterality Date    CHOLECYSTECTOMY      HYSTERECTOMY (CERVIX STATUS UNKNOWN)      IR ABSCESS DRAIN PERC  8/30/2019    IR TUBE CHANGE  10/9/2019 TUBAL LIGATION         Patient Active Problem List   Diagnosis    Gastroesophageal reflux disease with esophagitis    Acute renal failure (ARF) (HCC)    Dysuria    Rectal bleeding    Fibromyalgia    Hemorrhoids    Blood in the stool    Headache    Hypokalemia    Liver mass    TIA (transient ischemic attack)    Elevated troponin    Arthritis    Chest pain         Tobacco:  reports that she quit smoking about 3 years ago. Her smoking use included cigarettes. She smoked an average of .5 packs per day. She has never used smokeless tobacco.  Alcohol:   Social History     Substance and Sexual Activity   Alcohol Use No        Physical Exam:   BMI: There is no height or weight on file to calculate BMI. GENERAL:  Adult in no acute distress, moderately obese Patient is appropriately conversant  MSK:  Examination of the lumbar spine reveals no sagittal or coronal imbalance   There is moderate tenderness to palpation along the thoracic spinous processes and paraspinal musculature. The patient ambulates with a normal gait. ROM of bilateral hip(s) reveals no irritability. NEURO:  Cranial nerves grossly intact. No motor deficits. Straight leg testing is negative bilateral  Sensory testing reveals intact sensation to light touch and in the distribution of the L3-S1 dermatomes bilaterally  Ankle jerk is negative for clonus    Reflexes   Right Left   Quadriceps (L4) 2 2   Achilles (S1) 2 2     Strength testing in the lower extremity reveals the following based on the 5 point grading scale:     HF (L2) H Ab (L5) KE (L3/4) ADF (L4) EHL (L5) A Ev (S1) APF (S1)   Right 5 5 5 5 5 5 5   Left 5 5 5 5 5 5 5     PSYCH:  Alert and oriented X 3. Appropriate affect. Intact judgment and insight. Bilateral upper extremity reflexes are 2 out of 4. Negative Cristian's. 5 out of 5 equal bilateral upper extremity strength testing.     Radiographic Studies:     AP, lateral and spot views of the lumbar spine:    I independently reviewed the x-rays of the thoracic spine showing multilevel degenerative changes of the thoracic spine. No obvious acute spinal process noted such as fracture or osseous lesion. Assessment/Plan:       Diagnosis Orders   1. Chronic midline thoracic back pain        2. DDD (degenerative disc disease), thoracic        3. Thoracic spine pain          She has acute on chronic back pain. X-rays do show degenerative changes. No obvious acute fracture noted. Given the chronicity of the symptoms and more acute nature and severity of the pain, I recommend MRI scan of the thoracic spine.    - A MRI was ordered to delineate anatomy, confirm the diagnosis and assess the severity. 4 This is a undiagnosed new problem with uncertain prognosis    No orders of the defined types were placed in this encounter. No orders of the defined types were placed in this encounter. Return for MRI results [facility]. Colonel Patricia PA-C  08/23/22      Elements of this note were created using speech recognition software. As such, errors of speech recognition may be present.

## 2022-09-06 ENCOUNTER — OFFICE VISIT (OUTPATIENT)
Dept: ORTHOPEDIC SURGERY | Age: 72
End: 2022-09-06
Payer: MEDICARE

## 2022-09-06 DIAGNOSIS — M54.6 THORACIC SPINE PAIN: ICD-10-CM

## 2022-09-06 DIAGNOSIS — M40.14 OTHER SECONDARY KYPHOSIS, THORACIC REGION: ICD-10-CM

## 2022-09-06 DIAGNOSIS — M51.34 DDD (DEGENERATIVE DISC DISEASE), THORACIC: Primary | ICD-10-CM

## 2022-09-06 PROCEDURE — G8417 CALC BMI ABV UP PARAM F/U: HCPCS | Performed by: PHYSICIAN ASSISTANT

## 2022-09-06 PROCEDURE — 1036F TOBACCO NON-USER: CPT | Performed by: PHYSICIAN ASSISTANT

## 2022-09-06 PROCEDURE — G8427 DOCREV CUR MEDS BY ELIG CLIN: HCPCS | Performed by: PHYSICIAN ASSISTANT

## 2022-09-06 PROCEDURE — 3017F COLORECTAL CA SCREEN DOC REV: CPT | Performed by: PHYSICIAN ASSISTANT

## 2022-09-06 PROCEDURE — G8400 PT W/DXA NO RESULTS DOC: HCPCS | Performed by: PHYSICIAN ASSISTANT

## 2022-09-06 PROCEDURE — 99214 OFFICE O/P EST MOD 30 MIN: CPT | Performed by: PHYSICIAN ASSISTANT

## 2022-09-06 PROCEDURE — 1090F PRES/ABSN URINE INCON ASSESS: CPT | Performed by: PHYSICIAN ASSISTANT

## 2022-09-06 PROCEDURE — 1123F ACP DISCUSS/DSCN MKR DOCD: CPT | Performed by: PHYSICIAN ASSISTANT

## 2022-09-06 NOTE — PROGRESS NOTES
Name: Mirian Rod  YOB: 1950  Gender: female  MRN: [id number]    CC: Back Pain (Mri results)       HPI: This is a 71y.o. year old female who has a chronic history of upper to mid back pain. Over the past year she has had several episodes of acute on chronic pain. Another episode occurred in July and then again earlier in August.  Pain is severe in the mid back it can radiate into the anterior chest.  It can radiate up to the neck and down into the lower back. She has had treatment with 10 days prednisone taper, Flexeril, tramadol. She does report that during those severe episodes, she has had bladder incontinence. She went to urgent care and T-spine x-rays showed no acute process. PCP has referred her to us for further evaluation. This patient  has not had lumbar surgery in the past.   X-rays did show degenerative changes of the thoracic spine with degenerative disc. Due to chronicity of the symptoms, we ordered MRI scan of the thoracic spine. AMB PAIN ASSESSMENT 8/23/2022   Location of Pain Back   Severity of Pain 8   Quality of Pain Aching; Sharp   Duration of Pain Persistent   Frequency of Pain Constant   Date Pain First Started 7/31/2022   Aggravating Factors Standing;Walking   Limiting Behavior Some   Relieving Factors Other (Comment); Nsaids   Result of Injury No   Work-Related Injury No   Are there other pain locations you wish to document? No              ROS/Meds/PSH/PMH/FH/SH: I personally reviewed the patient's collected intake data. Below are the pertinents:    Allergies   Allergen Reactions    Meperidine Other (See Comments)     Nausea and ill    Aspirin Rash and Swelling    Celecoxib Rash and Swelling    Metronidazole Rash     Unknown; possibly a rash.     Morphine Other (See Comments)    Naproxen Rash    Nitrofurantoin Macrocrystal Nausea And Vomiting    Red Dye Rash    Sulfa Antibiotics Rash         Current Outpatient Medications:     predniSONE (DELTASONE) 20 MG tablet, TAKE 1 TABLET TWICE A DAY FOR 5 DAYS THEN 1 TABLET DAILY, Disp: , Rfl:     magnesium oxide (MAG-OX) 400 MG tablet, TAKE 1 TABLET BY MOUTH TWICE A DAY (Patient not taking: Reported on 8/16/2022), Disp: , Rfl:     acetaminophen (TYLENOL) 500 MG tablet, Take 1 tablet by mouth 2 times daily as needed, Disp: , Rfl:     cyclobenzaprine (FLEXERIL) 5 MG tablet, TAKE 1 TABLET (5 MG) BY MOUTH 3 (THREE) TIMES A DAY FOR MUSCLE SPASM, Disp: 15 tablet, Rfl: 0    albuterol sulfate  (90 Base) MCG/ACT inhaler, Inhale 1 puff into the lungs every 4 hours as needed, Disp: , Rfl:     albuterol (PROVENTIL) (2.5 MG/3ML) 0.083% nebulizer solution, Inhale 2.5 mg into the lungs every 4 hours as needed, Disp: , Rfl:     Cholecalciferol 50 MCG (2000 UT) TABS, Take by mouth, Disp: , Rfl:     cyanocobalamin 1000 MCG tablet, Take 1,000 mcg by mouth daily, Disp: , Rfl:     DULoxetine (CYMBALTA) 30 MG extended release capsule, Take 30 mg by mouth daily, Disp: , Rfl:     esomeprazole (NEXIUM) 40 MG delayed release capsule, TAKE 1 CAPSULE BY MOUTH EVERY DAY, Disp: , Rfl:     folic acid (FOLVITE) 1 MG tablet, TAKE 1 TABLET BY MOUTH EVERY DAY, Disp: , Rfl:     gabapentin (NEURONTIN) 600 MG tablet, Take 600 mg by mouth 3 times daily.  (Patient not taking: Reported on 8/16/2022), Disp: , Rfl:     hydrocortisone 2.5 % cream, Place rectally 4 times daily, Disp: , Rfl:     montelukast (SINGULAIR) 10 MG tablet, Take 10 mg by mouth every evening, Disp: , Rfl:     potassium chloride (KLOR-CON M) 20 MEQ extended release tablet, Take 20 mEq by mouth daily (Patient not taking: Reported on 8/16/2022), Disp: , Rfl:     pravastatin (PRAVACHOL) 20 MG tablet, Take 20 mg by mouth, Disp: , Rfl:     tacrolimus (PROTOPIC) 0.1 % ointment, Apply topically 2 times daily, Disp: , Rfl:     triamcinolone (ARISTOCORT) 0.5 % ointment, APPLY TO AFFECTED AREA TWICE A DAY * USE THIN LAYER, Disp: , Rfl:     Past Surgical History:   Procedure Laterality Date CHOLECYSTECTOMY      HYSTERECTOMY (CERVIX STATUS UNKNOWN)      IR ABSCESS DRAIN PERC  8/30/2019    IR TUBE CHANGE  10/9/2019    TUBAL LIGATION         Patient Active Problem List   Diagnosis    Gastroesophageal reflux disease with esophagitis    Acute renal failure (ARF) (HCC)    Dysuria    Rectal bleeding    Fibromyalgia    Hemorrhoids    Blood in the stool    Headache    Hypokalemia    Liver mass    TIA (transient ischemic attack)    Elevated troponin    Arthritis    Chest pain         Tobacco:  reports that she quit smoking about 3 years ago. Her smoking use included cigarettes. She smoked an average of .5 packs per day. She has never used smokeless tobacco.  Alcohol:   Social History     Substance and Sexual Activity   Alcohol Use No        Physical Exam:   BMI: There is no height or weight on file to calculate BMI. GENERAL:  Adult in no acute distress, moderately obese. Heavy chested. Patient is appropriately conversant  MSK:  Examination of the lumbar spine reveals thoracic kyphosis. There is moderate tenderness to palpation along the thoracic spinous processes and paraspinal musculature. The patient ambulates with a normal gait. ROM of bilateral hip(s) reveals no irritability. NEURO:  Cranial nerves grossly intact. No motor deficits. Straight leg testing is negative bilateral  Sensory testing reveals intact sensation to light touch and in the distribution of the L3-S1 dermatomes bilaterally  Ankle jerk is negative for clonus    Reflexes   Right Left   Quadriceps (L4) 2 2   Achilles (S1) 2 2     Strength testing in the lower extremity reveals the following based on the 5 point grading scale:     HF (L2) H Ab (L5) KE (L3/4) ADF (L4) EHL (L5) A Ev (S1) APF (S1)   Right 5 5 5 5 5 5 5   Left 5 5 5 5 5 5 5     PSYCH:  Alert and oriented X 3. Appropriate affect. Intact judgment and insight. Bilateral upper extremity reflexes are 2 out of 4. Negative Cristian's.   5 out of 5 equal bilateral upper extremity strength testing. Radiographic Studies:     AP, lateral and spot views of the lumbar spine:    I independently reviewed the x-rays of the thoracic spine showing multilevel degenerative changes of the thoracic spine. No obvious acute spinal process noted such as fracture or osseous lesion. MRI Result (most recent):  MRI THORACIC SPINE WO CONTRAST 08/31/2022    Narrative  History: Chronic back pain    EXAM: MRI thoracic spine without contrast    TECHNIQUE: Multiplanar multisequence imaging is performed    No comparison    FINDINGS: Degenerative disc signal present at all included levels. There is mild  kyphotic curvature of the thoracic spine. There is normal signal in the thoracic  spinal cord throughout. No spinal stenosis or neural foraminal narrowing. Axial  imaging demonstrates no additional abnormality. Impression  Degenerative disc disease at multiple levels of the thoracic spine  without spinal stenosis or neural foraminal narrowing. I have reviewed the MRI of the thoracic spine neck and also see into the cervical spine. No disc herniations are noted. There are no disc herniations noted in the thoracic spine. She has multiple levels of degenerative disc changes and there is mild kyphotic curvature. No fractures noted. Assessment/Plan:       Diagnosis Orders   1. DDD (degenerative disc disease), thoracic        2. Thoracic spine pain        3. Other secondary kyphosis, thoracic region            She has acute on chronic back pain. X-rays do show degenerative changes. No obvious acute fracture noted. MRI scan of the thoracic spine shows no disc herniation or cord compression. There are multiple levels of degenerative disc changes, mild kyphosis. No acute abnormalities. - Physical Therapy was prescribed and will include stretching, strengthening and modalities to promote blood flow, flexibility and core strengthening.     We also discussed possibility of breast reduction helping the symptoms of back pain. 4 This is a undiagnosed new problem with uncertain prognosis    No orders of the defined types were placed in this encounter. No orders of the defined types were placed in this encounter. Return if symptoms worsen or fail to improve. Connor Isaac PA-C  09/06/22      Elements of this note were created using speech recognition software. As such, errors of speech recognition may be present.

## 2022-09-06 NOTE — LETTER
[facility] [place]                                                     IVORY CAALT  1950  MRN [id number]                                              ROOM NUMBER______      Radiographic Studies:    Cervical MRI      Thoracic MRI         Lumbar MRI          Pelvis MRI        CONTRAST    CT Myelogram: _______________   NCS/EMG ________________ ( UE  /  LE )     MRI of ___________________          Other: ____________________      Injections:    KNEE    HIP  Depomedrol _____ mg Euflexxa _____    _______________ TFESI/SNRB  _______________ SI Joint  _______________ BROOKLYN    _______________ Facet  _______________Piriformis/ Sciatica      Medications:    Oral Steroids _______________  NSAIDS _______________    Muscle Relaxers _______________  Neurontin/Lyrica _______________    Pain Medicine _______________  Other _______________                       Physical Therapy:    Lumbar     Thoracic      Cervical     Hip       Knee       Shoulder               Traction          Ultrasound          Dry Needling      Referral:    Pain referral:  [facility]   [facility]   Other: ______________________________    Follow-up/ Refer__________________________________________________    Authorization to hold blood thinners:___________________________________

## 2022-09-16 ENCOUNTER — HOSPITAL ENCOUNTER (OUTPATIENT)
Dept: PHYSICAL THERAPY | Age: 72
Setting detail: RECURRING SERIES
Discharge: HOME | End: 2022-09-19
Payer: MEDICARE

## 2022-09-16 PROCEDURE — 97161 PT EVAL LOW COMPLEX 20 MIN: CPT

## 2022-09-16 PROCEDURE — 97110 THERAPEUTIC EXERCISES: CPT

## 2022-09-16 ASSESSMENT — PAIN SCALES - GENERAL: PAINLEVEL_OUTOF10: 5

## 2022-09-16 NOTE — PROGRESS NOTES
Tonya Collazo Ed[place]  : 1950  Primary: Medicare Part A And B  Secondary: Hernando Barrera @ [address] [facility] [address]  Phone: [phone]  Fax: [phone] Plan Frequency: 2 visits per week for 8 weeks    Plan of Care/Certification Expiration Date: 22 (Start of [address] 2022)      PT Visit Info:  No data recorded   Visit Count:  1   OUTPATIENT PHYSICAL THERAPY:OP NOTE TYPE: Treatment Note 2022       Episode  }Appt Desk             Treatment Diagnosis:  Degenerative disc disease, thoracic (M51.34)  Thoracic spine pain (M54.6)  Other secondary kyphosis, thoracic region (M40.14)  Low back pain (M54.5)  Neck pain (M54.2)  Medical/Referring Diagnosis:  DDD (degenerative disc disease), thoracic [M51.34]  Thoracic spine pain [M54.6]  Other secondary kyphosis, thoracic region [M40.14]  Referring Physician:  Stan Hogan MD Orders:  PT Eval and Treat   Date of Onset:  Onset Date: 22 (Onset 2022, symptoms wrosening since onset and now stable.)   Allergies:   Meperidine, Aspirin, Celecoxib, Metronidazole, Morphine, Naproxen, Nitrofurantoin macrocrystal, Red dye, and Sulfa antibiotics  Restrictions/Precautions:  Restrictions/Precautions: None  Interventions Planned (Treatment may consist of any combination of the following):    Current Treatment Recommendations: Strengthening; ROM; Balance training; Functional mobility training; Transfer training; ADL/Self-care training; Endurance training; Gait training; Stair training; Neuromuscular re-education; Manual Therapy - Soft Tissue Mobilization; Manual Therapy - Joint Manipulation; Pain management; Home exercise program; Safety education & training; Patient/Caregiver education & training; Equipment evaluation, education, & procurement; Modalities; Integrated dry needling;  Therapeutic activities     Subjective Comments:  Pt reports pain levels are \"much better\" than at initial onset however still increased with twisting and bending movements. Initial:}    5/10Post Session:       3/10  Medications Last Reviewed:  9/16/2022  Updated Objective Findings:  See evaluation note from today  Treatment   THERAPEUTIC EXERCISE: (38 minutes):    Exercises per grid below to improve mobility, strength, and coordination. Required moderate visual, verbal, manual, and tactile cues to promote proper body alignment, promote proper body poster and proper body mechanics. Progressed resistance, range, repetitions and complexity of movement as indicated. Date:  9/16/2022 Date:   Date:     Activity/Exercise Parameters Parameters Parameters   Thoracic extension Seated, stability ball, x20     Seated lumbar flexion X20, stability ball     Scap retract x30     Transfer education Sit to stand and supine to sit using log roll technique, 5 mins                         Time spent with patient reviewing proper muscle recruitment and technique with exercises. MANUAL THERAPY: (0 minutes):   Joint mobilization and Soft tissue mobilization was utilized and necessary because of the patient's restricted joint motion, painful spasm, loss of articular motion, and restricted motion of soft tissue. - None today. MODALITIES: (0 minutes):        None today. HEP: As above; handouts given to patient for all exercises. Treatment/Session Summary:    Treatment Assessment:  Good tolerance to exercises performed. Communication/Consultation:   HEP handout provided  Equipment provided today:  None  Recommendations/Intent for next treatment session: Next visit will focus on pain control, improve thoracic mobility, improve postural stability strength, progress BUE strength and functional mobility. Total Treatment Billable Duration:  58 minutes, 20 min eval, 38 min therex.   Time In: 1340  Time Out: 1440    Solomon Barksdale, PT       Charge Capture  }Post Session Pain  PT Visit Info  Ditto Portal  MD Guidelines Scanned Media  Benefits  MyChart    Future Appointments   Date Time Provider Mally Kang   9/23/2022 11:00 AM [place], [place] [facility]   9/26/2022  1:30 PM [place] Counts, PTA [facility]   9/29/2022  1:30 PM [place], PT [facility]   10/3/2022  1:30 PM [place], PT [facility]   10/7/2022  1:30 PM [place], PTA [facility]   10/10/2022  2:30 PM [place], PTA [facility]   10/12/2022  1:30 PM [place], PT [facility]   10/17/2022  1:30 PM [place], PT [facility]   10/20/2022  1:30 PM [place], PTA [facility]   10/24/2022  1:30 PM [place], PT [facility]   10/27/2022  1:30 PM [place], PTA [facility]   10/31/2022  1:30 PM [place], PT [facility]   11/3/2022  1:30 PM [place], PTA [facility]   11/7/2022  1:30 PM [place], PTA [facility]   11/10/2022  1:30 PM [place], PT [facility]   1/26/2023 11:00 AM FPA MISCELLANEOUS [facility]   2/2/2023 11:15 AM Yesenia Smart MD [facility]

## 2022-09-16 NOTE — THERAPY EVALUATION
PT treatment to address deficits in strength, AROM, balance, gait and functional mobility in order to return to prior level of function and improve quality of life. Patient Stated Goal(s):  \"I'd not to have no pain and improve my overall health\"  Initial:     5/10 Post Session:     3/10  Past Medical History/Comorbidities:   Ms. Diana Moss  has a past medical history of Fibromyalgia, Liver abscess, and Pancreatitis. Ms. Diana Moss  has a past surgical history that includes  [place] (10/9/2019); IR ABSCESS DRAINAGE PERC (8/30/2019); Hysterectomy; Cholecystectomy; and Tubal ligation. Social History/Living Environment:   Lives With: Family  Type of Home: House  Home Layout: One level   Prior Level of Function/Work/Activity:   No data [address] [facility] recorded   Learning:   Does the patient/guardian have any barriers to learning?: No barriers  Will there be a co-learner?: No  What is the preferred language of the patient/guardian?: English  Is an  required?: No  How does the patient/guardian prefer to learn new concepts?: Listening; Reading; Demonstration; Pictures/Videos   Fall Risk Scale: Total Score: 0  [place] Fall Risk: Low (0-24)         OBJECTIVE   Observation/Postural and Gait Assessment: Gait: Rounded shoulders and forward head posture in standing; thoracic kyphosis in sitting; forward flexed trunk and decreased gait speed. Palpation: Moderate tenderness to palpation noted mid thoracic paraspinals. AROM:   Thoracic extension: Neutral°   Thoracic flexion: WNL°   Thoracic left side bend: 15°   Thoracic right side bend: 12°   Thoracic rotation left 26°   Thoracic rotation right 22°       Strength: Gross BUE strength, 4- to 4/5    Passive Accessory Motion: NT due to patient unable to lay prone at initial evaluation.     Special Tests:  Unremarkable    Neurological Screen:  Myotomes: Key muscle strength testing through bilateral LE is Normal.  Dermatomes: Sensation testing through bilateral lower quadrants for light touch is Normal.  Reflexes: Patellar (L4) and Achilles (S1) are Normal and Normal.  Neural tension tests: Passive straight leg raise (SLR) test is NT. Crossed SLR test is NT. Slump test is NT. Femoral nerve stretch test is NT. Functional Mobility:  Sit to stand: Independent with BUE assist.  Supine to sit: Supervision assist with verbal cues for log roll technique. ASSESSMENT   Initial Assessment:  Pt is a 72 y/o female with complaints of thoracic spine pain insidious onset 8/1/2022 while caring for her brother who was very ill. Pt reports was performing frequent lifting and bending activities involved with her brother's care which may have contributed to current symptoms. Pt denies numbness/tingling symptoms however does report radiating pain from mid thoracic region to sternum anteriorly. Pt denies shortness of breath/chest pain associated with symptoms. Pt referred by MD for PT eval and treat. Pt will benefit from skilled PT treatment to address deficits in strength, AROM, balance, gait and functional mobility in order to return to prior level of function and improve quality of life. Problem List: (Impacting functional limitations): Body Structures, Functions, Activity Limitations Requiring Skilled Therapeutic Intervention: Decreased functional mobility ; Decreased ADL status; Decreased ROM; Decreased body mechanics; Decreased tolerance to work activity; Decreased strength; Decreased safe awareness; Decreased endurance; Decreased balance; Decreased coordination;  Increased pain; Decreased posture   Therapy Prognosis:   Therapy Prognosis: Good   Assessment Complexity:   Low Complexity  PLAN   Effective Dates: 9/16/2022 TO Plan of Care/Certification Expiration Date: 11/11/22 (Start of POC 9/16/2022)   Frequency/Duration: Plan Frequency: 2 visits per week for 8 weeks   Interventions Planned (Treatment may consist of any combination of the following): Current Treatment Recommendations: Strengthening; ROM; Balance training; Functional mobility training; Transfer training; ADL/Self-care training; Endurance training; Gait training; Stair training; Neuromuscular re-education; Manual Therapy - Soft Tissue Mobilization; Manual Therapy - Joint Manipulation; Pain management; Home exercise program; Safety education & training; Patient/Caregiver education & training; Equipment evaluation, education, & procurement; Modalities; Integrated dry needling; Therapeutic activities   Goals: (Goals have been discussed and agreed upon with patient.)  Short-Term Functional Goals: Time Frame: 9/16/2022 to 10/14/2022  Patient demonstrates independence with home exercise program without verbal cueing provided by therapist.  Pt will report worst pain 3/10 or less at rest in order to return to unrestricted prolonged sitting 30 mins or greater. Pt will report unrestricted sleeping through night 75% of week in order to progress towards sleeping habits consistent with prior level of function. Pt will demonstrate independent supine to sit transfer using log roll technique in order to improve independence and safety with functional transfers. Discharge Goals: Time Frame: 9/16/2022 to 11/11/2022  Pt will report worst pain 3/10 or less with prolonged standing/walking 15 mins or greater in order to return to unrestricted community distance ambulation. Pt will demonstrated active thoracic rotation ROM 35 degrees or greater bilaterally in order to return to unrestricted twisting. Pt will demonstrate gross BUE strength 4/5 or greater in order to return to unrestricted functional lifting and carrying activities. SEBASTIAN score of 20/50 or less/greater in order to demonstrate overall functional improvement. Outcome Measure:    Tool Used: Modified Oswestry Low Back Pain Questionnaire  Score:  Initial: 30/50  Most Recent: X/50 (Date: -- )   Interpretation of Score: Each section is scored on a 0-5 scale, 5 representing the greatest disability. The scores of each section are added together for a total score of 50. Medical Necessity:   > Patient is expected to demonstrate progress in strength, range of motion, balance, coordination, and functional technique to increase independence with community distance ambulation, functional transfers, ADLs and light house work.  > Skilled intervention continues to be required due to decreased AROM, decreased strength, decreased balance, decreased gait, decreased functional mobility. Reason For Services/Other Comments:  > Patient continues to require skilled intervention due to increasing complexity of exercise. Total Duration:  Time In: 1340  Time Out: 1440    Regarding Wing Wyatt's therapy, I certify that the treatment plan above will be carried out by a therapist or under their direction.   Thank you for this referral,  Eliud Christiansen PT     Referring Physician Signature: Vince Brown PA-C _______________________________ Date _____________        Post Session Pain  Charge Capture  PT Visit Info MD Guidelines  [place]

## 2022-09-23 ENCOUNTER — HOSPITAL ENCOUNTER (OUTPATIENT)
Dept: PHYSICAL THERAPY | Age: 72
Setting detail: RECURRING SERIES
End: 2022-09-23
Payer: MEDICARE

## 2022-09-26 ENCOUNTER — HOSPITAL ENCOUNTER (OUTPATIENT)
Dept: PHYSICAL THERAPY | Age: 72
Setting detail: RECURRING SERIES
Discharge: HOME | End: 2022-09-29
Payer: MEDICARE

## 2022-09-26 PROCEDURE — 97140 MANUAL THERAPY 1/> REGIONS: CPT

## 2022-09-26 PROCEDURE — 97110 THERAPEUTIC EXERCISES: CPT

## 2022-09-26 ASSESSMENT — PAIN SCALES - GENERAL: PAINLEVEL_OUTOF10: 8

## 2022-09-26 NOTE — PROGRESS NOTES
Travis Dwyer Ed[place]undo  : 1950  Primary: Medicare Part A And B  Secondary: Hernando Barrera @ [address] [facility] [address]  Phone: [phone]  Fax: [phone] Plan Frequency: 2 visits per week for 8 weeks    Plan of Care/Certification Expiration Date: 22 (Start of [address] 2022)      PT Visit Info:  No data recorded   Visit Count:  2   OUTPATIENT PHYSICAL THERAPY:OP NOTE TYPE: Treatment Note 2022       Episode  }Appt Desk             Treatment Diagnosis:  Degenerative disc disease, thoracic (M51.34)  Thoracic spine pain (M54.6)  Other secondary kyphosis, thoracic region (M40.14)  Low back pain (M54.5)  Neck pain (M54.2)  Medical/Referring Diagnosis:  DDD (degenerative disc disease), thoracic [M51.34]  Thoracic spine pain [M54.6]  Other secondary kyphosis, thoracic region [M40.14]  Referring Physician:  Norma Pedraza MD Orders:  PT Eval and Treat   Date of Onset:  Onset Date: 22 (Onset 2022, symptoms wrosening since onset and now stable.)     Allergies:   Meperidine, Aspirin, Celecoxib, Metronidazole, Morphine, Naproxen, Nitrofurantoin macrocrystal, Red dye, and Sulfa antibiotics  Restrictions/Precautions:  Restrictions/Precautions: None  Interventions Planned (Treatment may consist of any combination of the following):    Current Treatment Recommendations: Strengthening; ROM; Balance training; Functional mobility training; Transfer training; ADL/Self-care training; Endurance training; Gait training; Stair training; Neuromuscular re-education; Manual Therapy - Soft Tissue Mobilization; Manual Therapy - Joint Manipulation; Pain management; Home exercise program; Safety education & training; Patient/Caregiver education & training; Equipment evaluation, education, & procurement; Modalities; Integrated dry needling;  Therapeutic activities     Subjective Comments:  Pt. reported being under a lot of stress and her back is aching and tight  Initial:}    8/10Post Session:        /10  Medications Last Reviewed:  9/26/2022  Updated Objective Findings:  See evaluation note from today  Treatment   THERAPEUTIC EXERCISE: (30 minutes):    Exercises per grid below to improve mobility, strength, and coordination. Required moderate visual, verbal, manual, and tactile cues to promote proper body alignment, promote proper body poster and proper body mechanics. Progressed resistance, range, repetitions and complexity of movement as indicated. Date:  9/16/2022 Date:   Date:     Activity/Exercise Parameters Parameters Parameters   Thoracic extension Seated, stability ball, x20 Seated stability ball, x20     Seated lumbar flexion X20, stability ball X 20 reps stability ball           Bilateral shoulder retraction  2x10 reps no band 5 sec hold each     Shoulder low rows   Red band 2x10 reps 5 sec hold     Shoulder rows  Red band 2x10 reps 5 sec hold     Pec stretch   At doorway 4x30 sec hold each     Scap retraction  x30 X 30 reps 5 sec hold     Transfer education Sit to stand and supine to sit using log roll technique, 5 mins Prone to sidelying to sitting to standing     Upper trap stretch  4x30 sec hold both sides    Levator scapulae stretch  4x30 sec hold both sides    Shoulder retraction with red band   2x10 reps 5 sec hold       Time spent with patient reviewing proper muscle recruitment and technique with exercises. MANUAL THERAPY: (x 25 mins) :   Joint mobilization and Soft tissue mobilization was utilized and necessary because of the patient's restricted joint motion, painful spasm, loss of articular motion, and restricted motion of soft tissue.  ----- Pt. In prone received soft tissue to bilateral thoracic paraspinals to decrease pain and tightness . MODALITIES: (8 minutes):        Pt.  Received moist hot pack to bilateral thoracic paraspinals      HEP: As above; handouts given to patient for all

## 2022-09-29 ENCOUNTER — HOSPITAL ENCOUNTER (OUTPATIENT)
Dept: PHYSICAL THERAPY | Age: 72
Setting detail: RECURRING SERIES
Discharge: HOME | End: 2022-10-02
Payer: MEDICARE

## 2022-09-29 PROCEDURE — 97140 MANUAL THERAPY 1/> REGIONS: CPT

## 2022-09-29 PROCEDURE — 97110 THERAPEUTIC EXERCISES: CPT

## 2022-09-29 ASSESSMENT — PAIN SCALES - GENERAL: PAINLEVEL_OUTOF10: 7

## 2022-09-29 NOTE — PROGRESS NOTES
Jace Mcardle [place]  : 1950  Primary: Medicare Part A And B  Secondary: Hernando Barrera @ [address] [facility] [address]  Phone: [phone]  Fax: [phone] Plan Frequency: 2 visits per week for 8 weeks    Plan of Care/Certification Expiration Date: 22 (Start of [address] 2022)      PT Visit Info:  No data recorded   Visit Count:  3   OUTPATIENT PHYSICAL THERAPY:OP NOTE TYPE: Treatment Note 2022       Episode  }Appt Desk             Treatment Diagnosis:  Degenerative disc disease, thoracic (M51.34)  Thoracic spine pain (M54.6)  Other secondary kyphosis, thoracic region (M40.14)  Low back pain (M54.5)  Neck pain (M54.2)  Medical/Referring Diagnosis:  DDD (degenerative disc disease), thoracic [M51.34]  Thoracic spine pain [M54.6]  Other secondary kyphosis, thoracic region [M40.14]  Referring Physician:  Ayan Causey MD Orders:  PT Eval and Treat   Date of Onset:  Onset Date: 22 (Onset 2022, symptoms wrosening since onset and now stable.)     Allergies:   Meperidine, Aspirin, Celecoxib, Metronidazole, Morphine, Naproxen, Nitrofurantoin macrocrystal, Red dye, and Sulfa antibiotics  Restrictions/Precautions:  Restrictions/Precautions: None  Interventions Planned (Treatment may consist of any combination of the following):    Current Treatment Recommendations: Strengthening; ROM; Balance training; Functional mobility training; Transfer training; ADL/Self-care training; Endurance training; Gait training; Stair training; Neuromuscular re-education; Manual Therapy - Soft Tissue Mobilization; Manual Therapy - Joint Manipulation; Pain management; Home exercise program; Safety education & training; Patient/Caregiver education & training; Equipment evaluation, education, & procurement; Modalities; Integrated dry needling;  Therapeutic activities     Subjective Comments:  Pt reports moderate soreness today; HEP compliance good.  Initial:}    7/10Post Session:       4/10  Medications Last Reviewed:  9/29/2022  Updated Objective Findings:   Mobility: hypomobile upper thoracic and lower cervical spine. Treatment   THERAPEUTIC EXERCISE: (38 minutes):    Exercises per grid below to improve mobility, strength, and coordination. Required moderate visual, verbal, manual, and tactile cues to promote proper body alignment, promote proper body poster and proper body mechanics. Progressed resistance, range, repetitions and complexity of movement as indicated. Date:  9/16/2022 Date:   Date:  9/29/22   Activity/Exercise Parameters Parameters Parameters   Thoracic extension Seated, stability ball, x20 Seated stability ball, x20  Seated stability ball, x20   Seated lumbar flexion X20, stability ball X 20 reps stability ball  X 20 reps stability ball    Open books   S/l x10 each   Bilateral shoulder retraction  2x10 reps no band 5 sec hold each  Seated scap retract, x30   Shoulder low rows   Red band 2x10 reps 5 sec hold  Verbal review   Shoulder rows  Red band 2x10 reps 5 sec hold  Verbal review   Pec stretch   At doorway 4x30 sec hold each  Doorway, [id number]   Scap retraction  x30 X 30 reps 5 sec hold  See above   Transfer education Sit to stand and supine to sit using log roll technique, 5 mins Prone to sidelying to sitting to standing  Log roll technique for supine to sit; rolling and sliding bed mobility. Upper trap stretch  4x30 sec hold both sides Verbal review   Levator scapulae stretch  4x30 sec hold both sides Verbal Review   Shoulder retraction with red band   2x10 reps 5 sec hold  --     Time spent with patient reviewing proper muscle recruitment and technique with exercises. MANUAL THERAPY: (x 15 mins) :   Joint mobilization and Soft tissue mobilization was utilized and necessary because of the patient's restricted joint motion, painful spasm, loss of articular motion, and restricted motion of soft tissue.        -- Soft tissue mobilization: thoracic paraspinals, rhomboids. -- Joint mobs: CPA mobs T2-5 grade II progressed to grade III; rib springing right ribs 3-5 grade II/III. MODALITIES: (5 minutes, no charge):        Pt. Received moist hot pack to bilateral thoracic paraspinals      HEP: As above; handouts given to patient for all exercises. Treatment/Session Summary:    Treatment Assessment:  Good tolerance to added spine mobility exercises observed today. Communication/Consultation:   HEP update provided. Equipment provided today:  None today. Recommendations/Intent for next treatment session: Next visit will focus on pain control, improve thoracic mobility, improve postural stability strength, progress BUE strength and functional mobility.     Total Treatment Billable Duration:  53 mins  Time In: 2769  Time Out: 1432    Kina Killer, PT       Charge Capture  }Post Session Pain  PT Visit Info  [employer] Portal  MD Guidelines  Scanned Media  Benefits  [employer]    Future Appointments   Date Time Provider Mally Kang   10/3/2022  1:30 PM Kina Killer, PT [facility]   10/7/2022  1:30 PM Zoran Preston, PTA [facility]   10/10/2022  2:30 PM Zoran Preston, PTA [facility]   10/12/2022  1:30 PM Kina Killer, PT [facility]   10/17/2022  1:30 PM Kina Killer, PT [facility]   10/20/2022  1:30 PM Zoran Preston, PTA [facility]   10/24/2022  1:30 PM Kina Killer, PT [facility]   10/27/2022  1:30 PM Zoran Preston, PTA [facility]   10/31/2022  1:30 PM Kina Killer, PT [facility]   11/3/2022  1:30 PM Zoran Preston, PTA [facility]   11/7/2022  1:30 PM Zoran Preston, PTA [facility]   11/10/2022  1:30 PM Kina Killer, PT [facility]   1/26/2023 11:00 AM FPA MISCELLANEOUS [facility]   2/2/2023 11:15 AM Yanna Ball MD [facility]

## 2022-10-03 ENCOUNTER — HOSPITAL ENCOUNTER (OUTPATIENT)
Dept: PHYSICAL THERAPY | Age: 72
Setting detail: RECURRING SERIES
Discharge: HOME | End: 2022-10-06
Payer: MEDICARE

## 2022-10-03 PROCEDURE — 97140 MANUAL THERAPY 1/> REGIONS: CPT

## 2022-10-03 PROCEDURE — 97110 THERAPEUTIC EXERCISES: CPT

## 2022-10-03 ASSESSMENT — PAIN SCALES - GENERAL: PAINLEVEL_OUTOF10: 4

## 2022-10-03 NOTE — PROGRESS NOTES
Mony Nuñez Edmundo  : 1950  Primary: Medicare Part A And B  Secondary: Hernando Barrera @ [address] [facility] [address]  Phone: [phone]  Fax: [phone] Plan Frequency: 2 visits per week for 8 weeks    Plan of Care/Certification Expiration Date: 22 (Start of [address] 2022)      PT Visit Info:  No data recorded   Visit Count:  4   OUTPATIENT PHYSICAL THERAPY:OP NOTE TYPE: Treatment Note 10/3/2022       Episode  }Appt Desk             Treatment Diagnosis:  Degenerative disc disease, thoracic (M51.34)  Thoracic spine pain (M54.6)  Other secondary kyphosis, thoracic region (M40.14)  Low back pain (M54.5)  Neck pain (M54.2)  Medical/Referring Diagnosis:  DDD (degenerative disc disease), thoracic [M51.34]  Thoracic spine pain [M54.6]  Other secondary kyphosis, thoracic region [M40.14]  Referring Physician:  Nino Gonzalez MD Orders:  PT Eval and Treat   Date of Onset:  Onset Date: 22 (Onset 2022, symptoms wrosening since onset and now stable.)     Allergies:   Meperidine, Aspirin, Celecoxib, Metronidazole, Morphine, Naproxen, Nitrofurantoin macrocrystal, Red dye, and Sulfa antibiotics  Restrictions/Precautions:  Restrictions/Precautions: None  Interventions Planned (Treatment may consist of any combination of the following):    Current Treatment Recommendations: Strengthening; ROM; Balance training; Functional mobility training; Transfer training; ADL/Self-care training; Endurance training; Gait training; Stair training; Neuromuscular re-education; Manual Therapy - Soft Tissue Mobilization; Manual Therapy - Joint Manipulation; Pain management; Home exercise program; Safety education & training; Patient/Caregiver education & training; Equipment evaluation, education, & procurement; Modalities; Integrated dry needling;  Therapeutic activities     Subjective Comments:  Pt reports minimal soreness following previous treatment; HEP compliance poor to fair. Initial:}    4/10Post Session:       2/10  Medications Last Reviewed:  10/3/2022  Updated Objective Findings:   Mobility: hypomobile upper thoracic and lower cervical spine. Treatment   THERAPEUTIC EXERCISE: (38 minutes):    Exercises per grid below to improve mobility, strength, and coordination. Required moderate visual, verbal, manual, and tactile cues to promote proper body alignment, promote proper body poster and proper body mechanics. Progressed resistance, range, repetitions and complexity of movement as indicated. Date:  10/3/2022 Date:  9/26/22 Date:  9/29/22   Activity/Exercise Parameters Parameters Parameters   Thoracic extension Seated, stability ball, x20 Seated stability ball, x20  Seated stability ball, x20   Seated lumbar flexion X20, stability ball X 20 reps stability ball  X 20 reps stability ball    Open books S/l x20 each  S/l x10 each   Posterior pelvic tilt x20     Bilateral shoulder retraction Seatedx x30 2x10 reps no band 5 sec hold each  Seated scap retract, x30   Shoulder low rows  Verbal review Red band 2x10 reps 5 sec hold  Verbal review   Shoulder rows Verbal review Red band 2x10 reps 5 sec hold  Verbal review   Pec stretch  Doorway, [id number] At doorway 4x30 sec hold each  Doorway, [id number]   Scap retraction  x30 X 30 reps 5 sec hold  See above   Transfer education Rolling, supine to sit and sit to stand 5 mins Prone to sidelying to sitting to standing  Log roll technique for supine to sit; rolling and sliding bed mobility. Upper trap stretch 3x30 sec 4x30 sec hold both sides Verbal review   Levator scapulae stretch [id number] 4x30 sec hold both sides Verbal Review           Time spent with patient reviewing proper muscle recruitment and technique with exercises.     MANUAL THERAPY: (x 15 mins) :   Joint mobilization and Soft tissue mobilization was utilized and necessary because of the patient's restricted joint motion, painful spasm, loss of articular motion, and restricted motion of soft tissue. -- Soft tissue mobilization: thoracic paraspinals, rhomboids. -- Joint mobs: CPA mobs T2-5 grade II progressed to grade III; rib springing right ribs 3-5 grade II/III. MODALITIES: (5 minutes, no charge):        Pt. Received moist hot pack to bilateral thoracic paraspinals      HEP: As above; handouts given to patient for all exercises. Treatment/Session Summary:    Treatment Assessment:  Improve UE and thoracic ROM observed post treatment. Communication/Consultation:   HEP update provided. Equipment provided today:  None today. Recommendations/Intent for next treatment session: Next visit will focus on pain control, improve thoracic mobility, improve postural stability strength, progress BUE strength and functional mobility.     Total Treatment Billable Duration:  53 mins  Time In: 1330  Time Out: 1429    Kathia Belts, PT       Charge Capture  }Post Session Pain  PT Visit Info  [employer] Portal  MD Guidelines  Scanned Media  Benefits  MyChart    Future Appointments   Date Time Provider Mally Kang   10/7/2022  1:30 PM Windell Newer, PTA [facility]   10/10/2022  2:30 PM Windell Newer, PTA [facility]   10/12/2022  1:30 PM Kathia Belts, PT [facility]   10/17/2022  1:30 PM Kathia Belts, PT [facility]   10/20/2022  1:30 PM Windell Newer, PTA [facility]   10/24/2022  1:30 PM Kathia Belts, PT [facility]   10/27/2022  1:30 PM Windell Newer, PTA [facility]   10/31/2022  1:30 PM Kathia Belts, PT [facility]   11/3/2022  1:30 PM Windell Newer, PTA [facility]   11/7/2022  1:30 PM Windell Newer, PTA [facility]   11/10/2022  1:30 PM Kathia Belts, PT [facility]   1/26/2023 11:00 AM FPA MISCELLANEOUS [facility]   2/2/2023 11:15 AM Landry May MD [facility]

## 2022-10-07 ENCOUNTER — HOSPITAL ENCOUNTER (OUTPATIENT)
Dept: PHYSICAL THERAPY | Age: 72
Setting detail: RECURRING SERIES
Discharge: HOME | End: 2022-10-10
Payer: MEDICARE

## 2022-10-07 PROCEDURE — 97110 THERAPEUTIC EXERCISES: CPT

## 2022-10-07 PROCEDURE — 97140 MANUAL THERAPY 1/> REGIONS: CPT

## 2022-10-07 ASSESSMENT — PAIN SCALES - GENERAL: PAINLEVEL_OUTOF10: 7

## 2022-10-07 NOTE — PROGRESS NOTES
Lucia Verdugo Edmundo  : 1950  Primary: Medicare Part A And B  Secondary: Hernando Barrera @ [address] [facility] [address]  Phone: [phone]  Fax: [phone] Plan Frequency: 2 visits per week for 8 weeks    Plan of Care/Certification Expiration Date: 22 (Start of [address] 2022)      PT Visit Info:  No data recorded   Visit Count:  5   OUTPATIENT PHYSICAL THERAPY:OP NOTE TYPE: Treatment Note 10/7/2022       Episode  }Appt Desk             Treatment Diagnosis:  Degenerative disc disease, thoracic (M51.34)  Thoracic spine pain (M54.6)  Other secondary kyphosis, thoracic region (M40.14)  Low back pain (M54.5)  Neck pain (M54.2)  Medical/Referring Diagnosis:  DDD (degenerative disc disease), thoracic [M51.34]  Thoracic spine pain [M54.6]  Other secondary kyphosis, thoracic region [M40.14]  Referring Physician:  Stella Casper MD Orders:  PT Eval and Treat   Date of Onset:  Onset Date: 22 (Onset 2022, symptoms wrosening since onset and now stable.)     Allergies:   Meperidine, Aspirin, Celecoxib, Metronidazole, Morphine, Naproxen, Nitrofurantoin macrocrystal, Red dye, and Sulfa antibiotics  Restrictions/Precautions:  Restrictions/Precautions: None  Interventions Planned (Treatment may consist of any combination of the following):    Current Treatment Recommendations: Strengthening; ROM; Balance training; Functional mobility training; Transfer training; ADL/Self-care training; Endurance training; Gait training; Stair training; Neuromuscular re-education; Manual Therapy - Soft Tissue Mobilization; Manual Therapy - Joint Manipulation; Pain management; Home exercise program; Safety education & training; Patient/Caregiver education & training; Equipment evaluation, education, & procurement; Modalities; Integrated dry needling;  Therapeutic activities     Subjective Comments:  Pt. continues to report not having the time to do her HEP everyday . Initial:}    7/10Post Session:       2/10  Medications Last Reviewed:  10/7/2022  Updated Objective Findings:   iPt. Had increased muscular tightness initially better after session. Treatment   THERAPEUTIC EXERCISE: (45 minutes):    Exercises per grid below to improve mobility, strength, and coordination. Required moderate visual, verbal, manual, and tactile cues to promote proper body alignment, promote proper body poster and proper body mechanics. Progressed resistance, range, repetitions and complexity of movement as indicated. Date:  10/3/2022 Date:  10/7/22 Date:  9/29/22   Activity/Exercise Parameters Parameters Parameters   Thoracic extension Seated, stability ball, x20 Seated stability ball, x20  Seated stability ball, x20   Seated lumbar flexion X20, stability ball X 20 reps stability ball  X 20 reps stability ball    Open books S/l x20 each X 20 reps each side S/l x10 each   Posterior pelvic tilt x20 X 20 reps     Bilateral shoulder retraction Seatedx x30 2x10 reps no band 5 sec hold each  Seated scap retract, x30   Shoulder low rows  Verbal review Red band 2x10 reps 5 sec hold  Verbal review   Shoulder rows Verbal review Red band 2x10 reps 5 sec hold  Verbal review   Pec stretch  Doorway, [id number] At doorway 4x30 sec hold each  Doorway, [id number]   Scap retraction  x30 X 30 reps 5 sec hold  See above   Transfer education Rolling, supine to sit and sit to stand 5 mins Prone to sidelying to sitting to standing  Log roll technique for supine to sit; rolling and sliding bed mobility. Upper trap stretch 3x30 sec 4x30 sec hold both sides Verbal review   Levator scapulae stretch [id number] 4x30 sec hold both sides Verbal Review           Time spent with patient reviewing proper muscle recruitment and technique with exercises.     MANUAL THERAPY: (x 15 mins) :   Joint mobilization and Soft tissue mobilization was utilized and necessary because of the patient's restricted joint motion, painful spasm, loss of articular motion, and restricted motion of soft tissue. -- Soft tissue mobilization: thoracic paraspinals, rhomboids. MODALITIES: (x 8 mins):        Pt. Received moist hot pack to bilateral thoracic paraspinals      HEP: As above; handouts given to patient for all exercises. Treatment/Session Summary:    Treatment Assessment:  Pt. was compliant with all exercises and reported less pain after session  Communication/Consultation:   HEP update provided. Equipment provided today:  None today. Recommendations/Intent for next treatment session: Next visit will focus on pain control, improve thoracic mobility, improve postural stability strength, progress BUE strength and functional mobility.     Total Treatment Billable Duration:  55 mins  Time In: 1330  Time Out: 1440    TONY GARCIA PTA       Charge Capture  }Post Session Pain  PT Visit Info  Fish Garza MD Guidelines  Scanned Media  Benefits  MyChart    Future Appointments   Date Time Provider Mally Kang   10/10/2022  2:30 PM Noyinderjit Mcnulty, [facility]   10/12/2022  1:30 PM Beula Shock, PT [facility]   10/17/2022  1:30 PM Beula Shock, PT [facility]   10/20/2022  1:30 PM Noy Mcnulty, PTA [facility]   10/24/2022  1:30 PM Beula Shock, PT [facility]   10/27/2022  1:30 PM Noy Mcnulty, PTA [facility]   10/31/2022  1:30 PM Beula Shock, PT [facility]   11/3/2022  1:30 PM Noy Mcnulty, PTA [facility]   11/7/2022  1:30 PM Noy Mcnulty, PTA [facility]   11/10/2022  1:30 PM Beula Shock, PT [facility]   1/26/2023 11:00 AM FPA MISCELLANEOUS [facility]   2/2/2023 11:15 AM Tristan Hay MD [facility]

## 2022-10-10 ENCOUNTER — HOSPITAL ENCOUNTER (OUTPATIENT)
Dept: PHYSICAL THERAPY | Age: 72
Setting detail: RECURRING SERIES
Discharge: HOME | End: 2022-10-13
Payer: MEDICARE

## 2022-10-10 PROCEDURE — 97110 THERAPEUTIC EXERCISES: CPT

## 2022-10-10 PROCEDURE — 97140 MANUAL THERAPY 1/> REGIONS: CPT

## 2022-10-10 ASSESSMENT — PAIN SCALES - GENERAL: PAINLEVEL_OUTOF10: 6

## 2022-10-10 NOTE — PROGRESS NOTES
Ling Capps Edmundo  : 1950  Primary: Medicare Part A And B  Secondary: Hernando Barrera @ [address] [facility] [address]  Phone: [phone]  Fax: [phone] Plan Frequency: 2 visits per week for 8 weeks    Plan of Care/Certification Expiration Date: 22 (Start of [address] 2022)      PT Visit Info:  No data recorded   Visit Count:  6   OUTPATIENT PHYSICAL THERAPY:OP NOTE TYPE: Treatment Note 10/10/2022       Episode  }Appt Desk             Treatment Diagnosis:  Degenerative disc disease, thoracic (M51.34)  Thoracic spine pain (M54.6)  Other secondary kyphosis, thoracic region (M40.14)  Low back pain (M54.5)  Neck pain (M54.2)  Medical/Referring Diagnosis:  DDD (degenerative disc disease), thoracic [M51.34]  Thoracic spine pain [M54.6]  Other secondary kyphosis, thoracic region [M40.14]  Referring Physician:  Bird Redmond MD Orders:  PT Eval and Treat   Date of Onset:  Onset Date: 22 (Onset 2022, symptoms wrosening since onset and now stable.)     Allergies:   Meperidine, Aspirin, Celecoxib, Metronidazole, Morphine, Naproxen, Nitrofurantoin macrocrystal, Red dye, and Sulfa antibiotics  Restrictions/Precautions:  Restrictions/Precautions: None  Interventions Planned (Treatment may consist of any combination of the following):    Current Treatment Recommendations: Strengthening; ROM; Balance training; Functional mobility training; Transfer training; ADL/Self-care training; Endurance training; Gait training; Stair training; Neuromuscular re-education; Manual Therapy - Soft Tissue Mobilization; Manual Therapy - Joint Manipulation; Pain management; Home exercise program; Safety education & training; Patient/Caregiver education & training; Equipment evaluation, education, & procurement; Modalities; Integrated dry needling;  Therapeutic activities     Subjective Comments:  Pt. reported doing her HEP over the weekend  Initial:}    6/10Post Session:       5/10  Medications Last Reviewed:  10/10/2022  Updated Objective Findings:   Pt. Entered clinic with slow gait and kyphotic posture  Treatment   THERAPEUTIC EXERCISE: (45 minutes):    Exercises per grid below to improve mobility, strength, and coordination. Required moderate visual, verbal, manual, and tactile cues to promote proper body alignment, promote proper body poster and proper body mechanics. Progressed resistance, range, repetitions and complexity of movement as indicated. Date:  10/3/2022 Date:  10/7/22 Date:  10/10/22   Activity/Exercise Parameters Parameters Parameters   Thoracic extension Seated, stability ball, x20 Seated stability ball, x20  Seated stability ball, x20   Seated lumbar flexion X20, stability ball X 20 reps stability ball  X 20 reps stability ball    Open books S/l x20 each X 20 reps each side X 20 reps each side   Posterior pelvic tilt x20 X 20 reps  X20 reps   Bilateral shoulder retraction Seatedx x30 2x10 reps no band 5 sec hold each  Seated scap retract, x30   Shoulder low rows  Verbal review Red band 2x10 reps 5 sec hold  Red band 2x10 reps    Shoulder rows Verbal review Red band 2x10 reps 5 sec hold  Red band 2x10 reps 5 sec hold    Pec stretch  Doorway, [id number] At doorway 4x30 sec hold each  Doorway, [id number]   Scap retraction  x30 X 30 reps 5 sec hold  See above   Transfer education Rolling, supine to sit and sit to stand 5 mins Prone to sidelying to sitting to standing  Log roll technique for supine to sit; rolling and sliding bed mobility. Upper trap stretch 3x30 sec 4x30 sec hold both sides Verbal review   Levator scapulae stretch [id number] 4x30 sec hold both sides Verbal Review   Nu step    Level 3 x 10 mins      Time spent with patient reviewing proper muscle recruitment and technique with exercises.     MANUAL THERAPY: (x 15 mins) :   Joint mobilization and Soft tissue mobilization was utilized and necessary because of the patient's restricted joint motion, painful spasm, loss of articular motion, and restricted motion of soft tissue. -- Soft tissue mobilization: thoracic paraspinals, rhomboids. MODALITIES: (x 8 mins):        Pt. Received moist hot pack to bilateral thoracic paraspinals      HEP: As above; handouts given to patient for all exercises. Treatment/Session Summary:    Treatment Assessment:  Pt. continues to be compliant with all exercises and reported less pain  Communication/Consultation:   HEP update provided. Equipment provided today:  None today. Recommendations/Intent for next treatment session: Next visit will focus on pain control, improve thoracic mobility, improve postural stability strength, progress BUE strength and functional mobility.     Total Treatment Billable Duration:  55 mins  Time In: 1430  Time Out: AARON Gomes 52 BUNCH, PTA       Charge Capture  }Post Session Pain  PT Visit Info  [employer] Portal  MD Guidelines  Scanned Media  Benefits  [employer]    Future Appointments   Date Time Provider Mally Kang   10/12/2022  1:30 PM Kylah Petty, PT [facility]   10/17/2022  1:30 PM Kylah Petty, PT [facility]   10/20/2022  1:30 PM Akiko Garza, PTA [facility]   10/24/2022  1:30 PM Kylah Petty, PT [facility]   10/27/2022  1:30 PM Akiko Garza, PTA [facility]   10/31/2022  1:30 PM Kylah Petty, PT [facility]   11/3/2022  1:30 PM Akiko Garza, PTA [facility]   11/7/2022  1:30 PM Akiko Garza, PTA [facility]   11/10/2022  1:30 PM Kylah Petty, PT [facility]   1/26/2023 11:00 AM FPA MISCELLANEOUS [facility]   2/2/2023 11:15 AM Lady Jerald MD [facility]

## 2022-10-12 ENCOUNTER — HOSPITAL ENCOUNTER (OUTPATIENT)
Dept: PHYSICAL THERAPY | Age: 72
Setting detail: RECURRING SERIES
Discharge: HOME | End: 2022-10-15
Payer: MEDICARE

## 2022-10-12 PROCEDURE — 97140 MANUAL THERAPY 1/> REGIONS: CPT

## 2022-10-12 PROCEDURE — 97110 THERAPEUTIC EXERCISES: CPT

## 2022-10-12 ASSESSMENT — PAIN SCALES - GENERAL: PAINLEVEL_OUTOF10: 5

## 2022-10-12 NOTE — PROGRESS NOTES
Eris Sue  : 1950  Primary: Medicare Part A And B  Secondary: Hernando Barrera @ [address] [facility] [address]  Phone: [phone]  Fax: [phone] Plan Frequency: 2 visits per week for 8 weeks    Plan of Care/Certification Expiration Date: 22 (Start of [address] 2022)      PT Visit Info:  No data recorded   Visit Count:  7   OUTPATIENT PHYSICAL THERAPY:OP NOTE TYPE: Treatment Note 10/12/2022       Episode  }Appt Desk             Treatment Diagnosis:  Degenerative disc disease, thoracic (M51.34)  Thoracic spine pain (M54.6)  Other secondary kyphosis, thoracic region (M40.14)  Low back pain (M54.5)  Neck pain (M54.2)  Medical/Referring Diagnosis:  DDD (degenerative disc disease), thoracic [M51.34]  Thoracic spine pain [M54.6]  Other secondary kyphosis, thoracic region [M40.14]  Referring Physician:  Geno Neri MD Orders:  PT Eval and Treat   Date of Onset:  Onset Date: 22 (Onset 2022, symptoms wrosening since onset and now stable.)     Allergies:   Meperidine, Aspirin, Celecoxib, Metronidazole, Morphine, Naproxen, Nitrofurantoin macrocrystal, Red dye, and Sulfa antibiotics  Restrictions/Precautions:  Restrictions/Precautions: None  Interventions Planned (Treatment may consist of any combination of the following):    Current Treatment Recommendations: Strengthening; ROM; Balance training; Functional mobility training; Transfer training; ADL/Self-care training; Endurance training; Gait training; Stair training; Neuromuscular re-education; Manual Therapy - Soft Tissue Mobilization; Manual Therapy - Joint Manipulation; Pain management; Home exercise program; Safety education & training; Patient/Caregiver education & training; Equipment evaluation, education, & procurement; Modalities; Integrated dry needling;  Therapeutic activities     Subjective Comments:  Pt reports more generalized fatgiue today \"maybe because of the weather\". HEP compliance fair to good. Initial:}    5/10Post Session:       3/10  Medications Last Reviewed:  10/12/2022  Updated Objective Findings:   Mobility: hypomobile T7-9 and upper thoracic to lower cervical spine. Treatment   THERAPEUTIC EXERCISE: (38 minutes):    Exercises per grid below to improve mobility, strength, and coordination. Required moderate visual, verbal, manual, and tactile cues to promote proper body alignment, promote proper body poster and proper body mechanics. Progressed resistance, range, repetitions and complexity of movement as indicated. Date:  10/12/2022 Date:  10/7/22 Date:  10/10/22   Activity/Exercise Parameters Parameters Parameters   Thoracic extension Seated, stability ball, x20 Seated stability ball, x20  Seated stability ball, x20   Seated lumbar flexion X20, stability ball X 20 reps stability ball  X 20 reps stability ball    Open books S/l x20 each X 20 reps each side X 20 reps each side   Posterior pelvic tilt x20 X 20 reps  X20 reps   Bridge x15     Lower trunk rotation X10 each     Shoulder low rows  - Red band 2x10 reps 5 sec hold  Red band 2x10 reps    Shoulder rows - Red band 2x10 reps 5 sec hold  Red band 2x10 reps 5 sec hold    Pec stretch  Doorway, [id number] At doorway 4x30 sec hold each  Doorway, [id number]   Scap retraction  x30 X 30 reps 5 sec hold  See above   Transfer education Rolling, supine to sit and sit to stand 3 mins Prone to sidelying to sitting to standing  Log roll technique for supine to sit; rolling and sliding bed mobility. Upper trap stretch 3x30 sec 4x30 sec hold both sides Verbal review   Levator scapulae stretch [id number] 4x30 sec hold both sides Verbal Review   Nu step  --  Level 3 x 10 mins      Time spent with patient reviewing proper muscle recruitment and technique with exercises.     MANUAL THERAPY: (x 15 mins):   Joint mobilization and Soft tissue mobilization was utilized and necessary because of the patient's restricted joint motion, painful spasm, loss of articular motion, and restricted motion of soft tissue. -- Soft tissue mobilization: thoracic paraspinals, rhomboids. -- Joint mobs: CPA Mobs thoracic spine T1-T10, rib springing Ribs 8-10           MODALITIES: (x 5 mins):        Pt. Received moist hot pack to bilateral thoracic paraspinals      HEP: As above; handouts given to patient for all exercises. Treatment/Session Summary:    Treatment Assessment:  Improved thoracic extension AROM observed post manual interventions; good tolerance to exercises performed. Communication/Consultation:   HEP update provided. Equipment provided today:  None today. Recommendations/Intent for next treatment session: Next visit will focus on pain control, improve thoracic mobility, improve postural stability strength, progress BUE strength and functional mobility.     Total Treatment Billable Duration:  55 mins  Time In: 3780  Time Out: 1431    James Mccarthy, PT       Charge Capture  }Post Session Pain  PT Visit Info  [employer] Portal  MD Guidelines  Scanned Media  Benefits  MyChart    Future Appointments   Date Time Provider Mally Kang   10/17/2022  1:30 PM James Mccarthy, PT [facility]   10/20/2022  1:30 PM Zondra Burroughs, PTA [facility]   10/24/2022  1:30 PM Jeppie Seton, PT [facility]   10/27/2022  1:30 PM Zondra Burroughs, PTA [facility]   10/31/2022  1:30 PM Jeppie Seton, PT [facility]   11/3/2022  1:30 PM Zondra Burroughs, PTA [facility]   11/7/2022  1:30 PM Zondra Burroughs, PTA [facility]   11/10/2022  1:30 PM Jeppie Seton, PT [facility]   1/26/2023 11:00 AM FPRUBY MISCELLANEOUS [place] [facility]   2/2/2023 11:15 AM MD MADISON Peña [facility] AMB

## 2022-10-17 ENCOUNTER — HOSPITAL ENCOUNTER (OUTPATIENT)
Dept: PHYSICAL THERAPY | Age: 72
Setting detail: RECURRING SERIES
Discharge: HOME | End: 2022-10-20
Payer: MEDICARE

## 2022-10-17 PROCEDURE — 97140 MANUAL THERAPY 1/> REGIONS: CPT

## 2022-10-17 PROCEDURE — 97110 THERAPEUTIC EXERCISES: CPT

## 2022-10-17 ASSESSMENT — PAIN SCALES - GENERAL: PAINLEVEL_OUTOF10: 5

## 2022-10-17 NOTE — PROGRESS NOTES
Gabriela Collins Edmundo  : 1950  Primary: Medicare Part A And B  Secondary: Hernando Barrera @ [address] [facility] [address]  Phone: [phone]  Fax: [phone] Plan Frequency: 2 visits per week for 8 weeks    Plan of Care/Certification Expiration Date: 22 (Start of [address] 2022)      PT Visit Info:  No data recorded   Visit Count:  8   OUTPATIENT PHYSICAL THERAPY:OP NOTE TYPE: Treatment Note 10/17/2022       Episode  }Appt Desk             Treatment Diagnosis:  Degenerative disc disease, thoracic (M51.34)  Thoracic spine pain (M54.6)  Other secondary kyphosis, thoracic region (M40.14)  Low back pain (M54.5)  Neck pain (M54.2)  Medical/Referring Diagnosis:  DDD (degenerative disc disease), thoracic [M51.34]  Thoracic spine pain [M54.6]  Other secondary kyphosis, thoracic region [M40.14]  Referring Physician:  Suresh Newman MD Orders:  PT Eval and Treat   Date of Onset:  Onset Date: 22 (Onset 2022, symptoms wrosening since onset and now stable.)     Allergies:   Meperidine, Aspirin, Celecoxib, Metronidazole, Morphine, Naproxen, Nitrofurantoin macrocrystal, Red dye, and Sulfa antibiotics  Restrictions/Precautions:  Restrictions/Precautions: None  Interventions Planned (Treatment may consist of any combination of the following):    Current Treatment Recommendations: Strengthening; ROM; Balance training; Functional mobility training; Transfer training; ADL/Self-care training; Endurance training; Gait training; Stair training; Neuromuscular re-education; Manual Therapy - Soft Tissue Mobilization; Manual Therapy - Joint Manipulation; Pain management; Home exercise program; Safety education & training; Patient/Caregiver education & training; Equipment evaluation, education, & procurement; Modalities; Integrated dry needling;  Therapeutic activities     Subjective Comments:  Patient reports constant mid back pain  Initial:} 5/10Post Session:       3/10  Medications Last Reviewed:  10/17/2022  Updated Objective Findings:   Mobility: hypomobile T7-9 and upper thoracic to lower cervical spine. Treatment   THERAPEUTIC EXERCISE: (38 minutes):    Exercises per grid below to improve mobility, strength, and coordination. Required moderate visual, verbal, manual, and tactile cues to promote proper body alignment, promote proper body poster and proper body mechanics. Progressed resistance, range, repetitions and complexity of movement as indicated. Date:  10/12/2022 Date:  10/17/22 Date:  10/10/22   Activity/Exercise Parameters Parameters Parameters   Thoracic extension Seated, stability ball, x20 Seated stability ball, x20  Seated stability ball, x20   Seated lumbar flexion X20, stability ball X 20 reps stability ball  X 20 reps stability ball    Open books S/l x20 each X 20 reps each side X 20 reps each side   Posterior pelvic tilt x20 X 20 reps  X20 reps   Bridge x15 2 x 10    Lower trunk rotation X10 each 2 x 20    Shoulder low rows  - Green  band 3x10   Red band 2x10 reps    Shoulder rows -  2 x 10 Red band 2x10 reps 5 sec hold    Pec stretch  Doorway, [id number] At doorway 4x30 sec   Doorway, [id number]   Scap retraction  x30 X 30 reps 5 sec hold  See above   Transfer education Rolling, supine to sit and sit to stand 3 mins Prone to sidelying to sitting to standing  Log roll technique for supine to sit; rolling and sliding bed mobility. Upper trap stretch 3x30 sec 4x30 sec B Verbal review   Levator scapulae stretch [id number] 4x30 sec B Verbal Review   Nu step  --  Level 3 x 10 mins      Time spent with patient reviewing proper muscle recruitment and technique with exercises. MANUAL THERAPY: (x 15 mins):   Joint mobilization and Soft tissue mobilization was utilized and necessary because of the patient's restricted joint motion, painful spasm, loss of articular motion, and restricted motion of soft tissue.        -- Soft tissue mobilization: thoracic paraspinals, rhomboids. -- Joint mobs: CPA Mobs thoracic spine T1-T10, rib springing Ribs 8-10 Not today           MODALITIES: (x 0 mins):        Pt. Received moist hot pack to bilateral thoracic paraspinals      HEP: As above; handouts given to patient for all exercises. Treatment/Session Summary:    Treatment Assessment:  Requires frequent cues for proper posture and technique  Communication/Consultation:   Posture and HEP  Equipment provided today:  None today. Recommendations/Intent for next treatment session: Next visit will focus on pain control, improve thoracic mobility, improve postural stability strength, progress BUE strength and functional mobility.     Total Treatment Billable Duration:  53 mins  Time In: 7905  Time Out: 1426    PEYTON BUNCH PTA       Charge Capture  }Post Session Pain  PT Visit Info  [facility] Portal  MD Guidelines  Scanned Media  Benefits  MyChart    Future Appointments   Date Time Provider Mally Kang   10/20/2022  1:30 PM Jessica Young, PTA [facility]   10/24/2022  1:30 PM Sejal Ananth, PT [facility] [place] [facility]   10/27/2022  1:30 PM Welford Knock, PTA [facility]   10/31/2022  1:30 PM Sejal Ananth, PT [facility]   11/3/2022  1:30 PM Welford Knock, PTA [facility]   11/7/2022  1:30 PM Welford Knock, PTA [facility]   11/10/2022  1:30 PM Sejal Ananth, PT [facility]   1/26/2023 11:00 AM [facility]   2/2/2023 11:15 AM Ren Bazzi MD [facility]

## 2022-10-20 ENCOUNTER — HOSPITAL ENCOUNTER (OUTPATIENT)
Dept: PHYSICAL THERAPY | Age: 72
Setting detail: RECURRING SERIES
Discharge: HOME | End: 2022-10-23
Payer: MEDICARE

## 2022-10-20 PROCEDURE — 97140 MANUAL THERAPY 1/> REGIONS: CPT

## 2022-10-20 PROCEDURE — 97110 THERAPEUTIC EXERCISES: CPT

## 2022-10-20 ASSESSMENT — PAIN SCALES - GENERAL: PAINLEVEL_OUTOF10: 6

## 2022-10-20 NOTE — PROGRESS NOTES
Anh Romero  : 1950  Primary: Medicare Part A And B  Secondary: Hernando Barrera @ [address] [facility] [address]  Phone: [phone]  Fax: [phone] Plan Frequency: 2 visits per week for 8 weeks    Plan of Care/Certification Expiration Date: 22 (Start of [address] 2022)      PT Visit Info:  No data recorded   Visit Count:  9   OUTPATIENT PHYSICAL THERAPY:OP NOTE TYPE: Treatment Note 10/20/2022       Episode  }Appt Desk             Treatment Diagnosis:  Degenerative disc disease, thoracic (M51.34)  Thoracic spine pain (M54.6)  Other secondary kyphosis, thoracic region (M40.14)  Low back pain (M54.5)  Neck pain (M54.2)  Medical/Referring Diagnosis:  DDD (degenerative disc disease), thoracic [M51.34]  Thoracic spine pain [M54.6]  Other secondary kyphosis, thoracic region [M40.14]  Referring Physician:  Raza Fulton MD Orders:  PT Eval and Treat   Date of Onset:  Onset Date: 22 (Onset 2022, symptoms wrosening since onset and now stable.)     Allergies:   Meperidine, Aspirin, Celecoxib, Metronidazole, Morphine, Naproxen, Nitrofurantoin macrocrystal, Red dye, and Sulfa antibiotics  Restrictions/Precautions:  Restrictions/Precautions: None  Interventions Planned (Treatment may consist of any combination of the following):    Current Treatment Recommendations: Strengthening; ROM; Balance training; Functional mobility training; Transfer training; ADL/Self-care training; Endurance training; Gait training; Stair training; Neuromuscular re-education; Manual Therapy - Soft Tissue Mobilization; Manual Therapy - Joint Manipulation; Pain management; Home exercise program; Safety education & training; Patient/Caregiver education & training; Equipment evaluation, education, & procurement; Modalities; Integrated dry needling;  Therapeutic activities     Subjective Comments:  Pt. reported less constant back pain  Initial:} tissue. -- Soft tissue mobilization: thoracic paraspinals, rhomboids, and bilateral lower back          MODALITIES: (x 0 mins):        Pt. Received moist hot pack to bilateral thoracic paraspinals      HEP: As above; handouts given to patient for all exercises. Treatment/Session Summary:    Treatment Assessment:Pt. Continues to be compliant with all exercises and report less pain at the end of session. Communication/Consultation:   Posture and HEP  Equipment provided today:  None today. Recommendations/Intent for next treatment session: Next visit will focus on pain control, improve thoracic mobility, improve postural stability strength, progress BUE strength and functional mobility.     Total Treatment Billable Duration:  55 mins  Time In: 1330  Time Out: 1435    TONY GARCIA PTA       Charge Capture  }Post Session Pain  PT Visit Info  [facility] Portal  MD Guidelines  Scanned Media  Benefits  MyChart    Future Appointments   Date Time Provider Mally Kang   10/24/2022  1:30 PM Karthikeyan Ross PT [facility]   10/27/2022  1:30 PM Mckinley Clancy PTA [facility]   10/31/2022  1:30 PM Karthikeyan Ross PT [facility]   11/3/2022  1:30 PM Mckinley Clancy PTA [facility]   11/7/2022  1:30 PM Mckinley Clancy PTA [facility]   11/10/2022  1:30 PM Karthikeyan Ross PT [facility]   1/26/2023 11:00 AM [facility]   2/2/2023 11:15 AM Santi Baldwin MD [facility]

## 2022-10-24 ENCOUNTER — HOSPITAL ENCOUNTER (OUTPATIENT)
Dept: PHYSICAL THERAPY | Age: 72
Setting detail: RECURRING SERIES
End: 2022-10-24
Payer: MEDICARE

## 2022-10-24 NOTE — PROGRESS NOTES
Physical Therapy  [address] [place] [address]  Date: 10/24/2022    Patient cancelled due to illness.       RADHA BautistaT

## 2022-10-27 ENCOUNTER — HOSPITAL ENCOUNTER (OUTPATIENT)
Dept: PHYSICAL THERAPY | Age: 72
Setting detail: RECURRING SERIES
Discharge: HOME | End: 2022-10-30
Payer: MEDICARE

## 2022-10-27 PROCEDURE — 97110 THERAPEUTIC EXERCISES: CPT

## 2022-10-27 PROCEDURE — 97140 MANUAL THERAPY 1/> REGIONS: CPT

## 2022-10-27 ASSESSMENT — PAIN SCALES - GENERAL: PAINLEVEL_OUTOF10: 5

## 2022-10-27 NOTE — PROGRESS NOTES
Dimitri Bowman  : 1950  Primary: Medicare Part A And B  Secondary: Hernando Barrera @ [address] [facility] [address] [address]  Phone: [phone]  Fax: [phone] Plan Frequency: 2 visits per week for 8 weeks  Plan of Care/Certification Expiration Date: 22 (Start of [address] 2022)      PT Visit Info:    No data recorded    Visit Count:  10    OUTPATIENT PHYSICAL THERAPY:OP NOTE TYPE: Progress Report 10/27/2022               Episode  Appt Desk         Treatment Diagnosis: Degenerative disc disease, thoracic (M51.34)  Thoracic spine pain (M54.6)  Other secondary kyphosis, thoracic region (M40.14)  Low back pain (M54.5)  Neck pain (M54.2)  Medical/Referring Diagnosis:  DDD (degenerative disc disease), thoracic [M51.34]  Thoracic spine pain [M54.6]  Other secondary kyphosis, thoracic region [M40.14]  Referring Physician:  Madan Cook MD Orders:  PT Eval and Treat  Return MD Appt:  TBD by patient  Date of Onset:  Onset Date: 22 (Onset 2022, symptoms wrosening since onset and now stable.)     Allergies:  Meperidine, Aspirin, Celecoxib, Metronidazole, Morphine, Naproxen, Nitrofurantoin macrocrystal, Red dye, and Sulfa antibiotics  Restrictions/Precautions:    Restrictions/Precautions: None  Medications Last Reviewed:  10/27/2022      OBJECTIVE   Observation/Postural and Gait Assessment: Gait: Rounded shoulders and forward head posture in standing; mild to moderate thoracic kyphosis in sitting  correctable with verbal cues and improve since initial PT evaluation. Palpation: Moderate tenderness to palpation noted mid thoracic paraspinals.     AROM:   Thoracic extension: Neutral°   Thoracic flexion: WNL°   Thoracic left side bend: 19°   Thoracic right side bend: 15°   Thoracic rotation left 30°   Thoracic rotation right 32°       Strength: Gross BUE strength, 4- to 4/5    Passive Accessory Motion: NT due to patient unable to lay prone at initial evaluation. Special Tests:  Unremarkable    Neurological Screen:  Myotomes: Key muscle strength testing through bilateral LE is Normal.  Dermatomes: Sensation testing through bilateral lower quadrants for light touch is Normal.  Reflexes: Patellar (L4) and Achilles (S1) are Normal and Normal.  Neural tension tests: Passive straight leg raise (SLR) test is NT. Crossed SLR test is NT. Slump test is NT. Femoral nerve stretch test is NT. Functional Mobility:  Sit to stand: Independent with BUE assist.  Supine to sit: Independent, mild verbal cues to perform log roll technique correctly. ASSESSMENT   Initial Assessment:  Pt is a 70 y/o female with complaints of thoracic spine pain insidious onset 8/1/2022 while caring for her brother who was very ill. Pt reports was performing frequent lifting and bending activities involved with her brother's care which may have contributed to current symptoms. Pt denies numbness/tingling symptoms however does report radiating pain from mid thoracic region to sternum anteriorly. Pt denies shortness of breath/chest pain associated with symptoms. Pt referred by MD for PT eval and treat. Pt will benefit from skilled PT treatment to address deficits in strength, AROM, balance, gait and functional mobility in order to return to prior level of function and improve quality of life. PROGRESS NOTE 10/27/2022: Pt has completed 10 PT treatment sessions from 9/16/2022 to 10/27/2022. Pt demonstrate improvements in thoracic AROM, standing and walking tolerance as well as functional transfer abilities since starting PT treatment. Pt has met 2/8 PT goals at this time and is demonstrate good progress towards completing remaining goals. Limitations remain primarily regarding tolerance to prolonged standing which per pt makes meal prep ADLs difficult.  Pt will benefit from skilled PT treatment to address deficits in strength, AROM, balance, gait and functional mobility in order to return to prior level of function and improve quality of life. Problem List: (Impacting functional limitations): Body Structures, Functions, Activity Limitations Requiring Skilled Therapeutic Intervention: Decreased functional mobility ; Decreased ADL status; Decreased ROM; Decreased body mechanics; Decreased tolerance to work activity; Decreased strength; Decreased safe awareness; Decreased endurance; Decreased balance; Decreased coordination; Increased pain; Decreased posture     Therapy Prognosis:   Therapy Prognosis: Good     Assessment Complexity:      PLAN   Effective Dates: 9/16/2022 TO Plan of Care/Certification Expiration Date: 11/11/22 (Start of POC 9/16/2022)     Frequency/Duration: Plan Frequency: 2 visits per week for 8 weeks     Interventions Planned (Treatment may consist of any combination of the following):    Current Treatment Recommendations: Strengthening; ROM; Balance training; Functional mobility training; Transfer training; ADL/Self-care training; Endurance training; Gait training; Stair training; Neuromuscular re-education; Manual Therapy - Soft Tissue Mobilization; Manual Therapy - Joint Manipulation; Pain management; Home exercise program; Safety education & training; Patient/Caregiver education & training; Equipment evaluation, education, & procurement; Modalities; Integrated dry needling;  Therapeutic activities     Goals: (Goals have been discussed and agreed upon with patient.)  Short-Term Functional Goals: Time Frame: 9/16/2022 to 10/14/2022  Patient demonstrates independence with home exercise program without verbal cueing provided by therapist. - Ongoing  Pt will report worst pain 3/10 or less at rest in order to return to unrestricted prolonged sitting 30 mins or greater. - Goal met  Pt will report unrestricted sleeping through night 75% of week in order to progress towards sleeping habits consistent with prior level of function. - Goal met  Pt will demonstrate independent supine to sit transfer using log roll technique in order to improve independence and safety with functional transfers. - Ongoing  Discharge Goals: Time Frame: 9/16/2022 to 11/11/2022  Pt will report worst pain 3/10 or less with prolonged standing/walking 15 mins or greater in order to return to unrestricted community distance ambulation. - Ongoing  Pt will demonstrated active thoracic rotation ROM 35 degrees or greater bilaterally in order to return to unrestricted twisting. - Ongoing  Pt will demonstrate gross BUE strength 4/5 or greater in order to return to unrestricted functional lifting and carrying activities. - Ongoing  SEBASTIAN score of 20/50 or less/greater in order to demonstrate overall functional improvement. - Ongoing           Outcome Measure: Tool Used: Modified Oswestry Low Back Pain Questionnaire  Score:  Initial: 30/50  Most Recent: 22/50 (Date: 10/27/2022 )   Interpretation of Score: Each section is scored on a 0-5 scale, 5 representing the greatest disability. The scores of each section are added together for a total score of 50. Medical Necessity:   > Patient is expected to demonstrate progress in strength, range of motion, balance, coordination, and functional technique to increase independence with community distance ambulation, functional transfers, ADLs and light house work.  > Skilled intervention continues to be required due to decreased AROM, decreased strength, decreased balance, decreased gait, decreased functional mobility. Reason For Services/Other Comments:  > Patient continues to require skilled intervention due to increasing complexity of exercise. Total Duration:  Time In: 1330  Time Out: 3283    Regarding Mony Wyatt's therapy, I certify that the treatment plan above will be carried out by a therapist or under their direction.   Thank you for this referral,  Jerald Carrillo, PT     Referring Physician Signature: Anupam Cruz PA-C No Signature is Required for this note.         Post Session Pain  Charge Capture  PT Visit Info MD Guidelines  MyChart

## 2022-10-27 NOTE — PROGRESS NOTES
Amena Sutherland Edmundo  : 1950  Primary: Medicare Part A And B  Secondary: Hernando Barrera @ [address] [facility] [address]  Phone: [phone]  Fax: [phone] Plan Frequency: 2 visits per week for 8 weeks    Plan of Care/Certification Expiration Date: 22 (Start of [address] 2022)      PT Visit Info:  No data recorded   Visit Count:  10   OUTPATIENT PHYSICAL THERAPY:OP NOTE TYPE: Treatment Note 10/27/2022       Episode  }Appt Desk             Treatment Diagnosis:  Degenerative disc disease, thoracic (M51.34)  Thoracic spine pain (M54.6)  Other secondary kyphosis, thoracic region (M40.14)  Low back pain (M54.5)  Neck pain (M54.2)  Medical/Referring Diagnosis:  DDD (degenerative disc disease), thoracic [M51.34]  Thoracic spine pain [M54.6]  Other secondary kyphosis, thoracic region [M40.14]  Referring Physician:  Geno Neri MD Orders:  PT Eval and Treat   Date of Onset:  Onset Date: 22 (Onset 2022, symptoms wrosening since onset and now stable.)     Allergies:   Meperidine, Aspirin, Celecoxib, Metronidazole, Morphine, Naproxen, Nitrofurantoin macrocrystal, Red dye, and Sulfa antibiotics  Restrictions/Precautions:  Restrictions/Precautions: None  Interventions Planned (Treatment may consist of any combination of the following):    Current Treatment Recommendations: Strengthening; ROM; Balance training; Functional mobility training; Transfer training; ADL/Self-care training; Endurance training; Gait training; Stair training; Neuromuscular re-education; Manual Therapy - Soft Tissue Mobilization; Manual Therapy - Joint Manipulation; Pain management; Home exercise program; Safety education & training; Patient/Caregiver education & training; Equipment evaluation, education, & procurement; Modalities; Integrated dry needling;  Therapeutic activities     Subjective Comments:  Pt. reported bilateral head neck and shoulder pain  Initial:}    5/10Post Session:       4/10  Medications Last Reviewed:  10/27/2022  Updated Objective Findings:   See progress note performed by Jojo Ratliff DPT 10/27/2022  Treatment   THERAPEUTIC EXERCISE: (40 minutes):    Exercises per grid below to improve mobility, strength, and coordination. Required moderate visual, verbal, manual, and tactile cues to promote proper body alignment, promote proper body poster and proper body mechanics. Progressed resistance, range, repetitions and complexity of movement as indicated. Date:  10/27/22 Date:  10/17/22 Date:  10/20/22   Activity/Exercise Parameters Parameters Parameters   Thoracic extension Seated, stability ball, x20 Seated stability ball, x20  Seated stability ball, x20   Seated lumbar flexion X20, stability ball X 20 reps stability ball  X 20 reps stability ball    Open books x20 reps each X 20 reps each side X 20 reps each side   Posterior pelvic tilt X20 reps  X 20 reps  X20 reps   Bridge X15 reps  2 x 10 2x10 reps   Lower trunk rotation X 30 repseach 2 x 20 X 30 reps    Shoulder low rows  -green band 3x10  Green  band 3x10   Red band 2x10 reps    Shoulder rows 2x10 reps   2 x 10 Red band 2x10 reps 5 sec hold    Pec stretch  Doorway, [id number] At doorway 4x30 sec   Doorway, [id number]   Scap retraction  X30 reps 5 sec hold each X 30 reps 5 sec hold  See above   Transfer education Rolling, supine to sit and sit to stand 3 mins Prone to sidelying to sitting to standing  Log roll technique for supine to sit; rolling and sliding bed mobility. Upper trap stretch 4x30 sec 4x30 sec B Verbal review   Levator scapulae stretch [id number] 4x30 sec B Verbal Review   Nu step  Level 4 x 10 mins  Level 3 x 10 mins      Time spent with patient reviewing proper muscle recruitment and technique with exercises.     MANUAL THERAPY: (x 15 mins):   Joint mobilization and Soft tissue mobilization was utilized and necessary because of the patient's restricted joint motion, painful spasm, loss of articular motion, and restricted motion of soft tissue. -- Soft tissue mobilization: thoracic paraspinals, rhomboids, and bilateral lower back          MODALITIES: (x 10 mins):        Pt. Received moist hot pack to bilateral thoracic paraspinals      HEP: As above; handouts given to patient for all exercises. Treatment/Session Summary:    Treatment Assessment:Pt. Continues to be compliant with all exercises and report less pain at the end of session. Communication/Consultation:   Posture and HEP  Equipment provided today:  None today. Recommendations/Intent for next treatment session: Next visit will focus on pain control, improve thoracic mobility, improve postural stability strength, progress BUE strength and functional mobility.     Total Treatment Billable Duration:  55 mins  Time In: 1330  Time Out: [address], PTA       Charge Capture  }Post Session Pain  PT Visit Info  [facility] Portal  MD Guidelines  Scanned Media  Benefits  MyChart    Future Appointments   Date Time Provider Mally Kang   10/31/2022  1:30 PM Juan Diego Clayton PT [facility]   11/3/2022  1:30 [facility]   11/7/2022  1:30 PM ABIGAIL Asencio O   11/10/2022  1:30 PM JOSE Padilla O   1/26/2023 11:00 AM [facility]   2/2/2023 11:15 AM Anibal Lucas MD [facility]

## 2022-10-31 ENCOUNTER — HOSPITAL ENCOUNTER (OUTPATIENT)
Dept: PHYSICAL THERAPY | Age: 72
Setting detail: RECURRING SERIES
Discharge: HOME | End: 2022-11-03
Payer: MEDICARE

## 2022-10-31 PROCEDURE — 97110 THERAPEUTIC EXERCISES: CPT

## 2022-10-31 PROCEDURE — 97140 MANUAL THERAPY 1/> REGIONS: CPT

## 2022-10-31 ASSESSMENT — PAIN SCALES - GENERAL: PAINLEVEL_OUTOF10: 3

## 2022-10-31 NOTE — PROGRESS NOTES
Johan Gomez Edmundo  : 1950  Primary: Medicare Part A And B  Secondary: Hernando Barrera @ [address] [facility] [address]  Phone: [phone]  Fax: [phone] Plan Frequency: 2 visits per week for 8 weeks    Plan of Care/Certification Expiration Date: 22 (Start of [address] 2022)      PT Visit Info:  No data recorded   Visit Count:  11   OUTPATIENT PHYSICAL THERAPY:OP NOTE TYPE: Treatment Note 10/31/2022       Episode  }Appt Desk             Treatment Diagnosis:  Degenerative disc disease, thoracic (M51.34)  Thoracic spine pain (M54.6)  Other secondary kyphosis, thoracic region (M40.14)  Low back pain (M54.5)  Neck pain (M54.2)  Medical/Referring Diagnosis:  DDD (degenerative disc disease), thoracic [M51.34]  Thoracic spine pain [M54.6]  Other secondary kyphosis, thoracic region [M40.14]  Referring Physician:  Carolina Calle MD Orders:  PT Eval and Treat   Date of Onset:  Onset Date: 22 (Onset 2022, symptoms wrosening since onset and now stable.)     Allergies:   Meperidine, Aspirin, Celecoxib, Metronidazole, Morphine, Naproxen, Nitrofurantoin macrocrystal, Red dye, and Sulfa antibiotics  Restrictions/Precautions:  Restrictions/Precautions: None  Interventions Planned (Treatment may consist of any combination of the following):    Current Treatment Recommendations: Strengthening; ROM; Balance training; Functional mobility training; Transfer training; ADL/Self-care training; Endurance training; Gait training; Stair training; Neuromuscular re-education; Manual Therapy - Soft Tissue Mobilization; Manual Therapy - Joint Manipulation; Pain management; Home exercise program; Safety education & training; Patient/Caregiver education & training; Equipment evaluation, education, & procurement; Modalities; Integrated dry needling; Therapeutic activities     Subjective Comments:  Pt reports back pain minimal today.   Initial:} 3/10Post Session:       0/10  Medications Last Reviewed:  10/31/2022  Updated Objective Findings:   Posture: Mild to moderate thoracic kyphosis. Treatment   THERAPEUTIC EXERCISE: (40 minutes):    Exercises per grid below to improve mobility, strength, and coordination. Required moderate visual, verbal, manual, and tactile cues to promote proper body alignment, promote proper body poster and proper body mechanics. Progressed resistance, range, repetitions and complexity of movement as indicated. Date:  10/27/22 Date:  10/31/22 Date:  10/20/22   Activity/Exercise Parameters Parameters Parameters   Thoracic extension Seated, stability ball, x20 Seated stability ball, x20  Seated stability ball, x20   Seated lumbar flexion X20, stability ball X 20 reps stability ball  X 20 reps stability ball    Open books x20 reps each X 20 reps each side X 20 reps each side   Posterior pelvic tilt X20 reps  X 20 reps  X20 reps   Bridge X15 reps  2 x 15 2x10 reps   Lower trunk rotation X 30 repseach 2 x 20 X 30 reps    Shoulder low rows  -green band 3x10  -- Red band 2x10 reps    Shoulder rows 2x10 reps  -- Red band 2x10 reps 5 sec hold    Pec stretch  Doorway, [id number] At doorway 4x30 sec   Doorway, [id number]   Scap retraction  X30 reps 5 sec hold each X 30 reps 5 sec hold  See above   Transfer education Rolling, supine to sit and sit to stand 3 mins Prone to sidelying to sitting to standing  Log roll technique for supine to sit; rolling and sliding bed mobility. Upper trap stretch 4x30 sec 3x30 sec B Verbal review   Levator scapulae stretch [id number] Verbal review Verbal Review   Nu step  Level 4 x 10 mins -- Level 3 x 10 mins      Time spent with patient reviewing proper muscle recruitment and technique with exercises.     MANUAL THERAPY: (x 15 mins):   Joint mobilization and Soft tissue mobilization was utilized and necessary because of the patient's restricted joint motion, painful spasm, loss of articular motion, and restricted motion of soft tissue. -- Soft tissue mobilization: thoracic paraspinals, rhomboids, and bilateral lower back          MODALITIES: (x 8 mins, no charge):        Pt. Received moist hot pack to bilateral thoracic paraspinals      HEP: As above; handouts given to patient for all exercises. Treatment/Session Summary:    Treatment Assessment:  Exercise program slightly reduced today due to increased patient reported fatigue during visit. Pt reports good symptom relief at end of treatment. Communication/Consultation:   Posture and HEP  Equipment provided today:  None today. Recommendations/Intent for next treatment session: Next visit will focus on pain control, improve thoracic mobility, improve postural stability strength, progress BUE strength and functional mobility.     Total Treatment Billable Duration:  55 mins  Time In: 3694  Time Out: 1440    Beula Shock, PT       Charge Capture  }Post Session Pain  PT Visit Info  [employer]  MD Guidelines  Scanned Media  Benefits  MyChart    Future Appointments   Date Time Provider Mally Kang   11/3/2022  1:30 PM Frida Greer [facility]   11/7/2022  1:30 PM Noy Mcnulty [facility] S[facility]   11/10/2022  1:30 PM Beula Shock, PT [facility] O   1/26/2023 11:00 AM FPA MISCELLANEOUS [facility] AMB   2/2/2023 11:15 AM Tristan Hay MD [facility] AMB

## 2022-11-03 ENCOUNTER — HOSPITAL ENCOUNTER (OUTPATIENT)
Dept: PHYSICAL THERAPY | Age: 72
Setting detail: RECURRING SERIES
Discharge: HOME | End: 2022-11-06
Payer: MEDICARE

## 2022-11-03 PROCEDURE — 97110 THERAPEUTIC EXERCISES: CPT

## 2022-11-03 PROCEDURE — 97140 MANUAL THERAPY 1/> REGIONS: CPT

## 2022-11-03 ASSESSMENT — PAIN SCALES - GENERAL: PAINLEVEL_OUTOF10: 5

## 2022-11-03 NOTE — PROGRESS NOTES
Tiburcio Litter  : 1950  Primary: Medicare Part A And B  Secondary: Hernando Barrera @ [address] [facility] [address]  Phone: [phone]  Fax: [phone] Plan Frequency: 2 visits per week for 8 weeks    Plan of Care/Certification Expiration Date: 22 (Start of [address] 2022)      PT Visit Info:  No data recorded   Visit Count:  12   OUTPATIENT PHYSICAL THERAPY:OP NOTE TYPE: Treatment Note 11/3/2022       Episode  }Appt Desk             Treatment Diagnosis:  Degenerative disc disease, thoracic (M51.34)  Thoracic spine pain (M54.6)  Other secondary kyphosis, thoracic region (M40.14)  Low back pain (M54.5)  Neck pain (M54.2)  Medical/Referring Diagnosis:  DDD (degenerative disc disease), thoracic [M51.34]  Thoracic spine pain [M54.6]  Other secondary kyphosis, thoracic region [M40.14]  Referring Physician:  Des Medina MD Orders:  PT Eval and Treat   Date of Onset:  Onset Date: 22 (Onset 2022, symptoms wrosening since onset and now stable.)     Allergies:   Meperidine, Aspirin, Celecoxib, Metronidazole, Morphine, Naproxen, Nitrofurantoin macrocrystal, Red dye, and Sulfa antibiotics  Restrictions/Precautions:  Restrictions/Precautions: None  Interventions Planned (Treatment may consist of any combination of the following):    Current Treatment Recommendations: Strengthening; ROM; Balance training; Functional mobility training; Transfer training; ADL/Self-care training; Endurance training; Gait training; Stair training; Neuromuscular re-education; Manual Therapy - Soft Tissue Mobilization; Manual Therapy - Joint Manipulation; Pain management; Home exercise program; Safety education & training; Patient/Caregiver education & training; Equipment evaluation, education, & procurement; Modalities; Integrated dry needling;  Therapeutic activities     Subjective Comments:  Pt. reported having other issues as well other than back pain  Initial:}    5/10Post Session:       0/10  Medications Last Reviewed:  11/3/2022  Updated Objective Findings:   Pt. Very guarded due to muscular back pain  Treatment   THERAPEUTIC EXERCISE: (40 minutes):    Exercises per grid below to improve mobility, strength, and coordination. Required moderate visual, verbal, manual, and tactile cues to promote proper body alignment, promote proper body poster and proper body mechanics. Progressed resistance, range, repetitions and complexity of movement as indicated. Date:  10/27/22 Date:  10/31/22 Date:  11/3/22   Activity/Exercise Parameters Parameters Parameters   Thoracic extension Seated, stability ball, x20 Seated stability ball, x20  Seated with 1/2 roll x 20   Seated lumbar flexion X20, stability ball X 20 reps stability ball  X 20 reps stability ball    Open books x20 reps each X 20 reps each side X 20 reps each side   Posterior pelvic tilt X20 reps  X 20 reps  X20 reps   Bridge X15 reps  2 x 15 2x10 reps   Lower trunk rotation X 30 repseach 2 x 20 X 30 reps    Shoulder low rows  -green band 3x10  -- Red band 2x10 reps    Shoulder rows 2x10 reps  -- Red band 2x10 reps 5 sec hold    Pec stretch  Doorway, [id number] At doorway 4x30 sec   Doorway, [id number]   Scap retraction  X30 reps 5 sec hold each X 30 reps 5 sec hold  See above   Transfer education Rolling, supine to sit and sit to stand 3 mins Prone to sidelying to sitting to standing  Log roll technique for supine to sit; rolling and sliding bed mobility. Upper trap stretch 4x30 sec 3x30 sec B Verbal review   Levator scapulae stretch [id number] Verbal review Verbal Review   Nu step  Level 4 x 10 mins -- Level 3 x 10 mins      Time spent with patient reviewing proper muscle recruitment and technique with exercises.     MANUAL THERAPY: (x 15 mins):   Joint mobilization and Soft tissue mobilization was utilized and necessary because of the patient's restricted joint motion, painful spasm, loss of articular motion, and restricted motion of soft tissue. -- Soft tissue mobilization: thoracic paraspinals, rhomboids, and bilateral lower back          MODALITIES: (none today):        None today      HEP: As above; handouts given to patient for all exercises. Treatment/Session Summary:    Treatment Assessment:  Pt. was compliant with all exercises and reported no pain at the end of session. Communication/Consultation:   Posture and HEP  Equipment provided today:  None today. Recommendations/Intent for next treatment session: Next visit will focus on pain control, improve thoracic mobility, improve postural stability strength, progress BUE strength and functional mobility.     Total Treatment Billable Duration:  55 mins  Time In: 1330  Time Out: [address] [employer], [facility]       Charge Capture  }Post Session Pain  PT Visit Info  [employer] Portal  MD Guidelines  Scanned Media  Benefits  MyChart    Future Appointments   Date Time Provider Mally Kang   11/7/2022  1:30 PM Radha Dwyer PTA [facility]   11/10/2022  1:30 PM Suzan Myers PT [facility] O   1/26/2023 11:00 AM FPA MISCELLANEOUS [facility]PR[facility] AMB   2/2/2023 11:15 AM Shayne Mercer MD [facility] AMB

## 2022-11-03 NOTE — PROGRESS NOTES
Arielle Soto  : 1950  Primary: Medicare Part A And B  Secondary: Hernando Barrera @ [address] [facility] [address]  Phone: [phone]  Fax: [phone] Plan Frequency: 2 visits per week for 8 weeks    Plan of Care/Certification Expiration Date: 22 (Start of [address] 2022)      PT Visit Info:  No data recorded   Visit Count:  12   OUTPATIENT PHYSICAL THERAPY:OP NOTE TYPE: Treatment Note 11/3/2022       Episode  }Appt Desk             Treatment Diagnosis:  Degenerative disc disease, thoracic (M51.34)  Thoracic spine pain (M54.6)  Other secondary kyphosis, thoracic region (M40.14)  Low back pain (M54.5)  Neck pain (M54.2)  Medical/Referring Diagnosis:  DDD (degenerative disc disease), thoracic [M51.34]  Thoracic spine pain [M54.6]  Other secondary kyphosis, thoracic region [M40.14]  Referring Physician:  Rhianna Domingo MD Orders:  PT Eval and Treat   Date of Onset:  Onset Date: 22 (Onset 2022, symptoms wrosening since onset and now stable.)     Allergies:   Meperidine, Aspirin, Celecoxib, Metronidazole, Morphine, Naproxen, Nitrofurantoin macrocrystal, Red dye, and Sulfa antibiotics  Restrictions/Precautions:  Restrictions/Precautions: None  Interventions Planned (Treatment may consist of any combination of the following):    Current Treatment Recommendations: Strengthening; ROM; Balance training; Functional mobility training; Transfer training; ADL/Self-care training; Endurance training; Gait training; Stair training; Neuromuscular re-education; Manual Therapy - Soft Tissue Mobilization; Manual Therapy - Joint Manipulation; Pain management; Home exercise program; Safety education & training; Patient/Caregiver education & training; Equipment evaluation, education, & procurement; Modalities; Integrated dry needling;  Therapeutic activities     Subjective Comments:  Pt. reported having other issues as well other than back pain  Initial:}    5/10Post Session:       0/10  Medications Last Reviewed:  11/3/2022  Updated Objective Findings:   Pt. Very guarded due to muscular back pain  Treatment   THERAPEUTIC EXERCISE: (40 minutes):    Exercises per grid below to improve mobility, strength, and coordination. Required moderate visual, verbal, manual, and tactile cues to promote proper body alignment, promote proper body poster and proper body mechanics. Progressed resistance, range, repetitions and complexity of movement as indicated. Date:  10/27/22 Date:  10/31/22 Date:  11/3/22   Activity/Exercise Parameters Parameters Parameters   Thoracic extension Seated, stability ball, x20 Seated stability ball, x20  Seated with 1/2 roll x 20   Seated lumbar flexion X20, stability ball X 20 reps stability ball  X 20 reps stability ball    Open books x20 reps each X 20 reps each side X 20 reps each side   Posterior pelvic tilt X20 reps  X 20 reps  X20 reps   Bridge X15 reps  2 x 15 2x10 reps   Lower trunk rotation X 30 repseach 2 x 20 X 30 reps    Shoulder low rows  -green band 3x10  -- Red band 2x10 reps    Shoulder rows 2x10 reps  -- Red band 2x10 reps 5 sec hold    Pec stretch  Doorway, [id number] At doorway 4x30 sec   Doorway, [id number]   Scap retraction  X30 reps 5 sec hold each X 30 reps 5 sec hold  See above   Transfer education Rolling, supine to sit and sit to stand 3 mins Prone to sidelying to sitting to standing  Log roll technique for supine to sit; rolling and sliding bed mobility. Upper trap stretch 4x30 sec 3x30 sec B Verbal review   Levator scapulae stretch [id number] Verbal review Verbal Review   Nu step  Level 4 x 10 mins -- Level 3 x 10 mins      Time spent with patient reviewing proper muscle recruitment and technique with exercises.     MANUAL THERAPY: (x 15 mins):   Joint mobilization and Soft tissue mobilization was utilized and necessary because of the patient's restricted joint motion, painful spasm, loss of articular motion, and restricted motion of soft tissue. -- Soft tissue mobilization: thoracic paraspinals, rhomboids, and bilateral lower back          MODALITIES: (none today):        None today      HEP: As above; handouts given to patient for all exercises. Treatment/Session Summary:    Treatment Assessment:  Pt. was compliant with all exercises and reported no pain at the end of session. Communication/Consultation:   Posture and HEP  Equipment provided today:  None today. Recommendations/Intent for next treatment session: Next visit will focus on pain control, improve thoracic mobility, improve postural stability strength, progress BUE strength and functional mobility.     Total Treatment Billable Duration:  55 mins  Time In: 1330  Time Out: [address] [employer], [facility]       Charge Capture  }Post Session Pain  PT Visit Info  [employer] Portal  MD Guidelines  Scanned Media  Benefits  MyChart    Future Appointments   Date Time Provider Mally Kang   11/7/2022  1:30 PM Tucker Jacinto PTA [facility]   11/10/2022  1:30 PM Kaycee Angel PT [facility]   1/26/2023 11:00 AM FPA MISCELLANEOUS [facility]PR[facility] AMB   2/2/2023 11:15 AM Elda Andrade MD [facility] AMB

## 2022-11-05 DIAGNOSIS — M79.7 FIBROMYALGIA: ICD-10-CM

## 2022-11-07 ENCOUNTER — HOSPITAL ENCOUNTER (OUTPATIENT)
Dept: PHYSICAL THERAPY | Age: 72
Setting detail: RECURRING SERIES
Discharge: HOME | End: 2022-11-10
Payer: MEDICARE

## 2022-11-07 PROCEDURE — 97140 MANUAL THERAPY 1/> REGIONS: CPT

## 2022-11-07 PROCEDURE — 97110 THERAPEUTIC EXERCISES: CPT

## 2022-11-07 ASSESSMENT — PAIN SCALES - GENERAL: PAINLEVEL_OUTOF10: 6

## 2022-11-07 NOTE — PROGRESS NOTES
Marysol Green  : 1950  Primary: Medicare Part A And B  Secondary: Hernando Barrera @ [address] [facility] [address]  Phone: [phone]  Fax: [phone] Plan Frequency: 2 visits per week for 8 weeks    Plan of Care/Certification Expiration Date: 22 (Start of [address] 2022)      PT Visit Info:  No data recorded   Visit Count:  13   OUTPATIENT PHYSICAL THERAPY:OP NOTE TYPE: Treatment Note 2022       Episode  }Appt Desk             Treatment Diagnosis:  Degenerative disc disease, thoracic (M51.34)  Thoracic spine pain (M54.6)  Other secondary kyphosis, thoracic region (M40.14)  Low back pain (M54.5)  Neck pain (M54.2)  Medical/Referring Diagnosis:  DDD (degenerative disc disease), thoracic [M51.34]  Thoracic spine pain [M54.6]  Other secondary kyphosis, thoracic region [M40.14]  Referring Physician:  Marshall Garnica MD Orders:  PT Eval and Treat   Date of Onset:  Onset Date: 22 (Onset 2022, symptoms wrosening since onset and now stable.)     Allergies:   Meperidine, Aspirin, Celecoxib, Metronidazole, Morphine, Naproxen, Nitrofurantoin macrocrystal, Red dye, and Sulfa antibiotics  Restrictions/Precautions:  Restrictions/Precautions: None  Interventions Planned (Treatment may consist of any combination of the following):    Current Treatment Recommendations: Strengthening; ROM; Balance training; Functional mobility training; Transfer training; ADL/Self-care training; Endurance training; Gait training; Stair training; Neuromuscular re-education; Manual Therapy - Soft Tissue Mobilization; Manual Therapy - Joint Manipulation; Pain management; Home exercise program; Safety education & training; Patient/Caregiver education & training; Equipment evaluation, education, & procurement; Modalities; Integrated dry needling;  Therapeutic activities     Subjective Comments:  Pt. reported bilateral neck pain  Initial:} 6/10Post Session:       2/10  Medications Last Reviewed:  11/7/2022  Updated Objective Findings:   Pt. Continues to be guarded due to increased neck pain   Treatment   THERAPEUTIC EXERCISE: (40 minutes):    Exercises per grid below to improve mobility, strength, and coordination. Required moderate visual, verbal, manual, and tactile cues to promote proper body alignment, promote proper body poster and proper body mechanics. Progressed resistance, range, repetitions and complexity of movement as indicated. Date:  11/7/22 Date:  10/31/22 Date:  11/3/22   Activity/Exercise Parameters Parameters Parameters   Thoracic extension Seated, stability ball, x20 Seated stability ball, x20  Seated with 1/2 roll x 20   Seated lumbar flexion X20, stability ball X 20 reps stability ball  X 20 reps stability ball    Open books x20 reps each X 20 reps each side X 20 reps each side   Posterior pelvic tilt X20 reps  X 20 reps  X20 reps   Bridge X15 reps  2 x 15 2x10 reps   Lower trunk rotation X 30 repseach 2 x 20 X 30 reps    Shoulder low rows  -green band 3x10  -- Red band 2x10 reps    Shoulder rows 2x10 reps  -- Red band 2x10 reps 5 sec hold    Pec stretch  Doorway, [id number] At doorway 4x30 sec   Doorway, [id number]   Scap retraction  X30 reps 5 sec hold each X 30 reps 5 sec hold  See above   Transfer education Rolling, supine to sit and sit to stand 3 mins Prone to sidelying to sitting to standing  Log roll technique for supine to sit; rolling and sliding bed mobility. Upper trap stretch 4x30 sec 3x30 sec B Verbal review   Levator scapulae stretch [id number] Verbal review Verbal Review   Nu step  Level 4 x 10 mins -- Level 3 x 10 mins      Time spent with patient reviewing proper muscle recruitment and technique with exercises.     MANUAL THERAPY: (x 15 mins):   Joint mobilization and Soft tissue mobilization was utilized and necessary because of the patient's restricted joint motion, painful spasm, loss of articular motion, and restricted motion of soft tissue. -- Soft tissue mobilization: thoracic paraspinals, rhomboids, and bilateral lower back          MODALITIES: (none today):        None today      HEP: As above; handouts given to patient for all exercises. Treatment/Session Summary:    Treatment Assessment:  Pt. reported less pain and tightness after session  Communication/Consultation:   Posture and HEP  Equipment provided today:  None today. Recommendations/Intent for next treatment session: Next visit will focus on pain control, improve thoracic mobility, improve postural stability strength, progress BUE strength and functional mobility.     Total Treatment Billable Duration:  55 mins  Time In: 1330  Time Out: [address] [employer], [facility]       Charge Capture  }Post Session Pain  PT Visit Info  [employer] Portal  MD Guidelines  Scanned Media  Benefits  MyChart    Future Appointments   Date Time Provider Mally Kang   11/10/2022  1:30 PM Dc Arriaga, PT [facility]   1/26/2023 11:00 AM M[facility]ISON MISCELLANEOUS [facility] AMB   2/2/2023 11:15 AM Manpreet Quinn MD [facility] AMB

## 2022-11-10 ENCOUNTER — HOSPITAL ENCOUNTER (OUTPATIENT)
Dept: PHYSICAL THERAPY | Age: 72
Setting detail: RECURRING SERIES
Discharge: HOME | End: 2022-11-13
Payer: MEDICARE

## 2022-11-10 PROCEDURE — 97140 MANUAL THERAPY 1/> REGIONS: CPT

## 2022-11-10 PROCEDURE — 97110 THERAPEUTIC EXERCISES: CPT

## 2022-11-10 ASSESSMENT — PAIN SCALES - GENERAL: PAINLEVEL_OUTOF10: 4

## 2022-11-10 NOTE — THERAPY DISCHARGE
Yen Bernal [facility]  : 1950  Primary: Medicare Part A And B  Secondary: Hernando Barrera @ [address] 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.14)  Low back pain (M54.5)  Neck pain (M54.2)  Medical/Referring Diagnosis:  DDD (degenerative disc disease), thoracic [M51.34]  Thoracic spine pain [M54.6]  Other secondary kyphosis, thoracic region [M40.14]  Referring Physician:  Jevon Lewis MD Orders:  PT Eval and Treat  Return MD Appt:  TBD by patient  Date of Onset:  Onset Date: 22 (Onset 2022, symptoms wrosening since onset and now stable.)     Allergies:  Meperidine, Aspirin, Celecoxib, Metronidazole, Morphine, Naproxen, Nitrofurantoin macrocrystal, Red dye, and Sulfa antibiotics  Restrictions/Precautions:    Restrictions/Precautions: None  Medications Last Reviewed:  11/10/2022      OBJECTIVE   Observation/Postural and Gait Assessment: Gait: Mild thoracic kyphosis, mild forward head. Palpation: Moderate tenderness to palpation noted mid thoracic paraspinals. AROM:   Thoracic extension: 5°   Thoracic flexion: WNL°   Thoracic left side bend: 22°   Thoracic right side bend: 26°   Thoracic rotation left 55°   Thoracic rotation right 45°       Strength: Gross BUE strength, 4/5    Passive Accessory Motion: NT due to patient unable to lay prone at initial evaluation.     Special Tests:  Unremarkable    Neurological Screen:  Myotomes: Key muscle strength testing through bilateral LE is Normal.  Dermatomes: Sensation testing through bilateral lower quadrants for light touch is Normal.  Reflexes: Patellar (L4) and Achilles (S1) are Normal and Normal.  Neural tension tests: Passive straight leg raise (SLR) test is NT. Crossed SLR test is NT. Slump test is NT. Femoral nerve stretch test is NT. Functional Mobility:  Sit to stand: Independent with BUE assist.  Supine to sit: Independent, Good understanding of log roll technique. ASSESSMENT   Initial Assessment:  Pt is a 70 y/o female with complaints of thoracic spine pain insidious onset 8/1/2022 while caring for her brother who was very ill. Pt reports was performing frequent lifting and bending activities involved with her brother's care which may have contributed to current symptoms. Pt denies numbness/tingling symptoms however does report radiating pain from mid thoracic region to sternum anteriorly. Pt denies shortness of breath/chest pain associated with symptoms. Pt referred by MD for PT eval and treat. Pt will benefit from skilled PT treatment to address deficits in strength, AROM, balance, gait and functional mobility in order to return to prior level of function and improve quality of life. DISCHARGE NOTE 11/10/2022: Pt has completed 14 PT treatment sessions from 9/16/2022 to 11/10/2022. Pt has made good overall progress with PT treatment demonstrating improvements in thoracic AROM and BUE strength at this time. Pt is independent with HEP and verbalizes confidence she can continue independently with HEP at this time. PT recommending discharge from PT treatment at this time, pt verbalized agreement with plan. Problem List: (Impacting functional limitations): Body Structures, Functions, Activity Limitations Requiring Skilled Therapeutic Intervention: Decreased functional mobility ; Decreased ADL status; Decreased ROM; Decreased body mechanics; Decreased tolerance to work activity;  Decreased strength; Decreased safe awareness; Decreased endurance; Decreased balance; Decreased coordination; Increased pain; Decreased posture     Therapy Prognosis:   Therapy Prognosis: Good     Assessment Complexity:      PLAN   Effective Dates: 9/16/2022 TO Plan of Care/Certification Expiration Date: 11/11/22 (Start of POC 9/16/2022)     Frequency/Duration: Plan Frequency: 2 visits per week for 8 weeks     Interventions Planned (Treatment may consist of any combination of the following):    Current Treatment Recommendations: Strengthening; ROM; Balance training; Functional mobility training; Transfer training; ADL/Self-care training; Endurance training; Gait training; Stair training; Neuromuscular re-education; Manual Therapy - Soft Tissue Mobilization; Manual Therapy - Joint Manipulation; Pain management; Home exercise program; Safety education & training; Patient/Caregiver education & training; Equipment evaluation, education, & procurement; Modalities; Integrated dry needling;  Therapeutic activities     Goals: (Goals have been discussed and agreed upon with patient.)  Short-Term Functional Goals: Time Frame: 9/16/2022 to 10/14/2022  Patient demonstrates independence with home exercise program without verbal cueing provided by therapist. - Goal met  Pt will report worst pain 3/10 or less at rest in order to return to unrestricted prolonged sitting 30 mins or greater. - Goal met  Pt will report unrestricted sleeping through night 75% of week in order to progress towards sleeping habits consistent with prior level of function. - Goal met  Pt will demonstrate independent supine to sit transfer using log roll technique in order to improve independence and safety with functional transfers. - Goal met  Discharge Goals: Time Frame: 9/16/2022 to 11/11/2022  Pt will report worst pain 3/10 or less with prolonged standing/walking 15 mins or greater in order to return to unrestricted community distance ambulation. - Partially met  Pt will demonstrated active thoracic rotation ROM 35 degrees or greater bilaterally in order to return to unrestricted twisting. - Goal met  Pt will demonstrate gross BUE strength 4/5 or greater in order to return to unrestricted functional lifting and carrying activities. - Goal met  SEBASTIAN score of 20/50 or less in order to demonstrate overall functional improvement. - Not met           Outcome Measure: Tool Used: Modified Oswestry Low Back Pain Questionnaire  Score:  Initial: 30/50  Most Recent: 23/50 (Date: 11/10/2022 )   Interpretation of Score: Each section is scored on a 0-5 scale, 5 representing the greatest disability. The scores of each section are added together for a total score of 50. Total Duration:  Time In: 1332  Time Out: 7578    Regarding Gian Wyatt's therapy, I certify that the treatment plan above will be carried out by a therapist or under their direction. Thank you for this referral,  Kathia Duong, PT     Referring Physician Signature: Layla Bermeo PA-C No Signature is Required for this note.         Post Session Pain  Charge Capture  PT Visit Info MD Guidelines  MyChart

## 2022-11-10 NOTE — PROGRESS NOTES
Josh Wyatt  : 1950  Primary: Medicare Part A And B  Secondary: Hernando Barrera @ [address] [facility] [address]  Phone: [phone]  Fax: [phone] Plan Frequency: 2 visits per week for 8 weeks    Plan of Care/Certification Expiration Date: 22 (Start of [address] 2022)      PT Visit Info:  No data recorded   Visit Count:  14   OUTPATIENT PHYSICAL THERAPY:OP NOTE TYPE: Treatment Note 11/10/2022       Episode  }Appt Desk             Treatment Diagnosis:  Degenerative disc disease, thoracic (M51.34)  Thoracic spine pain (M54.6)  Other secondary kyphosis, thoracic region (M40.14)  Low back pain (M54.5)  Neck pain (M54.2)  Medical/Referring Diagnosis:  DDD (degenerative disc disease), thoracic [M51.34]  Thoracic spine pain [M54.6]  Other secondary kyphosis, thoracic region [M40.14]  Referring Physician:  Shukri Cochran MD Orders:  PT Eval and Treat   Date of Onset:  Onset Date: 22 (Onset 2022, symptoms wrosening since onset and now stable.)     Allergies:   Meperidine, Aspirin, Celecoxib, Metronidazole, Morphine, Naproxen, Nitrofurantoin macrocrystal, Red dye, and Sulfa antibiotics  Restrictions/Precautions:  Restrictions/Precautions: None  Interventions Planned (Treatment may consist of any combination of the following):    Current Treatment Recommendations: Strengthening; ROM; Balance training; Functional mobility training; Transfer training; ADL/Self-care training; Endurance training; Gait training; Stair training; Neuromuscular re-education; Manual Therapy - Soft Tissue Mobilization; Manual Therapy - Joint Manipulation; Pain management; Home exercise program; Safety education & training; Patient/Caregiver education & training; Equipment evaluation, education, & procurement; Modalities; Integrated dry needling;  Therapeutic activities     Subjective Comments:  Pt reports overall symptoms remain intermittent however with HEP performance symptoms are well controlled. Initial:}    4/10Post Session:       1/10  Medications Last Reviewed:  11/10/2022  Updated Objective Findings:   See discharge note dated 11/10/2022. Treatment   THERAPEUTIC EXERCISE: (40 minutes):    Exercises per grid below to improve mobility, strength, and coordination. Required moderate visual, verbal, manual, and tactile cues to promote proper body alignment, promote proper body poster and proper body mechanics. Progressed resistance, range, repetitions and complexity of movement as indicated. Date:  11/7/22 Date:  11/10/22 Date:  11/3/22   Activity/Exercise Parameters Parameters Parameters   Thoracic extension Seated, stability ball, x20 Seated stability ball, x20  Seated with 1/2 roll x 20   Seated lumbar flexion X20, stability ball X 20 reps stability ball  X 20 reps stability ball    Open books x20 reps each X 20 reps each side X 20 reps each side   Posterior pelvic tilt X20 reps  X 20 reps  X20 reps   Bridge X15 reps  2 x 15 2x10 reps   Lower trunk rotation X 30 repseach 2 x 20 X 30 reps    Shoulder low rows  -green band 3x10  Green, 2x10 Red band 2x10 reps    Shoulder rows 2x10 reps  Red, 2x10 Red band 2x10 reps 5 sec hold    Pec stretch  Doorway, [id number] At doorway 4x30 sec   Doorway, [id number]   Scap retraction  X30 reps 5 sec hold each X 30 reps 5 sec hold  See above   Transfer education Rolling, supine to sit and sit to stand 3 mins Prone to sidelying to sitting to standing  Log roll technique for supine to sit; rolling and sliding bed mobility. Upper trap stretch 4x30 sec 3x30 sec B Verbal review   Levator scapulae stretch [id number] B Verbal Review   Nu step  Level 4 x 10 mins -- Level 3 x 10 mins      Time spent with patient reviewing proper muscle recruitment and technique with exercises.     MANUAL THERAPY: (x 15 mins):   Joint mobilization and Soft tissue mobilization was utilized and necessary because of the patient's restricted joint motion, painful spasm, loss of articular motion, and restricted motion of soft tissue. -- Soft tissue mobilization: thoracic paraspinals, rhomboids, and bilateral lower back       -- Joint mobs: CPA grade II/III thoracic spine all levels; UPA Right T3-5 grade II/III            MODALITIES: (none today):        None today      HEP: As above; handouts given to patient for all exercises. Treatment/Session Summary:    Treatment Assessment:  Pt demonstrates excellent understanding of HEP exercises at this time. Communication/Consultation:   Final HEP handout provided. Equipment provided today:  None today. Recommendations/Intent for next treatment session: Discharge to [facility] at this time, see discharge note dated 11/10/2022.     Total Treatment Billable Duration:  55 mins  Time In: 7298  Time Out: 7198    [facility], PT       Charge Capture  }Post Session Pain  PT Visit Info  [facility] Portal  MD Guidelines  Scanned Media  Benefits  MyChart    Future Appointments   Date Time Provider Mally Kang   1/26/2023 11:00 AM [facility]OUS [facility]   2/2/2023 11:15 AM MD [facility]ADISON Clark AMB

## 2022-11-16 RX ORDER — MAGNESIUM OXIDE 400 MG/1
TABLET ORAL
Qty: 60 TABLET | Refills: 5 | OUTPATIENT
Start: 2022-11-16

## 2022-11-30 DIAGNOSIS — K21.00 GASTRO-ESOPHAGEAL REFLUX DISEASE WITH ESOPHAGITIS, WITHOUT BLEEDING: ICD-10-CM

## 2022-11-30 RX ORDER — ESOMEPRAZOLE MAGNESIUM 40 MG/1
CAPSULE, DELAYED RELEASE ORAL
Qty: 90 CAPSULE | Refills: 1 | Status: SHIPPED | OUTPATIENT
Start: 2022-11-30

## 2022-12-08 NOTE — PROGRESS NOTES
09/03/19 1901   NIH Stroke Scale   Interval Other (comment)  (shift report with Sebastien Foster)   LOC 0   LOC Questions 0   LOC Commands 0   Best Gaze 0   Visual 1   Facial Palsy 0   Motor Right Arm 0   Motor Left Arm 0   Motor Right Leg 0   Motor Left Leg 0   Limb Ataxia 0   Sensory 0   Best Language 0   Dysarthria 0   Extinction and Inattention 0   Total 1 Never smoker

## 2023-01-25 ENCOUNTER — TELEPHONE (OUTPATIENT)
Dept: FAMILY MEDICINE CLINIC | Facility: CLINIC | Age: 73
End: 2023-01-25

## 2023-01-25 DIAGNOSIS — G45.9 TIA (TRANSIENT ISCHEMIC ATTACK): ICD-10-CM

## 2023-01-25 DIAGNOSIS — E78.2 MIXED HYPERLIPIDEMIA: Primary | ICD-10-CM

## 2023-01-26 ENCOUNTER — NURSE ONLY (OUTPATIENT)
Dept: FAMILY MEDICINE CLINIC | Facility: CLINIC | Age: 73
End: 2023-01-26

## 2023-01-26 DIAGNOSIS — E78.2 MIXED HYPERLIPIDEMIA: ICD-10-CM

## 2023-01-26 DIAGNOSIS — G45.9 TIA (TRANSIENT ISCHEMIC ATTACK): ICD-10-CM

## 2023-01-26 LAB
ALBUMIN SERPL-MCNC: 3.7 G/DL (ref 3.2–4.6)
ALBUMIN/GLOB SERPL: 1 (ref 0.4–1.6)
ALP SERPL-CCNC: 99 U/L (ref 50–136)
ALT SERPL-CCNC: 13 U/L (ref 12–65)
ANION GAP SERPL CALC-SCNC: 10 MMOL/L (ref 2–11)
AST SERPL-CCNC: 9 U/L (ref 15–37)
BASOPHILS # BLD: 0.1 K/UL (ref 0–0.2)
BASOPHILS NFR BLD: 1 % (ref 0–2)
BILIRUB SERPL-MCNC: 0.4 MG/DL (ref 0.2–1.1)
BUN SERPL-MCNC: 18 MG/DL (ref 8–23)
CALCIUM SERPL-MCNC: 9.5 MG/DL (ref 8.3–10.4)
CHLORIDE SERPL-SCNC: 105 MMOL/L (ref 101–110)
CHOLEST SERPL-MCNC: 163 MG/DL
CO2 SERPL-SCNC: 25 MMOL/L (ref 21–32)
CREAT SERPL-MCNC: 1 MG/DL (ref 0.6–1)
DIFFERENTIAL METHOD BLD: ABNORMAL
EOSINOPHIL # BLD: 0.1 K/UL (ref 0–0.8)
EOSINOPHIL NFR BLD: 1 % (ref 0.5–7.8)
ERYTHROCYTE [DISTWIDTH] IN BLOOD BY AUTOMATED COUNT: 13.6 % (ref 11.9–14.6)
GLOBULIN SER CALC-MCNC: 3.8 G/DL (ref 2.8–4.5)
GLUCOSE SERPL-MCNC: 107 MG/DL (ref 65–100)
HCT VFR BLD AUTO: 47.5 % (ref 35.8–46.3)
HDLC SERPL-MCNC: 63 MG/DL (ref 40–60)
HDLC SERPL: 2.6
HGB BLD-MCNC: 15.5 G/DL (ref 11.7–15.4)
IMM GRANULOCYTES # BLD AUTO: 0 K/UL (ref 0–0.5)
IMM GRANULOCYTES NFR BLD AUTO: 0 % (ref 0–5)
LDLC SERPL CALC-MCNC: 72 MG/DL
LYMPHOCYTES # BLD: 2.1 K/UL (ref 0.5–4.6)
LYMPHOCYTES NFR BLD: 29 % (ref 13–44)
MCH RBC QN AUTO: 31.6 PG (ref 26.1–32.9)
MCHC RBC AUTO-ENTMCNC: 32.6 G/DL (ref 31.4–35)
MCV RBC AUTO: 96.7 FL (ref 82–102)
MONOCYTES # BLD: 0.4 K/UL (ref 0.1–1.3)
MONOCYTES NFR BLD: 6 % (ref 4–12)
NEUTS SEG # BLD: 4.6 K/UL (ref 1.7–8.2)
NEUTS SEG NFR BLD: 63 % (ref 43–78)
NRBC # BLD: 0 K/UL (ref 0–0.2)
PLATELET # BLD AUTO: 184 K/UL (ref 150–450)
PMV BLD AUTO: 9 FL (ref 9.4–12.3)
POTASSIUM SERPL-SCNC: 4.5 MMOL/L (ref 3.5–5.1)
PROT SERPL-MCNC: 7.5 G/DL (ref 6.3–8.2)
RBC # BLD AUTO: 4.91 M/UL (ref 4.05–5.2)
SODIUM SERPL-SCNC: 140 MMOL/L (ref 133–143)
TRIGL SERPL-MCNC: 140 MG/DL (ref 35–150)
VLDLC SERPL CALC-MCNC: 28 MG/DL (ref 6–23)
WBC # BLD AUTO: 7.2 K/UL (ref 4.3–11.1)

## 2023-02-01 DIAGNOSIS — E78.2 MIXED HYPERLIPIDEMIA: ICD-10-CM

## 2023-02-01 RX ORDER — PRAVASTATIN SODIUM 20 MG
TABLET ORAL
Qty: 90 TABLET | Refills: 3 | Status: SHIPPED | OUTPATIENT
Start: 2023-02-01

## 2023-02-08 ENCOUNTER — OFFICE VISIT (OUTPATIENT)
Dept: FAMILY MEDICINE CLINIC | Facility: CLINIC | Age: 73
End: 2023-02-08
Payer: MEDICARE

## 2023-02-08 VITALS
OXYGEN SATURATION: 96 % | TEMPERATURE: 98.4 F | RESPIRATION RATE: 18 BRPM | DIASTOLIC BLOOD PRESSURE: 66 MMHG | WEIGHT: 180.9 LBS | HEART RATE: 83 BPM | BODY MASS INDEX: 35.51 KG/M2 | HEIGHT: 60 IN | SYSTOLIC BLOOD PRESSURE: 109 MMHG

## 2023-02-08 DIAGNOSIS — Z12.31 ENCOUNTER FOR SCREENING MAMMOGRAM FOR MALIGNANT NEOPLASM OF BREAST: ICD-10-CM

## 2023-02-08 DIAGNOSIS — K21.00 GASTRO-ESOPHAGEAL REFLUX DISEASE WITH ESOPHAGITIS, WITHOUT BLEEDING: ICD-10-CM

## 2023-02-08 DIAGNOSIS — Z12.11 COLON CANCER SCREENING: ICD-10-CM

## 2023-02-08 DIAGNOSIS — Z00.00 MEDICARE ANNUAL WELLNESS VISIT, SUBSEQUENT: Primary | ICD-10-CM

## 2023-02-08 DIAGNOSIS — R11.0 NAUSEA: ICD-10-CM

## 2023-02-08 DIAGNOSIS — M79.7 FIBROMYALGIA: ICD-10-CM

## 2023-02-08 PROBLEM — N17.9 ACUTE RENAL FAILURE (ARF) (HCC): Status: RESOLVED | Noted: 2019-08-27 | Resolved: 2023-02-08

## 2023-02-08 PROBLEM — R77.8 ELEVATED TROPONIN: Status: RESOLVED | Noted: 2019-08-27 | Resolved: 2023-02-08

## 2023-02-08 PROBLEM — R51.9 HEADACHE: Status: RESOLVED | Noted: 2019-08-27 | Resolved: 2023-02-08

## 2023-02-08 PROBLEM — R30.0 DYSURIA: Status: RESOLVED | Noted: 2019-03-08 | Resolved: 2023-02-08

## 2023-02-08 PROBLEM — K92.1 BLOOD IN THE STOOL: Status: RESOLVED | Noted: 2019-03-08 | Resolved: 2023-02-08

## 2023-02-08 PROBLEM — R79.89 ELEVATED TROPONIN: Status: RESOLVED | Noted: 2019-08-27 | Resolved: 2023-02-08

## 2023-02-08 PROBLEM — R07.9 CHEST PAIN: Status: RESOLVED | Noted: 2019-08-27 | Resolved: 2023-02-08

## 2023-02-08 PROCEDURE — G8484 FLU IMMUNIZE NO ADMIN: HCPCS | Performed by: FAMILY MEDICINE

## 2023-02-08 PROCEDURE — G0439 PPPS, SUBSEQ VISIT: HCPCS | Performed by: FAMILY MEDICINE

## 2023-02-08 PROCEDURE — 1123F ACP DISCUSS/DSCN MKR DOCD: CPT | Performed by: FAMILY MEDICINE

## 2023-02-08 PROCEDURE — 3017F COLORECTAL CA SCREEN DOC REV: CPT | Performed by: FAMILY MEDICINE

## 2023-02-08 RX ORDER — TRAMADOL HYDROCHLORIDE 50 MG/1
50 TABLET ORAL DAILY PRN
Qty: 30 TABLET | Refills: 1 | Status: SHIPPED | OUTPATIENT
Start: 2023-02-08 | End: 2023-03-10

## 2023-02-08 RX ORDER — ALBUTEROL SULFATE 90 UG/1
1 AEROSOL, METERED RESPIRATORY (INHALATION) EVERY 4 HOURS PRN
Qty: 18 G | Status: CANCELLED | OUTPATIENT
Start: 2023-02-08

## 2023-02-08 RX ORDER — ESOMEPRAZOLE MAGNESIUM 40 MG/1
CAPSULE, DELAYED RELEASE ORAL
Qty: 90 CAPSULE | Refills: 1 | Status: CANCELLED | OUTPATIENT
Start: 2023-02-08

## 2023-02-08 RX ORDER — PANTOPRAZOLE SODIUM 40 MG/1
40 TABLET, DELAYED RELEASE ORAL DAILY
Qty: 90 TABLET | Refills: 3 | Status: SHIPPED | OUTPATIENT
Start: 2023-02-08

## 2023-02-08 RX ORDER — GABAPENTIN 600 MG/1
600 TABLET ORAL DAILY
Qty: 90 TABLET | Refills: 3 | Status: SHIPPED | OUTPATIENT
Start: 2023-02-08 | End: 2023-05-09

## 2023-02-08 RX ORDER — DULOXETIN HYDROCHLORIDE 30 MG/1
30 CAPSULE, DELAYED RELEASE ORAL DAILY
Qty: 90 CAPSULE | Refills: 3 | Status: SHIPPED | OUTPATIENT
Start: 2023-02-08

## 2023-02-08 RX ORDER — ALBUTEROL SULFATE 2.5 MG/3ML
2.5 SOLUTION RESPIRATORY (INHALATION) EVERY 4 HOURS PRN
Qty: 120 EACH | Refills: 3 | Status: SHIPPED | OUTPATIENT
Start: 2023-02-08

## 2023-02-08 RX ORDER — PROMETHAZINE HYDROCHLORIDE 25 MG/1
25 TABLET ORAL DAILY PRN
Qty: 30 TABLET | Refills: 1 | Status: SHIPPED | OUTPATIENT
Start: 2023-02-08 | End: 2023-03-10

## 2023-02-08 RX ORDER — DULOXETIN HYDROCHLORIDE 30 MG/1
30 CAPSULE, DELAYED RELEASE ORAL DAILY
Qty: 30 CAPSULE | Refills: 11 | Status: CANCELLED | OUTPATIENT
Start: 2023-02-08

## 2023-02-08 SDOH — ECONOMIC STABILITY: FOOD INSECURITY: WITHIN THE PAST 12 MONTHS, THE FOOD YOU BOUGHT JUST DIDN'T LAST AND YOU DIDN'T HAVE MONEY TO GET MORE.: NEVER TRUE

## 2023-02-08 SDOH — ECONOMIC STABILITY: INCOME INSECURITY: HOW HARD IS IT FOR YOU TO PAY FOR THE VERY BASICS LIKE FOOD, HOUSING, MEDICAL CARE, AND HEATING?: NOT HARD AT ALL

## 2023-02-08 SDOH — ECONOMIC STABILITY: FOOD INSECURITY: WITHIN THE PAST 12 MONTHS, YOU WORRIED THAT YOUR FOOD WOULD RUN OUT BEFORE YOU GOT MONEY TO BUY MORE.: NEVER TRUE

## 2023-02-08 SDOH — ECONOMIC STABILITY: HOUSING INSECURITY
IN THE LAST 12 MONTHS, WAS THERE A TIME WHEN YOU DID NOT HAVE A STEADY PLACE TO SLEEP OR SLEPT IN A SHELTER (INCLUDING NOW)?: NO

## 2023-02-08 ASSESSMENT — PATIENT HEALTH QUESTIONNAIRE - PHQ9
2. FEELING DOWN, DEPRESSED OR HOPELESS: 0
1. LITTLE INTEREST OR PLEASURE IN DOING THINGS: 0
6. FEELING BAD ABOUT YOURSELF - OR THAT YOU ARE A FAILURE OR HAVE LET YOURSELF OR YOUR FAMILY DOWN: 0
SUM OF ALL RESPONSES TO PHQ QUESTIONS 1-9: 0
4. FEELING TIRED OR HAVING LITTLE ENERGY: 0
SUM OF ALL RESPONSES TO PHQ9 QUESTIONS 1 & 2: 0
SUM OF ALL RESPONSES TO PHQ QUESTIONS 1-9: 0
8. MOVING OR SPEAKING SO SLOWLY THAT OTHER PEOPLE COULD HAVE NOTICED. OR THE OPPOSITE, BEING SO FIGETY OR RESTLESS THAT YOU HAVE BEEN MOVING AROUND A LOT MORE THAN USUAL: 0
SUM OF ALL RESPONSES TO PHQ QUESTIONS 1-9: 0
5. POOR APPETITE OR OVEREATING: 0
10. IF YOU CHECKED OFF ANY PROBLEMS, HOW DIFFICULT HAVE THESE PROBLEMS MADE IT FOR YOU TO DO YOUR WORK, TAKE CARE OF THINGS AT HOME, OR GET ALONG WITH OTHER PEOPLE: 0
3. TROUBLE FALLING OR STAYING ASLEEP: 0
SUM OF ALL RESPONSES TO PHQ QUESTIONS 1-9: 0
7. TROUBLE CONCENTRATING ON THINGS, SUCH AS READING THE NEWSPAPER OR WATCHING TELEVISION: 0
9. THOUGHTS THAT YOU WOULD BE BETTER OFF DEAD, OR OF HURTING YOURSELF: 0

## 2023-02-08 ASSESSMENT — LIFESTYLE VARIABLES
HOW MANY STANDARD DRINKS CONTAINING ALCOHOL DO YOU HAVE ON A TYPICAL DAY: 1 OR 2
HOW OFTEN DO YOU HAVE A DRINK CONTAINING ALCOHOL: MONTHLY OR LESS

## 2023-02-08 NOTE — PROGRESS NOTES
Medicare Annual Wellness Visit    Minerva Ochoa is here for Medicare AWV         Assessment & Plan   Medicare annual wellness visit, subsequent  -     albuterol (PROVENTIL) (2.5 MG/3ML) 0.083% nebulizer solution; Take 3 mLs by nebulization every 4 hours as needed for Wheezing, Disp-120 each, R-3Normal  Gastro-esophageal reflux disease with esophagitis, without bleeding  -     pantoprazole (PROTONIX) 40 MG tablet; Take 1 tablet by mouth daily, Disp-90 tablet, R-3Normal  Encounter for screening mammogram for malignant neoplasm of breast  -     SHEYLA DIGITAL SCREEN W OR WO CAD BILATERAL; Future  Colon cancer screening  Fibromyalgia  -     gabapentin (NEURONTIN) 600 MG tablet; Take 1 tablet by mouth daily for 90 days. , Disp-90 tablet, R-3Normal  -     DULoxetine (CYMBALTA) 30 MG extended release capsule; Take 1 capsule by mouth daily, Disp-90 capsule, R-3Normal  -     traMADol (ULTRAM) 50 MG tablet; Take 1 tablet by mouth daily as needed for Pain for up to 30 days. Intended supply: 5 days. Take lowest dose possible to manage pain Max Daily Amount: 50 mg, Disp-30 tablet, R-1Normal  Nausea  -     promethazine (PHENERGAN) 25 MG tablet; Take 1 tablet by mouth daily as needed for Nausea, Disp-30 tablet, R-1Normal      Recommendations for Preventive Services Due: see orders and patient instructions/AVS.  Recommended screening schedule for the next 5-10 years is provided to the patient in written form: see Patient Instructions/AVS.     Return in about 1 year (around 2/8/2024) for AWV, wait for new Dr. or give Drs list.     Subjective   72 yo female with underlying h/o TIA, GERD, h/o hemorrhoids. Here for AWV. Lately she has had a relapse of hemorrhoids again , patient stated when hemorrhoid burst it had a discharge that was clearlish slim, looked like it had infection in it    GERD:  Is using omeprazole.         Patient's complete Health Risk Assessment and screening values have been reviewed and are found in Flowsheets. The following problems were reviewed today and where indicated follow up appointments were made and/or referrals ordered. Positive Risk Factor Screenings with Interventions:            {IMPORTANT! Click here to document Controlled Substance Monitoring and then refresh note (Optional):[id number]}    Opioid Risk: (Low risk score <55) Opioid risk score: 7    Patient is low risk for opioid use disorder or overdose. Last PDMP [place] as Reviewed:  Review User Review Instant Review Result                  Weight and Activity:  Physical Activity: Not on file           Body mass index: (!) 35.33                Objective   Vitals:    02/08/23 1502   BP: 109/66   Pulse: 83   Resp: 18   Temp: 98.4 °F (36.9 °C)   TempSrc: Temporal   SpO2: 96%   Weight: 180 lb 14.4 oz (82.1 kg)   Height: 5' (1.524 m)      Body mass index is 35.33 kg/m². Physical Exam  Vitals and nursing note reviewed. Constitutional:       General: She is not in acute distress. Appearance: She is obese. She is not ill-appearing or toxic-appearing. HENT:      Head: Normocephalic and atraumatic. Right Ear: Tympanic membrane and ear canal normal.      Left Ear: Tympanic membrane and ear canal normal.   Cardiovascular:      Rate and Rhythm: Normal rate and regular rhythm. Pulmonary:      Effort: Pulmonary effort is normal.      Breath sounds: Normal breath sounds. Neurological:      General: No focal deficit present. Mental Status: She is alert and oriented to person, place, and time. Psychiatric:         Mood and Affect: Mood normal.         Behavior: Behavior normal.         Thought Content: Thought content normal.         Judgment: Judgment normal.            Allergies   Allergen Reactions    Meperidine Other (See Comments)     Nausea and ill    Aspirin Rash and Swelling    Celecoxib Rash and Swelling    Metronidazole Rash     Unknown; possibly a rash.     Morphine Other (See Comments)    Naproxen Rash    Nitrofurantoin Macrocrystal Nausea And Vomiting    Red Dye Rash    Sulfa Antibiotics Rash     Prior to Visit Medications    Medication Sig Taking? Authorizing Provider   albuterol (PROVENTIL) (2.5 MG/3ML) 0.083% nebulizer solution Take 3 mLs by nebulization every 4 hours as needed for Wheezing Yes Elisa Foss MD   gabapentin (NEURONTIN) 600 MG tablet Take 1 tablet by mouth daily for 90 days. Yes Elisa Foss MD   DULoxetine (CYMBALTA) 30 MG extended release capsule Take 1 capsule by mouth daily Yes Elisa Foss MD   pantoprazole (PROTONIX) 40 MG tablet Take 1 tablet by mouth daily Yes Elisa Foss MD   promethazine (PHENERGAN) 25 MG tablet Take 1 tablet by mouth daily as needed for Nausea Yes Elisa Foss MD   traMADol (ULTRAM) 50 MG tablet Take 1 tablet by mouth daily as needed for Pain for up to 30 days. Intended supply: 5 days.  Take lowest dose possible to manage pain Max Daily Amount: 50 mg Yes Elisa Foss MD   pravastatin (PRAVACHOL) 20 MG tablet TAKE 1 TABLET BY MOUTH NIGHTLY Yes Elisa Foss MD   magnesium oxide (MAG-OX) 400 MG tablet  Yes Historical Provider, MD   acetaminophen (TYLENOL) 500 MG tablet Take 1 tablet by mouth 2 times daily as needed Yes Historical Provider, MD   cyclobenzaprine (FLEXERIL) 5 MG tablet TAKE 1 TABLET (5 MG) BY MOUTH 3 (THREE) TIMES A DAY FOR MUSCLE SPASM Yes Skip Gibson PA-C   Cholecalciferol 50 MCG (2000 UT) TABS Take by mouth Yes Ar Automatic Reconciliation   cyanocobalamin 1000 MCG tablet Take 1,000 mcg by mouth daily Yes Ar Automatic Reconciliation   folic acid (FOLVITE) 1 MG tablet TAKE 1 TABLET BY MOUTH EVERY DAY Yes Ar Automatic Reconciliation   hydrocortisone 2.5 % cream Place rectally 4 times daily Yes Ar Automatic Reconciliation   montelukast (SINGULAIR) 10 MG tablet Take 10 mg by mouth every evening Yes Ar Automatic Reconciliation   potassium chloride (KLOR-CON M) 20 MEQ extended release tablet Take 20 mEq by mouth daily Yes Ar Automatic Reconciliation   tacrolimus (PROTOPIC) 0.1 % ointment Apply topically 2 times daily Yes Ar Automatic Reconciliation   triamcinolone (ARISTOCORT) 0.5 % ointment APPLY TO AFFECTED AREA TWICE A DAY * USE THIN LAYER Yes Ar Automatic Reconciliation       CareTeam (Including outside providers/suppliers regularly involved in providing care):   Patient Care Team:  Lakshmi Johnson MD as PCP - General  Lakshmi Johnson MD as PCP - Empaneled Provider  infectious disease (h/o abscess of liver in 2019), (doesn't need to see anymore)  Eye Dr. Hollie Chaudhry and updated this visit:  Tobacco  Allergies  Meds  Problems  Med Hx  Surg Hx  Soc Hx  Fam Hx               Lakshmi Johnson MD, MD

## 2023-02-08 NOTE — PATIENT INSTRUCTIONS
Advance Directives: Care Instructions  Overview  An advance directive is a legal way to state your wishes at the end of your life. It tells your family and your doctor what to do if you can't say what you want. There are two main types of advance directives. You can change them any time your wishes change. Living will. This form tells your family and your doctor your wishes about life support and other treatment. The form is also called a declaration. Medical power of . This form lets you name a person to make treatment decisions for you when you can't speak for yourself. This person is called a health care agent (health care proxy, health care surrogate). The form is also called a durable power of  for health care. If you do not have an advance directive, decisions about your medical care may be made by a family member, or by a doctor or a  who doesn't know you. It may help to think of an advance directive as a gift to the people who care for you. If you have one, they won't have to make tough decisions by themselves. For more information, including forms for your state, see the [address] website (www.caringinfo.org/planning/advance-directives/). Follow-up care is a key part of your treatment and safety. Be sure to make and go to all appointments, and call your doctor if you are having problems. It's also a good idea to know your test results and keep a list of the medicines you take. What should you include in an advance directive? Many states have a unique advance directive form. (It may ask you to address specific issues.) Or you might use a universal form that's approved by many states. If your form doesn't tell you what to address, it may be hard to know what to include in your advance directive. Use the questions below to help you get started. Who do you want to make decisions about your medical care if you are not able to?   What life-support measures do you want if you have a serious illness that gets worse over time or can't be cured? What are you most afraid of that might happen? (Maybe you're afraid of having pain, losing your independence, or being kept alive by machines.)  Where would you prefer to die? (Your home? A hospital? A nursing home?)  Do you want to donate your organs when you die? Do you want certain Mandaen practices performed before you die? When should you call for help? Be sure to contact your doctor if you have any questions. Where can you learn more? Go to http://www.regalado.com/ and enter R264 to learn more about \"Advance Directives: Care Instructions. \"  Current as of: June 16, 2022               Content Version: 13.5  © 7217-1544 Healthwise, [employer]. Care instructions adapted under license by [facility] ([facility]). If you have questions about a medical condition or this instruction, always ask your healthcare professional. [place] [address] any warranty or liability for your use of this information. Starting a Weight Loss Plan: Care Instructions  Overview     If you're thinking about losing weight, it can be hard to know where to start. Your doctor can help you set up a weight loss plan that best meets your needs. You may want to take a class on nutrition or exercise, or you could join a weight loss support group. If you have questions about how to make changes to your eating or exercise habits, ask your doctor about seeing a registered dietitian or an exercise specialist.  It can be a big challenge to lose weight. But you don't have to make huge changes at once. Make small changes, and stick with them. When those changes become habit, add a few more changes. If you don't think you're ready to make changes right now, try to pick a date in the future. Make an appointment to see your doctor to discuss whether the time is right for you to start a plan. Follow-up care is a key part of your treatment and safety.  Be sure to make and go to all appointments, and call your doctor if you are having problems. It's also a good idea to know your test results and keep a list of the medicines you take. How can you care for yourself at home? Set realistic goals. Many people expect to lose much more weight than is likely. A weight loss of 5% to 10% of your body weight may be enough to improve your health. Get family and friends involved to provide support. Talk to them about why you are trying to lose weight, and ask them to help. They can help by participating in exercise and having meals with you, even if they may be eating something different. Find what works best for you. If you do not have time or do not like to cook, a program that offers meal replacement bars or shakes may be better for you. Or if you like to prepare meals, finding a plan that includes daily menus and recipes may be best.  Ask your doctor about other health professionals who can help you achieve your weight loss goals. A dietitian can help you make healthy changes in your diet. An exercise specialist or  can help you develop a safe and effective exercise program.  A counselor or psychiatrist can help you cope with issues such as depression, anxiety, or family problems that can make it hard to focus on weight loss. Consider joining a support group for people who are trying to lose weight. Your doctor can suggest groups in your area. Where can you learn more? Go to http://www.woods.com/ and enter U357 to learn more about \"Starting a Weight Loss Plan: Care Instructions. \"  Current as of: August 25, 2022               Content Version: 13.5  © 2006-2022 Healthwise, Incorporated. Care instructions adapted under license by [facility] ([facility]).  If you have questions about a medical condition or this instruction, always ask your healthcare professional. [address] any warranty or liability for your use of this information. A Healthy Heart: Care Instructions  Your Care Instructions     Coronary artery disease, also called heart disease, occurs when a substance called plaque builds up in the vessels that supply oxygen-rich blood to your heart muscle. This can narrow the blood vessels and reduce blood flow. A heart attack happens when blood flow is completely blocked. A high-fat diet, smoking, and other factors increase the risk of heart disease. Your doctor has found that you have a chance of having heart disease. You can do lots of things to keep your heart healthy. It may not be easy, but you can change your diet, exercise more, and quit smoking. These steps really work to lower your chance of heart disease. Follow-up care is a key part of your treatment and safety. Be sure to make and go to all appointments, and call your doctor if you are having problems. It's also a good idea to know your test results and keep a list of the medicines you take. How can you care for yourself at home? Diet    Use less salt when you cook and eat. This helps lower your blood pressure. Taste food before salting. Add only a little salt when you think you need it. With time, your taste buds will adjust to less salt.     Eat fewer snack items, fast foods, canned soups, and other high-salt, high-fat, processed foods.     Read food labels and try to avoid saturated and trans fats. They increase your risk of heart disease by raising cholesterol levels.     Limit the amount of solid fat-butter, margarine, and shortening-you eat. Use olive, peanut, or canola oil when you cook. Bake, broil, and steam foods instead of frying them.     Eat a variety of fruit and vegetables every day. Dark green, deep orange, red, or yellow fruits and vegetables are especially good for you. Examples include spinach, carrots, peaches, and berries.     Foods high in fiber can reduce your cholesterol and provide important vitamins and minerals.  High-fiber foods include whole-grain cereals and breads, oatmeal, beans, brown rice, citrus fruits, and apples.     Eat lean proteins. Heart-healthy proteins include seafood, lean meats and poultry, eggs, beans, peas, nuts, seeds, and soy products.     Limit drinks and foods with added sugar. These include candy, desserts, and soda pop. Lifestyle changes    If your doctor recommends it, get more exercise. Walking is a good choice. Bit by bit, increase the amount you walk every day. Try for at least 30 minutes on most days of the week. You also may want to swim, bike, or do other activities.     Do not smoke. If you need help quitting, talk to your doctor about stop-smoking programs and medicines. These can increase your chances of quitting for good. Quitting smoking may be the most important step you can take to protect your heart. It is never too late to quit.     Limit alcohol to 2 drinks a day for men and 1 drink a day for women. Too much alcohol can cause health problems.     Manage other health problems such as diabetes, high blood pressure, and high cholesterol. If you think you may have a problem with alcohol or drug use, talk to your doctor. Medicines    Take your medicines exactly as prescribed. Call your doctor if you think you are having a problem with your medicine.     If your doctor recommends aspirin, take the amount directed each day. Make sure you take aspirin and not another kind of pain reliever, such as acetaminophen (Tylenol). When should you call for help? Call 911 if you have symptoms of a heart attack. These may include:    Chest pain or pressure, or a strange feeling in the chest.     Sweating.     Shortness of breath.     Pain, pressure, or a strange feeling in the back, neck, jaw, or upper belly or in one or both shoulders or arms.     Lightheadedness or sudden weakness.     A fast or irregular heartbeat.    After you call 911, the  may tell you to chew 1 adult-strength or 2 to 4 low-dose aspirin. Wait for an ambulance. Do not try to drive yourself. Watch closely for changes in your health, and be sure to contact your doctor if you have any problems. Where can you learn more? Go to http://www.regalado.com/ and enter F075 to learn more about \"A Healthy Heart: Care Instructions. \"  Current as of: September 7, 2022               Content Version: 13.5  © 2006-2022 [employer]. Care instructions adapted under license by [facility] ([facility]). If you have questions about a medical condition or this instruction, always ask your healthcare professional. [place] [address] any warranty or liability for your use of this information. Personalized Preventive Plan for Vianey [place] - 2/8/2023  Medicare offers a range of preventive health benefits. Some of the tests and screenings are paid in full while other may be subject to a deductible, co-insurance, and/or copay. Some of these benefits include a comprehensive review of your medical history including lifestyle, illnesses that may run in your family, and various assessments and screenings as appropriate. After reviewing your medical record and screening and assessments performed today your provider may have ordered immunizations, labs, imaging, and/or referrals for you. A list of these orders (if applicable) as well as your Preventive Care list are included within your After Visit Summary for your review. Other Preventive Recommendations:    A preventive eye exam performed by an eye specialist is recommended every 1-2 years to screen for glaucoma; cataracts, macular degeneration, and other eye disorders. A preventive dental visit is recommended every 6 months. Try to get at least 150 minutes of exercise per week or 10,000 steps per day on a pedometer . Order or download the FREE \"Exercise & Physical Activity: Your Everyday Guide\" from [employer].  Call 2-804.149.3459 or search [employer] online. You need 5953-7207 mg of calcium and 1939-7367 IU of vitamin D per day. It is possible to meet your calcium requirement with diet alone, but a vitamin D supplement is usually necessary to meet this goal.  When exposed to the sun, use a sunscreen that protects against both UVA and UVB radiation with an SPF of 30 or greater. Reapply every 2 to 3 hours or after sweating, drying off with a towel, or swimming. Always wear a seat belt when traveling in a car. Always wear a helmet when riding a bicycle or motorcycle.

## 2023-02-13 DIAGNOSIS — J30.9 ALLERGIC RHINITIS, UNSPECIFIED: ICD-10-CM

## 2023-02-13 DIAGNOSIS — E87.6 HYPOKALEMIA: ICD-10-CM

## 2023-02-14 RX ORDER — POTASSIUM CHLORIDE 1500 MG/1
TABLET, EXTENDED RELEASE ORAL
Qty: 90 TABLET | Refills: 3 | Status: SHIPPED | OUTPATIENT
Start: 2023-02-14

## 2023-02-14 RX ORDER — MONTELUKAST SODIUM 10 MG/1
TABLET ORAL
Qty: 90 TABLET | Refills: 3 | Status: SHIPPED | OUTPATIENT
Start: 2023-02-14

## 2023-03-10 PROBLEM — Z00.00 MEDICARE ANNUAL WELLNESS VISIT, SUBSEQUENT: Status: RESOLVED | Noted: 2023-02-08 | Resolved: 2023-03-10

## 2023-03-14 DIAGNOSIS — D52.9 FOLATE DEFICIENCY ANEMIA, UNSPECIFIED: ICD-10-CM

## 2023-03-14 RX ORDER — FOLIC ACID 1 MG/1
TABLET ORAL
Qty: 90 TABLET | Refills: 3 | Status: SHIPPED | OUTPATIENT
Start: 2023-03-14

## 2023-03-14 RX ORDER — MAGNESIUM OXIDE 400 MG/1
TABLET ORAL
Qty: 60 TABLET | Refills: 5 | Status: SHIPPED | OUTPATIENT
Start: 2023-03-14

## 2023-06-09 ENCOUNTER — HOSPITAL ENCOUNTER (INPATIENT)
Age: 73
LOS: 4 days | Discharge: HOME | DRG: 379 | End: 2023-06-13
Attending: EMERGENCY MEDICINE | Admitting: FAMILY MEDICINE
Payer: MEDICARE

## 2023-06-09 ENCOUNTER — APPOINTMENT (OUTPATIENT)
Dept: CT IMAGING | Age: 73
DRG: 379 | End: 2023-06-09
Payer: MEDICARE

## 2023-06-09 DIAGNOSIS — K57.20 COLONIC DIVERTICULAR ABSCESS: Primary | ICD-10-CM

## 2023-06-09 DIAGNOSIS — R79.89 ELEVATED LFTS: ICD-10-CM

## 2023-06-09 PROBLEM — K75.0 BACTERIAL LIVER ABSCESS: Status: RESOLVED | Noted: 2019-09-04 | Resolved: 2023-06-09

## 2023-06-09 PROBLEM — J30.9 ALLERGIC RHINITIS: Chronic | Status: ACTIVE | Noted: 2023-06-09

## 2023-06-09 PROBLEM — I95.1 ORTHOSTATIC HYPOTENSION: Status: ACTIVE | Noted: 2019-09-04

## 2023-06-09 PROBLEM — K21.9 GASTRO-ESOPHAGEAL REFLUX DISEASE WITHOUT ESOPHAGITIS: Status: ACTIVE | Noted: 2019-09-04

## 2023-06-09 PROBLEM — R78.81 STREPTOCOCCAL BACTEREMIA: Status: ACTIVE | Noted: 2019-10-01

## 2023-06-09 PROBLEM — E87.6 HYPOKALEMIA: Status: RESOLVED | Noted: 2019-08-27 | Resolved: 2023-06-09

## 2023-06-09 PROBLEM — R78.81 STREPTOCOCCAL BACTEREMIA: Status: RESOLVED | Noted: 2019-10-01 | Resolved: 2023-06-09

## 2023-06-09 PROBLEM — R17 JAUNDICE: Status: ACTIVE | Noted: 2023-06-09

## 2023-06-09 PROBLEM — F33.0 MAJOR DEPRESSIVE DISORDER, RECURRENT, MILD (HCC): Status: ACTIVE | Noted: 2019-09-04

## 2023-06-09 PROBLEM — M79.7 FIBROMYALGIA: Status: RESOLVED | Noted: 2019-08-27 | Resolved: 2023-06-09

## 2023-06-09 PROBLEM — K75.0 BACTERIAL LIVER ABSCESS: Status: ACTIVE | Noted: 2019-09-04

## 2023-06-09 PROBLEM — A40.0 SEPSIS DUE TO STREPTOCOCCUS, GROUP A (HCC): Status: ACTIVE | Noted: 2019-09-04

## 2023-06-09 PROBLEM — G45.9 TIA (TRANSIENT ISCHEMIC ATTACK): Status: RESOLVED | Noted: 2019-08-27 | Resolved: 2023-06-09

## 2023-06-09 PROBLEM — E66.811 CLASS 1 OBESITY DUE TO EXCESS CALORIES WITH SERIOUS COMORBIDITY AND BODY MASS INDEX (BMI) OF 34.0 TO 34.9 IN ADULT: Chronic | Status: ACTIVE | Noted: 2023-06-09

## 2023-06-09 PROBLEM — R16.0 LIVER MASS: Status: RESOLVED | Noted: 2019-08-27 | Resolved: 2023-06-09

## 2023-06-09 PROBLEM — B95.5 STREPTOCOCCAL BACTEREMIA: Status: ACTIVE | Noted: 2019-10-01

## 2023-06-09 PROBLEM — A40.0 SEPSIS DUE TO STREPTOCOCCUS, GROUP A (HCC): Status: RESOLVED | Noted: 2019-09-04 | Resolved: 2023-06-09

## 2023-06-09 PROBLEM — E66.09 CLASS 1 OBESITY DUE TO EXCESS CALORIES WITH SERIOUS COMORBIDITY AND BODY MASS INDEX (BMI) OF 34.0 TO 34.9 IN ADULT: Chronic | Status: ACTIVE | Noted: 2023-06-09

## 2023-06-09 PROBLEM — L20.82 FLEXURAL ECZEMA: Status: ACTIVE | Noted: 2019-09-04

## 2023-06-09 PROBLEM — K62.5 RECTAL BLEEDING: Status: RESOLVED | Noted: 2019-03-08 | Resolved: 2023-06-09

## 2023-06-09 PROBLEM — R74.01 TRANSAMINITIS: Status: ACTIVE | Noted: 2019-08-10

## 2023-06-09 PROBLEM — K21.9 GASTRO-ESOPHAGEAL REFLUX DISEASE WITHOUT ESOPHAGITIS: Status: RESOLVED | Noted: 2019-09-04 | Resolved: 2023-06-09

## 2023-06-09 PROBLEM — K57.21 DIVERTICULITIS OF LARGE INTESTINE WITH ABSCESS WITH BLEEDING: Status: ACTIVE | Noted: 2023-06-09

## 2023-06-09 PROBLEM — B95.5 STREPTOCOCCAL BACTEREMIA: Status: RESOLVED | Noted: 2019-10-01 | Resolved: 2023-06-09

## 2023-06-09 PROBLEM — K21.00 GASTROESOPHAGEAL REFLUX DISEASE WITH ESOPHAGITIS: Status: ACTIVE | Noted: 2019-03-08

## 2023-06-09 LAB
ALBUMIN SERPL-MCNC: 3.5 G/DL (ref 3.2–4.6)
ALBUMIN/GLOB SERPL: 0.8 (ref 0.4–1.6)
ALP SERPL-CCNC: 259 U/L (ref 50–136)
ALT SERPL-CCNC: 187 U/L (ref 12–65)
ANION GAP SERPL CALC-SCNC: 3 MMOL/L (ref 2–11)
AST SERPL-CCNC: 182 U/L (ref 15–37)
BASOPHILS # BLD: 0 K/UL (ref 0–0.2)
BASOPHILS NFR BLD: 0 % (ref 0–2)
BILIRUB SERPL-MCNC: 5.6 MG/DL (ref 0.2–1.1)
BILIRUB UR QL: ABNORMAL
BUN SERPL-MCNC: 16 MG/DL (ref 8–23)
CALCIUM SERPL-MCNC: 9.3 MG/DL (ref 8.3–10.4)
CHLORIDE SERPL-SCNC: 105 MMOL/L (ref 101–110)
CO2 SERPL-SCNC: 28 MMOL/L (ref 21–32)
CREAT SERPL-MCNC: 1.2 MG/DL (ref 0.6–1)
DIFFERENTIAL METHOD BLD: ABNORMAL
EOSINOPHIL # BLD: 0.1 K/UL (ref 0–0.8)
EOSINOPHIL NFR BLD: 1 % (ref 0.5–7.8)
ERYTHROCYTE [DISTWIDTH] IN BLOOD BY AUTOMATED COUNT: 13.7 % (ref 11.9–14.6)
GLOBULIN SER CALC-MCNC: 4.2 G/DL (ref 2.8–4.5)
GLUCOSE SERPL-MCNC: 128 MG/DL (ref 65–100)
GLUCOSE UR QL STRIP.AUTO: NEGATIVE MG/DL
HCT VFR BLD AUTO: 46.1 % (ref 35.8–46.3)
HGB BLD-MCNC: 15.4 G/DL (ref 11.7–15.4)
IMM GRANULOCYTES # BLD AUTO: 0 K/UL (ref 0–0.5)
IMM GRANULOCYTES NFR BLD AUTO: 0 % (ref 0–5)
KETONES UR-MCNC: NEGATIVE MG/DL
LEUKOCYTE ESTERASE UR QL STRIP: NEGATIVE
LIPASE SERPL-CCNC: 103 U/L (ref 73–393)
LYMPHOCYTES # BLD: 1.6 K/UL (ref 0.5–4.6)
LYMPHOCYTES NFR BLD: 20 % (ref 13–44)
MCH RBC QN AUTO: 32.1 PG (ref 26.1–32.9)
MCHC RBC AUTO-ENTMCNC: 33.4 G/DL (ref 31.4–35)
MCV RBC AUTO: 96 FL (ref 82–102)
MONOCYTES # BLD: 0.4 K/UL (ref 0.1–1.3)
MONOCYTES NFR BLD: 5 % (ref 4–12)
NEUTS SEG # BLD: 6 K/UL (ref 1.7–8.2)
NEUTS SEG NFR BLD: 74 % (ref 43–78)
NITRITE UR QL: NEGATIVE
NRBC # BLD: 0 K/UL (ref 0–0.2)
PH UR: 5.5 (ref 5–9)
PLATELET # BLD AUTO: 178 K/UL (ref 150–450)
PMV BLD AUTO: 9.1 FL (ref 9.4–12.3)
POTASSIUM SERPL-SCNC: 3.8 MMOL/L (ref 3.5–5.1)
PROT SERPL-MCNC: 7.7 G/DL (ref 6.3–8.2)
PROT UR QL: NEGATIVE MG/DL
RBC # BLD AUTO: 4.8 M/UL (ref 4.05–5.2)
RBC # UR STRIP: ABNORMAL
SERVICE CMNT-IMP: ABNORMAL
SODIUM SERPL-SCNC: 136 MMOL/L (ref 133–143)
SP GR UR: 1.02 (ref 1–1.02)
UROBILINOGEN UR QL: 4 EU/DL (ref 0.2–1)
WBC # BLD AUTO: 8.1 K/UL (ref 4.3–11.1)

## 2023-06-09 PROCEDURE — 83690 ASSAY OF LIPASE: CPT

## 2023-06-09 PROCEDURE — 96365 THER/PROPH/DIAG IV INF INIT: CPT

## 2023-06-09 PROCEDURE — 6360000002 HC RX W HCPCS: Performed by: FAMILY MEDICINE

## 2023-06-09 PROCEDURE — 6360000002 HC RX W HCPCS: Performed by: EMERGENCY MEDICINE

## 2023-06-09 PROCEDURE — 80053 COMPREHEN METABOLIC PANEL: CPT

## 2023-06-09 PROCEDURE — 85025 COMPLETE CBC W/AUTO DIFF WBC: CPT

## 2023-06-09 PROCEDURE — 94760 N-INVAS EAR/PLS OXIMETRY 1: CPT

## 2023-06-09 PROCEDURE — 93005 ELECTROCARDIOGRAM TRACING: CPT | Performed by: EMERGENCY MEDICINE

## 2023-06-09 PROCEDURE — 80074 ACUTE HEPATITIS PANEL: CPT

## 2023-06-09 PROCEDURE — 81003 URINALYSIS AUTO W/O SCOPE: CPT

## 2023-06-09 PROCEDURE — 1100000000 HC RM PRIVATE

## 2023-06-09 PROCEDURE — 74177 CT ABD & PELVIS W/CONTRAST: CPT

## 2023-06-09 PROCEDURE — 99285 EMERGENCY DEPT VISIT HI MDM: CPT

## 2023-06-09 PROCEDURE — 2500000003 HC RX 250 WO HCPCS: Performed by: FAMILY MEDICINE

## 2023-06-09 PROCEDURE — 6360000004 HC RX CONTRAST MEDICATION: Performed by: EMERGENCY MEDICINE

## 2023-06-09 PROCEDURE — 2580000003 HC RX 258: Performed by: EMERGENCY MEDICINE

## 2023-06-09 PROCEDURE — 2580000003 HC RX 258: Performed by: FAMILY MEDICINE

## 2023-06-09 PROCEDURE — 96375 TX/PRO/DX INJ NEW DRUG ADDON: CPT

## 2023-06-09 PROCEDURE — 6370000000 HC RX 637 (ALT 250 FOR IP): Performed by: FAMILY MEDICINE

## 2023-06-09 RX ORDER — MAGNESIUM SULFATE IN WATER 40 MG/ML
2000 INJECTION, SOLUTION INTRAVENOUS PRN
Status: DISCONTINUED | OUTPATIENT
Start: 2023-06-09 | End: 2023-06-13 | Stop reason: HOSPADM

## 2023-06-09 RX ORDER — POTASSIUM CHLORIDE 7.45 MG/ML
10 INJECTION INTRAVENOUS PRN
Status: DISCONTINUED | OUTPATIENT
Start: 2023-06-09 | End: 2023-06-13 | Stop reason: HOSPADM

## 2023-06-09 RX ORDER — SODIUM CHLORIDE 0.9 % (FLUSH) 0.9 %
5-40 SYRINGE (ML) INJECTION EVERY 12 HOURS SCHEDULED
Status: DISCONTINUED | OUTPATIENT
Start: 2023-06-09 | End: 2023-06-13 | Stop reason: HOSPADM

## 2023-06-09 RX ORDER — ALBUTEROL SULFATE 2.5 MG/3ML
2.5 SOLUTION RESPIRATORY (INHALATION) EVERY 4 HOURS PRN
Status: DISCONTINUED | OUTPATIENT
Start: 2023-06-09 | End: 2023-06-13 | Stop reason: HOSPADM

## 2023-06-09 RX ORDER — SODIUM CHLORIDE 9 MG/ML
INJECTION, SOLUTION INTRAVENOUS PRN
Status: DISCONTINUED | OUTPATIENT
Start: 2023-06-09 | End: 2023-06-13 | Stop reason: HOSPADM

## 2023-06-09 RX ORDER — ONDANSETRON 2 MG/ML
4 INJECTION INTRAMUSCULAR; INTRAVENOUS EVERY 6 HOURS PRN
Status: DISCONTINUED | OUTPATIENT
Start: 2023-06-09 | End: 2023-06-13 | Stop reason: HOSPADM

## 2023-06-09 RX ORDER — POLYETHYLENE GLYCOL 3350 17 G/17G
17 POWDER, FOR SOLUTION ORAL DAILY PRN
Status: DISCONTINUED | OUTPATIENT
Start: 2023-06-09 | End: 2023-06-13 | Stop reason: HOSPADM

## 2023-06-09 RX ORDER — NALOXONE HYDROCHLORIDE 0.4 MG/ML
0.4 INJECTION, SOLUTION INTRAMUSCULAR; INTRAVENOUS; SUBCUTANEOUS PRN
Status: DISCONTINUED | OUTPATIENT
Start: 2023-06-09 | End: 2023-06-13 | Stop reason: HOSPADM

## 2023-06-09 RX ORDER — SODIUM CHLORIDE, SODIUM LACTATE, POTASSIUM CHLORIDE, AND CALCIUM CHLORIDE .6; .31; .03; .02 G/100ML; G/100ML; G/100ML; G/100ML
1000 INJECTION, SOLUTION INTRAVENOUS ONCE
Status: COMPLETED | OUTPATIENT
Start: 2023-06-09 | End: 2023-06-09

## 2023-06-09 RX ORDER — OXYCODONE HYDROCHLORIDE 5 MG/1
5 TABLET ORAL EVERY 4 HOURS PRN
Status: DISCONTINUED | OUTPATIENT
Start: 2023-06-09 | End: 2023-06-13 | Stop reason: HOSPADM

## 2023-06-09 RX ORDER — HYDROMORPHONE HYDROCHLORIDE 1 MG/ML
0.25 INJECTION, SOLUTION INTRAMUSCULAR; INTRAVENOUS; SUBCUTANEOUS
Status: DISCONTINUED | OUTPATIENT
Start: 2023-06-09 | End: 2023-06-13 | Stop reason: HOSPADM

## 2023-06-09 RX ORDER — MONTELUKAST SODIUM 10 MG/1
10 TABLET ORAL EVERY EVENING
Status: DISCONTINUED | OUTPATIENT
Start: 2023-06-09 | End: 2023-06-13 | Stop reason: HOSPADM

## 2023-06-09 RX ORDER — ACETAMINOPHEN 650 MG/1
650 SUPPOSITORY RECTAL EVERY 6 HOURS PRN
Status: DISCONTINUED | OUTPATIENT
Start: 2023-06-09 | End: 2023-06-13 | Stop reason: HOSPADM

## 2023-06-09 RX ORDER — ACETAMINOPHEN 325 MG/1
650 TABLET ORAL EVERY 6 HOURS PRN
Status: DISCONTINUED | OUTPATIENT
Start: 2023-06-09 | End: 2023-06-13 | Stop reason: HOSPADM

## 2023-06-09 RX ORDER — HYDROMORPHONE HYDROCHLORIDE 1 MG/ML
0.5 INJECTION, SOLUTION INTRAMUSCULAR; INTRAVENOUS; SUBCUTANEOUS
Status: DISCONTINUED | OUTPATIENT
Start: 2023-06-09 | End: 2023-06-13 | Stop reason: HOSPADM

## 2023-06-09 RX ORDER — POTASSIUM CHLORIDE 20 MEQ/1
40 TABLET, EXTENDED RELEASE ORAL PRN
Status: DISCONTINUED | OUTPATIENT
Start: 2023-06-09 | End: 2023-06-13 | Stop reason: HOSPADM

## 2023-06-09 RX ORDER — SODIUM CHLORIDE, SODIUM LACTATE, POTASSIUM CHLORIDE, CALCIUM CHLORIDE 600; 310; 30; 20 MG/100ML; MG/100ML; MG/100ML; MG/100ML
INJECTION, SOLUTION INTRAVENOUS CONTINUOUS
Status: DISCONTINUED | OUTPATIENT
Start: 2023-06-09 | End: 2023-06-12

## 2023-06-09 RX ORDER — SODIUM CHLORIDE 0.9 % (FLUSH) 0.9 %
5-40 SYRINGE (ML) INJECTION PRN
Status: DISCONTINUED | OUTPATIENT
Start: 2023-06-09 | End: 2023-06-13 | Stop reason: HOSPADM

## 2023-06-09 RX ADMIN — HYDROMORPHONE HYDROCHLORIDE 0.25 MG: 1 INJECTION, SOLUTION INTRAMUSCULAR; INTRAVENOUS; SUBCUTANEOUS at 23:53

## 2023-06-09 RX ADMIN — MONTELUKAST 10 MG: 10 TABLET, FILM COATED ORAL at 23:00

## 2023-06-09 RX ADMIN — ONDANSETRON 4 MG: 2 INJECTION INTRAMUSCULAR; INTRAVENOUS at 23:57

## 2023-06-09 RX ADMIN — SODIUM CHLORIDE, POTASSIUM CHLORIDE, SODIUM LACTATE AND CALCIUM CHLORIDE 1000 ML: 600; 310; 30; 20 INJECTION, SOLUTION INTRAVENOUS at 22:08

## 2023-06-09 RX ADMIN — SODIUM CHLORIDE, POTASSIUM CHLORIDE, SODIUM LACTATE AND CALCIUM CHLORIDE: 600; 310; 30; 20 INJECTION, SOLUTION INTRAVENOUS at 23:00

## 2023-06-09 RX ADMIN — PIPERACILLIN AND TAZOBACTAM 3375 MG: 3; .375 INJECTION, POWDER, LYOPHILIZED, FOR SOLUTION INTRAVENOUS at 21:44

## 2023-06-09 RX ADMIN — IOPAMIDOL 100 ML: 755 INJECTION, SOLUTION INTRAVENOUS at 20:20

## 2023-06-09 ASSESSMENT — PAIN SCALES - GENERAL
PAINLEVEL_OUTOF10: 6
PAINLEVEL_OUTOF10: 6

## 2023-06-09 ASSESSMENT — LIFESTYLE VARIABLES
HOW OFTEN DO YOU HAVE A DRINK CONTAINING ALCOHOL: NEVER
HOW MANY STANDARD DRINKS CONTAINING ALCOHOL DO YOU HAVE ON A TYPICAL DAY: PATIENT DOES NOT DRINK

## 2023-06-09 ASSESSMENT — PAIN - FUNCTIONAL ASSESSMENT: PAIN_FUNCTIONAL_ASSESSMENT: 0-10

## 2023-06-09 ASSESSMENT — ENCOUNTER SYMPTOMS: ABDOMINAL PAIN: 1

## 2023-06-10 ENCOUNTER — APPOINTMENT (OUTPATIENT)
Dept: ULTRASOUND IMAGING | Age: 73
DRG: 379 | End: 2023-06-10
Payer: MEDICARE

## 2023-06-10 LAB
ALBUMIN SERPL-MCNC: 2.8 G/DL (ref 3.2–4.6)
ALBUMIN/GLOB SERPL: 0.8 (ref 0.4–1.6)
ALP SERPL-CCNC: 212 U/L (ref 50–136)
ALT SERPL-CCNC: 135 U/L (ref 12–65)
ANION GAP SERPL CALC-SCNC: 3 MMOL/L (ref 2–11)
AST SERPL-CCNC: 129 U/L (ref 15–37)
BASOPHILS # BLD: 0.1 K/UL (ref 0–0.2)
BASOPHILS NFR BLD: 1 % (ref 0–2)
BILIRUB DIRECT SERPL-MCNC: 2.7 MG/DL
BILIRUB SERPL-MCNC: 3.2 MG/DL (ref 0.2–1.1)
BUN SERPL-MCNC: 11 MG/DL (ref 8–23)
CALCIUM SERPL-MCNC: 8.6 MG/DL (ref 8.3–10.4)
CHLORIDE SERPL-SCNC: 107 MMOL/L (ref 101–110)
CO2 SERPL-SCNC: 28 MMOL/L (ref 21–32)
CREAT SERPL-MCNC: 0.9 MG/DL (ref 0.6–1)
DIFFERENTIAL METHOD BLD: ABNORMAL
EKG ATRIAL RATE: 79 BPM
EKG DIAGNOSIS: NORMAL
EKG P AXIS: 57 DEGREES
EKG P-R INTERVAL: 129 MS
EKG Q-T INTERVAL: 389 MS
EKG QRS DURATION: 101 MS
EKG QTC CALCULATION (BAZETT): 446 MS
EKG R AXIS: 14 DEGREES
EKG T AXIS: 62 DEGREES
EKG VENTRICULAR RATE: 79 BPM
EOSINOPHIL # BLD: 0.2 K/UL (ref 0–0.8)
EOSINOPHIL NFR BLD: 3 % (ref 0.5–7.8)
ERYTHROCYTE [DISTWIDTH] IN BLOOD BY AUTOMATED COUNT: 13.8 % (ref 11.9–14.6)
GLOBULIN SER CALC-MCNC: 3.5 G/DL (ref 2.8–4.5)
GLUCOSE SERPL-MCNC: 101 MG/DL (ref 65–100)
HAV IGM SER QL: NONREACTIVE
HBV CORE IGM SER QL: NONREACTIVE
HBV SURFACE AG SER QL: NONREACTIVE
HCT VFR BLD AUTO: 40.1 % (ref 35.8–46.3)
HCV AB SER QL: NONREACTIVE
HGB BLD-MCNC: 13.1 G/DL (ref 11.7–15.4)
IMM GRANULOCYTES # BLD AUTO: 0 K/UL (ref 0–0.5)
IMM GRANULOCYTES NFR BLD AUTO: 0 % (ref 0–5)
LACTATE SERPL-SCNC: 0.8 MMOL/L (ref 0.4–2)
LYMPHOCYTES # BLD: 1.4 K/UL (ref 0.5–4.6)
LYMPHOCYTES NFR BLD: 21 % (ref 13–44)
MAGNESIUM SERPL-MCNC: 1.8 MG/DL (ref 1.8–2.4)
MCH RBC QN AUTO: 32.3 PG (ref 26.1–32.9)
MCHC RBC AUTO-ENTMCNC: 32.7 G/DL (ref 31.4–35)
MCV RBC AUTO: 98.8 FL (ref 82–102)
MONOCYTES # BLD: 0.5 K/UL (ref 0.1–1.3)
MONOCYTES NFR BLD: 7 % (ref 4–12)
NEUTS SEG # BLD: 4.4 K/UL (ref 1.7–8.2)
NEUTS SEG NFR BLD: 68 % (ref 43–78)
NRBC # BLD: 0 K/UL (ref 0–0.2)
PLATELET # BLD AUTO: 140 K/UL (ref 150–450)
PMV BLD AUTO: 9 FL (ref 9.4–12.3)
POTASSIUM SERPL-SCNC: 3.7 MMOL/L (ref 3.5–5.1)
PROT SERPL-MCNC: 6.3 G/DL (ref 6.3–8.2)
RBC # BLD AUTO: 4.06 M/UL (ref 4.05–5.2)
SODIUM SERPL-SCNC: 138 MMOL/L (ref 133–143)
WBC # BLD AUTO: 6.4 K/UL (ref 4.3–11.1)

## 2023-06-10 PROCEDURE — 2580000003 HC RX 258: Performed by: HOSPITALIST

## 2023-06-10 PROCEDURE — 1100000003 HC PRIVATE W/ TELEMETRY

## 2023-06-10 PROCEDURE — 6370000000 HC RX 637 (ALT 250 FOR IP): Performed by: FAMILY MEDICINE

## 2023-06-10 PROCEDURE — 6360000002 HC RX W HCPCS: Performed by: FAMILY MEDICINE

## 2023-06-10 PROCEDURE — 80053 COMPREHEN METABOLIC PANEL: CPT

## 2023-06-10 PROCEDURE — 83605 ASSAY OF LACTIC ACID: CPT

## 2023-06-10 PROCEDURE — 2580000003 HC RX 258: Performed by: FAMILY MEDICINE

## 2023-06-10 PROCEDURE — C9113 INJ PANTOPRAZOLE SODIUM, VIA: HCPCS | Performed by: FAMILY MEDICINE

## 2023-06-10 PROCEDURE — 93010 ELECTROCARDIOGRAM REPORT: CPT | Performed by: INTERNAL MEDICINE

## 2023-06-10 PROCEDURE — A4216 STERILE WATER/SALINE, 10 ML: HCPCS | Performed by: FAMILY MEDICINE

## 2023-06-10 PROCEDURE — 2500000003 HC RX 250 WO HCPCS: Performed by: FAMILY MEDICINE

## 2023-06-10 PROCEDURE — 82248 BILIRUBIN DIRECT: CPT

## 2023-06-10 PROCEDURE — 85025 COMPLETE CBC W/AUTO DIFF WBC: CPT

## 2023-06-10 PROCEDURE — 83735 ASSAY OF MAGNESIUM: CPT

## 2023-06-10 PROCEDURE — 36415 COLL VENOUS BLD VENIPUNCTURE: CPT

## 2023-06-10 PROCEDURE — 76705 ECHO EXAM OF ABDOMEN: CPT

## 2023-06-10 RX ORDER — SODIUM CHLORIDE, SODIUM LACTATE, POTASSIUM CHLORIDE, AND CALCIUM CHLORIDE .6; .31; .03; .02 G/100ML; G/100ML; G/100ML; G/100ML
500 INJECTION, SOLUTION INTRAVENOUS ONCE
Status: COMPLETED | OUTPATIENT
Start: 2023-06-10 | End: 2023-06-10

## 2023-06-10 RX ADMIN — PIPERACILLIN AND TAZOBACTAM 3375 MG: 3; .375 INJECTION, POWDER, LYOPHILIZED, FOR SOLUTION INTRAVENOUS at 04:16

## 2023-06-10 RX ADMIN — PIPERACILLIN AND TAZOBACTAM 3375 MG: 3; .375 INJECTION, POWDER, LYOPHILIZED, FOR SOLUTION INTRAVENOUS at 20:19

## 2023-06-10 RX ADMIN — SODIUM CHLORIDE, POTASSIUM CHLORIDE, SODIUM LACTATE AND CALCIUM CHLORIDE 500 ML: 600; 310; 30; 20 INJECTION, SOLUTION INTRAVENOUS at 07:38

## 2023-06-10 RX ADMIN — MONTELUKAST 10 MG: 10 TABLET, FILM COATED ORAL at 16:49

## 2023-06-10 RX ADMIN — TUBERCULIN PURIFIED PROTEIN DERIVATIVE 5 UNITS: 5 INJECTION, SOLUTION INTRADERMAL at 01:28

## 2023-06-10 RX ADMIN — SODIUM CHLORIDE 40 MG: 9 INJECTION INTRAMUSCULAR; INTRAVENOUS; SUBCUTANEOUS at 07:39

## 2023-06-10 RX ADMIN — SODIUM CHLORIDE, POTASSIUM CHLORIDE, SODIUM LACTATE AND CALCIUM CHLORIDE: 600; 310; 30; 20 INJECTION, SOLUTION INTRAVENOUS at 12:26

## 2023-06-10 RX ADMIN — SODIUM CHLORIDE, PRESERVATIVE FREE 10 ML: 5 INJECTION INTRAVENOUS at 20:19

## 2023-06-10 RX ADMIN — SODIUM CHLORIDE, POTASSIUM CHLORIDE, SODIUM LACTATE AND CALCIUM CHLORIDE: 600; 310; 30; 20 INJECTION, SOLUTION INTRAVENOUS at 20:19

## 2023-06-10 RX ADMIN — PIPERACILLIN AND TAZOBACTAM 3375 MG: 3; .375 INJECTION, POWDER, LYOPHILIZED, FOR SOLUTION INTRAVENOUS at 12:25

## 2023-06-10 RX ADMIN — SODIUM CHLORIDE, PRESERVATIVE FREE 10 ML: 5 INJECTION INTRAVENOUS at 01:28

## 2023-06-10 ASSESSMENT — PAIN SCALES - GENERAL: PAINLEVEL_OUTOF10: 0

## 2023-06-11 LAB
ALBUMIN SERPL-MCNC: 2.8 G/DL (ref 3.2–4.6)
ALBUMIN/GLOB SERPL: 0.8 (ref 0.4–1.6)
ALP SERPL-CCNC: 207 U/L (ref 50–136)
ALT SERPL-CCNC: 106 U/L (ref 12–65)
ANION GAP SERPL CALC-SCNC: 5 MMOL/L (ref 2–11)
AST SERPL-CCNC: 81 U/L (ref 15–37)
BASOPHILS # BLD: 0 K/UL (ref 0–0.2)
BASOPHILS NFR BLD: 1 % (ref 0–2)
BILIRUB SERPL-MCNC: 2.5 MG/DL (ref 0.2–1.1)
BUN SERPL-MCNC: 7 MG/DL (ref 8–23)
CALCIUM SERPL-MCNC: 8.8 MG/DL (ref 8.3–10.4)
CHLORIDE SERPL-SCNC: 108 MMOL/L (ref 101–110)
CO2 SERPL-SCNC: 24 MMOL/L (ref 21–32)
CREAT SERPL-MCNC: 0.8 MG/DL (ref 0.6–1)
DIFFERENTIAL METHOD BLD: ABNORMAL
EOSINOPHIL # BLD: 0.1 K/UL (ref 0–0.8)
EOSINOPHIL NFR BLD: 1 % (ref 0.5–7.8)
ERYTHROCYTE [DISTWIDTH] IN BLOOD BY AUTOMATED COUNT: 13.6 % (ref 11.9–14.6)
GLOBULIN SER CALC-MCNC: 3.5 G/DL (ref 2.8–4.5)
GLUCOSE SERPL-MCNC: 91 MG/DL (ref 65–100)
HCT VFR BLD AUTO: 40 % (ref 35.8–46.3)
HGB BLD-MCNC: 13.2 G/DL (ref 11.7–15.4)
IMM GRANULOCYTES # BLD AUTO: 0 K/UL (ref 0–0.5)
IMM GRANULOCYTES NFR BLD AUTO: 0 % (ref 0–5)
LYMPHOCYTES # BLD: 1.2 K/UL (ref 0.5–4.6)
LYMPHOCYTES NFR BLD: 21 % (ref 13–44)
MAGNESIUM SERPL-MCNC: 1.6 MG/DL (ref 1.8–2.4)
MCH RBC QN AUTO: 32.1 PG (ref 26.1–32.9)
MCHC RBC AUTO-ENTMCNC: 33 G/DL (ref 31.4–35)
MCV RBC AUTO: 97.3 FL (ref 82–102)
MM INDURATION, POC: 0 MM (ref 0–5)
MM INDURATION, POC: 0 MM (ref 0–5)
MONOCYTES # BLD: 0.4 K/UL (ref 0.1–1.3)
MONOCYTES NFR BLD: 6 % (ref 4–12)
NEUTS SEG # BLD: 4.2 K/UL (ref 1.7–8.2)
NEUTS SEG NFR BLD: 71 % (ref 43–78)
NRBC # BLD: 0 K/UL (ref 0–0.2)
PLATELET # BLD AUTO: 117 K/UL (ref 150–450)
PMV BLD AUTO: 9.5 FL (ref 9.4–12.3)
POTASSIUM SERPL-SCNC: 4 MMOL/L (ref 3.5–5.1)
PPD, POC: NEGATIVE
PPD, POC: NEGATIVE
PROT SERPL-MCNC: 6.3 G/DL (ref 6.3–8.2)
RBC # BLD AUTO: 4.11 M/UL (ref 4.05–5.2)
SODIUM SERPL-SCNC: 137 MMOL/L (ref 133–143)
WBC # BLD AUTO: 5.9 K/UL (ref 4.3–11.1)

## 2023-06-11 PROCEDURE — 6370000000 HC RX 637 (ALT 250 FOR IP): Performed by: HOSPITALIST

## 2023-06-11 PROCEDURE — 2580000003 HC RX 258: Performed by: FAMILY MEDICINE

## 2023-06-11 PROCEDURE — 1100000003 HC PRIVATE W/ TELEMETRY

## 2023-06-11 PROCEDURE — C9113 INJ PANTOPRAZOLE SODIUM, VIA: HCPCS | Performed by: FAMILY MEDICINE

## 2023-06-11 PROCEDURE — 80053 COMPREHEN METABOLIC PANEL: CPT

## 2023-06-11 PROCEDURE — 6360000002 HC RX W HCPCS: Performed by: HOSPITALIST

## 2023-06-11 PROCEDURE — 97535 SELF CARE MNGMENT TRAINING: CPT

## 2023-06-11 PROCEDURE — 6370000000 HC RX 637 (ALT 250 FOR IP): Performed by: FAMILY MEDICINE

## 2023-06-11 PROCEDURE — 97165 OT EVAL LOW COMPLEX 30 MIN: CPT

## 2023-06-11 PROCEDURE — A4216 STERILE WATER/SALINE, 10 ML: HCPCS | Performed by: FAMILY MEDICINE

## 2023-06-11 PROCEDURE — 85025 COMPLETE CBC W/AUTO DIFF WBC: CPT

## 2023-06-11 PROCEDURE — 36415 COLL VENOUS BLD VENIPUNCTURE: CPT

## 2023-06-11 PROCEDURE — 6360000002 HC RX W HCPCS: Performed by: FAMILY MEDICINE

## 2023-06-11 PROCEDURE — 83735 ASSAY OF MAGNESIUM: CPT

## 2023-06-11 PROCEDURE — 97162 PT EVAL MOD COMPLEX 30 MIN: CPT

## 2023-06-11 PROCEDURE — 97530 THERAPEUTIC ACTIVITIES: CPT

## 2023-06-11 RX ORDER — LANOLIN ALCOHOL/MO/W.PET/CERES
400 CREAM (GRAM) TOPICAL 2 TIMES DAILY
Status: DISCONTINUED | OUTPATIENT
Start: 2023-06-11 | End: 2023-06-13 | Stop reason: HOSPADM

## 2023-06-11 RX ORDER — POTASSIUM CHLORIDE 20 MEQ/1
20 TABLET, EXTENDED RELEASE ORAL
Status: DISCONTINUED | OUTPATIENT
Start: 2023-06-12 | End: 2023-06-13 | Stop reason: HOSPADM

## 2023-06-11 RX ORDER — MAGNESIUM SULFATE IN WATER 40 MG/ML
2000 INJECTION, SOLUTION INTRAVENOUS ONCE
Status: COMPLETED | OUTPATIENT
Start: 2023-06-11 | End: 2023-06-11

## 2023-06-11 RX ADMIN — SODIUM CHLORIDE 40 MG: 9 INJECTION INTRAMUSCULAR; INTRAVENOUS; SUBCUTANEOUS at 08:18

## 2023-06-11 RX ADMIN — SODIUM CHLORIDE, PRESERVATIVE FREE 10 ML: 5 INJECTION INTRAVENOUS at 20:15

## 2023-06-11 RX ADMIN — MONTELUKAST 10 MG: 10 TABLET, FILM COATED ORAL at 16:49

## 2023-06-11 RX ADMIN — PIPERACILLIN AND TAZOBACTAM 3375 MG: 3; .375 INJECTION, POWDER, LYOPHILIZED, FOR SOLUTION INTRAVENOUS at 12:04

## 2023-06-11 RX ADMIN — SODIUM CHLORIDE, POTASSIUM CHLORIDE, SODIUM LACTATE AND CALCIUM CHLORIDE: 600; 310; 30; 20 INJECTION, SOLUTION INTRAVENOUS at 05:31

## 2023-06-11 RX ADMIN — MAGNESIUM SULFATE HEPTAHYDRATE 2000 MG: 40 INJECTION, SOLUTION INTRAVENOUS at 09:59

## 2023-06-11 RX ADMIN — PIPERACILLIN AND TAZOBACTAM 3375 MG: 3; .375 INJECTION, POWDER, LYOPHILIZED, FOR SOLUTION INTRAVENOUS at 20:14

## 2023-06-11 RX ADMIN — PIPERACILLIN AND TAZOBACTAM 3375 MG: 3; .375 INJECTION, POWDER, LYOPHILIZED, FOR SOLUTION INTRAVENOUS at 05:31

## 2023-06-11 RX ADMIN — GLYCERIN, PETROLATUM, PHENYLEPHRINE HCL, PRAMOXINE HCL 1 TUBE: 144; 2.5; 10; 15 CREAM TOPICAL at 03:54

## 2023-06-11 RX ADMIN — MAGNESIUM GLUCONATE 500 MG ORAL TABLET 400 MG: 500 TABLET ORAL at 20:14

## 2023-06-11 ASSESSMENT — PAIN SCALES - GENERAL: PAINLEVEL_OUTOF10: 0

## 2023-06-12 PROBLEM — K57.20 COLONIC DIVERTICULAR ABSCESS: Status: ACTIVE | Noted: 2023-06-12

## 2023-06-12 LAB
ALBUMIN SERPL-MCNC: 2.8 G/DL (ref 3.2–4.6)
ALBUMIN/GLOB SERPL: 0.8 (ref 0.4–1.6)
ALP SERPL-CCNC: 211 U/L (ref 50–136)
ALT SERPL-CCNC: 88 U/L (ref 12–65)
ANION GAP SERPL CALC-SCNC: 8 MMOL/L (ref 2–11)
AST SERPL-CCNC: 58 U/L (ref 15–37)
BASOPHILS # BLD: 0 K/UL (ref 0–0.2)
BASOPHILS NFR BLD: 1 % (ref 0–2)
BILIRUB SERPL-MCNC: 1.8 MG/DL (ref 0.2–1.1)
BUN SERPL-MCNC: 6 MG/DL (ref 8–23)
CALCIUM SERPL-MCNC: 8.6 MG/DL (ref 8.3–10.4)
CHLORIDE SERPL-SCNC: 107 MMOL/L (ref 101–110)
CO2 SERPL-SCNC: 25 MMOL/L (ref 21–32)
CREAT SERPL-MCNC: 0.8 MG/DL (ref 0.6–1)
DIFFERENTIAL METHOD BLD: ABNORMAL
EOSINOPHIL # BLD: 0.1 K/UL (ref 0–0.8)
EOSINOPHIL NFR BLD: 1 % (ref 0.5–7.8)
ERYTHROCYTE [DISTWIDTH] IN BLOOD BY AUTOMATED COUNT: 13.4 % (ref 11.9–14.6)
GLOBULIN SER CALC-MCNC: 3.4 G/DL (ref 2.8–4.5)
GLUCOSE SERPL-MCNC: 93 MG/DL (ref 65–100)
HCT VFR BLD AUTO: 36.6 % (ref 35.8–46.3)
HGB BLD-MCNC: 12.1 G/DL (ref 11.7–15.4)
IMM GRANULOCYTES # BLD AUTO: 0 K/UL (ref 0–0.5)
IMM GRANULOCYTES NFR BLD AUTO: 0 % (ref 0–5)
LYMPHOCYTES # BLD: 1.3 K/UL (ref 0.5–4.6)
LYMPHOCYTES NFR BLD: 25 % (ref 13–44)
MAGNESIUM SERPL-MCNC: 1.9 MG/DL (ref 1.8–2.4)
MCH RBC QN AUTO: 31.8 PG (ref 26.1–32.9)
MCHC RBC AUTO-ENTMCNC: 33.1 G/DL (ref 31.4–35)
MCV RBC AUTO: 96.1 FL (ref 82–102)
MONOCYTES # BLD: 0.4 K/UL (ref 0.1–1.3)
MONOCYTES NFR BLD: 8 % (ref 4–12)
NEUTS SEG # BLD: 3.4 K/UL (ref 1.7–8.2)
NEUTS SEG NFR BLD: 65 % (ref 43–78)
NRBC # BLD: 0 K/UL (ref 0–0.2)
PLATELET # BLD AUTO: 136 K/UL (ref 150–450)
PMV BLD AUTO: 9.3 FL (ref 9.4–12.3)
POTASSIUM SERPL-SCNC: 3.3 MMOL/L (ref 3.5–5.1)
PROT SERPL-MCNC: 6.2 G/DL (ref 6.3–8.2)
RBC # BLD AUTO: 3.81 M/UL (ref 4.05–5.2)
SODIUM SERPL-SCNC: 140 MMOL/L (ref 133–143)
WBC # BLD AUTO: 5.3 K/UL (ref 4.3–11.1)

## 2023-06-12 PROCEDURE — 36415 COLL VENOUS BLD VENIPUNCTURE: CPT

## 2023-06-12 PROCEDURE — 99232 SBSQ HOSP IP/OBS MODERATE 35: CPT | Performed by: NURSE PRACTITIONER

## 2023-06-12 PROCEDURE — 6360000002 HC RX W HCPCS: Performed by: FAMILY MEDICINE

## 2023-06-12 PROCEDURE — 6370000000 HC RX 637 (ALT 250 FOR IP): Performed by: HOSPITALIST

## 2023-06-12 PROCEDURE — 6370000000 HC RX 637 (ALT 250 FOR IP): Performed by: FAMILY MEDICINE

## 2023-06-12 PROCEDURE — 2580000003 HC RX 258: Performed by: FAMILY MEDICINE

## 2023-06-12 PROCEDURE — 85025 COMPLETE CBC W/AUTO DIFF WBC: CPT

## 2023-06-12 PROCEDURE — 80053 COMPREHEN METABOLIC PANEL: CPT

## 2023-06-12 PROCEDURE — 83735 ASSAY OF MAGNESIUM: CPT

## 2023-06-12 PROCEDURE — A4216 STERILE WATER/SALINE, 10 ML: HCPCS | Performed by: FAMILY MEDICINE

## 2023-06-12 PROCEDURE — 1100000000 HC RM PRIVATE

## 2023-06-12 PROCEDURE — C9113 INJ PANTOPRAZOLE SODIUM, VIA: HCPCS | Performed by: FAMILY MEDICINE

## 2023-06-12 RX ADMIN — POTASSIUM CHLORIDE 20 MEQ: 1500 TABLET, EXTENDED RELEASE ORAL at 08:12

## 2023-06-12 RX ADMIN — MAGNESIUM GLUCONATE 500 MG ORAL TABLET 400 MG: 500 TABLET ORAL at 20:53

## 2023-06-12 RX ADMIN — PIPERACILLIN AND TAZOBACTAM 3375 MG: 3; .375 INJECTION, POWDER, LYOPHILIZED, FOR SOLUTION INTRAVENOUS at 03:50

## 2023-06-12 RX ADMIN — SODIUM CHLORIDE, PRESERVATIVE FREE 10 ML: 5 INJECTION INTRAVENOUS at 08:12

## 2023-06-12 RX ADMIN — PIPERACILLIN AND TAZOBACTAM 3375 MG: 3; .375 INJECTION, POWDER, LYOPHILIZED, FOR SOLUTION INTRAVENOUS at 12:26

## 2023-06-12 RX ADMIN — SODIUM CHLORIDE 40 MG: 9 INJECTION INTRAMUSCULAR; INTRAVENOUS; SUBCUTANEOUS at 08:12

## 2023-06-12 RX ADMIN — SODIUM CHLORIDE, PRESERVATIVE FREE 10 ML: 5 INJECTION INTRAVENOUS at 20:54

## 2023-06-12 RX ADMIN — MONTELUKAST 10 MG: 10 TABLET, FILM COATED ORAL at 18:01

## 2023-06-12 RX ADMIN — MAGNESIUM GLUCONATE 500 MG ORAL TABLET 400 MG: 500 TABLET ORAL at 08:12

## 2023-06-12 RX ADMIN — PIPERACILLIN AND TAZOBACTAM 3375 MG: 3; .375 INJECTION, POWDER, LYOPHILIZED, FOR SOLUTION INTRAVENOUS at 20:53

## 2023-06-12 NOTE — PROGRESS NOTES
END OF SHIFT NOTE:    INTAKE/OUTPUT  06/11 0701 - 06/12 0700  In: 7974 [P.O.:1080; I.V.:2558]  Out: 4931 [Urine:3750]  Voiding: Yes  Catheter: No  Drain:              Flatus: Patient does have flatus present. Stool:  occurrences. Characteristics:  Stool Appearance: Hard (looks like \"cashews\" that had not been chewed up)  Stool Color: Ramsey  Stool Amount: Small  Stool Assessment  Incontinence: No  Stool Appearance: Hard (looks like \"cashews\" that had not been chewed up)  Stool Color: Ramsey  Stool Amount: Small  Stool Source: Rectum  Last BM (including prior to admit): 06/11/23    Emesis:  occurrences. Characteristics:        VITAL SIGNS  Patient Vitals for the past 12 hrs:   Temp Pulse Resp BP SpO2   06/12/23 1523 97.7 °F (36.5 °C) 70 18 127/63 93 %       Pain Assessment  Pain Level: 0 (06/11/23 0715)          Ambulating  Yes    Shift report given to oncoming nurse at the bedside.     Saud Nuñez RN

## 2023-06-12 NOTE — ACP (ADVANCE CARE PLANNING)
Advance Care Planning     General Advance Care Planning (ACP) Conversation    Date of Conversation: 6/9/2023  Conducted with: Patient with Decision Making Capacity    Healthcare Decision Maker:    Primary Decision Maker: EdmundoLiliana - Daughter-in-Law - [phone]  Click here to complete Healthcare Decision Makers including selection of the Healthcare Decision Maker Relationship (ie \"Primary\"). Today we documented Decision Maker(s) consistent with Legal Next of Kin hierarchy.     Content/Action Overview:  Primary decision makers: CORI(Ron Wyatt, Trung Wyatt)  Reviewed DNR/DNI and patient elects Full Code (Attempt Resuscitation)        Length of Voluntary ACP Conversation in minutes:  <16 minutes (Non-Billable)    Ervin Osorio RN

## 2023-06-12 NOTE — CARE COORDINATION
RNCM met with patient in room 212 to discuss discharge planning. Patient lives alone in one level home with 2 steps for entry. Patient is independent at baseline and an active . Patient has no current home care services or DME. Demographics verified. [facility] referral made per patient request. CM following for discharge needs. 06/12/23 1223   Service Assessment   Patient Orientation Alert and Oriented   Cognition Alert   History Provided By Patient   Primary [address]   PCP Verified by CM Yes   Last Visit to PCP Within last 3 months   Prior Functional Level Independent in ADLs/IADLs   Current Functional Level Independent in ADLs/IADLs   Can patient return to prior living arrangement Yes   Ability to make needs known: Good   Family able to assist with home care needs: Yes   Would you like for me to discuss the discharge plan with any other family members/significant others, and if so, who? No   Financial Resources Medicare   Community Resources None   Social/Functional History   Lives With Alone   Type of [address] [facility] One level   [address] [place] [address],[address] Responsibilities No   Ambulation Assistance Independent   Transfer Assistance Independent   Active  Yes   Occupation Retired   Discharge Planning   Type of [address] [place] [address] Prior To Admission None   Potential Assistance Needed N/A   DME Ordered?  No   Potential Assistance Purchasing Medications No   Type of Home Care Services None   Patient expects to be discharged to: House   History of falls? 0

## 2023-06-12 NOTE — PROGRESS NOTES
Hospitalist Progress Note   Admit Date:  2023  7:18 PM   Name:  Hernando Fishman   Age:  68 y.o. Sex:  female  :  1950   MRN:  [id number]   Room:      Presenting/Chief Complaint: Abdominal Pain     Reason(s) for Admission: Elevated LFTs [R79.89]  Colonic diverticular abscess [K57.20]  Diverticulitis of large intestine with abscess with bleeding [facility] Course:   Hernando Fishman is a 68 y.o. female with medical history of obesity, biliary pancreatitis, liver abscess with strep bacteremia in 2019, GERD, TIA, dyslipidemia, allergic rhinitis, fibromyalgia, presented with complaints of right-sided abdominal pain with nausea that started 1 day prior to presentation. She also noticed blood in urine and stool. Patient was afebrile on arrival to the ER. CT of the abdomen and pelvis showed moderate diverticulitis of the sigmoid colon with 23 x 20 x 15 mm abscess located within the wall of the sigmoid colon and adjacent to the sigmoid colon and the serosa of the colon. She was admitted for further evaluation and management of diverticulitis with abscess. Subjective & 24hr Events (23): Patient denies any nausea vomiting or abdominal pain this morning. She states she feels hungry. No fever no chills. No chest pain or shortness of breath. Assessment & Plan: This is a 79-year-old female with:     Acute sigmoid diverticulitis with abscess without perforation  Continue IV Zosyn  General surgery on board. Appreciate recommendations. IR consulted. Abscess too small for drainage per IR. Recommends continuation of current conservative management. If no improvement, plans to repeat imaging in 148 to 72 hours. Patient started back on full liquid diet. Transaminitis/jaundice  ALT improved to 88 from 187 on admission and AST improved to 58 from 182 on admission. Alkaline phosphatase elevated at 207 improved from 259 on admission.   History of

## 2023-06-12 NOTE — CONSULTS
Asked about diverticular abscess. Review of imaging on June 9 shows small possibly intramural collection and a small extraluminal collection. Both are too small for drainage at that point. Would recommend repeat imaging in a few days if the patient doesn't continue to improve with conservative measures.

## 2023-06-12 NOTE — PROGRESS NOTES
END OF SHIFT NOTE:    INTAKE/OUTPUT  06/11 0701 - 06/12 0700  In: 6291 [P.O.:1080; I.V.:2558]  Out: 3350 [Urine:3350]  Voiding: Yes  Catheter: No  Drain:              Flatus: Patient does have flatus present. Stool: 0 occurrences. Characteristics:  Stool Appearance: Hard (looks like \"cashews\" that had not been chewed up)  Stool Color: Ramsey  Stool Amount: Small  Stool Assessment  Incontinence: No  Stool Appearance: Hard (looks like \"cashews\" that had not been chewed up)  Stool Color: Ramsey  Stool Amount: Small  Stool Source: Rectum  Last BM (including prior to admit): 06/11/23    Emesis: 0 occurrences. Characteristics:        VITAL SIGNS  Patient Vitals for the past 12 hrs:   Temp Pulse Resp BP SpO2   06/12/23 0700 98.2 °F (36.8 °C) 73 18 (!) 152/70 94 %   06/12/23 0349 -- 72 -- 129/64 94 %   06/12/23 0347 98.6 °F (37 °C) 76 18 (!) 114/100 93 %   06/11/23 2321 98.4 °F (36.9 °C) 72 18 (!) 147/73 94 %   06/11/23 1948 98.1 °F (36.7 °C) 71 17 113/64 93 %       Pain Assessment  Pain Level: 0 (06/11/23 0715)          Ambulating  Yes    Shift report given to oncoming nurse at the bedside.     Edie Lord RN

## 2023-06-12 NOTE — PROGRESS NOTES
Admit Date: 2023    POD * No surgery found *    Procedure:  * No surgery found *    Subjective:     NPO for IR   Feeling much better. Tolerating FLD . No nausea or vomiting. Objective:       Vitals:    23 2321 23 0347 23 0349 23 0700   BP: (!) 147/73 (!) 114/100 129/64 (!) 152/70   Pulse: 72 76 72 73   Resp: 18 18  18   Temp: 98.4 °F (36.9 °C) 98.6 °F (37 °C)  98.2 °F (36.8 °C)   TempSrc: Oral Oral  Oral   SpO2: 94% 93% 94% 94%   Weight:       Height:           Temp (24hrs), Av.2 °F (36.8 °C), Min:97.7 °F (36.5 °C), Max:98.6 °F (37 °C)    Intake/Output Summary (Last 24 hours) at 2023 7955  Last data filed at 2023 0815  Gross per 24 hour   Intake 3638 ml   Output 3250 ml   Net 388 ml         Physical Exam:   Constitutional: Alert, oriented, cooperative patient in no acute distress; appears stated age   BP (!) 152/70 Comment: Primary RN notified  Pulse 73   Temp 98.2 °F (36.8 °C) (Oral)   Resp 18   Ht 5' (1.524 m)   Wt 183 lb (83 kg)   SpO2 94%   BMI 35.74 kg/m²   Eyes:Sclera are clear. EOMs intact  ENMT: no external lesions' gross hearing normal; no obvious neck masses, no ear or lip lesions, nares normal  CV: RRR. Normal perfusion  Resp: No JVD. Breathing is  non-labored; no audible wheezing. GI: soft and non-distended, non tender abdomen on exam     Musculoskeletal: unremarkable with normal function. No embolic signs or cyanosis.    Neuro:  Oriented; moves all 4; no focal deficits  Psychiatric: normal affect and mood, no memory impairment     Labs:   Recent Results (from the past 24 hour(s))   PLEASE READ & DOCUMENT PPD TEST IN 48 HRS    Collection Time: 23 11:01 PM   Result Value Ref Range    PPD, (POC) Negative Negative    mm Induration 0 0 - 5 mm   CBC with Auto Differential    Collection Time: 23  4:32 AM   Result Value Ref Range    WBC 5.3 4.3 - 11.1 K/uL    RBC 3.81 (L) 4.05 - 5.2 M/uL    Hemoglobin 12.1 11.7 - 15.4 g/dL    Hematocrit

## 2023-06-13 VITALS
HEART RATE: 69 BPM | RESPIRATION RATE: 18 BRPM | WEIGHT: 183 LBS | SYSTOLIC BLOOD PRESSURE: 130 MMHG | DIASTOLIC BLOOD PRESSURE: 74 MMHG | HEIGHT: 60 IN | TEMPERATURE: 98.2 F | BODY MASS INDEX: 35.93 KG/M2 | OXYGEN SATURATION: 91 %

## 2023-06-13 LAB
ANION GAP SERPL CALC-SCNC: 9 MMOL/L (ref 2–11)
BASOPHILS # BLD: 0 K/UL (ref 0–0.2)
BASOPHILS NFR BLD: 1 % (ref 0–2)
BUN SERPL-MCNC: 7 MG/DL (ref 8–23)
CALCIUM SERPL-MCNC: 9 MG/DL (ref 8.3–10.4)
CHLORIDE SERPL-SCNC: 108 MMOL/L (ref 101–110)
CO2 SERPL-SCNC: 22 MMOL/L (ref 21–32)
CREAT SERPL-MCNC: 0.7 MG/DL (ref 0.6–1)
DIFFERENTIAL METHOD BLD: ABNORMAL
EOSINOPHIL # BLD: 0.1 K/UL (ref 0–0.8)
EOSINOPHIL NFR BLD: 1 % (ref 0.5–7.8)
ERYTHROCYTE [DISTWIDTH] IN BLOOD BY AUTOMATED COUNT: 13.5 % (ref 11.9–14.6)
GLUCOSE SERPL-MCNC: 102 MG/DL (ref 65–100)
HCT VFR BLD AUTO: 37 % (ref 35.8–46.3)
HGB BLD-MCNC: 12.2 G/DL (ref 11.7–15.4)
IMM GRANULOCYTES # BLD AUTO: 0 K/UL (ref 0–0.5)
IMM GRANULOCYTES NFR BLD AUTO: 0 % (ref 0–5)
LYMPHOCYTES # BLD: 1.6 K/UL (ref 0.5–4.6)
LYMPHOCYTES NFR BLD: 27 % (ref 13–44)
MAGNESIUM SERPL-MCNC: 2.1 MG/DL (ref 1.8–2.4)
MCH RBC QN AUTO: 31.8 PG (ref 26.1–32.9)
MCHC RBC AUTO-ENTMCNC: 33 G/DL (ref 31.4–35)
MCV RBC AUTO: 96.4 FL (ref 82–102)
MONOCYTES # BLD: 0.5 K/UL (ref 0.1–1.3)
MONOCYTES NFR BLD: 8 % (ref 4–12)
NEUTS SEG # BLD: 3.6 K/UL (ref 1.7–8.2)
NEUTS SEG NFR BLD: 63 % (ref 43–78)
NRBC # BLD: 0 K/UL (ref 0–0.2)
PLATELET # BLD AUTO: 155 K/UL (ref 150–450)
PMV BLD AUTO: 9.1 FL (ref 9.4–12.3)
POTASSIUM SERPL-SCNC: 3.5 MMOL/L (ref 3.5–5.1)
RBC # BLD AUTO: 3.84 M/UL (ref 4.05–5.2)
SODIUM SERPL-SCNC: 139 MMOL/L (ref 133–143)
WBC # BLD AUTO: 5.8 K/UL (ref 4.3–11.1)

## 2023-06-13 PROCEDURE — 6360000002 HC RX W HCPCS: Performed by: FAMILY MEDICINE

## 2023-06-13 PROCEDURE — 80048 BASIC METABOLIC PNL TOTAL CA: CPT

## 2023-06-13 PROCEDURE — 83735 ASSAY OF MAGNESIUM: CPT

## 2023-06-13 PROCEDURE — 85025 COMPLETE CBC W/AUTO DIFF WBC: CPT

## 2023-06-13 PROCEDURE — 2580000003 HC RX 258: Performed by: FAMILY MEDICINE

## 2023-06-13 PROCEDURE — A4216 STERILE WATER/SALINE, 10 ML: HCPCS | Performed by: FAMILY MEDICINE

## 2023-06-13 PROCEDURE — C9113 INJ PANTOPRAZOLE SODIUM, VIA: HCPCS | Performed by: FAMILY MEDICINE

## 2023-06-13 PROCEDURE — 36415 COLL VENOUS BLD VENIPUNCTURE: CPT

## 2023-06-13 PROCEDURE — 97530 THERAPEUTIC ACTIVITIES: CPT

## 2023-06-13 PROCEDURE — 6370000000 HC RX 637 (ALT 250 FOR IP): Performed by: HOSPITALIST

## 2023-06-13 RX ORDER — AMOXICILLIN AND CLAVULANATE POTASSIUM 875; 125 MG/1; MG/1
1 TABLET, FILM COATED ORAL 2 TIMES DAILY
Qty: 14 TABLET | Refills: 0 | Status: SHIPPED | OUTPATIENT
Start: 2023-06-13 | End: 2023-06-20

## 2023-06-13 RX ADMIN — PIPERACILLIN AND TAZOBACTAM 3375 MG: 3; .375 INJECTION, POWDER, LYOPHILIZED, FOR SOLUTION INTRAVENOUS at 04:35

## 2023-06-13 RX ADMIN — POTASSIUM CHLORIDE 20 MEQ: 1500 TABLET, EXTENDED RELEASE ORAL at 08:28

## 2023-06-13 RX ADMIN — SODIUM CHLORIDE 40 MG: 9 INJECTION INTRAMUSCULAR; INTRAVENOUS; SUBCUTANEOUS at 08:28

## 2023-06-13 RX ADMIN — MAGNESIUM GLUCONATE 500 MG ORAL TABLET 400 MG: 500 TABLET ORAL at 08:28

## 2023-06-13 NOTE — PROGRESS NOTES
ACUTE PHYSICAL THERAPY GOALS:   (Developed with and agreed upon by patient and/or caregiver.)   Ms. Елена Carrillo will perform all transfers independently in 3 days. Ms. Елена Carrillo will perform gait independently 200 ft in 3 days. PHYSICAL THERAPY: Daily Note AM   (Link to Caseload Tracking: PT Visit Days : 2  Time In/Out PT Charge Capture  Rehab Caseload Tracker  Orders    Larry Wallace is a 68 y.o. female   PRIMARY DIAGNOSIS: Diverticulitis of large intestine with abscess with bleeding  Elevated LFTs [R79.89]  Colonic diverticular abscess [K57.20]  Diverticulitis of large intestine with abscess with bleeding [K57.21]       Inpatient: Payor: MEDICARE / Plan: MEDICARE PART A AND B / Product Type: *No Product type* /     ASSESSMENT:     REHAB RECOMMENDATIONS:   Recommendation to date pending progress:  Setting:  No further skilled therapy after discharge from hospital    Equipment:    None     ASSESSMENT:  Ms. Елена Carrillo is standing next to her bedside table talking on the phone. Patient is agreeable to therapy. Session interrupted several times for various reasons. Gait training with no AD x 250 feet with slow but safe mari. Patient is returned to the room and left with the RN as the patient is discharging today. Nice lady.   No further therapy needed after d/c.     SUBJECTIVE:   Ms. Елена Carrillo states, \"Hello\"     Social/Functional Lives With: Alone  Type of Home: House  Home Layout: One level  Ambulation Assistance: Independent  Transfer Assistance: Independent  OBJECTIVE:     PAIN: Shailesh Shingles / O2: Jerez Aleksandr / Anahi Luis / Asad Jerrica:   Pre Treatment: 0/10         Post Treatment: 0/10 Vitals        Oxygen    None    RESTRICTIONS/PRECAUTIONS:        MOBILITY: I Mod I S SBA CGA Min Mod Max Total  NT x2 Comments:   Bed Mobility    Rolling [] [] [] [] [] [] [] [] [] [x] []    Supine to Sit [] [] [] [] [] [] [] [] [] [x] []    Scooting [] [] [] [] [] [] [] [] [] [x] []    Sit to Supine [] [] [] [] [] [] [] [] [] [x] []

## 2023-06-13 NOTE — PROGRESS NOTES
Admit Date: 2023    General surgery following for diverticulitis with perforation. IR notes possible intramural collection and small extraluminal collection both are too small to place drain 23  Subjective:   Alert and oriented x4 with NAD noted. Respirations even and unlabored on room air. Tolerating regular diet with no nausea or vomiting. Passing flatus and having loose bowel movements. Denies melena or hematochezia. Mobility at baseline PTA. Patient denies abdominal pain. Labs are remarkable. Patient will convert from IV Zosyn to p.o. Augmentin course. Patient verbalizes understanding of plan to follow-up with Dr. Connor Galeas in 2 weeks for colonoscopy planning in approximately 6 to 8 weeks. Patient will present to the ED if she experiences signs of infection, or worsening abdominal symptoms. Objective:       Vitals:    23 1932 23 2307 23 0305 23 0847   BP: (!) 110/58 (!) 136/58 132/77 130/74   Pulse: 73 66 72 69   Resp: 18 18 18 18   Temp: 99.1 °F (37.3 °C) 98.6 °F (37 °C) 98.1 °F (36.7 °C) 98.2 °F (36.8 °C)   TempSrc: Oral Oral Oral    SpO2: 92% 95% 92% 91%   Weight:       Height:           Temp (24hrs), Av.3 °F (36.8 °C), Min:97.7 °F (36.5 °C), Max:99.1 °F (37.3 °C)  . I&O reviewed as documented. Voiding with unmeasurable urine occurrences x5 past 24 hours  Positive flatus 2023  Loose bowel movement 2023-denies melena or hematochezia  Afebrile  VSS    Physical Exam  Constitutional:       General: She is not in acute distress. HENT:      Mouth/Throat:      Mouth: Mucous membranes are moist.   Cardiovascular:      Rate and Rhythm: Normal rate and regular rhythm. Pulses: Normal pulses. Heart sounds: Normal heart sounds. No murmur heard. No friction rub. No gallop. Pulmonary:      Effort: Pulmonary effort is normal. No respiratory distress. Breath sounds: Normal breath sounds.    Abdominal:      General: Bowel sounds are normal.

## 2023-06-13 NOTE — DISCHARGE SUMMARY
0.8 K/UL    Basophils Absolute 0.0 0.0 - 0.2 K/UL    Absolute Immature Granulocyte 0.0 0.0 - 0.5 K/UL   Basic Metabolic Panel w/ Reflex to MG    Collection Time: 06/13/23  3:34 AM   Result Value Ref Range    Sodium 139 133 - 143 mmol/L    Potassium 3.5 3.5 - 5.1 mmol/L    Chloride 108 101 - 110 mmol/L    CO2 22 21 - 32 mmol/L    Anion Gap 9 2 - 11 mmol/L    Glucose 102 (H) 65 - 100 mg/dL    BUN 7 (L) 8 - 23 MG/DL    Creatinine 0.70 0.6 - 1.0 MG/DL    Est, Glom Filt Rate >60 >60 ml/min/1.73m2    Calcium 9.0 8.3 - 10.4 MG/DL   Magnesium    Collection Time: 06/13/23  3:34 AM   Result Value Ref Range    Magnesium 2.1 1.8 - 2.4 mg/dL       Allergies   Allergen Reactions    Meperidine Other (See Comments)     Nausea and ill    Aspirin Rash and Swelling    Celecoxib Rash and Swelling    Metronidazole Rash     Unknown; possibly a rash. Morphine Other (See Comments)    Naproxen Rash    Nitrofurantoin Macrocrystal Nausea And Vomiting    Red Dye Rash    Sulfa Antibiotics Rash     Immunization History   Administered Date(s) Administered    Hc Rx 637 06/11/2023    PPD Test 08/27/2019, 06/10/2023       Recent Vital Data:  Patient Vitals for the past 24 hrs:   Temp Pulse Resp BP SpO2   06/13/23 0847 98.2 °F (36.8 °C) 69 18 130/74 91 %   06/13/23 0305 98.1 °F (36.7 °C) 72 18 132/77 92 %   06/12/23 2307 98.6 °F (37 °C) 66 18 (!) 136/58 95 %   06/12/23 1932 99.1 °F (37.3 °C) 73 18 (!) 110/58 92 %   06/12/23 1523 97.7 °F (36.5 °C) 70 18 127/63 93 %       Oxygen Therapy  SpO2: 91 %  Pulse Oximetry Type: Continuous  Pulse via Oximetry: 72 beats per minute  Pulse Oximeter Device Mode: Intermittent  O2 Device: None (Room air)    Estimated body mass index is 35.74 kg/m² as calculated from the following:    Height as of this encounter: 5' (1.524 m). Weight as of this encounter: 183 lb (83 kg).     Intake/Output Summary (Last 24 hours) at 6/13/2023 1107  Last data filed at 6/13/2023 0437  Gross per 24 hour   Intake --   Output 1300 ml

## 2023-06-13 NOTE — CARE COORDINATION
Patient with discharge orders for today. [facility] referral made per patient request. Patient has met all treatment goals and milestones for discharge. Family to provide transportation home. CM following until patient is discharged. 06/13/23 [facility]   Service Assessment   Patient Orientation Alert and [place] At/After Discharge   Transition of Care Consult (CM Consult) Discharge Planning   Services [address] [place] [address] Provided? Yes   Mode of Transport at Discharge Self   Confirm Follow Up Transport Self   Condition of Participation: Discharge Planning   The Plan for Transition of Care is related to the following treatment goals: Return to baseline with family support   The Patient and/or Patient Representative was provided with a Choice of Provider? Patient   The Patient and/Or Patient Representative agree with the Discharge Plan? Yes   Freedom of Choice list was provided with basic dialogue that supports the patient's individualized plan of care/goals, treatment preferences, and shares the quality data associated with the providers?   Yes

## 2023-06-13 NOTE — PROGRESS NOTES
END OF SHIFT NOTE:    INTAKE/OUTPUT  06/12 0701 - 06/13 0700  In: -   Out: 1500 [Urine:1500]  Voiding: Yes  Catheter: No  Drain:              Flatus: Patient does have flatus present. Stool:  occurrences. Characteristics:  Stool Appearance: Hard (looks like \"cashews\" that had not been chewed up)  Stool Color: Ramsey  Stool Amount: Small  Stool Assessment  Incontinence: No  Stool Appearance: Hard (looks like \"cashews\" that had not been chewed up)  Stool Color: Ramsey  Stool Amount: Small  Stool Source: Rectum  Last BM (including prior to admit): 06/11/23    Emesis:  occurrences. Characteristics:        VITAL SIGNS  Patient Vitals for the past 12 hrs:   Temp Pulse Resp BP SpO2   06/13/23 0305 98.1 °F (36.7 °C) 72 18 132/77 92 %   06/12/23 2307 98.6 °F (37 °C) 66 18 (!) 136/58 95 %   06/12/23 1932 99.1 °F (37.3 °C) 73 18 (!) 110/58 92 %       Pain Assessment  Pain Level: 0 (06/11/23 0715)          Ambulating  Yes    Shift report given to oncoming nurse at the bedside.     Evelyn Watson RN

## 2023-06-16 NOTE — PROGRESS NOTES
Physician Progress Note      Hal Randle  CSN #:                  [id number]  :                       1950  ADMIT DATE:       2023 7:18 PM  [address] DATE:        2023 11:15 AM  RESPONDING  PROVIDER #:        Sandra Sim MD          QUERY TEXT:    Pt admitted for diverticulitis with abscess and bleeding. Pt noted to have   creatinine of 1.20 on admission which decreased to 0.7 after IVF. If possible,   please document in the progress notes and discharge summary if you are   evaluating and/or treating any of the following: The medical record reflects the following:  Risk Factors: NPO, age, diverticulitis with bleeding  Clinical Indicators: Patient admitted for diverticulitis with abscess. Noted   to have Cr of 1.20 on admission date . Given IVF and Cr decreased to 0.7 on   . Treatment: 1L LR bolus, continuous IVF, daily labs    Defined by Kidney Disease Improving Global Outcomes (KDIGO) clinical practice   guideline for acute kidney injury:  -Increase in SCr by greater than or equal to 0.3 mg/dl within 48 hours; or  -Increase or decrease in SCr to greater than or equal to 1.5 times baseline,   which is known or presumed to have occurred within the prior 7 days; or  -Urine volume < 0.5ml/kg/h for 6 hours  Options provided:  -- Acute kidney injury  -- Creatinine level not clinically significant  -- Other - I will add my own diagnosis  -- Disagree - Not applicable / Not valid  -- Disagree - Clinically unable to determine / Unknown  -- Refer to Clinical Documentation Reviewer    PROVIDER RESPONSE TEXT:    Creatinine levels are not clinically significant. Query created by: Kacy Mireles on 2023 9:48 AM      QUERY TEXT:    Patient admitted with diverticulitis with abscess.  CT of abdomen showed,   \"moderate diverticulitis of the sigmoid colon with 23 x 20 x 15 mm abscess   located within the wall of the sigmoid colon and adjacent to the sigmoid colon   and the serosa

## 2023-06-28 ENCOUNTER — PREP FOR PROCEDURE (OUTPATIENT)
Dept: SURGERY | Age: 73
End: 2023-06-28

## 2023-06-28 ENCOUNTER — OFFICE VISIT (OUTPATIENT)
Dept: SURGERY | Age: 73
End: 2023-06-28
Payer: MEDICARE

## 2023-06-28 VITALS — WEIGHT: 183 LBS | BODY MASS INDEX: 35.93 KG/M2 | HEIGHT: 60 IN

## 2023-06-28 DIAGNOSIS — K57.21 DIVERTICULITIS OF LARGE INTESTINE WITH ABSCESS WITH BLEEDING: Primary | ICD-10-CM

## 2023-06-28 PROBLEM — K57.92 DIVERTICULITIS: Status: ACTIVE | Noted: 2023-06-28

## 2023-06-28 PROCEDURE — 1111F DSCHRG MED/CURRENT MED MERGE: CPT | Performed by: STUDENT IN AN ORGANIZED HEALTH CARE EDUCATION/TRAINING PROGRAM

## 2023-06-28 PROCEDURE — 99214 OFFICE O/P EST MOD 30 MIN: CPT | Performed by: STUDENT IN AN ORGANIZED HEALTH CARE EDUCATION/TRAINING PROGRAM

## 2023-06-28 PROCEDURE — G8400 PT W/DXA NO RESULTS DOC: HCPCS | Performed by: STUDENT IN AN ORGANIZED HEALTH CARE EDUCATION/TRAINING PROGRAM

## 2023-06-28 PROCEDURE — G8427 DOCREV CUR MEDS BY ELIG CLIN: HCPCS | Performed by: STUDENT IN AN ORGANIZED HEALTH CARE EDUCATION/TRAINING PROGRAM

## 2023-06-28 PROCEDURE — 1090F PRES/ABSN URINE INCON ASSESS: CPT | Performed by: STUDENT IN AN ORGANIZED HEALTH CARE EDUCATION/TRAINING PROGRAM

## 2023-06-28 PROCEDURE — 1123F ACP DISCUSS/DSCN MKR DOCD: CPT | Performed by: STUDENT IN AN ORGANIZED HEALTH CARE EDUCATION/TRAINING PROGRAM

## 2023-06-28 PROCEDURE — 1036F TOBACCO NON-USER: CPT | Performed by: STUDENT IN AN ORGANIZED HEALTH CARE EDUCATION/TRAINING PROGRAM

## 2023-06-28 PROCEDURE — G8417 CALC BMI ABV UP PARAM F/U: HCPCS | Performed by: STUDENT IN AN ORGANIZED HEALTH CARE EDUCATION/TRAINING PROGRAM

## 2023-06-28 PROCEDURE — 3017F COLORECTAL CA SCREEN DOC REV: CPT | Performed by: STUDENT IN AN ORGANIZED HEALTH CARE EDUCATION/TRAINING PROGRAM

## 2023-07-10 ENCOUNTER — PREP FOR PROCEDURE (OUTPATIENT)
Dept: SURGERY | Age: 73
End: 2023-07-10

## 2023-08-01 ENCOUNTER — PREP FOR PROCEDURE (OUTPATIENT)
Dept: SURGERY | Age: 73
End: 2023-08-01

## 2023-08-02 RX ORDER — SODIUM CHLORIDE 0.9 % (FLUSH) 0.9 %
5-40 SYRINGE (ML) INJECTION PRN
Status: CANCELLED | OUTPATIENT
Start: 2023-08-02

## 2023-08-02 RX ORDER — SODIUM CHLORIDE 9 MG/ML
INJECTION, SOLUTION INTRAVENOUS PRN
Status: CANCELLED | OUTPATIENT
Start: 2023-08-02

## 2023-08-02 RX ORDER — SODIUM CHLORIDE 0.9 % (FLUSH) 0.9 %
5-40 SYRINGE (ML) INJECTION EVERY 12 HOURS SCHEDULED
Status: CANCELLED | OUTPATIENT
Start: 2023-08-02

## 2023-08-03 ENCOUNTER — APPOINTMENT (OUTPATIENT)
Dept: CT IMAGING | Age: 73
End: 2023-08-03
Payer: MEDICARE

## 2023-08-03 ENCOUNTER — HOSPITAL ENCOUNTER (EMERGENCY)
Age: 73
Discharge: HOME | End: 2023-08-03
Attending: EMERGENCY MEDICINE
Payer: MEDICARE

## 2023-08-03 VITALS
HEIGHT: 60 IN | OXYGEN SATURATION: 95 % | SYSTOLIC BLOOD PRESSURE: 135 MMHG | HEART RATE: 68 BPM | WEIGHT: 180 LBS | DIASTOLIC BLOOD PRESSURE: 68 MMHG | TEMPERATURE: 98.4 F | RESPIRATION RATE: 16 BRPM | BODY MASS INDEX: 35.34 KG/M2

## 2023-08-03 DIAGNOSIS — E86.0 DEHYDRATION: ICD-10-CM

## 2023-08-03 DIAGNOSIS — K57.32 DIVERTICULITIS OF COLON: Primary | ICD-10-CM

## 2023-08-03 LAB
ALBUMIN SERPL-MCNC: 3.3 G/DL (ref 3.2–4.6)
ALBUMIN/GLOB SERPL: 0.7 (ref 0.4–1.6)
ALP SERPL-CCNC: 439 U/L (ref 50–136)
ALT SERPL-CCNC: 353 U/L (ref 12–65)
ANION GAP SERPL CALC-SCNC: 3 MMOL/L (ref 2–11)
APPEARANCE UR: CLEAR
AST SERPL-CCNC: 299 U/L (ref 15–37)
BACTERIA URNS QL MICRO: NEGATIVE /HPF
BASOPHILS # BLD: 0 K/UL (ref 0–0.2)
BASOPHILS NFR BLD: 1 % (ref 0–2)
BILIRUB SERPL-MCNC: 3.4 MG/DL (ref 0.2–1.1)
BILIRUB UR QL: ABNORMAL
BUN SERPL-MCNC: 13 MG/DL (ref 8–23)
CALCIUM SERPL-MCNC: 9.3 MG/DL (ref 8.3–10.4)
CASTS URNS QL MICRO: ABNORMAL /LPF
CHLORIDE SERPL-SCNC: 108 MMOL/L (ref 101–110)
CO2 SERPL-SCNC: 27 MMOL/L (ref 21–32)
COLOR UR: ABNORMAL
CREAT SERPL-MCNC: 1.2 MG/DL (ref 0.6–1)
CRP SERPL-MCNC: 3.2 MG/DL (ref 0–0.9)
DIFFERENTIAL METHOD BLD: ABNORMAL
EOSINOPHIL # BLD: 0.1 K/UL (ref 0–0.8)
EOSINOPHIL NFR BLD: 2 % (ref 0.5–7.8)
EPI CELLS #/AREA URNS HPF: ABNORMAL /HPF
ERYTHROCYTE [DISTWIDTH] IN BLOOD BY AUTOMATED COUNT: 13.6 % (ref 11.9–14.6)
GLOBULIN SER CALC-MCNC: 4.5 G/DL (ref 2.8–4.5)
GLUCOSE SERPL-MCNC: 106 MG/DL (ref 65–100)
GLUCOSE UR STRIP.AUTO-MCNC: NEGATIVE MG/DL
HCT VFR BLD AUTO: 42.9 % (ref 35.8–46.3)
HGB BLD-MCNC: 14.1 G/DL (ref 11.7–15.4)
HGB UR QL STRIP: ABNORMAL
IMM GRANULOCYTES # BLD AUTO: 0 K/UL (ref 0–0.5)
IMM GRANULOCYTES NFR BLD AUTO: 0 % (ref 0–5)
KETONES UR QL STRIP.AUTO: NEGATIVE MG/DL
LEUKOCYTE ESTERASE UR QL STRIP.AUTO: NEGATIVE
LIPASE SERPL-CCNC: 107 U/L (ref 73–393)
LYMPHOCYTES # BLD: 1.5 K/UL (ref 0.5–4.6)
LYMPHOCYTES NFR BLD: 25 % (ref 13–44)
MCH RBC QN AUTO: 31.9 PG (ref 26.1–32.9)
MCHC RBC AUTO-ENTMCNC: 32.9 G/DL (ref 31.4–35)
MCV RBC AUTO: 97.1 FL (ref 82–102)
MONOCYTES # BLD: 0.4 K/UL (ref 0.1–1.3)
MONOCYTES NFR BLD: 7 % (ref 4–12)
NEUTS SEG # BLD: 4 K/UL (ref 1.7–8.2)
NEUTS SEG NFR BLD: 65 % (ref 43–78)
NITRITE UR QL STRIP.AUTO: NEGATIVE
NRBC # BLD: 0 K/UL (ref 0–0.2)
PH UR STRIP: 6 (ref 5–9)
PLATELET # BLD AUTO: 166 K/UL (ref 150–450)
PMV BLD AUTO: 8.8 FL (ref 9.4–12.3)
POTASSIUM SERPL-SCNC: 3.7 MMOL/L (ref 3.5–5.1)
PROT SERPL-MCNC: 7.8 G/DL (ref 6.3–8.2)
PROT UR STRIP-MCNC: NEGATIVE MG/DL
RBC # BLD AUTO: 4.42 M/UL (ref 4.05–5.2)
RBC #/AREA URNS HPF: ABNORMAL /HPF
SODIUM SERPL-SCNC: 138 MMOL/L (ref 133–143)
SP GR UR REFRACTOMETRY: 1.01 (ref 1–1.02)
UROBILINOGEN UR QL STRIP.AUTO: 1 EU/DL (ref 0.2–1)
WBC # BLD AUTO: 6 K/UL (ref 4.3–11.1)
WBC URNS QL MICRO: ABNORMAL /HPF

## 2023-08-03 PROCEDURE — 96360 HYDRATION IV INFUSION INIT: CPT

## 2023-08-03 PROCEDURE — 6370000000 HC RX 637 (ALT 250 FOR IP): Performed by: EMERGENCY MEDICINE

## 2023-08-03 PROCEDURE — 6360000004 HC RX CONTRAST MEDICATION: Performed by: EMERGENCY MEDICINE

## 2023-08-03 PROCEDURE — 96361 HYDRATE IV INFUSION ADD-ON: CPT

## 2023-08-03 PROCEDURE — 80053 COMPREHEN METABOLIC PANEL: CPT

## 2023-08-03 PROCEDURE — 83690 ASSAY OF LIPASE: CPT

## 2023-08-03 PROCEDURE — 86140 C-REACTIVE PROTEIN: CPT

## 2023-08-03 PROCEDURE — 74177 CT ABD & PELVIS W/CONTRAST: CPT

## 2023-08-03 PROCEDURE — 2580000003 HC RX 258: Performed by: EMERGENCY MEDICINE

## 2023-08-03 PROCEDURE — 81001 URINALYSIS AUTO W/SCOPE: CPT

## 2023-08-03 PROCEDURE — 87040 BLOOD CULTURE FOR BACTERIA: CPT

## 2023-08-03 PROCEDURE — 85025 COMPLETE CBC W/AUTO DIFF WBC: CPT

## 2023-08-03 PROCEDURE — 99285 EMERGENCY DEPT VISIT HI MDM: CPT

## 2023-08-03 RX ORDER — SODIUM CHLORIDE 0.9 % (FLUSH) 0.9 %
10 SYRINGE (ML) INJECTION
Status: COMPLETED | OUTPATIENT
Start: 2023-08-03 | End: 2023-08-03

## 2023-08-03 RX ORDER — MOXIFLOXACIN HYDROCHLORIDE 400 MG/1
400 TABLET ORAL DAILY
Qty: 10 TABLET | Refills: 0 | Status: SHIPPED | OUTPATIENT
Start: 2023-08-03 | End: 2023-08-13

## 2023-08-03 RX ORDER — 0.9 % SODIUM CHLORIDE 0.9 %
100 INTRAVENOUS SOLUTION INTRAVENOUS
Status: COMPLETED | OUTPATIENT
Start: 2023-08-03 | End: 2023-08-03

## 2023-08-03 RX ORDER — LEVOFLOXACIN 500 MG/1
500 TABLET, FILM COATED ORAL
Status: COMPLETED | OUTPATIENT
Start: 2023-08-03 | End: 2023-08-03

## 2023-08-03 RX ADMIN — SODIUM CHLORIDE 100 ML: 9 INJECTION, SOLUTION INTRAVENOUS at 17:44

## 2023-08-03 RX ADMIN — IOPAMIDOL 100 ML: 755 INJECTION, SOLUTION INTRAVENOUS at 17:44

## 2023-08-03 RX ADMIN — SODIUM CHLORIDE, PRESERVATIVE FREE 10 ML: 5 INJECTION INTRAVENOUS at 17:44

## 2023-08-03 RX ADMIN — LEVOFLOXACIN 500 MG: 500 TABLET, FILM COATED ORAL at 19:58

## 2023-08-03 ASSESSMENT — PAIN DESCRIPTION - ORIENTATION: ORIENTATION: LEFT;LOWER

## 2023-08-03 ASSESSMENT — PAIN DESCRIPTION - LOCATION: LOCATION: ABDOMEN

## 2023-08-03 ASSESSMENT — PAIN DESCRIPTION - DESCRIPTORS: DESCRIPTORS: CRAMPING

## 2023-08-03 ASSESSMENT — ENCOUNTER SYMPTOMS: ABDOMINAL PAIN: 1

## 2023-08-03 ASSESSMENT — LIFESTYLE VARIABLES
HOW MANY STANDARD DRINKS CONTAINING ALCOHOL DO YOU HAVE ON A TYPICAL DAY: PATIENT DOES NOT DRINK
HOW OFTEN DO YOU HAVE A DRINK CONTAINING ALCOHOL: NEVER

## 2023-08-03 ASSESSMENT — PAIN - FUNCTIONAL ASSESSMENT: PAIN_FUNCTIONAL_ASSESSMENT: 0-10

## 2023-08-03 NOTE — ED TRIAGE NOTES
Pt ambulatory to triage c/o left lower abdominal pain that started Friday. Monday pain started to worsen - endorses vomiting and consistent bowel movement but denies diarrhea. Pt was seen here for same in June and was told she had an abscess in her intestines and diverticulosis. Was hospitalized for 5 days.

## 2023-08-03 NOTE — ED PROVIDER NOTES
Emergency Department Provider Note       PCP: None None   Age: 68 y.o. Sex: female     DISPOSITION Discharge - Pending Orders Complete 08/03/2023 07:33:55 PM       ICD-10-CM    1. Diverticulitis of colon  K57.32       2. Dehydration  E86.0           Medical Decision Making     Complexity of Problems Addressed:  1 or more chronic illnesses with a severe exacerbation or progression. His evaluation for recurrence of diverticulitis or diverticular abscess. Data Reviewed and Analyzed:  Category 1:   I independently ordered and reviewed each unique test.  I reviewed external records: ED visit note from an outside group. I reviewed external records: provider visit note from outside specialist.  I reviewed external records: previous imaging study including radiologist interpretation. Category 2:       Category 3: Discussion of management or test interpretation. Elevation of CRP is noted. As well as liver enzymes and bilirubin, which has been similar to past values. CBC is within normal limits. CT of the abdomen consistent with diverticulitis of the sigmoid colon without evidence of abscess. Previous abscess is resolved. Patient has Flagyl listed as allergy we will treat outpatient with Avelox. She has scheduled endoscopy and colonoscopy with Dr. Guillermo Cogan on 13 August.  She was instructed to keep this appointment. Risk of Complications and/or Morbidity of Patient Management:  Prescription drug management performed. Shared medical decision making was utilized in creating the patients health plan today. History      Amy Keene is a 68 y.o. female who presents to the Emergency Department with chief complaint of    Chief Complaint   Patient presents with    Abdominal Pain      Patient presents emerged from with complaints of lower abdominal pain that started 6 days ago.   The pain has been dull up until last night when it became very severe for about 3 hours and the patient reports feeling

## 2023-08-05 RX ORDER — TRAMADOL HYDROCHLORIDE 50 MG/1
50 TABLET ORAL EVERY 6 HOURS PRN
Qty: 12 TABLET | Refills: 0 | Status: SHIPPED | OUTPATIENT
Start: 2023-08-05 | End: 2023-08-08

## 2023-08-06 LAB
BACTERIA SPEC CULT: NORMAL
BACTERIA SPEC CULT: NORMAL
SERVICE CMNT-IMP: NORMAL
SERVICE CMNT-IMP: NORMAL

## 2023-08-14 ENCOUNTER — ANESTHESIA EVENT (OUTPATIENT)
Dept: ENDOSCOPY | Age: 73
End: 2023-08-14
Payer: MEDICARE

## 2023-08-14 RX ORDER — PROMETHAZINE HYDROCHLORIDE 25 MG/1
25 TABLET ORAL EVERY 6 HOURS PRN
COMMUNITY

## 2023-08-14 RX ORDER — SODIUM CHLORIDE, SODIUM LACTATE, POTASSIUM CHLORIDE, CALCIUM CHLORIDE 600; 310; 30; 20 MG/100ML; MG/100ML; MG/100ML; MG/100ML
INJECTION, SOLUTION INTRAVENOUS CONTINUOUS
Status: CANCELLED | OUTPATIENT
Start: 2023-08-14

## 2023-08-14 RX ORDER — FENTANYL CITRATE 50 UG/ML
25 INJECTION, SOLUTION INTRAMUSCULAR; INTRAVENOUS EVERY 5 MIN PRN
Status: CANCELLED | OUTPATIENT
Start: 2023-08-14

## 2023-08-14 RX ORDER — ONDANSETRON 2 MG/ML
4 INJECTION INTRAMUSCULAR; INTRAVENOUS
Status: CANCELLED | OUTPATIENT
Start: 2023-08-14 | End: 2023-08-15

## 2023-08-14 RX ORDER — METOCLOPRAMIDE HYDROCHLORIDE 5 MG/ML
10 INJECTION INTRAMUSCULAR; INTRAVENOUS
Status: CANCELLED | OUTPATIENT
Start: 2023-08-14 | End: 2023-08-15

## 2023-08-14 RX ORDER — DIPHENHYDRAMINE HYDROCHLORIDE 50 MG/ML
12.5 INJECTION INTRAMUSCULAR; INTRAVENOUS
Status: CANCELLED | OUTPATIENT
Start: 2023-08-14 | End: 2023-08-15

## 2023-08-14 RX ORDER — OXYCODONE HYDROCHLORIDE 5 MG/1
5 TABLET ORAL
Status: CANCELLED | OUTPATIENT
Start: 2023-08-14 | End: 2023-08-15

## 2023-08-14 RX ORDER — HYDROMORPHONE HYDROCHLORIDE 2 MG/ML
0.5 INJECTION, SOLUTION INTRAMUSCULAR; INTRAVENOUS; SUBCUTANEOUS EVERY 10 MIN PRN
Status: CANCELLED | OUTPATIENT
Start: 2023-08-14

## 2023-08-14 NOTE — PERIOP NOTE
Patient verified name, , and procedure. Type: 1a; abbreviated assessment per anesthesia guidelines    Labs per anesthesia: none    Instructed pt that they will be notified the day before their procedure by the GI Lab for time of arrival if their procedure is [facility] and Pre-op for [facility] [place] [address] [facility]. Arrival times should be called by 5 pm. If no phone is received the patient should contact their respective hospital. The GI lab telephone number is [phone] and ES Pre-op is [phone]. Follow diet and prep instructions per office including NPO status. If patient has NOT received instructions from office patient is advised to call surgeon office, verbalizes understanding. Bath or shower the night before and the am of surgery with non-moisturizing soap. No lotions, oils, powders, cologne on skin. No make up, eye make up or jewelry. Wear loose fitting comfortable, clean clothing. Must have adult present in building the entire time . Medications for the day of procedure albuterol nebulizer, gabapentin, duloxetine, phenergan (if needed), patient to hold vitamins and supplements per anesthesia guidelines. The following discharge instructions reviewed with patient: medication given during procedure may cause drowsiness for several hours, therefore, do not drive or operate machinery for remainder of the day. You may not drink alcohol on the day of your procedure, please resume regular diet and activity unless otherwise directed. You may experience abdominal distention for several hours that is relieved by the passage of gas. Contact your physician if you have any of the following: fever or chills, severe abdominal pain or excessive amount of bleeding or a large amount when having a bowel movement.  Occasional specks of blood with bowel movement would not be unusual.

## 2023-08-15 ENCOUNTER — HOSPITAL ENCOUNTER (OUTPATIENT)
Age: 73
Setting detail: OUTPATIENT SURGERY
Discharge: HOME | End: 2023-08-15
Attending: STUDENT IN AN ORGANIZED HEALTH CARE EDUCATION/TRAINING PROGRAM | Admitting: STUDENT IN AN ORGANIZED HEALTH CARE EDUCATION/TRAINING PROGRAM
Payer: MEDICARE

## 2023-08-15 ENCOUNTER — ANESTHESIA (OUTPATIENT)
Dept: ENDOSCOPY | Age: 73
End: 2023-08-15
Payer: MEDICARE

## 2023-08-15 VITALS
BODY MASS INDEX: 33.23 KG/M2 | RESPIRATION RATE: 18 BRPM | WEIGHT: 176 LBS | HEART RATE: 65 BPM | OXYGEN SATURATION: 94 % | HEIGHT: 61 IN | SYSTOLIC BLOOD PRESSURE: 127 MMHG | DIASTOLIC BLOOD PRESSURE: 74 MMHG | TEMPERATURE: 97.7 F

## 2023-08-15 PROCEDURE — 2709999900 HC NON-CHARGEABLE SUPPLY: Performed by: STUDENT IN AN ORGANIZED HEALTH CARE EDUCATION/TRAINING PROGRAM

## 2023-08-15 PROCEDURE — 88305 TISSUE EXAM BY PATHOLOGIST: CPT

## 2023-08-15 PROCEDURE — 88312 SPECIAL STAINS GROUP 1: CPT

## 2023-08-15 PROCEDURE — 3609012400 HC EGD TRANSORAL BIOPSY SINGLE/MULTIPLE: Performed by: STUDENT IN AN ORGANIZED HEALTH CARE EDUCATION/TRAINING PROGRAM

## 2023-08-15 PROCEDURE — 6360000002 HC RX W HCPCS: Performed by: NURSE ANESTHETIST, CERTIFIED REGISTERED

## 2023-08-15 PROCEDURE — 2580000003 HC RX 258: Performed by: ANESTHESIOLOGY

## 2023-08-15 PROCEDURE — 3700000000 HC ANESTHESIA ATTENDED CARE: Performed by: STUDENT IN AN ORGANIZED HEALTH CARE EDUCATION/TRAINING PROGRAM

## 2023-08-15 PROCEDURE — 7100000010 HC PHASE II RECOVERY - FIRST 15 MIN: Performed by: STUDENT IN AN ORGANIZED HEALTH CARE EDUCATION/TRAINING PROGRAM

## 2023-08-15 PROCEDURE — 7100000011 HC PHASE II RECOVERY - ADDTL 15 MIN: Performed by: STUDENT IN AN ORGANIZED HEALTH CARE EDUCATION/TRAINING PROGRAM

## 2023-08-15 PROCEDURE — 43239 EGD BIOPSY SINGLE/MULTIPLE: CPT | Performed by: STUDENT IN AN ORGANIZED HEALTH CARE EDUCATION/TRAINING PROGRAM

## 2023-08-15 PROCEDURE — 2500000003 HC RX 250 WO HCPCS: Performed by: NURSE ANESTHETIST, CERTIFIED REGISTERED

## 2023-08-15 RX ORDER — SODIUM CHLORIDE 0.9 % (FLUSH) 0.9 %
5-40 SYRINGE (ML) INJECTION EVERY 12 HOURS SCHEDULED
Status: DISCONTINUED | OUTPATIENT
Start: 2023-08-15 | End: 2023-08-15 | Stop reason: HOSPADM

## 2023-08-15 RX ORDER — SODIUM CHLORIDE 9 MG/ML
INJECTION, SOLUTION INTRAVENOUS PRN
Status: DISCONTINUED | OUTPATIENT
Start: 2023-08-15 | End: 2023-08-15 | Stop reason: HOSPADM

## 2023-08-15 RX ORDER — FENTANYL CITRATE 50 UG/ML
25 INJECTION, SOLUTION INTRAMUSCULAR; INTRAVENOUS
Status: DISCONTINUED | OUTPATIENT
Start: 2023-08-15 | End: 2023-08-15 | Stop reason: HOSPADM

## 2023-08-15 RX ORDER — SODIUM CHLORIDE, SODIUM LACTATE, POTASSIUM CHLORIDE, CALCIUM CHLORIDE 600; 310; 30; 20 MG/100ML; MG/100ML; MG/100ML; MG/100ML
INJECTION, SOLUTION INTRAVENOUS CONTINUOUS
Status: DISCONTINUED | OUTPATIENT
Start: 2023-08-15 | End: 2023-08-15 | Stop reason: HOSPADM

## 2023-08-15 RX ORDER — PHENYLEPHRINE HYDROCHLORIDE 10 MG/ML
INJECTION INTRAVENOUS PRN
Status: DISCONTINUED | OUTPATIENT
Start: 2023-08-15 | End: 2023-08-15 | Stop reason: SDUPTHER

## 2023-08-15 RX ORDER — SODIUM CHLORIDE 0.9 % (FLUSH) 0.9 %
5-40 SYRINGE (ML) INJECTION PRN
Status: DISCONTINUED | OUTPATIENT
Start: 2023-08-15 | End: 2023-08-15 | Stop reason: HOSPADM

## 2023-08-15 RX ORDER — LIDOCAINE HYDROCHLORIDE 5 MG/ML
INJECTION, SOLUTION INFILTRATION; INTRAVENOUS PRN
Status: DISCONTINUED | OUTPATIENT
Start: 2023-08-15 | End: 2023-08-15 | Stop reason: SDUPTHER

## 2023-08-15 RX ORDER — LIDOCAINE HYDROCHLORIDE 10 MG/ML
1 INJECTION, SOLUTION INFILTRATION; PERINEURAL
Status: DISCONTINUED | OUTPATIENT
Start: 2023-08-15 | End: 2023-08-15 | Stop reason: HOSPADM

## 2023-08-15 RX ORDER — FENTANYL CITRATE 50 UG/ML
100 INJECTION, SOLUTION INTRAMUSCULAR; INTRAVENOUS
Status: DISCONTINUED | OUTPATIENT
Start: 2023-08-15 | End: 2023-08-15 | Stop reason: HOSPADM

## 2023-08-15 RX ORDER — PROPOFOL 10 MG/ML
INJECTION, EMULSION INTRAVENOUS PRN
Status: DISCONTINUED | OUTPATIENT
Start: 2023-08-15 | End: 2023-08-15 | Stop reason: SDUPTHER

## 2023-08-15 RX ADMIN — PROPOFOL 20 MG: 10 INJECTION, EMULSION INTRAVENOUS at 07:42

## 2023-08-15 RX ADMIN — PHENYLEPHRINE HYDROCHLORIDE 150 MCG: 10 INJECTION INTRAVENOUS at 07:54

## 2023-08-15 RX ADMIN — PROPOFOL 30 MG: 10 INJECTION, EMULSION INTRAVENOUS at 07:44

## 2023-08-15 RX ADMIN — PROPOFOL 20 MG: 10 INJECTION, EMULSION INTRAVENOUS at 07:41

## 2023-08-15 RX ADMIN — PROPOFOL 30 MG: 10 INJECTION, EMULSION INTRAVENOUS at 07:39

## 2023-08-15 RX ADMIN — PROPOFOL 50 MG: 10 INJECTION, EMULSION INTRAVENOUS at 07:38

## 2023-08-15 RX ADMIN — SODIUM CHLORIDE, POTASSIUM CHLORIDE, SODIUM LACTATE AND CALCIUM CHLORIDE: 600; 310; 30; 20 INJECTION, SOLUTION INTRAVENOUS at 06:43

## 2023-08-15 RX ADMIN — PROPOFOL 30 MG: 10 INJECTION, EMULSION INTRAVENOUS at 07:40

## 2023-08-15 RX ADMIN — LIDOCAINE HYDROCHLORIDE 80 MG: 5 INJECTION, SOLUTION INFILTRATION at 07:38

## 2023-08-15 ASSESSMENT — PAIN - FUNCTIONAL ASSESSMENT: PAIN_FUNCTIONAL_ASSESSMENT: NONE - DENIES PAIN

## 2023-08-15 NOTE — PROGRESS NOTES
Discharge instructions were reviewed with patient and Son Zander Grant. An opportunity was given for questions. Patient and Son verbalized understanding, and has no questions at this time.

## 2023-08-15 NOTE — DISCHARGE INSTRUCTIONS
Gastrointestinal Esophagogastroduodenoscopy (EGD) - Upper Exam Discharge Instructions    1. Call Dr. Janneth Carbajal at [phone] for any problems or questions. 2. Contact the doctor's office for follow up appointment as directed. 3. Medication may cause drowsiness for several hours, therefore:  Do not drive or operate machinery for remainder of the day. No alcohol today. Do not make any important or legal decisions for 24 hours. Do not sign any legal documents for 24 hours. 5. Ordinarily, you may resume regular diet and activity after exam unless otherwise specified by your physician. 6. For mild soreness in your throat you may use Cepacol throat lozenges or warm salt-water gargles as needed. Additional instructions:  Office will follow up with pathology results.

## 2023-08-15 NOTE — OP NOTE
numerous gastric polyps that I did remove a few. The endoscope was advanced through the pylorus into the duodenal bulb and swept which appeared normal. The endoscope was withdrawn into the stomach and retroflexed demonstrating a normal appearing cardia and fundus, and no hiatal hernia. I did take a cold forcep bx of the pylorus, antrum, and GE junction. The endoscope was then withdrawn as air was suctioned. The patient tolerated the procedure will without complications. The patient was brought back to recovery room in stable condition.

## 2023-08-15 NOTE — PROGRESS NOTES
's pre-procedural visit and prayer with patient as requested.     Cecilia Tobias MDiv, BS  Board Certified

## 2023-08-15 NOTE — ANESTHESIA PRE PROCEDURE
found for: PREGTESTUR, PREGSERUM, HCG, HCGQUANT     ABGs: No results found for: PHART, PO2ART, TWH9JHZ, EWX7JCQ, BEART, S9RXTKJF     Type & Screen (If Applicable):  No results found for: LABABO, LABRH    Drug/Infectious Status (If Applicable):  Lab Results   Component Value Date/Time    HEPCAB NONREACTIVE 06/09/2023 07:24 PM       COVID-19 Screening (If Applicable): No results found for: COVID19        Anesthesia Evaluation  Patient summary reviewed and Nursing notes reviewed  Airway: Mallampati: II  TM distance: >3 FB   Neck ROM: full     Dental:          Pulmonary:Negative Pulmonary ROS and normal exam    (+) asthma:                            Cardiovascular:  Exercise tolerance: good (>4 METS),           Rhythm: regular  Rate: normal                    Neuro/Psych:   Negative Neuro/Psych ROS              GI/Hepatic/Renal: Neg GI/Hepatic/Renal ROS  (+) GERD:, liver disease:,           Endo/Other: Negative Endo/Other ROS             Pt had no PAT visit       Abdominal:             Vascular: negative vascular ROS. Other Findings:           Anesthesia Plan      TIVA     ASA 2       Induction: intravenous. MIPS: Postoperative opioids intended and Prophylactic antiemetics administered. Anesthetic plan and risks discussed with patient.       Plan discussed with surgical team.                    Ashley Rodriguez MD   8/15/2023

## 2023-08-15 NOTE — H&P
ENDOSCOPY H&P    Melynda Goldmann is a 68 y.o. female who presents to the Endoscopy Suite for EGD. Pt has GERD. Pt also has hx of diverticulitis but her most recent bout was 10 days ago we will hold off on colonoscopy until a later date as she just finished her abx on Sunday    Patient's medications, allergies, past medical, surgical, social and family histories were reviewed and updated as appropriate.     Past Medical History:   Diagnosis Date    Bacterial liver abscess 09/04/2019    Borderline diabetes     diet controlled    Diverticulitis     Fibromyalgia     Gastro-esophageal reflux disease without esophagitis 09/04/2019    History of anemia     History of atrial fibrillation     states only one occurence due to sepsis    History of hypotension     Liver abscess     Pancreatitis     Rectal bleeding 03/08/2019    Sepsis due to Streptococcus, group A ([address]) 09/04/2019    Streptococcal bacteremia 10/01/2019    TIA (transient ischemic attack) 08/27/2019       Past Surgical History:   Procedure Laterality Date    CHOLECYSTECTOMY      HYSTERECTOMY (CERVIX STATUS UNKNOWN)      IR ABSCESS DRAIN PERC  8/30/2019    IR TUBE CHANGE  10/9/2019    TUBAL LIGATION         Current Facility-Administered Medications: lidocaine 1 % injection 1 mL, 1 mL, IntraDERmal, Once PRN  fentaNYL (SUBLIMAZE) injection 25 mcg, 25 mcg, IntraVENous, Once PRN **OR** fentaNYL (SUBLIMAZE) injection 100 mcg, 100 mcg, IntraVENous, Once PRN  sodium chloride flush 0.9 % injection 5-40 mL, 5-40 mL, IntraVENous, 2 times per day  sodium chloride flush 0.9 % injection 5-40 mL, 5-40 mL, IntraVENous, PRN  0.9 % sodium chloride infusion, , IntraVENous, PRN  sodium chloride flush 0.9 % injection 5-40 mL, 5-40 mL, IntraVENous, 2 times per day  sodium chloride flush 0.9 % injection 5-40 mL, 5-40 mL, IntraVENous, PRN  0.9 % sodium chloride infusion, , IntraVENous, PRN  lactated ringers IV soln infusion, , IntraVENous, Continuous    Allergies   Allergen Reactions

## 2023-08-15 NOTE — ANESTHESIA POSTPROCEDURE EVALUATION
Department of Anesthesiology  Postprocedure Note    Patient: Rashmi Khan  MRN: [id number]  YOB: 1950  Date of evaluation: 8/15/2023      Procedure Summary     Date: 08/15/23 Room / Location: [facility] ENDO 03 / [facility] ENDOSCOPY    Anesthesia Start: 1230 Anesthesia Stop: 5115    Procedure: EGD BIOPSY (Upper GI Region) Diagnosis:       Diverticulitis      (Diverticulitis [K57.92])    Surgeons: Mabel Forbes DO Responsible Provider: Marci Martel MD    Anesthesia Type: TIVA ASA Status: 2          Anesthesia Type: No value filed.     Pool Phase I: Pool Score: 10    Pool Phase II: Pool Score: 10      Anesthesia Post Evaluation    Patient location during evaluation: PACU  Patient participation: complete - patient participated  Level of consciousness: awake and awake and alert  Airway patency: patent  Nausea & Vomiting: no nausea  Complications: no  Cardiovascular status: hemodynamically stable  Respiratory status: acceptable  Hydration status: euvolemic  Multimodal analgesia pain management approach  Pain management: adequate

## 2023-08-15 NOTE — PROGRESS NOTES
Assisted patient with getting her perineum clean after her procedure and prior to going home. She was given a large pink pad to wear home. Patient comfortable, clean and dry at discharge.

## 2023-10-18 PROBLEM — Z12.11 COLON CANCER SCREENING: Status: ACTIVE | Noted: 2023-10-18

## 2023-11-17 PROBLEM — Z12.11 COLON CANCER SCREENING: Status: RESOLVED | Noted: 2023-10-18 | Resolved: 2023-11-17

## 2023-11-30 PROBLEM — Z12.11 COLON CANCER SCREENING: Status: ACTIVE | Noted: 2023-10-18

## 2023-12-30 PROBLEM — Z12.11 COLON CANCER SCREENING: Status: RESOLVED | Noted: 2023-10-18 | Resolved: 2023-12-30

## 2024-02-16 ENCOUNTER — TELEPHONE (OUTPATIENT)
Dept: SURGERY | Age: 74
End: 2024-02-16

## 2024-02-16 NOTE — TELEPHONE ENCOUNTER
Patent called and cancelled her colonoscopy on 2/20/24 with Dr. Lucio.  She will call back to reschedule.  Meghan Nunez

## 2024-04-19 ENCOUNTER — HOSPITAL ENCOUNTER (OUTPATIENT)
Dept: MAMMOGRAPHY | Age: 74
End: 2024-04-19
Attending: FAMILY MEDICINE
Payer: MEDICARE

## 2024-04-19 DIAGNOSIS — Z12.31 ENCOUNTER FOR MAMMOGRAM TO ESTABLISH BASELINE MAMMOGRAM: ICD-10-CM

## 2024-04-19 PROCEDURE — 77067 SCR MAMMO BI INCL CAD: CPT

## 2024-06-18 ENCOUNTER — APPOINTMENT (OUTPATIENT)
Dept: CT IMAGING | Age: 74
End: 2024-06-18
Payer: MEDICARE

## 2024-06-18 ENCOUNTER — HOSPITAL ENCOUNTER (INPATIENT)
Age: 74
LOS: 3 days | Discharge: HOME | End: 2024-06-21
Attending: GENERAL PRACTICE | Admitting: FAMILY MEDICINE
Payer: MEDICARE

## 2024-06-18 DIAGNOSIS — K57.20 DIVERTICULITIS OF LARGE INTESTINE WITH ABSCESS WITHOUT BLEEDING: Primary | ICD-10-CM

## 2024-06-18 DIAGNOSIS — K57.20 COLONIC DIVERTICULAR ABSCESS: ICD-10-CM

## 2024-06-18 PROBLEM — K65.1 INTRA-ABDOMINAL ABSCESS (HCC): Status: ACTIVE | Noted: 2024-06-18

## 2024-06-18 LAB
ALBUMIN SERPL-MCNC: 3.5 G/DL (ref 3.2–4.6)
ALBUMIN/GLOB SERPL: 1.1 (ref 1–1.9)
ALP SERPL-CCNC: 110 U/L (ref 35–104)
ALT SERPL-CCNC: 14 U/L (ref 12–65)
ANION GAP SERPL CALC-SCNC: 10 MMOL/L (ref 9–18)
AST SERPL-CCNC: 22 U/L (ref 15–37)
BACTERIA URNS QL MICRO: NEGATIVE /HPF
BASOPHILS # BLD: 0.1 K/UL (ref 0–0.2)
BASOPHILS NFR BLD: 1 % (ref 0–2)
BILIRUB SERPL-MCNC: 0.4 MG/DL (ref 0–1.2)
BILIRUB UR QL: NEGATIVE
BUN SERPL-MCNC: 13 MG/DL (ref 8–23)
CALCIUM SERPL-MCNC: 9.1 MG/DL (ref 8.8–10.2)
CASTS URNS QL MICRO: ABNORMAL /LPF
CHLORIDE SERPL-SCNC: 102 MMOL/L (ref 98–107)
CO2 SERPL-SCNC: 27 MMOL/L (ref 20–28)
CREAT SERPL-MCNC: 0.94 MG/DL (ref 0.6–1.1)
DIFFERENTIAL METHOD BLD: ABNORMAL
EOSINOPHIL # BLD: 0.1 K/UL (ref 0–0.8)
EOSINOPHIL NFR BLD: 1 % (ref 0.5–7.8)
EPI CELLS #/AREA URNS HPF: ABNORMAL /HPF
ERYTHROCYTE [DISTWIDTH] IN BLOOD BY AUTOMATED COUNT: 13.5 % (ref 11.9–14.6)
GLOBULIN SER CALC-MCNC: 3.1 G/DL (ref 2.3–3.5)
GLUCOSE SERPL-MCNC: 102 MG/DL (ref 70–99)
GLUCOSE UR QL STRIP.AUTO: NEGATIVE MG/DL
HCT VFR BLD AUTO: 43.2 % (ref 35.8–46.3)
HGB BLD-MCNC: 14.5 G/DL (ref 11.7–15.4)
HYALINE CASTS URNS QL MICRO: ABNORMAL /LPF
IMM GRANULOCYTES # BLD AUTO: 0 K/UL (ref 0–0.5)
IMM GRANULOCYTES NFR BLD AUTO: 0 % (ref 0–5)
KETONES UR-MCNC: NEGATIVE MG/DL
LACTATE SERPL-SCNC: 0.7 MMOL/L (ref 0.5–2)
LEUKOCYTE ESTERASE UR QL STRIP: NEGATIVE
LIPASE SERPL-CCNC: 28 U/L (ref 13–60)
LYMPHOCYTES # BLD: 1.9 K/UL (ref 0.5–4.6)
LYMPHOCYTES NFR BLD: 26 % (ref 13–44)
MCH RBC QN AUTO: 32.4 PG (ref 26.1–32.9)
MCHC RBC AUTO-ENTMCNC: 33.6 G/DL (ref 31.4–35)
MCV RBC AUTO: 96.4 FL (ref 82–102)
MONOCYTES # BLD: 0.5 K/UL (ref 0.1–1.3)
MONOCYTES NFR BLD: 6 % (ref 4–12)
NEUTS SEG # BLD: 4.8 K/UL (ref 1.7–8.2)
NEUTS SEG NFR BLD: 66 % (ref 43–78)
NITRITE UR QL: NEGATIVE
NRBC # BLD: 0 K/UL (ref 0–0.2)
PH UR: 6.5 (ref 5–9)
PLATELET # BLD AUTO: 197 K/UL (ref 150–450)
PMV BLD AUTO: 8.3 FL (ref 9.4–12.3)
POTASSIUM SERPL-SCNC: 4.1 MMOL/L (ref 3.5–5.1)
PROCALCITONIN SERPL-MCNC: 0.04 NG/ML (ref 0–0.1)
PROT SERPL-MCNC: 6.6 G/DL (ref 6.3–8.2)
PROT UR QL: NEGATIVE MG/DL
RBC # BLD AUTO: 4.48 M/UL (ref 4.05–5.2)
RBC # UR STRIP: ABNORMAL
RBC #/AREA URNS HPF: ABNORMAL /HPF
SERVICE CMNT-IMP: ABNORMAL
SODIUM SERPL-SCNC: 139 MMOL/L (ref 136–145)
SP GR UR: 1.02 (ref 1–1.02)
UROBILINOGEN UR QL: 2 EU/DL (ref 0.2–1)
WBC # BLD AUTO: 7.3 K/UL (ref 4.3–11.1)
WBC URNS QL MICRO: ABNORMAL /HPF

## 2024-06-18 PROCEDURE — 96374 THER/PROPH/DIAG INJ IV PUSH: CPT

## 2024-06-18 PROCEDURE — 2580000003 HC RX 258

## 2024-06-18 PROCEDURE — 6370000000 HC RX 637 (ALT 250 FOR IP)

## 2024-06-18 PROCEDURE — 83690 ASSAY OF LIPASE: CPT

## 2024-06-18 PROCEDURE — 85025 COMPLETE CBC W/AUTO DIFF WBC: CPT

## 2024-06-18 PROCEDURE — 2500000003 HC RX 250 WO HCPCS

## 2024-06-18 PROCEDURE — 84145 PROCALCITONIN (PCT): CPT

## 2024-06-18 PROCEDURE — 74177 CT ABD & PELVIS W/CONTRAST: CPT

## 2024-06-18 PROCEDURE — 99285 EMERGENCY DEPT VISIT HI MDM: CPT

## 2024-06-18 PROCEDURE — 80053 COMPREHEN METABOLIC PANEL: CPT

## 2024-06-18 PROCEDURE — 83605 ASSAY OF LACTIC ACID: CPT

## 2024-06-18 PROCEDURE — 6360000004 HC RX CONTRAST MEDICATION

## 2024-06-18 PROCEDURE — 81001 URINALYSIS AUTO W/SCOPE: CPT

## 2024-06-18 PROCEDURE — 6360000002 HC RX W HCPCS

## 2024-06-18 PROCEDURE — 87040 BLOOD CULTURE FOR BACTERIA: CPT

## 2024-06-18 PROCEDURE — 1100000000 HC RM PRIVATE

## 2024-06-18 RX ORDER — 0.9 % SODIUM CHLORIDE 0.9 %
1000 INTRAVENOUS SOLUTION INTRAVENOUS ONCE
Status: COMPLETED | OUTPATIENT
Start: 2024-06-18 | End: 2024-06-18

## 2024-06-18 RX ORDER — 0.9 % SODIUM CHLORIDE 0.9 %
100 INTRAVENOUS SOLUTION INTRAVENOUS
Status: DISCONTINUED | OUTPATIENT
Start: 2024-06-18 | End: 2024-06-21 | Stop reason: HOSPADM

## 2024-06-18 RX ORDER — ONDANSETRON 2 MG/ML
4 INJECTION INTRAMUSCULAR; INTRAVENOUS ONCE
Status: COMPLETED | OUTPATIENT
Start: 2024-06-18 | End: 2024-06-18

## 2024-06-18 RX ORDER — METOCLOPRAMIDE HYDROCHLORIDE 5 MG/ML
10 INJECTION INTRAMUSCULAR; INTRAVENOUS
Status: COMPLETED | OUTPATIENT
Start: 2024-06-18 | End: 2024-06-18

## 2024-06-18 RX ORDER — SODIUM CHLORIDE 0.9 % (FLUSH) 0.9 %
10 SYRINGE (ML) INJECTION
Status: DISCONTINUED | OUTPATIENT
Start: 2024-06-18 | End: 2024-06-21 | Stop reason: HOSPADM

## 2024-06-18 RX ORDER — HYDROMORPHONE HYDROCHLORIDE 1 MG/ML
0.25 INJECTION, SOLUTION INTRAMUSCULAR; INTRAVENOUS; SUBCUTANEOUS ONCE
Status: COMPLETED | OUTPATIENT
Start: 2024-06-18 | End: 2024-06-18

## 2024-06-18 RX ADMIN — SODIUM CHLORIDE 1000 ML: 9 INJECTION, SOLUTION INTRAVENOUS at 21:46

## 2024-06-18 RX ADMIN — PIPERACILLIN AND TAZOBACTAM 4500 MG: 4; .5 INJECTION, POWDER, FOR SOLUTION INTRAVENOUS at 23:25

## 2024-06-18 RX ADMIN — IOPAMIDOL 100 ML: 755 INJECTION, SOLUTION INTRAVENOUS at 21:38

## 2024-06-18 RX ADMIN — HYDROMORPHONE HYDROCHLORIDE 0.25 MG: 1 INJECTION, SOLUTION INTRAMUSCULAR; INTRAVENOUS; SUBCUTANEOUS at 23:34

## 2024-06-18 RX ADMIN — METOCLOPRAMIDE HYDROCHLORIDE 10 MG: 5 INJECTION INTRAMUSCULAR; INTRAVENOUS at 23:33

## 2024-06-18 RX ADMIN — ONDANSETRON 4 MG: 2 INJECTION INTRAMUSCULAR; INTRAVENOUS at 21:49

## 2024-06-18 RX ADMIN — HYOSCYAMINE SULFATE 125 MCG: 0.12 TABLET ORAL; SUBLINGUAL at 21:47

## 2024-06-18 ASSESSMENT — PAIN SCALES - GENERAL
PAINLEVEL_OUTOF10: 7
PAINLEVEL_OUTOF10: 5
PAINLEVEL_OUTOF10: 7

## 2024-06-18 ASSESSMENT — PAIN - FUNCTIONAL ASSESSMENT: PAIN_FUNCTIONAL_ASSESSMENT: 0-10

## 2024-06-18 ASSESSMENT — ENCOUNTER SYMPTOMS
BACK PAIN: 0
NAUSEA: 0
ABDOMINAL PAIN: 1
SHORTNESS OF BREATH: 0
DIARRHEA: 1
CHEST TIGHTNESS: 0
VOMITING: 0

## 2024-06-18 ASSESSMENT — PAIN DESCRIPTION - LOCATION
LOCATION: ABDOMEN

## 2024-06-18 ASSESSMENT — PAIN DESCRIPTION - DESCRIPTORS: DESCRIPTORS: ACHING

## 2024-06-19 PROBLEM — K63.2 COLONIC FISTULA: Status: ACTIVE | Noted: 2024-06-19

## 2024-06-19 PROBLEM — G89.29 CHRONIC ABDOMINAL PAIN: Status: ACTIVE | Noted: 2024-06-19

## 2024-06-19 PROBLEM — K57.20 DIVERTICULITIS OF LARGE INTESTINE WITH ABSCESS WITHOUT BLEEDING: Status: ACTIVE | Noted: 2024-06-19

## 2024-06-19 PROBLEM — R10.9 CHRONIC ABDOMINAL PAIN: Status: ACTIVE | Noted: 2024-06-19

## 2024-06-19 LAB
ANION GAP SERPL CALC-SCNC: 9 MMOL/L (ref 9–18)
BASOPHILS # BLD: 0 K/UL (ref 0–0.2)
BASOPHILS NFR BLD: 1 % (ref 0–2)
BUN SERPL-MCNC: 10 MG/DL (ref 8–23)
CALCIUM SERPL-MCNC: 8.3 MG/DL (ref 8.8–10.2)
CHLORIDE SERPL-SCNC: 106 MMOL/L (ref 98–107)
CO2 SERPL-SCNC: 24 MMOL/L (ref 20–28)
CREAT SERPL-MCNC: 0.8 MG/DL (ref 0.6–1.1)
DIFFERENTIAL METHOD BLD: ABNORMAL
EOSINOPHIL # BLD: 0.1 K/UL (ref 0–0.8)
EOSINOPHIL NFR BLD: 1 % (ref 0.5–7.8)
ERYTHROCYTE [DISTWIDTH] IN BLOOD BY AUTOMATED COUNT: 13.5 % (ref 11.9–14.6)
GLUCOSE SERPL-MCNC: 100 MG/DL (ref 70–99)
HCT VFR BLD AUTO: 37.6 % (ref 35.8–46.3)
HGB BLD-MCNC: 12.6 G/DL (ref 11.7–15.4)
IMM GRANULOCYTES # BLD AUTO: 0 K/UL (ref 0–0.5)
IMM GRANULOCYTES NFR BLD AUTO: 0 % (ref 0–5)
LYMPHOCYTES # BLD: 2.1 K/UL (ref 0.5–4.6)
LYMPHOCYTES NFR BLD: 31 % (ref 13–44)
MCH RBC QN AUTO: 32.4 PG (ref 26.1–32.9)
MCHC RBC AUTO-ENTMCNC: 33.5 G/DL (ref 31.4–35)
MCV RBC AUTO: 96.7 FL (ref 82–102)
MONOCYTES # BLD: 0.5 K/UL (ref 0.1–1.3)
MONOCYTES NFR BLD: 7 % (ref 4–12)
NEUTS SEG # BLD: 4.1 K/UL (ref 1.7–8.2)
NEUTS SEG NFR BLD: 60 % (ref 43–78)
NRBC # BLD: 0 K/UL (ref 0–0.2)
PLATELET # BLD AUTO: 175 K/UL (ref 150–450)
PMV BLD AUTO: 8.8 FL (ref 9.4–12.3)
POTASSIUM SERPL-SCNC: 3.8 MMOL/L (ref 3.5–5.1)
RBC # BLD AUTO: 3.89 M/UL (ref 4.05–5.2)
SODIUM SERPL-SCNC: 138 MMOL/L (ref 136–145)
WBC # BLD AUTO: 6.8 K/UL (ref 4.3–11.1)

## 2024-06-19 PROCEDURE — 85025 COMPLETE CBC W/AUTO DIFF WBC: CPT

## 2024-06-19 PROCEDURE — 99222 1ST HOSP IP/OBS MODERATE 55: CPT | Performed by: PHYSICIAN ASSISTANT

## 2024-06-19 PROCEDURE — 2580000003 HC RX 258: Performed by: STUDENT IN AN ORGANIZED HEALTH CARE EDUCATION/TRAINING PROGRAM

## 2024-06-19 PROCEDURE — 6370000000 HC RX 637 (ALT 250 FOR IP): Performed by: FAMILY MEDICINE

## 2024-06-19 PROCEDURE — 6360000002 HC RX W HCPCS: Performed by: STUDENT IN AN ORGANIZED HEALTH CARE EDUCATION/TRAINING PROGRAM

## 2024-06-19 PROCEDURE — 80048 BASIC METABOLIC PNL TOTAL CA: CPT

## 2024-06-19 PROCEDURE — 2580000003 HC RX 258: Performed by: FAMILY MEDICINE

## 2024-06-19 PROCEDURE — 36415 COLL VENOUS BLD VENIPUNCTURE: CPT

## 2024-06-19 PROCEDURE — 1100000000 HC RM PRIVATE

## 2024-06-19 PROCEDURE — 6360000002 HC RX W HCPCS: Performed by: FAMILY MEDICINE

## 2024-06-19 RX ORDER — SODIUM CHLORIDE 9 MG/ML
INJECTION, SOLUTION INTRAVENOUS CONTINUOUS
Status: ACTIVE | OUTPATIENT
Start: 2024-06-19 | End: 2024-06-21

## 2024-06-19 RX ORDER — POTASSIUM CHLORIDE 20 MEQ/1
20 TABLET, EXTENDED RELEASE ORAL DAILY
Status: DISCONTINUED | OUTPATIENT
Start: 2024-06-19 | End: 2024-06-21 | Stop reason: HOSPADM

## 2024-06-19 RX ORDER — METRONIDAZOLE 500 MG/100ML
500 INJECTION, SOLUTION INTRAVENOUS EVERY 8 HOURS
Status: DISCONTINUED | OUTPATIENT
Start: 2024-06-19 | End: 2024-06-21 | Stop reason: HOSPADM

## 2024-06-19 RX ORDER — ACETAMINOPHEN 650 MG/1
650 SUPPOSITORY RECTAL EVERY 6 HOURS PRN
Status: DISCONTINUED | OUTPATIENT
Start: 2024-06-19 | End: 2024-06-21 | Stop reason: HOSPADM

## 2024-06-19 RX ORDER — HYDROCODONE BITARTRATE AND ACETAMINOPHEN 5; 325 MG/1; MG/1
1 TABLET ORAL EVERY 6 HOURS PRN
Status: DISCONTINUED | OUTPATIENT
Start: 2024-06-19 | End: 2024-06-21 | Stop reason: HOSPADM

## 2024-06-19 RX ORDER — PROMETHAZINE HYDROCHLORIDE 25 MG/1
25 TABLET ORAL EVERY 6 HOURS PRN
Status: DISCONTINUED | OUTPATIENT
Start: 2024-06-19 | End: 2024-06-21 | Stop reason: HOSPADM

## 2024-06-19 RX ORDER — POLYETHYLENE GLYCOL 3350 17 G/17G
17 POWDER, FOR SOLUTION ORAL DAILY PRN
Status: DISCONTINUED | OUTPATIENT
Start: 2024-06-19 | End: 2024-06-21 | Stop reason: HOSPADM

## 2024-06-19 RX ORDER — ONDANSETRON 4 MG/1
4 TABLET, ORALLY DISINTEGRATING ORAL EVERY 8 HOURS PRN
Status: DISCONTINUED | OUTPATIENT
Start: 2024-06-19 | End: 2024-06-21 | Stop reason: HOSPADM

## 2024-06-19 RX ORDER — SODIUM CHLORIDE 9 MG/ML
INJECTION, SOLUTION INTRAVENOUS PRN
Status: DISCONTINUED | OUTPATIENT
Start: 2024-06-19 | End: 2024-06-21 | Stop reason: HOSPADM

## 2024-06-19 RX ORDER — ENOXAPARIN SODIUM 100 MG/ML
40 INJECTION SUBCUTANEOUS DAILY
Status: DISCONTINUED | OUTPATIENT
Start: 2024-06-19 | End: 2024-06-21 | Stop reason: HOSPADM

## 2024-06-19 RX ORDER — HYDROMORPHONE HYDROCHLORIDE 1 MG/ML
0.25 INJECTION, SOLUTION INTRAMUSCULAR; INTRAVENOUS; SUBCUTANEOUS EVERY 4 HOURS PRN
Status: DISCONTINUED | OUTPATIENT
Start: 2024-06-19 | End: 2024-06-21 | Stop reason: HOSPADM

## 2024-06-19 RX ORDER — ACETAMINOPHEN 325 MG/1
650 TABLET ORAL EVERY 6 HOURS PRN
Status: DISCONTINUED | OUTPATIENT
Start: 2024-06-19 | End: 2024-06-21 | Stop reason: HOSPADM

## 2024-06-19 RX ORDER — POTASSIUM CHLORIDE 20 MEQ/1
40 TABLET, EXTENDED RELEASE ORAL PRN
Status: DISCONTINUED | OUTPATIENT
Start: 2024-06-19 | End: 2024-06-21 | Stop reason: HOSPADM

## 2024-06-19 RX ORDER — PANTOPRAZOLE SODIUM 40 MG/1
40 TABLET, DELAYED RELEASE ORAL
Status: DISCONTINUED | OUTPATIENT
Start: 2024-06-19 | End: 2024-06-21 | Stop reason: HOSPADM

## 2024-06-19 RX ORDER — LANOLIN ALCOHOL/MO/W.PET/CERES
400 CREAM (GRAM) TOPICAL 2 TIMES DAILY
Status: DISCONTINUED | OUTPATIENT
Start: 2024-06-19 | End: 2024-06-21 | Stop reason: HOSPADM

## 2024-06-19 RX ORDER — MAGNESIUM SULFATE IN WATER 40 MG/ML
2000 INJECTION, SOLUTION INTRAVENOUS PRN
Status: DISCONTINUED | OUTPATIENT
Start: 2024-06-19 | End: 2024-06-21 | Stop reason: HOSPADM

## 2024-06-19 RX ORDER — POTASSIUM CHLORIDE 7.45 MG/ML
10 INJECTION INTRAVENOUS PRN
Status: DISCONTINUED | OUTPATIENT
Start: 2024-06-19 | End: 2024-06-21 | Stop reason: HOSPADM

## 2024-06-19 RX ORDER — SODIUM CHLORIDE 0.9 % (FLUSH) 0.9 %
5-40 SYRINGE (ML) INJECTION PRN
Status: DISCONTINUED | OUTPATIENT
Start: 2024-06-19 | End: 2024-06-21 | Stop reason: HOSPADM

## 2024-06-19 RX ORDER — ALBUTEROL SULFATE 2.5 MG/3ML
2.5 SOLUTION RESPIRATORY (INHALATION) EVERY 4 HOURS PRN
Status: DISCONTINUED | OUTPATIENT
Start: 2024-06-19 | End: 2024-06-21 | Stop reason: HOSPADM

## 2024-06-19 RX ORDER — MONTELUKAST SODIUM 10 MG/1
10 TABLET ORAL EVERY EVENING
Status: DISCONTINUED | OUTPATIENT
Start: 2024-06-19 | End: 2024-06-21 | Stop reason: HOSPADM

## 2024-06-19 RX ORDER — PRAVASTATIN SODIUM 20 MG
20 TABLET ORAL NIGHTLY
Status: DISCONTINUED | OUTPATIENT
Start: 2024-06-19 | End: 2024-06-21 | Stop reason: HOSPADM

## 2024-06-19 RX ORDER — DULOXETIN HYDROCHLORIDE 30 MG/1
30 CAPSULE, DELAYED RELEASE ORAL DAILY
Status: DISCONTINUED | OUTPATIENT
Start: 2024-06-19 | End: 2024-06-21 | Stop reason: HOSPADM

## 2024-06-19 RX ORDER — ONDANSETRON 2 MG/ML
4 INJECTION INTRAMUSCULAR; INTRAVENOUS EVERY 6 HOURS PRN
Status: DISCONTINUED | OUTPATIENT
Start: 2024-06-19 | End: 2024-06-21 | Stop reason: HOSPADM

## 2024-06-19 RX ORDER — SODIUM CHLORIDE 0.9 % (FLUSH) 0.9 %
5-40 SYRINGE (ML) INJECTION EVERY 12 HOURS SCHEDULED
Status: DISCONTINUED | OUTPATIENT
Start: 2024-06-19 | End: 2024-06-21 | Stop reason: HOSPADM

## 2024-06-19 RX ORDER — FOLIC ACID 1 MG/1
1000 TABLET ORAL DAILY
Status: DISCONTINUED | OUTPATIENT
Start: 2024-06-19 | End: 2024-06-21 | Stop reason: HOSPADM

## 2024-06-19 RX ORDER — CYCLOBENZAPRINE HCL 10 MG
5 TABLET ORAL 3 TIMES DAILY PRN
Status: DISCONTINUED | OUTPATIENT
Start: 2024-06-19 | End: 2024-06-21 | Stop reason: HOSPADM

## 2024-06-19 RX ADMIN — SODIUM CHLORIDE, PRESERVATIVE FREE 10 ML: 5 INJECTION INTRAVENOUS at 08:56

## 2024-06-19 RX ADMIN — PIPERACILLIN AND TAZOBACTAM 3375 MG: 3; .375 INJECTION, POWDER, LYOPHILIZED, FOR SOLUTION INTRAVENOUS at 04:56

## 2024-06-19 RX ADMIN — HYDROCODONE BITARTRATE AND ACETAMINOPHEN 1 TABLET: 5; 325 TABLET ORAL at 03:47

## 2024-06-19 RX ADMIN — PRAVASTATIN SODIUM 20 MG: 20 TABLET ORAL at 19:59

## 2024-06-19 RX ADMIN — SODIUM CHLORIDE: 9 INJECTION, SOLUTION INTRAVENOUS at 09:06

## 2024-06-19 RX ADMIN — HYDROCODONE BITARTRATE AND ACETAMINOPHEN 1 TABLET: 5; 325 TABLET ORAL at 19:58

## 2024-06-19 RX ADMIN — POTASSIUM CHLORIDE 20 MEQ: 1500 TABLET, EXTENDED RELEASE ORAL at 08:59

## 2024-06-19 RX ADMIN — MONTELUKAST 10 MG: 10 TABLET, FILM COATED ORAL at 16:48

## 2024-06-19 RX ADMIN — PRAVASTATIN SODIUM 20 MG: 20 TABLET ORAL at 02:05

## 2024-06-19 RX ADMIN — MAGNESIUM GLUCONATE 500 MG ORAL TABLET 400 MG: 500 TABLET ORAL at 19:58

## 2024-06-19 RX ADMIN — SODIUM CHLORIDE: 9 INJECTION, SOLUTION INTRAVENOUS at 00:57

## 2024-06-19 RX ADMIN — DULOXETINE HYDROCHLORIDE 30 MG: 30 CAPSULE, DELAYED RELEASE ORAL at 08:58

## 2024-06-19 RX ADMIN — METRONIDAZOLE 500 MG: 500 INJECTION, SOLUTION INTRAVENOUS at 16:48

## 2024-06-19 RX ADMIN — SODIUM CHLORIDE: 9 INJECTION, SOLUTION INTRAVENOUS at 09:03

## 2024-06-19 RX ADMIN — FOLIC ACID 1000 MCG: 1 TABLET ORAL at 08:59

## 2024-06-19 RX ADMIN — ONDANSETRON 4 MG: 2 INJECTION INTRAMUSCULAR; INTRAVENOUS at 19:59

## 2024-06-19 RX ADMIN — SODIUM CHLORIDE, PRESERVATIVE FREE 10 ML: 5 INJECTION INTRAVENOUS at 19:59

## 2024-06-19 RX ADMIN — SODIUM CHLORIDE: 9 INJECTION, SOLUTION INTRAVENOUS at 14:42

## 2024-06-19 RX ADMIN — METRONIDAZOLE 500 MG: 500 INJECTION, SOLUTION INTRAVENOUS at 09:04

## 2024-06-19 RX ADMIN — MAGNESIUM GLUCONATE 500 MG ORAL TABLET 400 MG: 500 TABLET ORAL at 02:05

## 2024-06-19 RX ADMIN — CEFTRIAXONE 1000 MG: 1 INJECTION, POWDER, FOR SOLUTION INTRAMUSCULAR; INTRAVENOUS at 08:56

## 2024-06-19 ASSESSMENT — PAIN DESCRIPTION - DESCRIPTORS
DESCRIPTORS: ACHING;SHARP
DESCRIPTORS: ACHING

## 2024-06-19 ASSESSMENT — PAIN DESCRIPTION - ORIENTATION: ORIENTATION: LEFT;LOWER

## 2024-06-19 ASSESSMENT — PAIN SCALES - GENERAL
PAINLEVEL_OUTOF10: 3
PAINLEVEL_OUTOF10: 6
PAINLEVEL_OUTOF10: 6

## 2024-06-19 ASSESSMENT — PAIN DESCRIPTION - LOCATION
LOCATION: ABDOMEN
LOCATION: ABDOMEN;NECK

## 2024-06-19 NOTE — CONSULTS
[facility]  Department of Interventional Radiology  [phone]        Consult Note     Patient: Racheal Wyatt MRN: [id number]  SSN: [id number]    YOB: 1950  Age: 74 y.o.  Sex: female      Referring Physician: Nik Mueller NP    Consult Date: 6/19/2024     Subjective:     Chief Complaint: Abdominal pain    History of Present Illness: Racheal Wyatt is a 74 y.o. female who is seen in consultation for diverticular abscess.    Patient presented to the emergency department last night due to worsening abdominal pain.  Over the past 2.5 weeks she has experienced significant lower abdominal pain both mid and left-sided with nausea and night sweats.  Past medical history pertinent for prior diverticulitis and associated surgeries.  Patient has also previously had a hepatic abscess with drain placement in August 2019.    She underwent CT scan of the abdomen and pelvis yesterday which revealed sigmoid diverticulitis with small abscess deep in the pelvis measuring 3.1 cm.  The abscess is tethered to the nearby sigmoid colon.    Presently patient is resting comfortably and in no apparent distress.  She was seen this afternoon by general surgery who evaluated patient and recommended conservative management of diverticulitis and IR assessment for potential diverticular abscess aspirate, drain placement and fistula evaluation.  It was mentioned that patient may need sigmoid colonic resection but hopeful for successful treatment with antibiotics to rule out potential sigmoid colon resection on an elective basis.    Past Medical History:   Diagnosis Date    Bacterial liver abscess 09/04/2019    Borderline diabetes     diet controlled    Diverticulitis     Fibromyalgia     Gastro-esophageal reflux disease without esophagitis 09/04/2019    History of anemia     History of atrial fibrillation     states only one occurence due to sepsis    History of hypotension     Liver abscess   Oral QAM AC Maggie Brooks MD        montelukast (SINGULAIR) tablet 10 mg  10 mg Oral QPM Maggie Brooks MD        potassium chloride (KLOR-CON M) extended release tablet 20 mEq  20 mEq Oral Daily Maggie Brooks MD   20 mEq at 06/19/24 0859    folic acid (FOLVITE) tablet 1,000 mcg  1,000 mcg Oral Daily Maggie Brooks MD   1,000 mcg at 06/19/24 0859    magnesium oxide (MAG-OX) tablet 400 mg  400 mg Oral BID Maggie Brooks MD   400 mg at 06/19/24 0205    promethazine (PHENERGAN) tablet 25 mg  25 mg Oral Q6H PRN Maggie Brooks MD        sodium chloride flush 0.9 % injection 5-40 mL  5-40 mL IntraVENous 2 times per day Maggie Brooks MD   10 mL at 06/19/24 0856    sodium chloride flush 0.9 % injection 5-40 mL  5-40 mL IntraVENous PRN Maggie Brooks MD        0.9 % sodium chloride infusion   IntraVENous PRN Mgagie Brooks MD   Stopped at 06/19/24 0904    potassium chloride (KLOR-CON M) extended release tablet 40 mEq  40 mEq Oral PRN Maggie Brooks MD        Or    potassium bicarb-citric acid (EFFER-K) effervescent tablet 40 mEq  40 mEq Oral PRN Maggie Brooks MD        Or    potassium chloride 10 mEq/100 mL IVPB (Peripheral Line)  10 mEq IntraVENous PRN Maggie Brooks MD        magnesium sulfate 2000 mg in 50 mL IVPB premix  2,000 mg IntraVENous PRN Maggie Brooks MD        enoxaparin (LOVENOX) injection 40 mg  40 mg SubCUTAneous Daily Maggie Brooks MD        ondansetron (ZOFRAN-ODT) disintegrating tablet 4 mg  4 mg Oral Q8H PRN Maggie Brooks MD        Or    ondansetron (ZOFRAN) injection 4 mg  4 mg IntraVENous Q6H PRN Maggie Brooks MD        polyethylene glycol (GLYCOLAX) packet 17 g  17 g Oral Daily PRN Maggie Brooks MD        acetaminophen (TYLENOL) tablet 650 mg  650 mg Oral Q6H PRN Maggie Brooks MD        Or    acetaminophen (TYLENOL) suppository 650 mg  650 mg Rectal Q6H PRN Maggie Brooks MD        0.9 % sodium chloride infusion   IntraVENous Continuous

## 2024-06-19 NOTE — ASSESSMENT & PLAN NOTE
- CT with evidence of sigmoid diverticulitis and associated abscess that appears to be connected to the sigmoid via fistula  - NPO  - IV zosyn  - Consult to General Surgery for evaluation

## 2024-06-19 NOTE — PROGRESS NOTES
4 Eyes Skin Assessment     NAME:  Racheal Wyatt  YOB: 1950  MEDICAL RECORD NUMBER:  [id number]    The patient is being assessed for  Admission    I agree that at least one RN has performed a thorough Head to Toe Skin Assessment on the patient. ALL assessment sites listed below have been assessed.      Areas assessed by both nurses:    Head, Face, Ears, Shoulders, Back, Chest, Arms, Elbows, Hands, Sacrum. Buttock, Coccyx, Ischium, and Legs. Feet and Heels        Does the Patient have a Wound? No noted wound(s)       Collins Prevention initiated by RN: No  Wound Care Orders initiated by RN: No    Pressure Injury (Stage 3,4, Unstageable, DTI, NWPT, and Complex wounds) if present, place Wound referral order by RN under : No    New Ostomies, if present place, Ostomy referral order under : No     Nurse 1 eSignature: Electronically signed by ROCKY DEAL RN on 6/19/24 at 4:36 AM EDT    **SHARE this note so that the co-signing nurse can place an eSignature**    Nurse 2 eSignature: Electronically signed by Dominique Guerra RN on 6/19/24 at 4:56 AM EDT

## 2024-06-19 NOTE — CARE COORDINATION
CM spoke to patient at bedside on this day. Patient confirmed demographic information and insurance information. Patient reports that she lives alone, in a 1 story house, and has level entry at the back entrance. Patient reports that she is independent with ADLs, uses no DMEs, and drives. Patient reports that she has no home care services. Patient has no PCP on file. Patient stated that her PCP is Dr. Karthik Hatch and last PCP visit was last month. Patient is agreeable to return home when medically stable for discharge and patient stated that her daughter-in-law will transport her home at the appropriate time.     No CM needs voiced or noted. CM will continue to follow patient for any discharge planning needs.        06/19/24 6526   Service Assessment   Patient Orientation Alert and Oriented   Cognition Alert   History Provided By Patient   Primary Caregiver Self   Support Systems Children;Family Members   Patient's Healthcare Decision Maker is: Legal Next of Kin   PCP Verified by CM Yes  (confirmed PCP is Dr. Karthik Hatch)   Last Visit to PCP Within last 3 months  (last PCP visit was last month)   Prior Functional Level Independent in ADLs/IADLs   Current Functional Level Other (see comment)  (TBD by clinicals)   Can patient return to prior living arrangement Yes   Ability to make needs known: Good   Family able to assist with home care needs: Yes   Would you like for me to discuss the discharge plan with any other family members/significant others, and if so, who? Yes   Financial Resources Medicare;Other (Comment)  (Medicare Part A and B and BCBS)   Community Resources None   CM/SW Referral Other (see comment)  (discharge planning)   Social/Functional History   Lives With Alone   Type of Home House   Home Layout One level   Home Access Level entry   Bathroom Shower/Tub Walk-in shower   Bathroom Toilet Standard   Bathroom Equipment None   Bathroom Accessibility Accessible   Home Equipment None   Receives

## 2024-06-19 NOTE — ED NOTES
TRANSFER - OUT REPORT:    Verbal report given to Sepideh TANG on Racheal Wyatt  being transferred to 220 for routine progression of patient care       Report consisted of patient's Situation, Background, Assessment and   Recommendations(SBAR).     Information from the following report(s) ED Encounter Summary was reviewed with the receiving nurse.    Benson Fall Assessment:    Presents to emergency department  because of falls (Syncope, seizure, or loss of consciousness): No  Age > 70: Yes  Altered Mental Status, Intoxication with alcohol or substance confusion (Disorientation, impaired judgment, poor safety awaremess, or inability to follow instructions): No  Impaired Mobility: Ambulates or transfers with assistive devices or assistance; Unable to ambulate or transer.: No             Lines:   Peripheral IV 06/18/24 Left Antecubital (Active)   Site Assessment Clean, dry & intact 06/18/24 2347   Line Status Blood return noted;Flushed 06/18/24 2045   Phlebitis Assessment No symptoms 06/18/24 2347   Infiltration Assessment 0 06/18/24 2347   Dressing Status New dressing applied;Clean, dry & intact 06/18/24 2045   Dressing Type Transparent 06/18/24 2045   Dressing Intervention New 06/18/24 2045       Peripheral IV 06/18/24 Proximal;Right Forearm (Active)   Site Assessment Clean, dry & intact 06/18/24 2346   Phlebitis Assessment No symptoms 06/18/24 2346   Infiltration Assessment 0 06/18/24 2346        Opportunity for questions and clarification was provided.      Patient transported with:  Registered Nurse           Juliana Figueroa RN  06/18/24 2347

## 2024-06-19 NOTE — PLAN OF CARE
Problem: Discharge Planning  Goal: Discharge to home or other facility with appropriate resources  6/19/2024 0939 by Di Wagner RN  Outcome: Progressing  6/19/2024 0408 by Sepideh Kirkland RN  Outcome: Progressing     Problem: Pain  Goal: Verbalizes/displays adequate comfort level or baseline comfort level  6/19/2024 0939 by Di Wagner RN  Outcome: Progressing  6/19/2024 0408 by Sepideh Kirkland RN  Outcome: Progressing     Problem: ABCDS Injury Assessment  Goal: Absence of physical injury  6/19/2024 0939 by Di Wagner RN  Outcome: Progressing  6/19/2024 0408 by Sepideh Kirkland RN  Outcome: Progressing     Problem: Safety - Adult  Goal: Free from fall injury  6/19/2024 0939 by Di Wagner RN  Outcome: Progressing  Flowsheets (Taken 6/19/2024 0723)  Free From Fall Injury: Instruct family/caregiver on patient safety  6/19/2024 0408 by Sepideh Kirkland RN  Outcome: Progressing

## 2024-06-19 NOTE — PROGRESS NOTES
Glucose, UA POC Negative NEG mg/dL    Ketones, Urine, POC Negative NEG mg/dL    Bilirubin, Urine, POC Negative NEG      Blood, UA POC SMALL (A) NEG      URINE UROBILINOGEN POC 2.0 (H) 0.2 - 1.0 EU/dL    Nitrite, Urine, POC Negative NEG      Leukocyte Est, UA POC Negative NEG      Performed by: Clementine Lovell    Lactic Acid    Collection Time: 06/18/24  9:33 PM   Result Value Ref Range    Lactic Acid 0.7 0.5 - 2.0 mmol/L   Urinalysis, Micro    Collection Time: 06/18/24  9:33 PM   Result Value Ref Range    WBC, UA 0-4 U4 /hpf    RBC, UA 0-5 U5 /hpf    Epithelial Cells, UA 10-20 (A) U5 /hpf    BACTERIA, URINE Negative NEG /hpf    Casts 0-2 U2 /lpf    Hyaline Casts, UA 0-2 /lpf   Blood Culture 1    Collection Time: 06/18/24 10:50 PM    Specimen: Blood   Result Value Ref Range    Special Requests LEFT  Antecubital        Culture PENDING    Basic Metabolic Panel w/ Reflex to MG    Collection Time: 06/19/24  4:35 AM   Result Value Ref Range    Sodium 138 136 - 145 mmol/L    Potassium 3.8 3.5 - 5.1 mmol/L    Chloride 106 98 - 107 mmol/L    CO2 24 20 - 28 mmol/L    Anion Gap 9 9 - 18 mmol/L    Glucose 100 (H) 70 - 99 mg/dL    BUN 10 8 - 23 MG/DL    Creatinine 0.80 0.60 - 1.10 MG/DL    Est, Glom Filt Rate 77 >60 ml/min/1.73m2    Calcium 8.3 (L) 8.8 - 10.2 MG/DL   CBC with Auto Differential    Collection Time: 06/19/24  4:35 AM   Result Value Ref Range    WBC 6.8 4.3 - 11.1 K/uL    RBC 3.89 (L) 4.05 - 5.2 M/uL    Hemoglobin 12.6 11.7 - 15.4 g/dL    Hematocrit 37.6 35.8 - 46.3 %    MCV 96.7 82 - 102 FL    MCH 32.4 26.1 - 32.9 PG    MCHC 33.5 31.4 - 35.0 g/dL    RDW 13.5 11.9 - 14.6 %    Platelets 175 150 - 450 K/uL    MPV 8.8 (L) 9.4 - 12.3 FL    nRBC 0.00 0.0 - 0.2 K/uL    Differential Type AUTOMATED      Neutrophils % 60 43 - 78 %    Lymphocytes % 31 13 - 44 %    Monocytes % 7 4.0 - 12.0 %    Eosinophils % 1 0.5 - 7.8 %    Basophils % 1 0.0 - 2.0 %    Immature Granulocytes % 0 0.0 - 5.0 %    Neutrophils Absolute 4.1 1.7 -  8.2 K/UL    Lymphocytes Absolute 2.1 0.5 - 4.6 K/UL    Monocytes Absolute 0.5 0.1 - 1.3 K/UL    Eosinophils Absolute 0.1 0.0 - 0.8 K/UL    Basophils Absolute 0.0 0.0 - 0.2 K/UL    Immature Granulocytes Absolute 0.0 0.0 - 0.5 K/UL       No results for input(s): \"COVID19\" in the last 72 hours.    Current Meds:  Current Facility-Administered Medications   Medication Dose Route Frequency    cyclobenzaprine (FLEXERIL) tablet 5 mg  5 mg Oral TID PRN    pravastatin (PRAVACHOL) tablet 20 mg  20 mg Oral Nightly    albuterol (PROVENTIL) (2.5 MG/3ML) 0.083% nebulizer solution 2.5 mg  2.5 mg Nebulization Q4H PRN    DULoxetine (CYMBALTA) extended release capsule 30 mg  30 mg Oral Daily    pantoprazole (PROTONIX) tablet 40 mg  40 mg Oral QAM AC    montelukast (SINGULAIR) tablet 10 mg  10 mg Oral QPM    potassium chloride (KLOR-CON M) extended release tablet 20 mEq  20 mEq Oral Daily    folic acid (FOLVITE) tablet 1,000 mcg  1,000 mcg Oral Daily    magnesium oxide (MAG-OX) tablet 400 mg  400 mg Oral BID    promethazine (PHENERGAN) tablet 25 mg  25 mg Oral Q6H PRN    sodium chloride flush 0.9 % injection 5-40 mL  5-40 mL IntraVENous 2 times per day    sodium chloride flush 0.9 % injection 5-40 mL  5-40 mL IntraVENous PRN    0.9 % sodium chloride infusion   IntraVENous PRN    potassium chloride (KLOR-CON M) extended release tablet 40 mEq  40 mEq Oral PRN    Or    potassium bicarb-citric acid (EFFER-K) effervescent tablet 40 mEq  40 mEq Oral PRN    Or    potassium chloride 10 mEq/100 mL IVPB (Peripheral Line)  10 mEq IntraVENous PRN    magnesium sulfate 2000 mg in 50 mL IVPB premix  2,000 mg IntraVENous PRN    enoxaparin (LOVENOX) injection 40 mg  40 mg SubCUTAneous Daily    ondansetron (ZOFRAN-ODT) disintegrating tablet 4 mg  4 mg Oral Q8H PRN    Or    ondansetron (ZOFRAN) injection 4 mg  4 mg IntraVENous Q6H PRN    polyethylene glycol (GLYCOLAX) packet 17 g  17 g Oral Daily PRN    acetaminophen (TYLENOL) tablet 650 mg  650 mg Oral

## 2024-06-19 NOTE — H&P
Hospitalist History and Physical   Admit Date:  2024  8:23 PM   Name:  Racheal Wyatt   Age:  74 y.o.  Sex:  female  :  1950   MRN:  [id number]     Presenting Complaint: abdominal pain  Reason(s) for Admission: Intra-abdominal abscess (HCC) [K65.1]  Diverticulitis of large intestine with abscess without bleeding [K57.20]     History of Present Illness:   Racheal Wyatt is a 74 y.o. female with medical history of prior diverticulitis and associated surgeries who presented to ED with abdominal pain and diarrhea.  Upon ER evaluation, labs are surprisingly unremarkable.  However CT A/P shows:  IMPRESSION:  There is sigmoid diverticulitis with a small abscess in the mesentery  of the deep pelvis, image number 59, measuring 3.1 cm.  The abscess is  larger than on 8/3/2023 and is connected to a fistulous communication  with the sigmoid colon.  Hospitalist asked to admit.    Review of Systems:  10 systems reviewed and negative except as noted in HPI.  Assessment & Plan:   * Intra-abdominal abscess (HCC)  Assessment & Plan  - CT with evidence of sigmoid diverticulitis and associated abscess that appears to be connected to the sigmoid via fistula  - NPO  - IV zosyn  - Consult to General Surgery for evaluation    Allergic rhinitis  Assessment & Plan  - Continue home singulair and albuterol    Major depressive disorder, recurrent, mild (HCC)  Assessment & Plan  - Continue home cymbalta        Disposition: inpatient      Past medical history reviewed.    Past Medical History:   Diagnosis Date    Bacterial liver abscess 2019    Borderline diabetes     diet controlled    Diverticulitis     Fibromyalgia     Gastro-esophageal reflux disease without esophagitis 2019    History of anemia     History of atrial fibrillation     states only one occurence due to sepsis    History of hypotension     Liver abscess     Pancreatitis     Rectal bleeding 2019    Sepsis due to Streptococcus, group A

## 2024-06-19 NOTE — ED PROVIDER NOTES
Emergency Department Provider Note       PCP: None, None   Age: 74 y.o.   Sex: female     DISPOSITION Decision To Admit 06/18/2024 10:56:27 PM       ICD-10-CM    1. Diverticulitis of large intestine with abscess without bleeding  K57.20           Medical Decision Making     74-year-old female presents with generalized abdominal pain and diarrhea.  Lab work is stable to include negative lactic acid.  CT scan concerning for diverticulitis with abscess larger than previously seen with fistulous communication within the sigmoid colon.  Patient was admitted to the hospitalist service after IV antibiotics and fluids.  She is hemodynamically stable.    ED Course as of 06/18/24 2258   Tue Jun 18, 2024 2231 CT abd/pelvis:  IMPRESSION:  There is sigmoid diverticulitis with a small abscess in the mesentery  of the deep pelvis, image number 59, measuring 3.1 cm.  The abscess is  larger than on 8/3/2023 and is connected to a fistulous communication  with the sigmoid colon   [CJ]      ED Course User Index  [CJ] Margaret Ramos, APRN - CNP     1 or more acute illnesses that pose a threat to life or bodily function.   Discussion with external consultants.  Shared medical decision making was utilized in creating the patients health plan today.    I independently ordered and reviewed each unique test.  I reviewed external records: ED visit note from an outside group.  I reviewed external records: provider visit note from PCP.  I reviewed external records: provider visit note from outside specialist.     I interpreted the CT Scan diverticulitis with abscess.              History     74-year-old female with history of liver abscess, diverticulitis, cholecystectomy, pancreatitis, sepsis presents with generalized abdominal pain and diarrhea.  This has been ongoing for around 2 weeks.  Does endorse that she has had some hemorrhoids with bright red blood after having a bowel movement.  She is unsure if she has had fevers, she has had  ENDOSCOPY N/A 08/15/2023    EGD BIOPSY performed by Suzette Jenkins DO at [facility]        Social History     Socioeconomic History    Marital status:    Tobacco Use    Smoking status: Former     Current packs/day: 0.00     Average packs/day: 0.5 packs/day for 15.0 years (7.5 ttl pk-yrs)     Types: Cigarettes     Start date: 2004     Quit date: 2019     Years since quittin.9    Smokeless tobacco: Never   Substance and Sexual Activity    Alcohol use: No    Drug use: No     Social Determinants of Health     Financial Resource Strain: Low Risk  (2023)    Overall Financial Resource Strain (CARDIA)     Difficulty of Paying Living Expenses: Not hard at all   Transportation Needs: Unknown (2023)    PRAPARE - Transportation     Lack of Transportation (Non-Medical): No   Physical Activity: Insufficiently Active (2023)    Exercise Vital Sign     Days of Exercise per Week: 2 days     Minutes of Exercise per Session: 20 min   Housing Stability: Unknown (2023)    Housing Stability Vital Sign     Unstable Housing in the Last Year: No        Previous Medications    ACETAMINOPHEN (TYLENOL) 500 MG TABLET    Take 1 tablet by mouth 2 times daily as needed    ALBUTEROL (PROVENTIL) (2.5 MG/3ML) 0.083% NEBULIZER SOLUTION    Take 3 mLs by nebulization every 4 hours as needed for Wheezing    CHOLECALCIFEROL 50 MCG (2000 UT) TABS    Take 1 tablet by mouth daily    CYANOCOBALAMIN 1000 MCG TABLET    Take 1 tablet by mouth daily    CYCLOBENZAPRINE (FLEXERIL) 5 MG TABLET    TAKE 1 TABLET (5 MG) BY MOUTH 3 (THREE) TIMES A DAY FOR MUSCLE SPASM    DULOXETINE (CYMBALTA) 30 MG EXTENDED RELEASE CAPSULE    Take 1 capsule by mouth daily    FOLIC ACID (FOLVITE) 1 MG TABLET    TAKE 1 TABLET BY MOUTH EVERY DAY    GABAPENTIN (NEURONTIN) 600 MG TABLET    Take 1 tablet by mouth daily for 90 days.    HYDROCORTISONE 2.5 % CREAM    Place rectally 4 times daily as needed    KLOR-CON M20 20 MEQ EXTENDED RELEASE

## 2024-06-19 NOTE — CONSULTS
Consult Note  Racheal Wyatt  MRN: [id number]  :1950  Age:74 y.o.    HPI: Racheal Wyatt is a 74 y.o. female who general surgery is asked in consultation by Dr. Brooks (hospitalist) to see for large intra-abdominal abscess with fistula in the setting of multiple prior abdominal surgeries.   The patient has a PMHx of diverticulitis (diverticulitis flare 2023-treated medically with ABX), rectal bleeding (hemorrhoids), anemia, bacterial liver abscess (Streptococcus group A-managed with IR drain and ID consultation), GERD, fibromyalgia, obesity, pancreatitis, and TIA.    Of note, patient with diverticulitis flare 2023  CT ABD/PLV 2023 revealed: Moderate diverticulitis of the sigmoid colon with a 23 x 20 x 15 mm abscess   located within the wall of the sigmoid colon and adjacent to the sigmoid colon   in the serosa of the colon. Suggest surgical consultation. Consider initial   treatment with antibiotics.    Patient was treated with ABX-no drain     2023 CT ABD/PLV revealed:   Persistent sigmoid diverticulitis. 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 No   drainable abscess at this time.    Patient was scheduled for colonoscopy 2024 but patient canceled colonoscopy.    She presented 24 with 2-week history of lower abdominal and rectal pain with associated nausea and night sweats. Pt attributed Lower abdominal and rectal pain to chronic hemorrhoids.  Lower abdominal pain was described as constant, intermittently worsening, aching, sore, 10/10 at worst.  Patient endorsed night sweats but did not check her temperature.  Positive flatus.  Positive bowel movement.  She denied vomiting, hematochezia, or melena.    In the ED 2024, afebrile.  VSS.  Labs unremarkable.  CT ABD/PELV 2024 revealed:  IMPRESSION:  There is sigmoid diverticulitis with a small abscess in the mesentery  of the deep pelvis, image  AND PELVIS WITH INTRAVENOUS CONTRAST 6/18/24     INDICATION:  Generalized abdominal pain with diarrhea left-sided  abdominal tenderness. : Generalized abdominal pain with diarrhea left-  sided abdominal tenderness.     TECHNIQUE:  Axial computed tomography images of the abdomen and pelvis  with intravenous contrast.  Sagittal and coronal reformatted images  were created and reviewed.  This CT exam was performed using one or  more of the following dose reduction techniques:  automated exposure  control, adjustment of the mA and/or kV according to patient size,  and/or use of iterative reconstruction technique.     COMPARISON:  8/3/2023     FINDINGS:     Lung bases:  Unremarkable.  No mass.  No consolidation.     ABDOMEN:     Liver:  Unremarkable.  No mass.     Gallbladder and bile ducts:  There has been a cholecystectomy.  No  ductal dilation.     Pancreas:  Unremarkable.  No mass.  No ductal dilation.     Spleen:  Unremarkable.  No splenomegaly.     Adrenals:  Unremarkable.  No mass.     Kidneys and ureters:  Unremarkable.  No solid mass.  No  hydronephrosis.     Stomach and bowel:  There is sigmoid diverticulitis with a small  abscess in the mesentery of the deep pelvis, image number 59,  measuring 3.1 cm.  The abscess is larger than on 8/3/2023 and is  connected to a fistulous communication with the sigmoid colon.     PELVIS:     Appendix:  No findings to suggest acute appendicitis.     Bladder:  Unremarkable.  No mass.     Reproductive:  Unremarkable as visualized.     ABDOMEN and PELVIS:     Intraperitoneal space:  Unremarkable.  No free air.  No significant  fluid collection.     Bones/joints:  No acute fracture.  No dislocation.     Soft tissues:  Unremarkable.     Vasculature:  Unremarkable.  No abdominal aortic aneurysm.     Lymph nodes:  Unremarkable.  No enlarged lymph nodes.     IMPRESSION:  There is sigmoid diverticulitis with a small abscess in the mesentery  of the deep pelvis, image number 59,

## 2024-06-19 NOTE — ED TRIAGE NOTES
C/o intermittent abd pain at various places, reports diarrhea today that started 1 weeks ago, nausea but denies vomiting, has not seen PCP since symptoms started, hx of pancreatitis and diverticulitis, has had gallbladder and appendix removed

## 2024-06-20 LAB
ANION GAP SERPL CALC-SCNC: 11 MMOL/L (ref 9–18)
BASOPHILS # BLD: 0 K/UL (ref 0–0.2)
BASOPHILS NFR BLD: 1 % (ref 0–2)
BUN SERPL-MCNC: 7 MG/DL (ref 8–23)
CALCIUM SERPL-MCNC: 8.4 MG/DL (ref 8.8–10.2)
CHLORIDE SERPL-SCNC: 105 MMOL/L (ref 98–107)
CO2 SERPL-SCNC: 23 MMOL/L (ref 20–28)
CREAT SERPL-MCNC: 0.7 MG/DL (ref 0.6–1.1)
DIFFERENTIAL METHOD BLD: ABNORMAL
EOSINOPHIL # BLD: 0.1 K/UL (ref 0–0.8)
EOSINOPHIL NFR BLD: 2 % (ref 0.5–7.8)
ERYTHROCYTE [DISTWIDTH] IN BLOOD BY AUTOMATED COUNT: 13.2 % (ref 11.9–14.6)
GLUCOSE SERPL-MCNC: 84 MG/DL (ref 70–99)
HCT VFR BLD AUTO: 37.6 % (ref 35.8–46.3)
HGB BLD-MCNC: 12.6 G/DL (ref 11.7–15.4)
IMM GRANULOCYTES # BLD AUTO: 0 K/UL (ref 0–0.5)
IMM GRANULOCYTES NFR BLD AUTO: 0 % (ref 0–5)
LYMPHOCYTES # BLD: 1 K/UL (ref 0.5–4.6)
LYMPHOCYTES NFR BLD: 18 % (ref 13–44)
MAGNESIUM SERPL-MCNC: 1.8 MG/DL (ref 1.8–2.4)
MCH RBC QN AUTO: 32.1 PG (ref 26.1–32.9)
MCHC RBC AUTO-ENTMCNC: 33.5 G/DL (ref 31.4–35)
MCV RBC AUTO: 95.7 FL (ref 82–102)
MONOCYTES # BLD: 0.4 K/UL (ref 0.1–1.3)
MONOCYTES NFR BLD: 7 % (ref 4–12)
NEUTS SEG # BLD: 4 K/UL (ref 1.7–8.2)
NEUTS SEG NFR BLD: 72 % (ref 43–78)
NRBC # BLD: 0 K/UL (ref 0–0.2)
PLATELET # BLD AUTO: 168 K/UL (ref 150–450)
PMV BLD AUTO: 8.4 FL (ref 9.4–12.3)
POTASSIUM SERPL-SCNC: 4.1 MMOL/L (ref 3.5–5.1)
RBC # BLD AUTO: 3.93 M/UL (ref 4.05–5.2)
SODIUM SERPL-SCNC: 139 MMOL/L (ref 136–145)
WBC # BLD AUTO: 5.4 K/UL (ref 4.3–11.1)

## 2024-06-20 PROCEDURE — 85025 COMPLETE CBC W/AUTO DIFF WBC: CPT

## 2024-06-20 PROCEDURE — 83735 ASSAY OF MAGNESIUM: CPT

## 2024-06-20 PROCEDURE — 2580000003 HC RX 258: Performed by: FAMILY MEDICINE

## 2024-06-20 PROCEDURE — 6360000002 HC RX W HCPCS: Performed by: FAMILY MEDICINE

## 2024-06-20 PROCEDURE — 6360000002 HC RX W HCPCS: Performed by: STUDENT IN AN ORGANIZED HEALTH CARE EDUCATION/TRAINING PROGRAM

## 2024-06-20 PROCEDURE — 80048 BASIC METABOLIC PNL TOTAL CA: CPT

## 2024-06-20 PROCEDURE — 6370000000 HC RX 637 (ALT 250 FOR IP): Performed by: FAMILY MEDICINE

## 2024-06-20 PROCEDURE — 36415 COLL VENOUS BLD VENIPUNCTURE: CPT

## 2024-06-20 PROCEDURE — 1100000000 HC RM PRIVATE

## 2024-06-20 PROCEDURE — 2580000003 HC RX 258: Performed by: STUDENT IN AN ORGANIZED HEALTH CARE EDUCATION/TRAINING PROGRAM

## 2024-06-20 RX ADMIN — CEFTRIAXONE 1000 MG: 1 INJECTION, POWDER, FOR SOLUTION INTRAMUSCULAR; INTRAVENOUS at 08:31

## 2024-06-20 RX ADMIN — DULOXETINE HYDROCHLORIDE 30 MG: 30 CAPSULE, DELAYED RELEASE ORAL at 08:31

## 2024-06-20 RX ADMIN — FOLIC ACID 1000 MCG: 1 TABLET ORAL at 08:31

## 2024-06-20 RX ADMIN — SODIUM CHLORIDE: 9 INJECTION, SOLUTION INTRAVENOUS at 10:42

## 2024-06-20 RX ADMIN — METRONIDAZOLE 500 MG: 500 INJECTION, SOLUTION INTRAVENOUS at 00:29

## 2024-06-20 RX ADMIN — POTASSIUM CHLORIDE 20 MEQ: 1500 TABLET, EXTENDED RELEASE ORAL at 08:31

## 2024-06-20 RX ADMIN — PRAVASTATIN SODIUM 20 MG: 20 TABLET ORAL at 22:29

## 2024-06-20 RX ADMIN — SODIUM CHLORIDE, PRESERVATIVE FREE 10 ML: 5 INJECTION INTRAVENOUS at 22:31

## 2024-06-20 RX ADMIN — METRONIDAZOLE 500 MG: 500 INJECTION, SOLUTION INTRAVENOUS at 08:38

## 2024-06-20 RX ADMIN — MAGNESIUM GLUCONATE 500 MG ORAL TABLET 400 MG: 500 TABLET ORAL at 08:31

## 2024-06-20 RX ADMIN — MAGNESIUM GLUCONATE 500 MG ORAL TABLET 400 MG: 500 TABLET ORAL at 22:29

## 2024-06-20 RX ADMIN — PANTOPRAZOLE SODIUM 40 MG: 40 TABLET, DELAYED RELEASE ORAL at 05:54

## 2024-06-20 RX ADMIN — METRONIDAZOLE 500 MG: 500 INJECTION, SOLUTION INTRAVENOUS at 15:50

## 2024-06-20 RX ADMIN — SODIUM CHLORIDE, PRESERVATIVE FREE 10 ML: 5 INJECTION INTRAVENOUS at 08:31

## 2024-06-20 RX ADMIN — ENOXAPARIN SODIUM 40 MG: 100 INJECTION SUBCUTANEOUS at 09:11

## 2024-06-20 RX ADMIN — MONTELUKAST 10 MG: 10 TABLET, FILM COATED ORAL at 17:38

## 2024-06-20 NOTE — PROGRESS NOTES
Admit Date: 6/18/2024    General surgery following for diverticular abscess  HPI: Racheal Wyatt is a 74 y.o. female who general surgery is asked in consultation by Dr. Brooks (hospitalist) to see for large intra-abdominal abscess with fistula in the setting of multiple prior abdominal surgeries.   The patient has a PMHx of diverticulitis (diverticulitis flare 06/2023-treated medically with ABX), rectal bleeding (hemorrhoids), anemia, bacterial liver abscess (Streptococcus group A-managed with IR drain and ID consultation), GERD, fibromyalgia, obesity, pancreatitis, and TIA.     Of note, patient with diverticulitis flare 06/2023  CT ABD/PLV 06/20/2023 revealed: Moderate diverticulitis of the sigmoid colon with a 23 x 20 x 15 mm abscess   located within the wall of the sigmoid colon and adjacent to the sigmoid colon   in the serosa of the colon. Suggest surgical consultation. Consider initial   treatment with antibiotics.    Patient was treated with ABX-no drain      08/2023 CT ABD/PLV revealed:   Persistent sigmoid diverticulitis. 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 No   drainable abscess at this time.     Patient was scheduled for colonoscopy 02/2024 but patient canceled colonoscopy.     She presented 6/18/24 with 2-week history of lower abdominal and rectal pain with associated nausea and night sweats. Pt attributed Lower abdominal and rectal pain to chronic hemorrhoids.  Lower abdominal pain was described as constant, intermittently worsening, aching, sore, 10/10 at worst.  Patient endorsed night sweats but did not check her temperature.  Positive flatus.  Positive bowel movement.  She denied vomiting, hematochezia, or melena.    In the ED 6/18/2024, afebrile.  VSS.  Labs unremarkable.  CT ABD/PELV 6/18/2024 revealed:  IMPRESSION:  There is sigmoid diverticulitis with a small abscess in the mesentery  of the deep pelvis, image  number 59, measuring 3.1 cm.  The abscess is  larger than on 8/3/2023 and is connected to a fistulous communication  with the sigmoid colon.     She was admitted by the hospitalist service on 2024 for intra-abdominal abscess.      Patient is n.p.o. at time of assessment  Patient is not taking anticoagulation.  Previous abdominal surgeries: cholecystectomy (), hysterectomy ()    24 PER IR: Patient's abscess is not amenable to IR drain placement due to size and location.   Subjective:   A/O x 4 with NAD noted.  Respirations even unlabored on room air.  Positive flatus.  BM x 9 past 24 H = D (no melena, no hematochezia).  Patient denies nausea or vomiting.  Abdominal pain is improved versus admission 6/10 at worst 3/10 at best.    Afebrile.  BP 100s-140s/50s-70s; otherwise, VSS.  Patient is hungry.  Objective:       Vitals:    24 2304 24 0316 24 0722 24 1132   BP: 132/62 (!) 134/57 134/74 (!) 113/54   Pulse: 67 71 71 81   Resp:    Temp: 98.2 °F (36.8 °C) 97.7 °F (36.5 °C) 98.1 °F (36.7 °C) 98.1 °F (36.7 °C)   TempSrc: Oral Oral Oral Oral   SpO2: 93% 92% 92% 92%   Weight:       Height:           Temp (24hrs), Av.1 °F (36.7 °C), Min:97.7 °F (36.5 °C), Max:98.4 °F (36.9 °C)  .  I&O reviewed as documented.    700 mL + unmeasurable urine occurrences past 24 H-voiding    Physical Exam  Constitutional:       General: She is not in acute distress.  HENT:      Mouth/Throat:      Mouth: Mucous membranes are moist.   Cardiovascular:      Rate and Rhythm: Normal rate and regular rhythm.      Pulses: Normal pulses.      Heart sounds: Normal heart sounds. No murmur heard.     No friction rub. No gallop.   Pulmonary:      Effort: Pulmonary effort is normal. No respiratory distress.      Breath sounds: Normal breath sounds.   Abdominal:      General: Bowel sounds are normal. There is no distension.      Palpations: Abdomen is soft.   Genitourinary:     Comments:

## 2024-06-20 NOTE — PROGRESS NOTES
nourished.  Alert. No acute distress  Head:  Normocephalic, atraumatic  CV:   RRR.  No m/r/g.    Lungs:   CTAB anterior. Respirations even/unlabored on RA  Abdomen:   Soft, non distended. Mildly tender. Normoactive bowel sounds  Extremities: No edema  Skin:     Warm and dry.    Neuro:  A&O ox 4. No focal deficits  Psych:  Normal mood and affect.      I have personally reviewed labs and tests:  Recent Labs:  Recent Results (from the past 48 hour(s))   CBC with Auto Differential    Collection Time: 06/18/24  8:46 PM   Result Value Ref Range    WBC 7.3 4.3 - 11.1 K/uL    RBC 4.48 4.05 - 5.2 M/uL    Hemoglobin 14.5 11.7 - 15.4 g/dL    Hematocrit 43.2 35.8 - 46.3 %    MCV 96.4 82 - 102 FL    MCH 32.4 26.1 - 32.9 PG    MCHC 33.6 31.4 - 35.0 g/dL    RDW 13.5 11.9 - 14.6 %    Platelets 197 150 - 450 K/uL    MPV 8.3 (L) 9.4 - 12.3 FL    nRBC 0.00 0.0 - 0.2 K/uL    Differential Type AUTOMATED      Neutrophils % 66 43 - 78 %    Lymphocytes % 26 13 - 44 %    Monocytes % 6 4.0 - 12.0 %    Eosinophils % 1 0.5 - 7.8 %    Basophils % 1 0.0 - 2.0 %    Immature Granulocytes % 0 0.0 - 5.0 %    Neutrophils Absolute 4.8 1.7 - 8.2 K/UL    Lymphocytes Absolute 1.9 0.5 - 4.6 K/UL    Monocytes Absolute 0.5 0.1 - 1.3 K/UL    Eosinophils Absolute 0.1 0.0 - 0.8 K/UL    Basophils Absolute 0.1 0.0 - 0.2 K/UL    Immature Granulocytes Absolute 0.0 0.0 - 0.5 K/UL   Comprehensive Metabolic Panel    Collection Time: 06/18/24  8:46 PM   Result Value Ref Range    Sodium 139 136 - 145 mmol/L    Potassium 4.1 3.5 - 5.1 mmol/L    Chloride 102 98 - 107 mmol/L    CO2 27 20 - 28 mmol/L    Anion Gap 10 9 - 18 mmol/L    Glucose 102 (H) 70 - 99 mg/dL    BUN 13 8 - 23 MG/DL    Creatinine 0.94 0.60 - 1.10 MG/DL    Est, Glom Filt Rate 64 >60 ml/min/1.73m2    Calcium 9.1 8.8 - 10.2 MG/DL    Total Bilirubin 0.4 0.0 - 1.2 MG/DL    ALT 14 12 - 65 U/L    AST 22 15 - 37 U/L    Alk Phosphatase 110 (H) 35 - 104 U/L    Total Protein 6.6 6.3 - 8.2 g/dL    Albumin 3.5 3.2 -  145 mmol/L    Potassium 4.1 3.5 - 5.1 mmol/L    Chloride 105 98 - 107 mmol/L    CO2 23 20 - 28 mmol/L    Anion Gap 11 9 - 18 mmol/L    Glucose 84 70 - 99 mg/dL    BUN 7 (L) 8 - 23 MG/DL    Creatinine 0.70 0.60 - 1.10 MG/DL    Est, Glom Filt Rate >90 >60 ml/min/1.73m2    Calcium 8.4 (L) 8.8 - 10.2 MG/DL   Magnesium    Collection Time: 06/20/24  9:24 AM   Result Value Ref Range    Magnesium 1.8 1.8 - 2.4 mg/dL       No results for input(s): \"COVID19\" in the last 72 hours.    Current Meds:  Current Facility-Administered Medications   Medication Dose Route Frequency    cyclobenzaprine (FLEXERIL) tablet 5 mg  5 mg Oral TID PRN    pravastatin (PRAVACHOL) tablet 20 mg  20 mg Oral Nightly    albuterol (PROVENTIL) (2.5 MG/3ML) 0.083% nebulizer solution 2.5 mg  2.5 mg Nebulization Q4H PRN    DULoxetine (CYMBALTA) extended release capsule 30 mg  30 mg Oral Daily    pantoprazole (PROTONIX) tablet 40 mg  40 mg Oral QAM AC    montelukast (SINGULAIR) tablet 10 mg  10 mg Oral QPM    potassium chloride (KLOR-CON M) extended release tablet 20 mEq  20 mEq Oral Daily    folic acid (FOLVITE) tablet 1,000 mcg  1,000 mcg Oral Daily    magnesium oxide (MAG-OX) tablet 400 mg  400 mg Oral BID    promethazine (PHENERGAN) tablet 25 mg  25 mg Oral Q6H PRN    sodium chloride flush 0.9 % injection 5-40 mL  5-40 mL IntraVENous 2 times per day    sodium chloride flush 0.9 % injection 5-40 mL  5-40 mL IntraVENous PRN    0.9 % sodium chloride infusion   IntraVENous PRN    potassium chloride (KLOR-CON M) extended release tablet 40 mEq  40 mEq Oral PRN    Or    potassium bicarb-citric acid (EFFER-K) effervescent tablet 40 mEq  40 mEq Oral PRN    Or    potassium chloride 10 mEq/100 mL IVPB (Peripheral Line)  10 mEq IntraVENous PRN    magnesium sulfate 2000 mg in 50 mL IVPB premix  2,000 mg IntraVENous PRN    enoxaparin (LOVENOX) injection 40 mg  40 mg SubCUTAneous Daily    ondansetron (ZOFRAN-ODT) disintegrating tablet 4 mg  4 mg Oral Q8H PRN    Or

## 2024-06-20 NOTE — PLAN OF CARE
Problem: Discharge Planning  Goal: Discharge to home or other facility with appropriate resources  Outcome: Progressing     Problem: Pain  Goal: Verbalizes/displays adequate comfort level or baseline comfort level  Outcome: Progressing     Problem: ABCDS Injury Assessment  Goal: Absence of physical injury  Outcome: Progressing     Problem: Safety - Adult  Goal: Free from fall injury  Outcome: Progressing  Flowsheets (Taken 6/20/2024 0666)  Free From Fall Injury: Instruct family/caregiver on patient safety

## 2024-06-21 VITALS
DIASTOLIC BLOOD PRESSURE: 68 MMHG | WEIGHT: 182.2 LBS | HEART RATE: 88 BPM | SYSTOLIC BLOOD PRESSURE: 129 MMHG | TEMPERATURE: 97.9 F | OXYGEN SATURATION: 91 % | RESPIRATION RATE: 18 BRPM | BODY MASS INDEX: 34.4 KG/M2 | HEIGHT: 61 IN

## 2024-06-21 LAB
ANION GAP SERPL CALC-SCNC: 11 MMOL/L (ref 9–18)
BASOPHILS # BLD: 0 K/UL (ref 0–0.2)
BASOPHILS NFR BLD: 0 % (ref 0–2)
BUN SERPL-MCNC: 8 MG/DL (ref 8–23)
CALCIUM SERPL-MCNC: 8.7 MG/DL (ref 8.8–10.2)
CHLORIDE SERPL-SCNC: 106 MMOL/L (ref 98–107)
CO2 SERPL-SCNC: 21 MMOL/L (ref 20–28)
CREAT SERPL-MCNC: 0.73 MG/DL (ref 0.6–1.1)
DIFFERENTIAL METHOD BLD: ABNORMAL
EOSINOPHIL # BLD: 0.2 K/UL (ref 0–0.8)
EOSINOPHIL NFR BLD: 3 % (ref 0.5–7.8)
ERYTHROCYTE [DISTWIDTH] IN BLOOD BY AUTOMATED COUNT: 13.2 % (ref 11.9–14.6)
GLUCOSE SERPL-MCNC: 101 MG/DL (ref 70–99)
HCT VFR BLD AUTO: 37.7 % (ref 35.8–46.3)
HGB BLD-MCNC: 12.5 G/DL (ref 11.7–15.4)
IMM GRANULOCYTES # BLD AUTO: 0 K/UL (ref 0–0.5)
IMM GRANULOCYTES NFR BLD AUTO: 0 % (ref 0–5)
LYMPHOCYTES # BLD: 0.9 K/UL (ref 0.5–4.6)
LYMPHOCYTES NFR BLD: 18 % (ref 13–44)
MCH RBC QN AUTO: 31.6 PG (ref 26.1–32.9)
MCHC RBC AUTO-ENTMCNC: 33.2 G/DL (ref 31.4–35)
MCV RBC AUTO: 95.4 FL (ref 82–102)
MONOCYTES # BLD: 0.3 K/UL (ref 0.1–1.3)
MONOCYTES NFR BLD: 7 % (ref 4–12)
NEUTS SEG # BLD: 3.6 K/UL (ref 1.7–8.2)
NEUTS SEG NFR BLD: 72 % (ref 43–78)
NRBC # BLD: 0 K/UL (ref 0–0.2)
PLATELET # BLD AUTO: 168 K/UL (ref 150–450)
PMV BLD AUTO: 8.4 FL (ref 9.4–12.3)
POTASSIUM SERPL-SCNC: 3.7 MMOL/L (ref 3.5–5.1)
RBC # BLD AUTO: 3.95 M/UL (ref 4.05–5.2)
SODIUM SERPL-SCNC: 138 MMOL/L (ref 136–145)
WBC # BLD AUTO: 5 K/UL (ref 4.3–11.1)

## 2024-06-21 PROCEDURE — 6360000002 HC RX W HCPCS: Performed by: FAMILY MEDICINE

## 2024-06-21 PROCEDURE — 2580000003 HC RX 258: Performed by: FAMILY MEDICINE

## 2024-06-21 PROCEDURE — 2580000003 HC RX 258: Performed by: STUDENT IN AN ORGANIZED HEALTH CARE EDUCATION/TRAINING PROGRAM

## 2024-06-21 PROCEDURE — 85025 COMPLETE CBC W/AUTO DIFF WBC: CPT

## 2024-06-21 PROCEDURE — 80048 BASIC METABOLIC PNL TOTAL CA: CPT

## 2024-06-21 PROCEDURE — 36415 COLL VENOUS BLD VENIPUNCTURE: CPT

## 2024-06-21 PROCEDURE — 6370000000 HC RX 637 (ALT 250 FOR IP): Performed by: FAMILY MEDICINE

## 2024-06-21 PROCEDURE — 6360000002 HC RX W HCPCS: Performed by: STUDENT IN AN ORGANIZED HEALTH CARE EDUCATION/TRAINING PROGRAM

## 2024-06-21 RX ORDER — ONDANSETRON 4 MG/1
4 TABLET, FILM COATED ORAL 3 TIMES DAILY PRN
Qty: 30 TABLET | Refills: 0 | Status: SHIPPED | OUTPATIENT
Start: 2024-06-21 | End: 2024-07-01

## 2024-06-21 RX ORDER — CIPROFLOXACIN 500 MG/1
500 TABLET, FILM COATED ORAL 2 TIMES DAILY
Qty: 14 TABLET | Refills: 0 | Status: SHIPPED | OUTPATIENT
Start: 2024-06-21 | End: 2024-06-29

## 2024-06-21 RX ORDER — METRONIDAZOLE 500 MG/1
500 TABLET ORAL 3 TIMES DAILY
Qty: 24 TABLET | Refills: 0 | Status: SHIPPED | OUTPATIENT
Start: 2024-06-21 | End: 2024-06-29

## 2024-06-21 RX ORDER — OXYCODONE HYDROCHLORIDE AND ACETAMINOPHEN 5; 325 MG/1; MG/1
1 TABLET ORAL EVERY 6 HOURS PRN
Qty: 20 TABLET | Refills: 0 | Status: SHIPPED | OUTPATIENT
Start: 2024-06-21 | End: 2024-06-26

## 2024-06-21 RX ORDER — CIPROFLOXACIN 500 MG/1
500 TABLET, FILM COATED ORAL EVERY 12 HOURS SCHEDULED
Status: DISCONTINUED | OUTPATIENT
Start: 2024-06-21 | End: 2024-06-21

## 2024-06-21 RX ADMIN — METRONIDAZOLE 500 MG: 500 INJECTION, SOLUTION INTRAVENOUS at 00:12

## 2024-06-21 RX ADMIN — DULOXETINE HYDROCHLORIDE 30 MG: 30 CAPSULE, DELAYED RELEASE ORAL at 08:25

## 2024-06-21 RX ADMIN — ONDANSETRON 4 MG: 2 INJECTION INTRAMUSCULAR; INTRAVENOUS at 01:08

## 2024-06-21 RX ADMIN — PANTOPRAZOLE SODIUM 40 MG: 40 TABLET, DELAYED RELEASE ORAL at 05:59

## 2024-06-21 RX ADMIN — CEFTRIAXONE 1000 MG: 1 INJECTION, POWDER, FOR SOLUTION INTRAMUSCULAR; INTRAVENOUS at 08:26

## 2024-06-21 RX ADMIN — METRONIDAZOLE 500 MG: 500 INJECTION, SOLUTION INTRAVENOUS at 08:38

## 2024-06-21 RX ADMIN — ACETAMINOPHEN 650 MG: 325 TABLET ORAL at 01:02

## 2024-06-21 RX ADMIN — FOLIC ACID 1000 MCG: 1 TABLET ORAL at 08:25

## 2024-06-21 RX ADMIN — ENOXAPARIN SODIUM 40 MG: 100 INJECTION SUBCUTANEOUS at 08:24

## 2024-06-21 RX ADMIN — SODIUM CHLORIDE, PRESERVATIVE FREE 10 ML: 5 INJECTION INTRAVENOUS at 08:33

## 2024-06-21 RX ADMIN — POTASSIUM CHLORIDE 20 MEQ: 1500 TABLET, EXTENDED RELEASE ORAL at 08:25

## 2024-06-21 RX ADMIN — MAGNESIUM GLUCONATE 500 MG ORAL TABLET 400 MG: 500 TABLET ORAL at 08:25

## 2024-06-21 ASSESSMENT — PAIN SCALES - GENERAL: PAINLEVEL_OUTOF10: 6

## 2024-06-21 ASSESSMENT — PAIN DESCRIPTION - ORIENTATION: ORIENTATION: ANTERIOR

## 2024-06-21 ASSESSMENT — PAIN DESCRIPTION - LOCATION: LOCATION: HEAD

## 2024-06-21 ASSESSMENT — PAIN DESCRIPTION - DESCRIPTORS: DESCRIPTORS: ACHING

## 2024-06-21 NOTE — DISCHARGE SUMMARY
Hospitalist Discharge Summary   Admit Date:  2024  8:23 PM   DC Note date: 2024  Name:  Racheal Wyatt   Age:  74 y.o.  Sex:  female  :  1950   MRN:  [id number]   Room:  [facility]  PCP:  None, None    Presenting Complaint: Abdominal Pain and Diarrhea     Initial Admission Diagnosis: Intra-abdominal abscess (HCC) [K65.1]  Diverticulitis of large intestine with abscess without bleeding [K57.20]     Problem List for this Hospitalization (present on admission):    Principal Problem:    Intra-abdominal abscess (HCC)  Active Problems:    Major depressive disorder, recurrent, mild (HCC)    Allergic rhinitis    Chronic abdominal pain    Colonic fistula    Diverticulitis of large intestine with abscess without bleeding  Resolved Problems:    * No resolved hospital problems. *      Hospital Course:    Patient is a 73 y/o female with medical history of diverticulitis with diverticular abscess () too small to drain per IR, liver abscess, pancreatitis, transaminitis, obesity, MDD, GERD, TIA, dyslipidemia, fibromyalgia who presented to ED with cc abdominal pain and diarrhea of 2-week duration. No known fevers but + night sweats. No vomiting.    ED workup: afebrile RR 16 HR 92 /87 97% on RA  Labs unremarkable  LA 0.7 PCT 0.04 WBC 7.3 Lipase 28  CT A/P revealed sigmoid diverticulitis with small abscess in the mesentery  of the deep pelvis, larger in size from prior (summer 2023) and connected to a fistulous communication with the sigmoid colon.  Hospitalist admission requested.     IR consultation: abscess is not amenable to drain placement due to size and location  General surgery saw patient in consultation.  She received IV Flagyl and ceftriaxone.  Symptoms improved and she was able to tolerate diet advancement without fever or worsening symptoms.  She was discharged home to complete a course of oral Flagyl and ciprofloxacin and to follow-up with Surgery in clinic.  Plan was discussed with  Therapy  SpO2: 92 %  Pulse via Oximetry: 73 beats per minute  O2 Device: None (Room air)    Estimated body mass index is 34.43 kg/m² as calculated from the following:    Height as of this encounter: 1.549 m (5' 1\").    Weight as of this encounter: 82.6 kg (182 lb 3.2 oz).    Intake/Output Summary (Last 24 hours) at 6/21/2024 1409  Last data filed at 6/21/2024 1339  Gross per 24 hour   Intake 4027.45 ml   Output 1700 ml   Net 2327.45 ml         Physical Exam:    General:    Well nourished.  No overt distress.  Pleasant woman resting in bed.   Head:  Normocephalic, atraumatic  Eyes:  Sclerae appear normal.  Pupils equally round.    HENT:  Nares appear normal, no drainage.  Moist mucous membranes  Neck:  No restricted ROM.  Trachea midline  CV:   RRR.  No m/r/g.  No JVD  Lungs:   CTAB.  No wheezing, rhonchi, or rales.  Respirations even, unlabored  Abdomen:   Soft, nontender, nondistended.  NABS.   Extremities: Warm and dry.   No edema.    Skin:     No rashes.  Normal coloration  Neuro:  CN II-XII grossly intact.  Psych:  Normal mood and affect.      Signed:  To Newberry MD    Part of this note may have been written by using a voice dictation software.  The note has been proof read but may still contain some grammatical/other typographical errors.

## 2024-06-21 NOTE — PROGRESS NOTES
Admit Date: 6/18/2024    General surgery following for diverticular abscess  HPI: Racheal Wyatt is a 74 y.o. female who general surgery is asked in consultation by Dr. Brooks (hospitalist) to see for large intra-abdominal abscess with fistula in the setting of multiple prior abdominal surgeries.   The patient has a PMHx of diverticulitis (diverticulitis flare 06/2023-treated medically with ABX), rectal bleeding (hemorrhoids), anemia, bacterial liver abscess (Streptococcus group A-managed with IR drain and ID consultation), GERD, fibromyalgia, obesity, pancreatitis, and TIA.     Of note, patient with diverticulitis flare 06/2023  CT ABD/PLV 06/20/2023 revealed: Moderate diverticulitis of the sigmoid colon with a 23 x 20 x 15 mm abscess   located within the wall of the sigmoid colon and adjacent to the sigmoid colon   in the serosa of the colon. Suggest surgical consultation. Consider initial   treatment with antibiotics.    Patient was treated with ABX-no drain      08/2023 CT ABD/PLV revealed:   Persistent sigmoid diverticulitis. 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 No   drainable abscess at this time.     Patient was scheduled for colonoscopy 02/2024 but patient canceled colonoscopy.     She presented 6/18/24 with 2-week history of lower abdominal and rectal pain with associated nausea and night sweats. Pt attributed Lower abdominal and rectal pain to chronic hemorrhoids.  Lower abdominal pain was described as constant, intermittently worsening, aching, sore, 10/10 at worst.  Patient endorsed night sweats but did not check her temperature.  Positive flatus.  Positive bowel movement.  She denied vomiting, hematochezia, or melena.    In the ED 6/18/2024, afebrile.  VSS.  Labs unremarkable.  CT ABD/PELV 6/18/2024 revealed:  IMPRESSION:  There is sigmoid diverticulitis with a small abscess in the mesentery  of the deep pelvis, image  Normal range of motion.      Cervical back: No rigidity.   Skin:     General: Skin is warm and dry.      Capillary Refill: Capillary refill takes less than 2 seconds.   Neurological:      Mental Status: She is alert and oriented to person, place, and time.   Psychiatric:         Behavior: Behavior normal.        Labs:   Recent Results (from the past 24 hour(s))   CBC with Auto Differential    Collection Time: 06/21/24  4:27 AM   Result Value Ref Range    WBC 5.0 4.3 - 11.1 K/uL    RBC 3.95 (L) 4.05 - 5.2 M/uL    Hemoglobin 12.5 11.7 - 15.4 g/dL    Hematocrit 37.7 35.8 - 46.3 %    MCV 95.4 82 - 102 FL    MCH 31.6 26.1 - 32.9 PG    MCHC 33.2 31.4 - 35.0 g/dL    RDW 13.2 11.9 - 14.6 %    Platelets 168 150 - 450 K/uL    MPV 8.4 (L) 9.4 - 12.3 FL    nRBC 0.00 0.0 - 0.2 K/uL    Differential Type AUTOMATED      Neutrophils % 72 43 - 78 %    Lymphocytes % 18 13 - 44 %    Monocytes % 7 4.0 - 12.0 %    Eosinophils % 3 0.5 - 7.8 %    Basophils % 0 0.0 - 2.0 %    Immature Granulocytes % 0 0.0 - 5.0 %    Neutrophils Absolute 3.6 1.7 - 8.2 K/UL    Lymphocytes Absolute 0.9 0.5 - 4.6 K/UL    Monocytes Absolute 0.3 0.1 - 1.3 K/UL    Eosinophils Absolute 0.2 0.0 - 0.8 K/UL    Basophils Absolute 0.0 0.0 - 0.2 K/UL    Immature Granulocytes Absolute 0.0 0.0 - 0.5 K/UL   Basic Metabolic Panel    Collection Time: 06/21/24  4:27 AM   Result Value Ref Range    Sodium 138 136 - 145 mmol/L    Potassium 3.7 3.5 - 5.1 mmol/L    Chloride 106 98 - 107 mmol/L    CO2 21 20 - 28 mmol/L    Anion Gap 11 9 - 18 mmol/L    Glucose 101 (H) 70 - 99 mg/dL    BUN 8 8 - 23 MG/DL    Creatinine 0.73 0.60 - 1.10 MG/DL    Est, Glom Filt Rate 87 >60 ml/min/1.73m2    Calcium 8.7 (L) 8.8 - 10.2 MG/DL       Assessment:     Principal Problem:    Intra-abdominal abscess (HCC)  Active Problems:    Major depressive disorder, recurrent, mild (HCC)    Allergic rhinitis    Chronic abdominal pain    Colonic fistula    Diverticulitis of large intestine with abscess without

## 2024-06-21 NOTE — CARE COORDINATION
Patient has discharge orders to go home today. Patient is aware and in agreement with this discharge plan.     No CM needs voiced or noted.     ASSESSMENT NOTE    Attending Physician: To Newberry MD  Admit Problem: Intra-abdominal abscess (HCC) [K65.1]  Diverticulitis of large intestine with abscess without bleeding [K57.20]  Date/Time of Admission: 6/18/2024  8:23 PM  Problem List:  Patient Active Problem List   Diagnosis    Gastroesophageal reflux disease with esophagitis    Hemorrhoids    Arthritis    Diverticulitis of large intestine with abscess with bleeding    Flexural eczema    Major depressive disorder, recurrent, mild (HCC)    Orthostatic hypotension    Vitamin D deficiency    Transaminitis    Jaundice    Allergic rhinitis    Class 1 obesity due to excess calories with serious comorbidity and body mass index (BMI) of 34.0 to 34.9 in adult    Colonic diverticular abscess    Diverticulitis    Intra-abdominal abscess (HCC)    Chronic abdominal pain    Colonic fistula    Diverticulitis of large intestine with abscess without bleeding       Service Assessment  Patient Orientation Alert and Oriented   Cognition Alert   History Provided By Patient   Primary Caregiver Self   Accompanied By/Relationship     Support Systems Children, Family Members   Patient's Healthcare Decision Maker is: Legal Next of Kin   PCP Verified by CM Yes (confirmed PCP is Dr. Karthik Hatch)   Last Visit to PCP Within last 3 months (last PCP visit was last month)   Prior Functional Level Independent in ADLs/IADLs   Current Functional Level Other (see comment) (TBD by clinicals)   Can patient return to prior living arrangement Yes   Ability to make needs known: Good   Family able to assist with home care needs: Yes   Would you like for me to discuss the discharge plan with any other family members/significant others, and if so, who? Yes   Financial Resources Medicare, Other (Comment) (Medicare Part A and B and BCBS)

## 2024-06-21 NOTE — PLAN OF CARE
Problem: Discharge Planning  Goal: Discharge to home or other facility with appropriate resources  6/21/2024 1514 by Kathrine Nunes RN  Outcome: Completed  6/21/2024 1011 by Kathrine Nunes RN  Outcome: Progressing     Problem: Pain  Goal: Verbalizes/displays adequate comfort level or baseline comfort level  6/21/2024 1514 by Kathrine Nunes RN  Outcome: Completed  6/21/2024 1011 by Kathrine Nunes RN  Outcome: Progressing  Flowsheets (Taken 6/21/2024 0826)  Verbalizes/displays adequate comfort level or baseline comfort level:   Encourage patient to monitor pain and request assistance   Assess pain using appropriate pain scale   Implement non-pharmacological measures as appropriate and evaluate response     Problem: ABCDS Injury Assessment  Goal: Absence of physical injury  6/21/2024 1514 by Kathrine Nunes RN  Outcome: Completed  6/21/2024 1011 by Kathrine Nunes RN  Outcome: Progressing     Problem: Safety - Adult  Goal: Free from fall injury  6/21/2024 1514 by Kathrine Nunes RN  Outcome: Completed  6/21/2024 1011 by Kathrine Nunes RN  Outcome: Progressing

## 2024-06-21 NOTE — PROGRESS NOTES
END OF SHIFT NOTE:    INTAKE/OUTPUT  06/20 0701 - 06/21 0700  In: 2662.8 [I.V.:2271.9]  Out: 1650 [Urine:1650]  Voiding: Yes  Catheter: No  Drain:              Flatus: Patient does have flatus present.    Stool:  0 occurrences.    Characteristics:  Stool Appearance: Soft, Formed  Stool Color: Brown  Stool Amount: Medium  Stool Assessment  Incontinence: No  Stool Appearance: Soft, Formed  Stool Color: Brown  Stool Amount: Medium  Stool Source: Rectum  Last BM (including prior to admit): 06/20/24    Emesis:  0 occurrences.    Characteristics:        VITAL SIGNS  Patient Vitals for the past 12 hrs:   Temp Pulse Resp BP SpO2   06/21/24 0351 98.6 °F (37 °C) 68 18 (!) 108/53 91 %   06/20/24 2250 98.2 °F (36.8 °C) 72 18 127/62 97 %   06/20/24 1956 98.6 °F (37 °C) 75 18 (!) 98/56 93 %       Pain Assessment  Pain Level: 6 (06/21/24 0102)  Pain Location: Head  Patient's Stated Pain Goal: 0 - No pain    Ambulating  Yes    Shift report given to oncoming nurse at the bedside.    Tayler Hastings RN

## 2024-06-21 NOTE — DISCHARGE INSTRUCTIONS
Discharge Instructions/Follow-up Plans:   MD Instructions:     Follow-up with Dr. Quesada (surgeon) in 3 weeks for recurrent diverticulitis and colon planning in the setting of recurrent diverticular abscess.     Diet -   Low fiber/residue diet for 4 weeks to allow diverticulitis to heal     Activity   No heavy lifting >10 lb for the first week after surgery. Lift nothing heavier than 25 lbs for until you are no longer experiencing abdominal pain.       Medications  No driving while taking narcotics/(prescription strength pain medication).  Do not drink alcohol while taking narcotics.  Resume other home medications.     Narcotic pain medication is known to cause constipation as narcotic pain medication slows gut motility. Even if you are not taking oral narcotic pain medication, you have likely been given narcotic pain medication intravenously during your surgical procedure.     Do not get constipated!  No more than 1 day without a bm. If 1 day no bm, then take colace stool softener twice a day. If 24 hours of taking colace stool softener does not produce a bm , then keep taking colace and add miralax laxative twice a day until you have a bm. Once you are having regular bms, you can use colace and/or miralax as needed to ensure you have a regular daily bm.      If problems (fever, signs of infection, worsening/uncontrolled abdominal pain, uncontrolled nausea and/or vomiting) or questions arise, please call our office at [phone] [facility] Office      Diverticulitis: Care Instructions  Overview     Diverticulitis occurs when pouches form in the wall of the colon and become inflamed or infected. It can be very painful.  Doctors aren't sure what causes diverticulitis. There is no proof that foods such as nuts, seeds, or berries cause it or make it worse. A low-fiber diet can cause small, hard stools. This means it takes more pressure in the colon to move stools out of the body. This  so, they may recommend a calcium and vitamin D supplement.  Eat well-cooked meat, such as chicken, turkey, fish, or lean meat. You also can eat eggs and tofu.  Avoid these foods:  Bran, brown or wild rice, oatmeal, granola, corn, navneet crackers, barley, and whole wheat and other whole-grain breads, such as rye bread  Cereals with more than 2 grams of fiber a serving  Berries, rhubarb, prunes, prune juice, and all dried fruits  Raw vegetables and fruits  Foods that may cause gas, such as raw or cooked cabbage, broccoli, brussels sprouts, and cauliflower  Cooked dried beans, lentils, and split peas  Crunchy peanut butter  Ice cream with fruit pieces in it  Coconut, nuts, popcorn, raisins, and seeds, or any ice cream, yogurt, or cheese with these in them  Where can you learn more?  Go to https://www.DataRank.net/patientEd and enter E763 to learn more about \"Low-Fiber Diet: Care Instructions.\"  Current as of: September 20, 2023  Content Version: 14.1  © 5030-2578 [employer].   Care instructions adapted under license by [employer]. If you have questions about a medical condition or this instruction, always ask your healthcare professional. [employer] disclaims any warranty or liability for your use of this information.         Soft-Textured, [place] Diet: Care Instructions  Your Care Instructions     A soft-textured, bland diet is used when you need food that is easy to chew, swallow, and digest. You will need to choose soft foods that are low in spices and seasonings. You will need to avoid high-fat foods, as well as caffeine and alcohol.  Your doctor or dietitian can help you plan a soft-textured, bland diet based on your health and what you prefer to eat. Ask your doctor how long you should stay on this diet. As you get better, you will probably be able to go back to a regular diet. Talk with your doctor or dietitian before you make changes in your diet.  Follow-up care is a key part of

## 2024-06-21 NOTE — CARE COORDINATION
CM reviewed chart.     Patient's discharge plan is for patient to return home when medically stable for discharge. No CM needs voiced or noted.     CM will continue to follow patient for any discharge planning needs that may arise.

## 2024-06-21 NOTE — PLAN OF CARE
Problem: Discharge Planning  Goal: Discharge to home or other facility with appropriate resources  Outcome: Progressing     Problem: Pain  Goal: Verbalizes/displays adequate comfort level or baseline comfort level  Outcome: Progressing  Flowsheets (Taken 6/21/2024 0826)  Verbalizes/displays adequate comfort level or baseline comfort level:   Encourage patient to monitor pain and request assistance   Assess pain using appropriate pain scale   Implement non-pharmacological measures as appropriate and evaluate response     Problem: ABCDS Injury Assessment  Goal: Absence of physical injury  Outcome: Progressing     Problem: Safety - Adult  Goal: Free from fall injury  Outcome: Progressing

## 2024-06-24 ENCOUNTER — CARE COORDINATION (OUTPATIENT)
Dept: CARE COORDINATION | Facility: CLINIC | Age: 74
End: 2024-06-24

## 2024-06-24 ENCOUNTER — TELEPHONE (OUTPATIENT)
Dept: INTERNAL MEDICINE CLINIC | Facility: CLINIC | Age: 74
End: 2024-06-24

## 2024-06-24 NOTE — TELEPHONE ENCOUNTER
Patient returned call stating she has her good and bad days. Has appt to follow up with her PCP 7/1/2024 and is waiting on call from Dr Quesada's office to schedule FU appt. Wiil  call back if she needs further assistance.

## 2024-06-24 NOTE — TELEPHONE ENCOUNTER
1st attempt to contact patient using phone number listed in chart following hospital discharge 6/21/24 [email or web address]

## 2024-06-24 NOTE — CARE COORDINATION
Care Transitions Note    Initial Call - Call within 2 business days of discharge: Yes    Attempted to reach patient for transitions of care follow up. Unable to reach patient.    Outreach Attempts:   Multiple attempts to contact patient at phone numbers on file.     Patient: Racheal Wyatt    Patient : 1950   MRN: [id number]    Reason for Admission: Diverticulitis of large intestine with abscess without bleeding  Discharge Date: 24  RURS: Readmission Risk Score: 8.6    Last Discharge Facility       Date Complaint Diagnosis Description Type Department Provider    24 Abdominal Pain; Diarrhea Diverticulitis of large intestine with abscess without bleeding ... ED to Hosp-Admission (Discharged) (ADMITTED) [id number] Newberry, To Jaime MD; [place], ...          Was this an external facility discharge? No    Follow Up Appointment:   Patient does not have a follow up appointment scheduled at time of call.     Plan for follow-up on next business day.      Venice Boo RN

## 2024-06-25 ENCOUNTER — CARE COORDINATION (OUTPATIENT)
Dept: CARE COORDINATION | Facility: CLINIC | Age: 74
End: 2024-06-25

## 2024-06-25 DIAGNOSIS — K65.1 INTRA-ABDOMINAL ABSCESS (HCC): Primary | ICD-10-CM

## 2024-06-25 PROCEDURE — 1111F DSCHRG MED/CURRENT MED MERGE: CPT | Performed by: INTERNAL MEDICINE

## 2024-06-25 NOTE — CARE COORDINATION
Care Transitions Note    Initial Call - Call within 2 business days of discharge: Yes    Patient Current Location:  Home: [address] [place] [id number]    Care Transition Nurse contacted the patient by telephone to perform post hospital discharge assessment, verified name and  as identifiers. Provided introduction to self, and explanation of the Care Transition Nurse role.     Patient: Racheal Wyatt    Patient : 1950   MRN: [id number]    Reason for Admission: Diverticulitis of large intestine with abscess without bleeding   Discharge Date: 24  RURS: Readmission Risk Score: 8.6    Last Discharge Facility       Date Complaint Diagnosis Description Type Department Provider    24 Abdominal Pain; Diarrhea Diverticulitis of large intestine with abscess without bleeding ... ED to Hosp-Admission (Discharged) (ADMITTED) [id number] Johnson MD; Alvarado, ...        Was this an external facility discharge? No    Additional needs identified to be addressed with provider   No needs identified             Method of communication with provider: none.    Patients top risk factors for readmission: medical condition-Intra abdominal abscess, diverticulitis of large intestine, chronic abdominal pain, colonic fistula, rhinitis, depressive disorder, obesity , TIA, HLD, fibromyalgia    Interventions to address risk factors:   Review of patient management of conditions/medications: medication adherence and management, self monitoring and when to seek care, soft diet, adequate fluid intake, and follow up appointments as scheduled.    Patient states she already has a follow up with PCP office with NP on 24 at 2:30 pm with NP. CTN called and message left on  to verify follow up.  Care Transition Nurse reviewed discharge instructions, medical action plan, and red flags with patient. The patient was given an opportunity to ask questions; all questions answered at this time.. The patient verbalized

## 2024-07-02 ENCOUNTER — TELEPHONE (OUTPATIENT)
Dept: SURGERY | Age: 74
End: 2024-07-02

## 2024-07-02 ENCOUNTER — CARE COORDINATION (OUTPATIENT)
Dept: CARE COORDINATION | Facility: CLINIC | Age: 74
End: 2024-07-02

## 2024-07-02 NOTE — CARE COORDINATION
Care Transitions Note    Follow Up Call     Attempted to reach patient for transitions of care follow up.  Unable to reach patient.      Outreach Attempts:   HIPAA compliant voicemail left for patient.     Care Summary Note: noted general surgery has reached out to patient regarding follow up.  No new needs are noted on chart review.    Follow Up Appointment:   Future Appointments         Provider Specialty Dept Phone    7/11/2024 3:20 PM Jose R Quesada MD General Surgery [phone]            Plan for follow-up call in 6-10 days based on severity of symptoms and risk factors. Plan for next call: follow-up appointment-assess attendance  Assess compliance with plan of care    Negin Dumas LPN

## 2024-07-11 ENCOUNTER — OFFICE VISIT (OUTPATIENT)
Dept: SURGERY | Age: 74
End: 2024-07-11

## 2024-07-11 VITALS
HEIGHT: 61 IN | BODY MASS INDEX: 32.85 KG/M2 | SYSTOLIC BLOOD PRESSURE: 125 MMHG | DIASTOLIC BLOOD PRESSURE: 77 MMHG | WEIGHT: 174 LBS | HEART RATE: 69 BPM

## 2024-07-11 DIAGNOSIS — K57.20 DIVERTICULITIS OF LARGE INTESTINE WITH PERFORATION AND ABSCESS WITHOUT BLEEDING: Primary | ICD-10-CM

## 2024-07-11 NOTE — PROGRESS NOTES
Jose R Quesada MD   General and Robotic surgery  [address] [facility]  [place] [address]  Phone [phone]   Fax [phone]      Date of visit: 2024      Primary/Requesting provider: Karthik Hatch MD         Name: Racheal Wyatt      MRN: [id number]       : 1950       Age: 74 y.o.    Sex: female        PCP: Karthik Hatch MD     CC:    Chief Complaint   Patient presents with    New Patient     Diverticulitis        HPI:     Racheal Wyatt is a 74 y.o. female with a history of perforated diverticulitis on 3 separate episodes in the past.  She is here for follow-up to discuss the possibility of going forward with a elective sigmoid colectomy.  Overall she is feeding well, tolerating oral intake and is having frequent bowel movements per day.  She is not having anything in the way of fever chills or other issues.  We have had a full discussion regarding the timing and risks and benefits of proceeding with a elective sigmoid colon resection robotically versus not doing the procedure and risking potential larger perforation and colostomy placement in the future.  She would like to move forward with colectomy but would like to revisit the topic formally in 2 months in clinic.      Past Medical History:   Diagnosis Date    Bacterial liver abscess 2019    Borderline diabetes     diet controlled    Diverticulitis     Fibromyalgia     Gastro-esophageal reflux disease without esophagitis 2019    History of anemia     History of atrial fibrillation     states only one occurence due to sepsis    History of hypotension     Liver abscess     Pancreatitis     Rectal bleeding 2019    Sepsis due to Streptococcus, group A (HCC) 2019    Streptococcal bacteremia 10/01/2019    TIA (transient ischemic attack) 2019        Past Surgical History:   Procedure Laterality Date    BREAST BIOPSY Left     benign cyst excision yrs ago    CHOLECYSTECTOMY

## 2024-07-12 ENCOUNTER — CARE COORDINATION (OUTPATIENT)
Dept: CARE COORDINATION | Facility: CLINIC | Age: 74
End: 2024-07-12

## 2024-07-12 NOTE — CARE COORDINATION
Care Transitions Note    Follow Up Call     Attempted to reach patient for transitions of care follow up.  Unable to reach patient.      Outreach Attempts:   Multiple attempts to contact patient at phone numbers on file.     Care Summary Note: Per chart review patient is following plan of care and attending all follow up as scheduled. No new needs are noted.    Follow Up Appointment:   Future Appointments         Provider Specialty Dept Phone    9/12/2024 11:00 AM Jose R Quesada MD General Surgery [phone]            No further follow-up call indicated due to being unable to reach patient.  Will reopen if call is returned.    Negin Dumas LPN

## 2024-07-22 ENCOUNTER — TELEPHONE (OUTPATIENT)
Dept: SURGERY | Age: 74
End: 2024-07-22

## 2024-07-22 NOTE — TELEPHONE ENCOUNTER
Called patient to reschedule their appointment on 09/12/2024. Patient didn't answer so I left a voicemail .

## 2024-08-07 ENCOUNTER — APPOINTMENT (OUTPATIENT)
Dept: CT IMAGING | Age: 74
End: 2024-08-07
Payer: MEDICARE

## 2024-08-07 ENCOUNTER — HOSPITAL ENCOUNTER (EMERGENCY)
Age: 74
Discharge: HOME | End: 2024-08-07
Attending: GENERAL PRACTICE
Payer: MEDICARE

## 2024-08-07 VITALS
BODY MASS INDEX: 31.91 KG/M2 | HEART RATE: 77 BPM | RESPIRATION RATE: 17 BRPM | HEIGHT: 61 IN | TEMPERATURE: 98.1 F | DIASTOLIC BLOOD PRESSURE: 76 MMHG | WEIGHT: 169 LBS | OXYGEN SATURATION: 99 % | SYSTOLIC BLOOD PRESSURE: 126 MMHG

## 2024-08-07 DIAGNOSIS — N32.1 COLOVESICAL FISTULA: ICD-10-CM

## 2024-08-07 DIAGNOSIS — K52.9 COLITIS: Primary | ICD-10-CM

## 2024-08-07 LAB
ALBUMIN SERPL-MCNC: 3.7 G/DL (ref 3.2–4.6)
ALBUMIN/GLOB SERPL: 1.1 (ref 1–1.9)
ALP SERPL-CCNC: 258 U/L (ref 35–104)
ALT SERPL-CCNC: 79 U/L (ref 12–65)
ANION GAP SERPL CALC-SCNC: 11 MMOL/L (ref 9–18)
APPEARANCE UR: CLEAR
AST SERPL-CCNC: 47 U/L (ref 15–37)
BACTERIA URNS QL MICRO: NEGATIVE /HPF
BASOPHILS # BLD: 0.1 K/UL (ref 0–0.2)
BASOPHILS NFR BLD: 1 % (ref 0–2)
BILIRUB SERPL-MCNC: 0.4 MG/DL (ref 0–1.2)
BILIRUB UR QL: NEGATIVE
BILIRUB UR QL: NEGATIVE
BUN SERPL-MCNC: 11 MG/DL (ref 8–23)
CALCIUM SERPL-MCNC: 9.7 MG/DL (ref 8.8–10.2)
CHLORIDE SERPL-SCNC: 103 MMOL/L (ref 98–107)
CO2 SERPL-SCNC: 27 MMOL/L (ref 20–28)
COLOR UR: ABNORMAL
CREAT SERPL-MCNC: 0.84 MG/DL (ref 0.6–1.1)
DIFFERENTIAL METHOD BLD: ABNORMAL
EOSINOPHIL # BLD: 0.1 K/UL (ref 0–0.8)
EOSINOPHIL NFR BLD: 1 % (ref 0.5–7.8)
EPI CELLS #/AREA URNS HPF: ABNORMAL /HPF
ERYTHROCYTE [DISTWIDTH] IN BLOOD BY AUTOMATED COUNT: 14.6 % (ref 11.9–14.6)
GLOBULIN SER CALC-MCNC: 3.3 G/DL (ref 2.3–3.5)
GLUCOSE SERPL-MCNC: 142 MG/DL (ref 70–99)
GLUCOSE UR QL STRIP.AUTO: NEGATIVE MG/DL
GLUCOSE UR STRIP.AUTO-MCNC: NEGATIVE MG/DL
HCT VFR BLD AUTO: 45.1 % (ref 35.8–46.3)
HGB BLD-MCNC: 14.3 G/DL (ref 11.7–15.4)
HGB UR QL STRIP: ABNORMAL
HYALINE CASTS URNS QL MICRO: ABNORMAL /LPF
IMM GRANULOCYTES # BLD AUTO: 0 K/UL (ref 0–0.5)
IMM GRANULOCYTES NFR BLD AUTO: 0 % (ref 0–5)
KETONES UR QL STRIP.AUTO: NEGATIVE MG/DL
KETONES UR-MCNC: NEGATIVE MG/DL
LEUKOCYTE ESTERASE UR QL STRIP.AUTO: ABNORMAL
LEUKOCYTE ESTERASE UR QL STRIP: NEGATIVE
LYMPHOCYTES # BLD: 2.2 K/UL (ref 0.5–4.6)
LYMPHOCYTES NFR BLD: 37 % (ref 13–44)
MCH RBC QN AUTO: 31.2 PG (ref 26.1–32.9)
MCHC RBC AUTO-ENTMCNC: 31.7 G/DL (ref 31.4–35)
MCV RBC AUTO: 98.3 FL (ref 82–102)
MONOCYTES # BLD: 0.4 K/UL (ref 0.1–1.3)
MONOCYTES NFR BLD: 6 % (ref 4–12)
NEUTS SEG # BLD: 3.2 K/UL (ref 1.7–8.2)
NEUTS SEG NFR BLD: 55 % (ref 43–78)
NITRITE UR QL STRIP.AUTO: NEGATIVE
NITRITE UR QL: NEGATIVE
NRBC # BLD: 0 K/UL (ref 0–0.2)
PH UR STRIP: 6 (ref 5–9)
PH UR: 6 (ref 5–9)
PLATELET # BLD AUTO: 263 K/UL (ref 150–450)
PMV BLD AUTO: 8.5 FL (ref 9.4–12.3)
POTASSIUM SERPL-SCNC: 4.2 MMOL/L (ref 3.5–5.1)
PROT SERPL-MCNC: 7 G/DL (ref 6.3–8.2)
PROT UR QL: NEGATIVE MG/DL
PROT UR STRIP-MCNC: NEGATIVE MG/DL
RBC # BLD AUTO: 4.59 M/UL (ref 4.05–5.2)
RBC # UR STRIP: ABNORMAL
RBC #/AREA URNS HPF: ABNORMAL /HPF
SERVICE CMNT-IMP: ABNORMAL
SODIUM SERPL-SCNC: 140 MMOL/L (ref 136–145)
SP GR UR REFRACTOMETRY: >1.035 (ref 1–1.02)
SP GR UR: 1.01 (ref 1–1.02)
UROBILINOGEN UR QL STRIP.AUTO: 0.2 EU/DL (ref 0.2–1)
UROBILINOGEN UR QL: 0.2 EU/DL (ref 0.2–1)
WBC # BLD AUTO: 5.9 K/UL (ref 4.3–11.1)
WBC URNS QL MICRO: ABNORMAL /HPF

## 2024-08-07 PROCEDURE — 6360000004 HC RX CONTRAST MEDICATION: Performed by: GENERAL PRACTICE

## 2024-08-07 PROCEDURE — 81003 URINALYSIS AUTO W/O SCOPE: CPT

## 2024-08-07 PROCEDURE — 81001 URINALYSIS AUTO W/SCOPE: CPT

## 2024-08-07 PROCEDURE — 80053 COMPREHEN METABOLIC PANEL: CPT

## 2024-08-07 PROCEDURE — 74177 CT ABD & PELVIS W/CONTRAST: CPT

## 2024-08-07 PROCEDURE — 85025 COMPLETE CBC W/AUTO DIFF WBC: CPT

## 2024-08-07 PROCEDURE — 99285 EMERGENCY DEPT VISIT HI MDM: CPT

## 2024-08-07 RX ORDER — CIPROFLOXACIN 500 MG/1
500 TABLET, FILM COATED ORAL 2 TIMES DAILY
Qty: 20 TABLET | Refills: 0 | Status: SHIPPED | OUTPATIENT
Start: 2024-08-07 | End: 2024-08-17

## 2024-08-07 RX ADMIN — IOPAMIDOL 100 ML: 755 INJECTION, SOLUTION INTRAVENOUS at 17:39

## 2024-08-07 ASSESSMENT — PAIN - FUNCTIONAL ASSESSMENT: PAIN_FUNCTIONAL_ASSESSMENT: 0-10

## 2024-08-07 ASSESSMENT — PAIN DESCRIPTION - ORIENTATION: ORIENTATION: LOWER

## 2024-08-07 ASSESSMENT — PAIN SCALES - GENERAL: PAINLEVEL_OUTOF10: 8

## 2024-08-07 NOTE — ED TRIAGE NOTES
Patient arrives to ED pov from home. Patient reports abdominal pain and n/v. Patient reports history of diverticulitis and an abscess in her pelvis. Patient reports they have been keeping an eye on the abscess in her pelvis.

## 2024-08-07 NOTE — ED PROVIDER NOTES
the prior exam appear to be the    focal segment which contained an intramural abscess.       1c. Large bowel large bowel fistulous connection seen on sagittal image 73   through 75 is worrisome.      2. Air within the lumen of the bladder. Please correlate for airforming bacteria   and/or recent instrumentation.      3. Hepatic steatosis. Please correlate with LFTs.      4. Cholecystectomy.   .         Electronically signed by Erica Scott                   No results for input(s): \"COVID19\" in the last 72 hours.    Voice dictation software was used during the making of this note.  This software is not perfect and grammatical and other typographical errors may be present.  This note has not been completely proofread for errors.     Daniel Childers DO  08/07/24 2019

## 2024-08-08 NOTE — ED NOTES
Patient mobility status  with no difficulty. Provider aware     I have reviewed discharge instructions with the patient.  The patient verbalized understanding.    Patient left ED via Discharge Method: ambulatory to Home     Opportunity for questions and clarification provided.     Patient given 1 scripts.           Xi Justice RN  08/07/24 2026

## 2024-08-15 ENCOUNTER — OFFICE VISIT (OUTPATIENT)
Dept: SURGERY | Age: 74
End: 2024-08-15
Payer: MEDICARE

## 2024-08-15 VITALS
SYSTOLIC BLOOD PRESSURE: 136 MMHG | BODY MASS INDEX: 29.95 KG/M2 | DIASTOLIC BLOOD PRESSURE: 77 MMHG | HEART RATE: 69 BPM | HEIGHT: 63 IN | WEIGHT: 169 LBS

## 2024-08-15 DIAGNOSIS — K57.20 DIVERTICULITIS OF LARGE INTESTINE WITH PERFORATION AND ABSCESS WITHOUT BLEEDING: Primary | ICD-10-CM

## 2024-08-15 PROCEDURE — G8427 DOCREV CUR MEDS BY ELIG CLIN: HCPCS | Performed by: SURGERY

## 2024-08-15 PROCEDURE — 99213 OFFICE O/P EST LOW 20 MIN: CPT | Performed by: SURGERY

## 2024-08-15 PROCEDURE — 3017F COLORECTAL CA SCREEN DOC REV: CPT | Performed by: SURGERY

## 2024-08-15 PROCEDURE — 1123F ACP DISCUSS/DSCN MKR DOCD: CPT | Performed by: SURGERY

## 2024-08-15 PROCEDURE — 1036F TOBACCO NON-USER: CPT | Performed by: SURGERY

## 2024-08-15 PROCEDURE — G8400 PT W/DXA NO RESULTS DOC: HCPCS | Performed by: SURGERY

## 2024-08-15 PROCEDURE — 1090F PRES/ABSN URINE INCON ASSESS: CPT | Performed by: SURGERY

## 2024-08-15 PROCEDURE — G8417 CALC BMI ABV UP PARAM F/U: HCPCS | Performed by: SURGERY

## 2024-08-15 RX ORDER — AMOXICILLIN AND CLAVULANATE POTASSIUM 875; 125 MG/1; MG/1
1 TABLET, FILM COATED ORAL 2 TIMES DAILY
Qty: 20 TABLET | Refills: 0 | Status: SHIPPED | OUTPATIENT
Start: 2024-08-15 | End: 2024-08-25

## 2024-08-15 NOTE — PROGRESS NOTES
demonstrated  within a focal segment of large bowel which on the prior exam appear to be the   focal segment which contained an intramural abscess.     1c. Large bowel large bowel fistulous connection seen on sagittal image 73  through 75 is worrisome.    2. Air within the lumen of the bladder. Please correlate for airforming bacteria  and/or recent instrumentation.    3. Hepatic steatosis. Please correlate with LFTs.    4. Cholecystectomy.  .    Electronically signed by Erica Scott        Diagnosis Orders   1. Diverticulitis of large intestine with perforation and abscess without bleeding  CT ABDOMEN PELVIS W WO CONTRAST Additional Contrast? Oral (oral and iv contrast)            Assessment/Plan:  Racheal Wyatt is a 74 y.o. female with a history of diverticulitis and lingering symptoms; CT of the abdomen and pelvis with IV contrast in 3 weeks and a 2-week course of Augmentin currently.      Jose R Quesada MD  General and Robotic Surgery  8/15/2024 2:12 PM

## 2024-09-09 ENCOUNTER — OFFICE VISIT (OUTPATIENT)
Dept: SURGERY | Age: 74
End: 2024-09-09
Payer: MEDICARE

## 2024-09-09 VITALS
DIASTOLIC BLOOD PRESSURE: 80 MMHG | WEIGHT: 166 LBS | BODY MASS INDEX: 29.41 KG/M2 | HEART RATE: 74 BPM | SYSTOLIC BLOOD PRESSURE: 128 MMHG | HEIGHT: 63 IN

## 2024-09-09 DIAGNOSIS — K57.20 DIVERTICULITIS OF LARGE INTESTINE WITH PERFORATION AND ABSCESS WITHOUT BLEEDING: Primary | ICD-10-CM

## 2024-09-09 PROCEDURE — 1036F TOBACCO NON-USER: CPT | Performed by: SURGERY

## 2024-09-09 PROCEDURE — 1123F ACP DISCUSS/DSCN MKR DOCD: CPT | Performed by: SURGERY

## 2024-09-09 PROCEDURE — G8417 CALC BMI ABV UP PARAM F/U: HCPCS | Performed by: SURGERY

## 2024-09-09 PROCEDURE — 99212 OFFICE O/P EST SF 10 MIN: CPT | Performed by: SURGERY

## 2024-09-09 PROCEDURE — 3017F COLORECTAL CA SCREEN DOC REV: CPT | Performed by: SURGERY

## 2024-09-09 PROCEDURE — 1090F PRES/ABSN URINE INCON ASSESS: CPT | Performed by: SURGERY

## 2024-09-09 PROCEDURE — G8400 PT W/DXA NO RESULTS DOC: HCPCS | Performed by: SURGERY

## 2024-09-09 PROCEDURE — G8427 DOCREV CUR MEDS BY ELIG CLIN: HCPCS | Performed by: SURGERY

## 2024-09-13 ENCOUNTER — TELEPHONE (OUTPATIENT)
Dept: SURGERY | Age: 74
End: 2024-09-13

## 2024-09-13 ENCOUNTER — CLINICAL DOCUMENTATION (OUTPATIENT)
Dept: SURGERY | Age: 74
End: 2024-09-13

## 2024-09-13 RX ORDER — CLINDAMYCIN HCL 300 MG
300 CAPSULE ORAL 3 TIMES DAILY
Qty: 21 CAPSULE | Refills: 0 | Status: SHIPPED | OUTPATIENT
Start: 2024-09-13 | End: 2024-09-20

## 2024-09-13 RX ORDER — CIPROFLOXACIN 500 MG/1
500 TABLET, FILM COATED ORAL 2 TIMES DAILY
Qty: 14 TABLET | Refills: 0 | Status: SHIPPED | OUTPATIENT
Start: 2024-09-13 | End: 2024-09-20

## 2024-09-23 ENCOUNTER — OFFICE VISIT (OUTPATIENT)
Dept: SURGERY | Age: 74
End: 2024-09-23
Payer: MEDICARE

## 2024-09-23 VITALS
WEIGHT: 163.5 LBS | HEART RATE: 96 BPM | BODY MASS INDEX: 28.97 KG/M2 | SYSTOLIC BLOOD PRESSURE: 131 MMHG | HEIGHT: 63 IN | DIASTOLIC BLOOD PRESSURE: 86 MMHG

## 2024-09-23 DIAGNOSIS — K57.20 DIVERTICULITIS OF LARGE INTESTINE WITH PERFORATION AND ABSCESS WITHOUT BLEEDING: Primary | ICD-10-CM

## 2024-09-23 PROCEDURE — 1123F ACP DISCUSS/DSCN MKR DOCD: CPT | Performed by: SURGERY

## 2024-09-23 PROCEDURE — G8417 CALC BMI ABV UP PARAM F/U: HCPCS | Performed by: SURGERY

## 2024-09-23 PROCEDURE — 1090F PRES/ABSN URINE INCON ASSESS: CPT | Performed by: SURGERY

## 2024-09-23 PROCEDURE — 3017F COLORECTAL CA SCREEN DOC REV: CPT | Performed by: SURGERY

## 2024-09-23 PROCEDURE — 1036F TOBACCO NON-USER: CPT | Performed by: SURGERY

## 2024-09-23 PROCEDURE — G8427 DOCREV CUR MEDS BY ELIG CLIN: HCPCS | Performed by: SURGERY

## 2024-09-23 PROCEDURE — G8400 PT W/DXA NO RESULTS DOC: HCPCS | Performed by: SURGERY

## 2024-09-23 PROCEDURE — 99212 OFFICE O/P EST SF 10 MIN: CPT | Performed by: SURGERY

## 2024-12-31 ENCOUNTER — APPOINTMENT (OUTPATIENT)
Dept: CT IMAGING | Age: 74
End: 2024-12-31
Payer: MEDICARE

## 2024-12-31 ENCOUNTER — HOSPITAL ENCOUNTER (INPATIENT)
Age: 74
LOS: 3 days | Discharge: HOME | End: 2025-01-03
Attending: INTERNAL MEDICINE | Admitting: INTERNAL MEDICINE
Payer: MEDICARE

## 2024-12-31 DIAGNOSIS — N30.01 ACUTE CYSTITIS WITH HEMATURIA: ICD-10-CM

## 2024-12-31 DIAGNOSIS — N17.9 AKI (ACUTE KIDNEY INJURY) (HCC): Primary | ICD-10-CM

## 2024-12-31 PROBLEM — N39.0 UTI (URINARY TRACT INFECTION): Status: ACTIVE | Noted: 2024-12-31

## 2024-12-31 PROBLEM — Z72.0 TOBACCO USE: Status: ACTIVE | Noted: 2024-12-31

## 2024-12-31 PROBLEM — R31.9 HEMATURIA: Status: ACTIVE | Noted: 2024-12-31

## 2024-12-31 LAB
ALBUMIN SERPL-MCNC: 3.2 G/DL (ref 3.2–4.6)
ALBUMIN/GLOB SERPL: 0.9 (ref 1–1.9)
ALP SERPL-CCNC: 95 U/L (ref 35–104)
ALT SERPL-CCNC: 15 U/L (ref 8–45)
ANION GAP SERPL CALC-SCNC: 14 MMOL/L (ref 7–16)
APPEARANCE UR: CLEAR
AST SERPL-CCNC: 24 U/L (ref 15–37)
BACTERIA URNS QL MICRO: 0 /HPF
BASOPHILS # BLD: 0.1 K/UL (ref 0–0.2)
BASOPHILS NFR BLD: 1 % (ref 0–2)
BILIRUB SERPL-MCNC: 0.3 MG/DL (ref 0–1.2)
BILIRUB UR QL: NEGATIVE
BUN SERPL-MCNC: 38 MG/DL (ref 8–23)
CALCIUM SERPL-MCNC: 9.6 MG/DL (ref 8.8–10.2)
CASTS URNS QL MICRO: 0 /LPF
CHLORIDE SERPL-SCNC: 103 MMOL/L (ref 98–107)
CO2 SERPL-SCNC: 23 MMOL/L (ref 20–29)
COLOR UR: ABNORMAL
CREAT SERPL-MCNC: 2.89 MG/DL (ref 0.6–1.1)
CRYSTALS URNS QL MICRO: 0 /LPF
DIFFERENTIAL METHOD BLD: ABNORMAL
EOSINOPHIL # BLD: 0.2 K/UL (ref 0–0.8)
EOSINOPHIL NFR BLD: 4 % (ref 0.5–7.8)
EPI CELLS #/AREA URNS HPF: ABNORMAL /HPF
ERYTHROCYTE [DISTWIDTH] IN BLOOD BY AUTOMATED COUNT: 14.1 % (ref 11.9–14.6)
GLOBULIN SER CALC-MCNC: 3.6 G/DL (ref 2.3–3.5)
GLUCOSE SERPL-MCNC: 111 MG/DL (ref 70–99)
GLUCOSE UR STRIP.AUTO-MCNC: NEGATIVE MG/DL
HCT VFR BLD AUTO: 37.8 % (ref 35.8–46.3)
HGB BLD-MCNC: 12.7 G/DL (ref 11.7–15.4)
HGB UR QL STRIP: ABNORMAL
IMM GRANULOCYTES # BLD AUTO: 0 K/UL (ref 0–0.5)
IMM GRANULOCYTES NFR BLD AUTO: 1 % (ref 0–5)
KETONES UR QL STRIP.AUTO: NEGATIVE MG/DL
LEUKOCYTE ESTERASE UR QL STRIP.AUTO: ABNORMAL
LYMPHOCYTES # BLD: 1.4 K/UL (ref 0.5–4.6)
LYMPHOCYTES NFR BLD: 25 % (ref 13–44)
MAGNESIUM SERPL-MCNC: 1.8 MG/DL (ref 1.8–2.4)
MCH RBC QN AUTO: 31.1 PG (ref 26.1–32.9)
MCHC RBC AUTO-ENTMCNC: 33.6 G/DL (ref 31.4–35)
MCV RBC AUTO: 92.6 FL (ref 82–102)
MONOCYTES # BLD: 0.4 K/UL (ref 0.1–1.3)
MONOCYTES NFR BLD: 8 % (ref 4–12)
MUCOUS THREADS URNS QL MICRO: 0 /LPF
NEUTS SEG # BLD: 3.4 K/UL (ref 1.7–8.2)
NEUTS SEG NFR BLD: 61 % (ref 43–78)
NITRITE UR QL STRIP.AUTO: NEGATIVE
NRBC # BLD: 0 K/UL (ref 0–0.2)
OTHER OBSERVATIONS: ABNORMAL
PH UR STRIP: 6 (ref 5–9)
PLATELET # BLD AUTO: 170 K/UL (ref 150–450)
PMV BLD AUTO: 8.4 FL (ref 9.4–12.3)
POTASSIUM SERPL-SCNC: 3.5 MMOL/L (ref 3.5–5.1)
PROT SERPL-MCNC: 6.8 G/DL (ref 6.3–8.2)
PROT UR STRIP-MCNC: ABNORMAL MG/DL
RBC # BLD AUTO: 4.08 M/UL (ref 4.05–5.2)
RBC #/AREA URNS HPF: ABNORMAL /HPF
SODIUM SERPL-SCNC: 140 MMOL/L (ref 136–145)
SP GR UR REFRACTOMETRY: 1.01 (ref 1–1.02)
URINE CULTURE IF INDICATED: ABNORMAL
UROBILINOGEN UR QL STRIP.AUTO: 0.2 EU/DL (ref 0.2–1)
WBC # BLD AUTO: 5.5 K/UL (ref 4.3–11.1)
WBC URNS QL MICRO: ABNORMAL /HPF

## 2024-12-31 PROCEDURE — 99285 EMERGENCY DEPT VISIT HI MDM: CPT

## 2024-12-31 PROCEDURE — 80053 COMPREHEN METABOLIC PANEL: CPT

## 2024-12-31 PROCEDURE — 81001 URINALYSIS AUTO W/SCOPE: CPT

## 2024-12-31 PROCEDURE — 83735 ASSAY OF MAGNESIUM: CPT

## 2024-12-31 PROCEDURE — 96361 HYDRATE IV INFUSION ADD-ON: CPT

## 2024-12-31 PROCEDURE — 6360000002 HC RX W HCPCS

## 2024-12-31 PROCEDURE — 85025 COMPLETE CBC W/AUTO DIFF WBC: CPT

## 2024-12-31 PROCEDURE — 2500000003 HC RX 250 WO HCPCS

## 2024-12-31 PROCEDURE — 96375 TX/PRO/DX INJ NEW DRUG ADDON: CPT

## 2024-12-31 PROCEDURE — 1100000000 HC RM PRIVATE

## 2024-12-31 PROCEDURE — 2580000003 HC RX 258

## 2024-12-31 PROCEDURE — 87086 URINE CULTURE/COLONY COUNT: CPT

## 2024-12-31 PROCEDURE — 74176 CT ABD & PELVIS W/O CONTRAST: CPT

## 2024-12-31 PROCEDURE — 96374 THER/PROPH/DIAG INJ IV PUSH: CPT

## 2024-12-31 RX ORDER — ONDANSETRON 2 MG/ML
4 INJECTION INTRAMUSCULAR; INTRAVENOUS EVERY 6 HOURS PRN
Status: DISCONTINUED | OUTPATIENT
Start: 2024-12-31 | End: 2025-01-03 | Stop reason: HOSPADM

## 2024-12-31 RX ORDER — FOLIC ACID 1 MG/1
1000 TABLET ORAL DAILY
Status: DISCONTINUED | OUTPATIENT
Start: 2025-01-01 | End: 2025-01-03 | Stop reason: HOSPADM

## 2024-12-31 RX ORDER — DULOXETIN HYDROCHLORIDE 30 MG/1
30 CAPSULE, DELAYED RELEASE ORAL DAILY
Status: DISCONTINUED | OUTPATIENT
Start: 2025-01-01 | End: 2025-01-03 | Stop reason: HOSPADM

## 2024-12-31 RX ORDER — SODIUM CHLORIDE 0.9 % (FLUSH) 0.9 %
5-40 SYRINGE (ML) INJECTION EVERY 12 HOURS SCHEDULED
Status: DISCONTINUED | OUTPATIENT
Start: 2024-12-31 | End: 2025-01-03 | Stop reason: HOSPADM

## 2024-12-31 RX ORDER — PRAVASTATIN SODIUM 20 MG
20 TABLET ORAL NIGHTLY
Status: DISCONTINUED | OUTPATIENT
Start: 2024-12-31 | End: 2025-01-03 | Stop reason: HOSPADM

## 2024-12-31 RX ORDER — ONDANSETRON 2 MG/ML
4 INJECTION INTRAMUSCULAR; INTRAVENOUS ONCE
Status: COMPLETED | OUTPATIENT
Start: 2024-12-31 | End: 2024-12-31

## 2024-12-31 RX ORDER — ALBUTEROL SULFATE 0.83 MG/ML
2.5 SOLUTION RESPIRATORY (INHALATION) EVERY 4 HOURS PRN
Status: DISCONTINUED | OUTPATIENT
Start: 2024-12-31 | End: 2025-01-03 | Stop reason: HOSPADM

## 2024-12-31 RX ORDER — MONTELUKAST SODIUM 10 MG/1
10 TABLET ORAL EVERY EVENING
Status: DISCONTINUED | OUTPATIENT
Start: 2024-12-31 | End: 2025-01-03 | Stop reason: HOSPADM

## 2024-12-31 RX ORDER — SODIUM CHLORIDE 0.9 % (FLUSH) 0.9 %
5-40 SYRINGE (ML) INJECTION PRN
Status: DISCONTINUED | OUTPATIENT
Start: 2024-12-31 | End: 2025-01-03 | Stop reason: HOSPADM

## 2024-12-31 RX ORDER — ACETAMINOPHEN 325 MG/1
650 TABLET ORAL EVERY 6 HOURS PRN
Status: DISCONTINUED | OUTPATIENT
Start: 2024-12-31 | End: 2025-01-03 | Stop reason: HOSPADM

## 2024-12-31 RX ORDER — SODIUM CHLORIDE 9 MG/ML
INJECTION, SOLUTION INTRAVENOUS CONTINUOUS
Status: DISCONTINUED | OUTPATIENT
Start: 2024-12-31 | End: 2025-01-03 | Stop reason: HOSPADM

## 2024-12-31 RX ORDER — SODIUM CHLORIDE 9 MG/ML
INJECTION, SOLUTION INTRAVENOUS PRN
Status: DISCONTINUED | OUTPATIENT
Start: 2024-12-31 | End: 2025-01-03 | Stop reason: HOSPADM

## 2024-12-31 RX ORDER — ONDANSETRON 4 MG/1
4 TABLET, ORALLY DISINTEGRATING ORAL EVERY 8 HOURS PRN
Status: DISCONTINUED | OUTPATIENT
Start: 2024-12-31 | End: 2025-01-03 | Stop reason: HOSPADM

## 2024-12-31 RX ORDER — PANTOPRAZOLE SODIUM 40 MG/1
40 TABLET, DELAYED RELEASE ORAL DAILY
Status: DISCONTINUED | OUTPATIENT
Start: 2025-01-01 | End: 2025-01-03 | Stop reason: HOSPADM

## 2024-12-31 RX ORDER — POLYETHYLENE GLYCOL 3350 17 G/17G
17 POWDER, FOR SOLUTION ORAL DAILY PRN
Status: DISCONTINUED | OUTPATIENT
Start: 2024-12-31 | End: 2025-01-02

## 2024-12-31 RX ORDER — 0.9 % SODIUM CHLORIDE 0.9 %
1000 INTRAVENOUS SOLUTION INTRAVENOUS ONCE
Status: COMPLETED | OUTPATIENT
Start: 2024-12-31 | End: 2024-12-31

## 2024-12-31 RX ORDER — ACETAMINOPHEN 650 MG/1
650 SUPPOSITORY RECTAL EVERY 6 HOURS PRN
Status: DISCONTINUED | OUTPATIENT
Start: 2024-12-31 | End: 2025-01-03 | Stop reason: HOSPADM

## 2024-12-31 RX ADMIN — WATER 1000 MG: 1 INJECTION INTRAMUSCULAR; INTRAVENOUS; SUBCUTANEOUS at 18:10

## 2024-12-31 RX ADMIN — SODIUM CHLORIDE 1000 ML: 9 INJECTION, SOLUTION INTRAVENOUS at 15:46

## 2024-12-31 RX ADMIN — ONDANSETRON 4 MG: 2 INJECTION INTRAMUSCULAR; INTRAVENOUS at 15:46

## 2024-12-31 ASSESSMENT — ENCOUNTER SYMPTOMS
ALLERGIC/IMMUNOLOGIC NEGATIVE: 1
NAUSEA: 0
RESPIRATORY NEGATIVE: 1
EYES NEGATIVE: 1
ABDOMINAL PAIN: 1
VOMITING: 0

## 2024-12-31 NOTE — ED PROVIDER NOTES
Emergency Department Provider Note       PCP: Karthik Hatch MD   Age: 74 y.o.   Sex: female     DISPOSITION Decision To Admit 12/31/2024 06:01:02 PM    ICD-10-CM    1. IRVNI (acute kidney injury) (HCC)  N17.9       2. Acute cystitis with hematuria  N30.01           Medical Decision Making     In summary, this is a well-appearing hypoxic 74-year-old female coming into the emergency department for complaints of diffuse abdominal pain, low back pain, hematuria, frequency has been ongoing for couple weeks.  As noted in HPI, patient was subsequently evaluated urgent care a couple weeks ago and diagnosed with a UTI at that time.  She was prescribed antibiotics in which she reports that her symptoms felt like they initially improved.  States that she developed recurrent symptoms including nausea ever since which prompted emergency room visit here today.  She has not had any fevers.  Vital signs are stable here.  CBC without evidence of leukocytosis.  H&H stable.  No left shift.  Mag normal.  CMP without gross electrolyte abnormalities.  Her kidney function is elevated with a BUN of 38, creatinine 2.89 and GFR 17.  She does not have a history of CKD.  No transaminitis.  She has not had any extensive vomiting or diarrhea.  She reports her oral intake has been somewhat adequate although mildly decreased secondary to nausea.  Urinalysis is showing mild acute cystitis in which she received longer Rocephin.  She also received 1 L normal saline as well as 4 Zofran.  Her nausea is improved here.  I did obtain a CAT scan to rule out any obstructive uropathy which was unremarkable.  Subsequent finding of small amount of free fluid and free air within the right lower quadrant of the abdomen extending into the pelvis concerning for contained rupture of sigmoid colon.  I reached out to Dr. Casanova who was kind enough to look at the images in which he felt looked similar to priors.  Given that she is nonacute appearing and  where dose is matched to indication/reason for   exam).            Electronically signed by NEHA AMAYA                   No results for input(s): \"COVID19\" in the last 72 hours.     Voice dictation software was used during the making of this note.  This software is not perfect and grammatical and other typographical errors may be present.  This note has not been completely proofread for errors.      Regan Wise, MATTY - NP  12/31/24 2506

## 2024-12-31 NOTE — ED TRIAGE NOTES
Pt arrives to the ER with c/o passing blood in urine x 4 weeks. Pt seen in urgent care for symptoms x 4 days ago and prescribed macrobid.

## 2025-01-01 ENCOUNTER — APPOINTMENT (OUTPATIENT)
Dept: ULTRASOUND IMAGING | Age: 75
End: 2025-01-01
Payer: MEDICARE

## 2025-01-01 LAB
ANION GAP SERPL CALC-SCNC: 12 MMOL/L (ref 7–16)
BASOPHILS # BLD: 0.1 K/UL (ref 0–0.2)
BASOPHILS NFR BLD: 1 % (ref 0–2)
BUN SERPL-MCNC: 35 MG/DL (ref 8–23)
CALCIUM SERPL-MCNC: 8.2 MG/DL (ref 8.8–10.2)
CHLORIDE SERPL-SCNC: 107 MMOL/L (ref 98–107)
CO2 SERPL-SCNC: 21 MMOL/L (ref 20–29)
CREAT SERPL-MCNC: 2.72 MG/DL (ref 0.6–1.1)
CREAT UR-MCNC: 42.7 MG/DL (ref 28–217)
DIFFERENTIAL METHOD BLD: ABNORMAL
EOSINOPHIL # BLD: 0.3 K/UL (ref 0–0.8)
EOSINOPHIL NFR BLD: 8 % (ref 0.5–7.8)
ERYTHROCYTE [DISTWIDTH] IN BLOOD BY AUTOMATED COUNT: 14.2 % (ref 11.9–14.6)
GLUCOSE SERPL-MCNC: 94 MG/DL (ref 70–99)
HCT VFR BLD AUTO: 32.8 % (ref 35.8–46.3)
HGB BLD-MCNC: 11 G/DL (ref 11.7–15.4)
IMM GRANULOCYTES # BLD AUTO: 0 K/UL (ref 0–0.5)
IMM GRANULOCYTES NFR BLD AUTO: 0 % (ref 0–5)
LYMPHOCYTES # BLD: 1.4 K/UL (ref 0.5–4.6)
LYMPHOCYTES NFR BLD: 33 % (ref 13–44)
MAGNESIUM SERPL-MCNC: 1.5 MG/DL (ref 1.8–2.4)
MCH RBC QN AUTO: 30.9 PG (ref 26.1–32.9)
MCHC RBC AUTO-ENTMCNC: 33.5 G/DL (ref 31.4–35)
MCV RBC AUTO: 92.1 FL (ref 82–102)
MONOCYTES # BLD: 0.3 K/UL (ref 0.1–1.3)
MONOCYTES NFR BLD: 8 % (ref 4–12)
NEUTS SEG # BLD: 2.1 K/UL (ref 1.7–8.2)
NEUTS SEG NFR BLD: 50 % (ref 43–78)
NRBC # BLD: 0 K/UL (ref 0–0.2)
PLATELET # BLD AUTO: 157 K/UL (ref 150–450)
PMV BLD AUTO: 8.6 FL (ref 9.4–12.3)
POTASSIUM SERPL-SCNC: 3.2 MMOL/L (ref 3.5–5.1)
PROT UR-MCNC: 9 MG/DL
PROT/CREAT UR-RTO: 0.2
RBC # BLD AUTO: 3.56 M/UL (ref 4.05–5.2)
SODIUM SERPL-SCNC: 140 MMOL/L (ref 136–145)
SODIUM UR-SCNC: 55 MMOL/L
WBC # BLD AUTO: 4.2 K/UL (ref 4.3–11.1)

## 2025-01-01 PROCEDURE — 6370000000 HC RX 637 (ALT 250 FOR IP): Performed by: INTERNAL MEDICINE

## 2025-01-01 PROCEDURE — 76775 US EXAM ABDO BACK WALL LIM: CPT

## 2025-01-01 PROCEDURE — 85025 COMPLETE CBC W/AUTO DIFF WBC: CPT

## 2025-01-01 PROCEDURE — 2580000003 HC RX 258: Performed by: INTERNAL MEDICINE

## 2025-01-01 PROCEDURE — 99222 1ST HOSP IP/OBS MODERATE 55: CPT | Performed by: UROLOGY

## 2025-01-01 PROCEDURE — 97535 SELF CARE MNGMENT TRAINING: CPT

## 2025-01-01 PROCEDURE — 97161 PT EVAL LOW COMPLEX 20 MIN: CPT

## 2025-01-01 PROCEDURE — 80048 BASIC METABOLIC PNL TOTAL CA: CPT

## 2025-01-01 PROCEDURE — 82570 ASSAY OF URINE CREATININE: CPT

## 2025-01-01 PROCEDURE — 2500000003 HC RX 250 WO HCPCS: Performed by: INTERNAL MEDICINE

## 2025-01-01 PROCEDURE — 97165 OT EVAL LOW COMPLEX 30 MIN: CPT

## 2025-01-01 PROCEDURE — 84156 ASSAY OF PROTEIN URINE: CPT

## 2025-01-01 PROCEDURE — 6360000002 HC RX W HCPCS: Performed by: INTERNAL MEDICINE

## 2025-01-01 PROCEDURE — 36415 COLL VENOUS BLD VENIPUNCTURE: CPT

## 2025-01-01 PROCEDURE — 97530 THERAPEUTIC ACTIVITIES: CPT

## 2025-01-01 PROCEDURE — 83735 ASSAY OF MAGNESIUM: CPT

## 2025-01-01 PROCEDURE — 84300 ASSAY OF URINE SODIUM: CPT

## 2025-01-01 PROCEDURE — 1100000000 HC RM PRIVATE

## 2025-01-01 RX ORDER — POLYETHYLENE GLYCOL 3350 17 G/17G
17 POWDER, FOR SOLUTION ORAL ONCE
Status: COMPLETED | OUTPATIENT
Start: 2025-01-01 | End: 2025-01-01

## 2025-01-01 RX ORDER — POTASSIUM CHLORIDE 1500 MG/1
40 TABLET, EXTENDED RELEASE ORAL 2 TIMES DAILY WITH MEALS
Status: COMPLETED | OUTPATIENT
Start: 2025-01-01 | End: 2025-01-01

## 2025-01-01 RX ADMIN — SODIUM CHLORIDE: 9 INJECTION, SOLUTION INTRAVENOUS at 00:52

## 2025-01-01 RX ADMIN — SODIUM CHLORIDE: 9 INJECTION, SOLUTION INTRAVENOUS at 22:01

## 2025-01-01 RX ADMIN — WATER 1000 MG: 1 INJECTION INTRAMUSCULAR; INTRAVENOUS; SUBCUTANEOUS at 23:38

## 2025-01-01 RX ADMIN — SODIUM CHLORIDE, PRESERVATIVE FREE 10 ML: 5 INJECTION INTRAVENOUS at 21:29

## 2025-01-01 RX ADMIN — POTASSIUM CHLORIDE 40 MEQ: 1500 TABLET, EXTENDED RELEASE ORAL at 08:55

## 2025-01-01 RX ADMIN — SODIUM CHLORIDE: 9 INJECTION, SOLUTION INTRAVENOUS at 11:13

## 2025-01-01 RX ADMIN — MONTELUKAST 10 MG: 10 TABLET, FILM COATED ORAL at 00:34

## 2025-01-01 RX ADMIN — PRAVASTATIN SODIUM 20 MG: 20 TABLET ORAL at 21:28

## 2025-01-01 RX ADMIN — PANTOPRAZOLE SODIUM 40 MG: 40 TABLET, DELAYED RELEASE ORAL at 08:56

## 2025-01-01 RX ADMIN — DULOXETINE HYDROCHLORIDE 30 MG: 30 CAPSULE, DELAYED RELEASE ORAL at 08:56

## 2025-01-01 RX ADMIN — MONTELUKAST 10 MG: 10 TABLET, FILM COATED ORAL at 17:10

## 2025-01-01 RX ADMIN — PRAVASTATIN SODIUM 20 MG: 20 TABLET ORAL at 00:34

## 2025-01-01 RX ADMIN — ACETAMINOPHEN 650 MG: 325 TABLET ORAL at 02:35

## 2025-01-01 RX ADMIN — POLYETHYLENE GLYCOL 3350 17 G: 17 POWDER, FOR SOLUTION ORAL at 11:12

## 2025-01-01 RX ADMIN — FOLIC ACID 1000 MCG: 1 TABLET ORAL at 08:56

## 2025-01-01 RX ADMIN — POTASSIUM CHLORIDE 40 MEQ: 1500 TABLET, EXTENDED RELEASE ORAL at 17:10

## 2025-01-01 ASSESSMENT — PAIN SCALES - GENERAL: PAINLEVEL_OUTOF10: 3

## 2025-01-01 ASSESSMENT — PAIN DESCRIPTION - LOCATION: LOCATION: HEAD

## 2025-01-01 NOTE — PROGRESS NOTES
has made initial visit and completed spiritual assessment.  CH reviewed pt's chart. Pt shared about his concerns and engaged in life review. Pt requested prayer. CH offered words of encouragement and prayed for the pt. Ch provided spiritual care and emotional support through pastoral presence, non-anxious presence, active listening, and prayer. CH is available as needed.

## 2025-01-01 NOTE — CONSULTS
Nephrology consult    Admission Date:  12/31/2024    Admission Diagnosis  IRVIN (acute kidney injury) (HCC) [N17.9]  Acute cystitis with hematuria [N30.01]    Consult request by Dr. Young    History of Present Illness:    Racheal Wyatt is an 74 y.o. female she is being seen for acute kidney injury in the setting of a complicated UTI.  Normal baseline kidney function with August creatinine 0.8 on admission it is 2.89.  Today's value of 2.72.    She has a history of liver abscess, colonic abscess with diverticulum.  She had a urinary tract infection was treated at urgent care with Macrobid.  Still with persistent symptoms and came to the emergency department.  She also describes some nausea, diarrhea.  She reported some gross hematuria such that it turned the bowl red.  In the emergency department her creatinine is 2.89 she was admitted.  IV fluids and antibiotics ordered.  Interestingly the urine studies showed only trace blood trace protein 0-3 red cells but large leukocyte Esterase.  She feels better today than yesterday.  No use of NSAIDs      No fever, chills, sweats, epistaxis, vision changes, headache, shortness of breath, wheezing,  chest pain, palpitations.  No nausea/vomitting, diarrhea or constipation.  No dysuria, hematuria.  No paresthesia, seizure history, no arthritis/ arthralgia or skin rashes.       Allergies   Allergen Reactions    Meperidine Other (See Comments)     Nausea and ill    Aspirin Rash and Swelling    Celecoxib Rash and Swelling    Metronidazole Rash     Unknown; possibly a rash.    Morphine Other (See Comments)    Naproxen Rash    Nitrofurantoin Macrocrystal Nausea And Vomiting    Red Dye #40 (Allura Red) Rash    Sulfa Antibiotics Rash       No current facility-administered medications on file prior to encounter.     Current Outpatient Medications on File Prior to Encounter   Medication Sig Dispense Refill    promethazine (PHENERGAN) 25 MG tablet Take 1 tablet by mouth

## 2025-01-01 NOTE — THERAPY EVALUATION
ACUTE OCCUPATIONAL THERAPY GOALS:   (Developed with and agreed upon by patient and/or caregiver.)  1. Patient will complete lower body bathing and dressing with MODIFIED INDEPENDENCE and adaptive equipment as needed.     2. Patient will complete toileting with MODIFIED INDEPENDENCE.  3. Patient will complete grooming ADL standing edge of sink with MODIFIED INDEPENDENCE.  4. Patient will tolerate 25 minutes of OT treatment with 1-2 rest breaks to increase activity tolerance for ADLs.   5. Patient will complete functional transfers with MODIFIED INDEPENDENCE and adaptive equipment as needed.   6. Patient will tolerate 10 minutes BUE exercises to increase strength for safe, functional transfers.     Timeframe: 7 visits      OCCUPATIONAL THERAPY Initial Assessment, Daily Note, and AM       OT Visit Days: 1  Acknowledge Orders  Time  OT Charge Capture  Rehab Caseload Tracker      Racheal Wyatt is a 74 y.o. female   PRIMARY DIAGNOSIS: IRVIN (acute kidney injury) ([facility])  IRVIN (acute kidney injury) ([facility]) [N17.9]  Acute cystitis with hematuria [N30.01]       Reason for Referral: Generalized Muscle Weakness (M62.81)  Other lack of cordination (R27.8)  Difficulty in walking, Not elsewhere classified (R26.2)  Dizziness and Giddiness (R42)  Inpatient: Payor: MEDICARE / Plan: MEDICARE PART A AND B / Product Type: *No Product type* /     ASSESSMENT:     REHAB RECOMMENDATIONS:   Recommendation to date pending progress:  Setting:  Home Health Therapy    Equipment:    To Be Determined     ASSESSMENT:  Ms. Wyatt demonstrates impairments in strength, balance, activity tolerance, ADLs and funtional mobility. She becomes short of breath and fatigues with grooming tasks standing at sink, had to sit in chair for rest and to finish grooming tasks. Pt overall CGA/Min A for functional transfers and mobility of household distances. Pt completed grooming tasks with SBA/CGA. Pt appears to be functioning below her baseline and would benefit  from further acute OT services during acute care stay to promote functional independence.       [facility] AM-PAC™ “6 Clicks” Daily Activity Inpatient Short Form:    AM-PAC Daily Activity - Inpatient   How much help is needed for putting on and taking off regular lower body clothing?: A Little  How much help is needed for bathing (which includes washing, rinsing, drying)?: A Little  How much help is needed for toileting (which includes using toilet, bedpan, or urinal)?: A Little  How much help is needed for putting on and taking off regular upper body clothing?: None  How much help is needed for taking care of personal grooming?: None  How much help for eating meals?: None  AM-PAC Inpatient Daily Activity Raw Score: 21  AM-PAC Inpatient ADL T-Scale Score : 44.27  ADL Inpatient CMS 0-100% Score: 32.79  ADL Inpatient CMS G-Code Modifier : CJ           SUBJECTIVE:     Ms. Wyatt states, \"I like to use this powder for my eyebrows\"     Social/Functional      OBJECTIVE:     LINES / DRAINS / AIRWAY: IV    RESTRICTIONS/PRECAUTIONS:       PAIN: VITALS / O2:   Pre Treatment:          Post Treatment: no c/o throughout       Vitals          Oxygen            GROSS EVALUATION: INTACT IMPAIRED   (See Comments)   UE AROM [] []   UE PROM [] []   Strength []  Generalized weakness     Posture / Balance []  Good sitting, fair + standing    Sensation []     Coordination []       Tone []       Edema []    Activity Tolerance []  Limited by fatigue, shortness of breath     Hand Dominance R [] L []      COGNITION/  PERCEPTION: INTACT IMPAIRED   (See Comments)   Orientation [x]     Vision [x]     Hearing [x]     Cognition  []  Decreased safety    Perception []       MOBILITY: I Mod I S SBA CGA Min Mod Max Total  NT x2 Comments:   Bed Mobility    Rolling [] [] [] [] [x] [] [] [] [] [] []    Supine to Sit [] [] [] [] [x] [] [] [] [] [] []    Scooting [] [] [] [] [x] [] [] [] [] [] []    Sit to Supine [] [] [] [] [] [] [] [] [] [] []

## 2025-01-01 NOTE — ED NOTES
TRANSFER - OUT REPORT:    Verbal report given to CLYDE Cevallos on Racheal Wyatt  being transferred to room 616 for routine progression of patient care       Report consisted of patient's Situation, Background, Assessment and   Recommendations(SBAR).     Information from the following report(s) Nurse Handoff Report, ED Encounter Summary, ED SBAR, Adult Overview, Intake/Output, MAR, Recent Results, and Neuro Assessment was reviewed with the receiving nurse.    [facility] Assessment:    Presents to emergency department  because of falls (Syncope, seizure, or loss of consciousness): No  Age > 70: Yes  Altered Mental Status, Intoxication with alcohol or substance confusion (Disorientation, impaired judgment, poor safety awaremess, or inability to follow instructions): No  Impaired Mobility: Ambulates or transfers with assistive devices or assistance; Unable to ambulate or transer.: No  Nursing Judgement: No          Lines:   Peripheral IV 09/03/24 Right Antecubital (Active)        Opportunity for questions and clarification was provided.      Patient transported with:  Madalyn Baron RN  12/31/24 2011

## 2025-01-01 NOTE — PROGRESS NOTES
ACUTE PHYSICAL THERAPY GOALS:   (Developed with and agreed upon by patient and/or caregiver.)    LTG:  (1.)Ms. Wyatt will move from supine to sit and sit to supine , scoot up and down, and roll side to side in bed with INDEPENDENT within 7 treatment day(s).    (2.)Ms. Wyatt will transfer from bed to chair and chair to bed with MODIFIED INDEPENDENCE using the least restrictive device within 7 treatment day(s).    (3.)Ms. Wyatt will ambulate with MODIFIED INDEPENDENCE for 300+ feet with the least restrictive device within 7 treatment day(s).  (4.)Ms. Wyatt will tolerate at least 23 min of dynamic standing activity to assist standing ADLs with the least restrictive device within 7 treatment days.      ________________________________________________________________________________________________      PHYSICAL THERAPY Initial Assessment, Daily Note, and AM  (Link to Caseload Tracking: PT Visit Days : 1  Acknowledge Orders  Time In/Out  PT Charge Capture  Rehab Caseload Tracker    Racheal Wyatt is a 74 y.o. female   PRIMARY DIAGNOSIS: IRVIN (acute kidney injury) ([facility])  IRVIN (acute kidney injury) ([facility]) [N17.9]  Acute cystitis with hematuria [N30.01]       Reason for Referral: Generalized Muscle Weakness (M62.81)  Other lack of cordination (R27.8)  Difficulty in walking, Not elsewhere classified (R26.2)  Other abnormalities of gait and mobility (R26.89)  Inpatient: Payor: MEDICARE / Plan: MEDICARE PART A AND B / Product Type: *No Product type* /     ASSESSMENT:     REHAB RECOMMENDATIONS:   Recommendation to date pending progress:  Setting:  Home Health Therapy    Equipment:    To Be Determined     ASSESSMENT:  Ms. Wyatt presents in supine, lethargic but agreeable to session. Pt reports lightheadedness and fatigue, and frequent seated rest breaks are utilized due to decreased activity tolerance.   Sittin/64  Standin/68      Upon entering, Pnt is agreeable to PT treatment.  she reports no pain  at        Post Treatment: 0 Vitals     See above    Oxygen      IV    RESTRICTIONS/PRECAUTIONS:                    GROSS EVALUATION:  Intact Impaired (Comments):   AROM [x]     PROM [x]    Strength []  Mild, generalized weakness   Balance []  Fair in ambulation   Posture [] Forward Head   Sensation []  NT   Coordination []      Tone []     Edema []    Activity Tolerance []  Moderate fatigue with medium range ambulation, frequent seated rest breaks utilized     []      COGNITION/  PERCEPTION: Intact Impaired (Comments):   Orientation [x]     Vision [x]     Hearing [x]     Cognition  [x]       MOBILITY: I Mod I S SBA CGA Min Mod Max Total  NT x2 Comments:   Bed Mobility    Rolling [] [] [] [] [x] [x] [] [] [] [] []    Supine to Sit [] [] [] [] [x] [x] [] [] [] [] []    Scooting [] [] [] [] [x] [x] [] [] [] [] []    Sit to Supine [] [] [] [] [] [] [] [] [] [] []    Transfers    Sit to Stand [] [] [] [] [x] [x] [] [] [] [] []    Bed to Chair [] [] [] [] [x] [x] [] [] [] [] []    Stand to Sit [] [] [] [] [x] [x] [] [] [] [] []     [] [] [] [] [] [] [] [] [] [] []    I=Independent, Mod I=Modified Independent, S=Supervision, SBA=Standby Assistance, CGA=Contact Guard Assistance,   Min=Minimal Assistance, Mod=Moderate Assistance, Max=Maximal Assistance, Total=Total Assistance, NT=Not Tested    GAIT: I Mod I S SBA CGA Min Mod Max Total  NT x2 Comments:   Level of Assistance [] [] [] [] [x] [x] [] [] [] [] []    Distance  5, 10, 2 x 75 feet    DME HHA    Gait Quality Decreased mari , Decreased step clearance, Decreased step length, and Decreased stance    Weightbearing Status      Stairs      I=Independent, Mod I=Modified Independent, S=Supervision, SBA=Standby Assistance, CGA=Contact Guard Assistance,   Min=Minimal Assistance, Mod=Moderate Assistance, Max=Maximal Assistance, Total=Total Assistance, NT=Not Tested    PLAN:   FREQUENCY AND DURATION: 3 times/week for duration of hospital stay or until stated goals are met,

## 2025-01-01 NOTE — PROGRESS NOTES
TRANSFER - IN REPORT:    Verbal report received from Madalyn Gaines RN on Racheal Wyatt  being received from ED for routine progression of patient care      Report consisted of patient's Situation, Background, Assessment and   Recommendations(SBAR).     Information from the following report(s) Nurse Handoff Report was reviewed with the receiving nurse.    Opportunity for questions and clarification was provided.      Assessment completed upon patient's arrival to unit and care assumed.

## 2025-01-01 NOTE — PROGRESS NOTES
Hourly rounds complete this shift, no new complaints at this time, Patient reports small amount of blood in urine: bed in low, locked position, call light and bedside table within reach,  all needs met. Report to day shift nurse.

## 2025-01-01 NOTE — PROGRESS NOTES
4 Eyes Skin Assessment     NAME:  Racheal Wyatt  YOB: 1950  MEDICAL RECORD NUMBER:  [id number]    The patient is being assessed for  Admission    I agree that at least one RN has performed a thorough Head to Toe Skin Assessment on the patient. ALL assessment sites listed below have been assessed.      Areas assessed by both nurses:    Head, Face, Ears, Shoulders, Back, Chest, Arms, Elbows, Hands, Sacrum. Buttock, Coccyx, Ischium, and Legs. Feet and Heels        Does the Patient have a Wound? No noted wound(s)       Collins Prevention initiated by RN: Yes  Wound Care Orders initiated by RN: No    Pressure Injury (Stage 3,4, Unstageable, DTI, NWPT, and Complex wounds) if present, place Wound referral order by RN under : No    New Ostomies, if present place, Ostomy referral order under : No     Nurse 1 eSignature: Electronically signed by Ban Guajardo RN on 1/1/25 at 5:57 AM EST    **SHARE this note so that the co-signing nurse can place an eSignature**    Nurse 2 eSignature: Electronically signed by Shelbie Pickering RN on 1/1/25 at 6:42 AM EST

## 2025-01-01 NOTE — PROGRESS NOTES
unchanged from prior.  -Follow-up with surgery as outpatient    Anticipated Discharge Arrangements:   Home Health    PT/OT evals ordered?  Therapy evals ordered  Diet:  ADULT DIET; Regular  VTE prophylaxis: SCD's   Code status: Full Code      Non-peripheral Lines and Tubes (if present):          Telemetry (if present):           Hospital Problems:  Principal Problem:    IRVIN (acute kidney injury) (HCC)  Active Problems:    Major depressive disorder, recurrent, mild (HCC)    Colonic diverticular abscess    Colonic fistula    Hematuria    UTI (urinary tract infection)    Tobacco use  Resolved Problems:    * No resolved hospital problems. *      Objective:   Patient Vitals for the past 24 hrs:   Temp Pulse Resp BP SpO2   01/01/25 0700 98.2 °F (36.8 °C) 60 18 106/64 95 %   01/01/25 0400 98.4 °F (36.9 °C) 92 18 (!) 148/89 95 %   12/31/24 2037 97.7 °F (36.5 °C) 84 14 123/68 99 %   12/31/24 1945 -- -- 16 122/62 94 %   12/31/24 1930 -- -- 16 129/65 94 %   12/31/24 1915 -- -- 17 129/62 92 %   12/31/24 1900 -- -- 16 124/65 93 %   12/31/24 1847 -- -- -- 110/62 94 %   12/31/24 1745 -- -- -- (!) 109/52 93 %   12/31/24 1700 -- -- -- (!) 109/51 98 %   12/31/24 1655 -- -- -- 114/64 98 %   12/31/24 1549 -- -- -- -- 97 %   12/31/24 1545 -- -- -- 115/61 --   12/31/24 1339 97.7 °F (36.5 °C) 89 15 123/67 96 %       Oxygen Therapy  SpO2: 95 %  Pulse via Oximetry: 76 beats per minute  O2 Device: None (Room air)    Estimated body mass index is 28.96 kg/m² as calculated from the following:    Height as of 9/23/24: 1.6 m (5' 3\").    Weight as of 9/23/24: 74.2 kg (163 lb 8 oz).  No intake or output data in the 24 hours ending 01/01/25 1312      Physical Exam:     General:    Well nourished.    Head:  Normocephalic, atraumatic  Eyes:  Sclerae appear normal.  Pupils equally round.  ENT:  Nares appear normal.  Moist oral mucosa  Neck:  No restricted ROM.  Trachea midline   CV:   RRR.  No m/r/g.  No jugular venous distension.  Lungs:   CTAB.  No  14.2 11.9 - 14.6 %    Platelets 157 150 - 450 K/uL    MPV 8.6 (L) 9.4 - 12.3 FL    nRBC 0.00 0.0 - 0.2 K/uL    Differential Type AUTOMATED      Neutrophils % 50 43 - 78 %    Lymphocytes % 33 13 - 44 %    Monocytes % 8 4.0 - 12.0 %    Eosinophils % 8 (H) 0.5 - 7.8 %    Basophils % 1 0.0 - 2.0 %    Immature Granulocytes % 0 0.0 - 5.0 %    Neutrophils Absolute 2.1 1.7 - 8.2 K/UL    Lymphocytes Absolute 1.4 0.5 - 4.6 K/UL    Monocytes Absolute 0.3 0.1 - 1.3 K/UL    Eosinophils Absolute 0.3 0.0 - 0.8 K/UL    Basophils Absolute 0.1 0.0 - 0.2 K/UL    Immature Granulocytes Absolute 0.0 0.0 - 0.5 K/UL   Magnesium    Collection Time: 01/01/25  6:25 AM   Result Value Ref Range    Magnesium 1.5 (L) 1.8 - 2.4 mg/dL   Protein / creatinine ratio, urine    Collection Time: 01/01/25 10:38 AM   Result Value Ref Range    Protein, Urine, Random 9 mg/dL    Creatinine, Ur 42.70 28.00 - 217.00 mg/dL    PROTEIN/CREAT RATIO URINE RAN 0.2     Sodium, urine, random    Collection Time: 01/01/25 10:38 AM   Result Value Ref Range    SODIUM, RANDOM URINE 55 MMOL/L       No results for input(s): \"COVID19\" in the last 72 hours.    Current Meds:  Current Facility-Administered Medications   Medication Dose Route Frequency    potassium chloride (KLOR-CON M) extended release tablet 40 mEq  40 mEq Oral BID WC    0.9 % sodium chloride infusion   IntraVENous Continuous    albuterol (PROVENTIL) (2.5 MG/3ML) 0.083% nebulizer solution 2.5 mg  2.5 mg Nebulization Q4H PRN    DULoxetine (CYMBALTA) extended release capsule 30 mg  30 mg Oral Daily    folic acid (FOLVITE) tablet 1,000 mcg  1,000 mcg Oral Daily    montelukast (SINGULAIR) tablet 10 mg  10 mg Oral QPM    pantoprazole (PROTONIX) tablet 40 mg  40 mg Oral Daily    pravastatin (PRAVACHOL) tablet 20 mg  20 mg Oral Nightly    sodium chloride flush 0.9 % injection 5-40 mL  5-40 mL IntraVENous 2 times per day    sodium chloride flush 0.9 % injection 5-40 mL  5-40 mL IntraVENous PRN    0.9 % sodium chloride  infusion   IntraVENous PRN    ondansetron (ZOFRAN-ODT) disintegrating tablet 4 mg  4 mg Oral Q8H PRN    Or    ondansetron (ZOFRAN) injection 4 mg  4 mg IntraVENous Q6H PRN    polyethylene glycol (GLYCOLAX) packet 17 g  17 g Oral Daily PRN    acetaminophen (TYLENOL) tablet 650 mg  650 mg Oral Q6H PRN    Or    acetaminophen (TYLENOL) suppository 650 mg  650 mg Rectal Q6H PRN    cefTRIAXone (ROCEPHIN) 1,000 mg in sterile water 10 mL IV syringe  1,000 mg IntraVENous Q24H       Signed:  Akhil Carrasquillo MD    Part of this note may have been written by using a voice dictation software.  The note has been proof read but may still contain some grammatical/other typographical errors.

## 2025-01-01 NOTE — CONSULTS
Urology Consult    Requesting MD:     Patient: Racheal Wyatt MRN: [id number]  SSN: [id number]    YOB: 1950  Age: 74 y.o.  Sex: female      Subjective:      Racheal Wyatt is a 74 y.o. female who  with medical history of liver and colonic abscess, diverticulitis, followed by surgery, depression, tobacco use who is evaluated with sean hematuria, fever, chills, increased thirst, anorexia, nausea.     Had been on outpatient 7 days macrobid per urgent care, EOT 12-27-24     UA positive   Creatinine 2.89   S/p CT AP showing \"  IMPRESSION:  Small amount of free fluid and free air within the right lower quadrant of the  abdomen extending into the pelvis may represent contained rupture of sigmoid  colon. This area measures 3.5 x 3 cm posterior and more anteriorly measures 3.6  x 1.4 cm.\"     Of note, she had this area previously and has been seen by surgery- the ED provider reached out to on call surgery and this area is not changed   Seen 9,2023 and deferred surgery .     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 When seen by Dr. Quesada in 9-24, elective sigmoid colectomy was discussed, but pt deferred that.   She has had approx one week of bloody, cloudy urine. Got started on abx when she went to Urgent Care, came to ER because of continued sxs.   She c/o pneumaturia, fecaluria. Abdominal pain is currently better.   Cr 2.72, baseline of 0.84 in 8-24. Wbc 4.2.   UA lg ashleigh, 20-50 wbc, 0-3 wbc. Urine culture from 12-31-24 showing no growth.   She states that she has been having recurrent UTI throughout 2024.   Past Medical History:   Diagnosis Date    Bacterial liver abscess 09/04/2019    Borderline diabetes     diet controlled    Diverticulitis     Fibromyalgia     Gastro-esophageal reflux disease without esophagitis 09/04/2019    History of anemia     History of atrial fibrillation     states only one occurence  symmetric chest walls, quiet, even and easy respiratory effort with no use of accessory muscles and on auscultation, normal breath sounds, no adventitious sounds and normal vocal resonance.      Cardiovascular   Cardiovascular examination reveals  - normal heart sounds, regular rate and rhythm with no murmurs.      Abdomen   Palpation/Percussion: Palpation and Percussion of the abdomen reveal - Non Tender, No Rebound tenderness, No Rigidity (guarding), No hepatosplenomegaly, No Palpable abdominal masses and Soft. Hernia - Bilateral - No Hernia(s) present.      Assessment:   Recurrent UTI, hematuria. Diverticulitis/possible abscess. Likely has colovesical fistula.       Plan:     Needs cystoscopy after discharge, can arrange in office.     Signed By: ALEXX CISNEROS JR, MD     January 1, 2025

## 2025-01-01 NOTE — H&P
Hospitalist History and Physical   Admit Date:  2024  1:57 PM   Name:  Racheal Wyatt   Age:  74 y.o.  Sex:  female  :  1950   MRN:  [id number]   Room:  [facility]/    Presenting/Chief Complaint: Hematuria and Nausea     Reason(s) for Admission: IRVIN (acute kidney injury) (HCC) [N17.9]     History of Present Illness:     Racheal Wyatt is a 74 y.o. female with medical history of liver and colonic abscess, diverticulitis, followed by surgery, depression, tobacco use who is evaluated with sean hematuria, fever, chills, increased thirst, anorexia, nausea.    Had been on outpatient 7 days macrobid per urgent care, EOT 24    UA positive   Creatinine 2.89   S/p CT AP showing \"  IMPRESSION:  Small amount of free fluid and free air within the right lower quadrant of the  abdomen extending into the pelvis may represent contained rupture of sigmoid  colon. This area measures 3.5 x 3 cm posterior and more anteriorly measures 3.6  x 1.4 cm.\"    Of note, she had this area previously and has been seen by surgery- the ED provider reached out to on call surgery and this area is not changed   Seen  and deferred surgery     S/p 1 L NS and rocephin        FULL CODE  Lives alone  Daughter in law Liliana     Assessment & Plan:     Principal Problem:    IRVIN (acute kidney injury) (HCC)  Plan:   Admit medical bed   NS 100cc/hr and reassess  Consult nephrology given hematuria and IRVIN   Consult urology- had sean bleeding with wiping after urination in ED  Followup BMP tomorrow           Hematuria  Plan:     UTI (urinary tract infection)  Plan:   D1 rocephin  Followup culture         Active Problems:    Major depressive disorder, recurrent, mild (HCC)  Plan:   Resume cymbalta          Colonic diverticular abscess  Plan:   Unchanged on CTAP  Followed outpatient by surgery             Tobacco use  Plan:   Needs cessation  Sporadic use      PT/OT evals ordered?  Therapy evals ordered  Diet: No diet orders on  following: automated exposure control; use of iterative reconstruction technique; adjustment of the mA and/or kV according to patient size (this includes techniques or standardized protocols for targeted exams where dose is matched to indication/reason for exam). Electronically signed by NEHA AMAYA        Signed:  Ame Young MD    Part of this note may have been written by using a voice dictation software.  The note has been proof read but may still contain some grammatical/other typographical errors.

## 2025-01-01 NOTE — ED NOTES
Bedside shift Report given to CLYDE Bergman to assume care at this time.       Higinio Frey RN  12/31/24 9201

## 2025-01-01 NOTE — PROGRESS NOTES
Urine sample sent to lab per order. Pt is resting in bed without any complaints. Hourly rounds completed and all needs met. Bed is low, locked,call light is in reach and pt is encouraged to call for assistance.

## 2025-01-02 LAB
ANION GAP SERPL CALC-SCNC: 12 MMOL/L (ref 7–16)
APPEARANCE UR: CLEAR
BACTERIA URNS QL MICRO: NEGATIVE /HPF
BILIRUB UR QL: NEGATIVE
BUN SERPL-MCNC: 33 MG/DL (ref 8–23)
CALCIUM SERPL-MCNC: 8.8 MG/DL (ref 8.8–10.2)
CHLORIDE SERPL-SCNC: 113 MMOL/L (ref 98–107)
CO2 SERPL-SCNC: 17 MMOL/L (ref 20–29)
COLOR UR: ABNORMAL
CREAT SERPL-MCNC: 2.61 MG/DL (ref 0.6–1.1)
EPI CELLS #/AREA URNS HPF: ABNORMAL /HPF
GLUCOSE SERPL-MCNC: 103 MG/DL (ref 70–99)
GLUCOSE UR STRIP.AUTO-MCNC: NEGATIVE MG/DL
HGB UR QL STRIP: NEGATIVE
HYALINE CASTS URNS QL MICRO: ABNORMAL /LPF
KETONES UR QL STRIP.AUTO: NEGATIVE MG/DL
LEUKOCYTE ESTERASE UR QL STRIP.AUTO: ABNORMAL
NITRITE UR QL STRIP.AUTO: NEGATIVE
PH UR STRIP: 6 (ref 5–9)
POTASSIUM SERPL-SCNC: 4.1 MMOL/L (ref 3.5–5.1)
PROT UR STRIP-MCNC: NEGATIVE MG/DL
RBC #/AREA URNS HPF: ABNORMAL /HPF
SODIUM SERPL-SCNC: 142 MMOL/L (ref 136–145)
SP GR UR REFRACTOMETRY: 1.01 (ref 1–1.02)
UROBILINOGEN UR QL STRIP.AUTO: 0.2 EU/DL (ref 0.2–1)
WBC URNS QL MICRO: ABNORMAL /HPF

## 2025-01-02 PROCEDURE — 2580000003 HC RX 258: Performed by: INTERNAL MEDICINE

## 2025-01-02 PROCEDURE — 36415 COLL VENOUS BLD VENIPUNCTURE: CPT

## 2025-01-02 PROCEDURE — 80048 BASIC METABOLIC PNL TOTAL CA: CPT

## 2025-01-02 PROCEDURE — 6370000000 HC RX 637 (ALT 250 FOR IP): Performed by: INTERNAL MEDICINE

## 2025-01-02 PROCEDURE — 81001 URINALYSIS AUTO W/SCOPE: CPT

## 2025-01-02 PROCEDURE — 2500000003 HC RX 250 WO HCPCS: Performed by: INTERNAL MEDICINE

## 2025-01-02 PROCEDURE — 1100000000 HC RM PRIVATE

## 2025-01-02 RX ORDER — POLYETHYLENE GLYCOL 3350 17 G/17G
17 POWDER, FOR SOLUTION ORAL DAILY
Status: DISCONTINUED | OUTPATIENT
Start: 2025-01-02 | End: 2025-01-03 | Stop reason: HOSPADM

## 2025-01-02 RX ADMIN — PANTOPRAZOLE SODIUM 40 MG: 40 TABLET, DELAYED RELEASE ORAL at 08:54

## 2025-01-02 RX ADMIN — SODIUM CHLORIDE: 9 INJECTION, SOLUTION INTRAVENOUS at 08:52

## 2025-01-02 RX ADMIN — FOLIC ACID 1000 MCG: 1 TABLET ORAL at 08:54

## 2025-01-02 RX ADMIN — SODIUM CHLORIDE: 9 INJECTION, SOLUTION INTRAVENOUS at 19:52

## 2025-01-02 RX ADMIN — PRAVASTATIN SODIUM 20 MG: 20 TABLET ORAL at 20:33

## 2025-01-02 RX ADMIN — SODIUM CHLORIDE, PRESERVATIVE FREE 10 ML: 5 INJECTION INTRAVENOUS at 19:54

## 2025-01-02 RX ADMIN — MONTELUKAST 10 MG: 10 TABLET, FILM COATED ORAL at 17:06

## 2025-01-02 RX ADMIN — DULOXETINE HYDROCHLORIDE 30 MG: 30 CAPSULE, DELAYED RELEASE ORAL at 08:54

## 2025-01-02 RX ADMIN — SODIUM CHLORIDE, PRESERVATIVE FREE 10 ML: 5 INJECTION INTRAVENOUS at 08:54

## 2025-01-02 RX ADMIN — POLYETHYLENE GLYCOL 3350 17 G: 17 POWDER, FOR SOLUTION ORAL at 14:45

## 2025-01-02 NOTE — PROGRESS NOTES
Nutrition Assessment  Assessment Type: Initial  Reason for visit:  Best Practice Alert: Malnutrition Screening Tool   Malnutrition Screening Tool Score: 3    Nutrition Intervention:   Food and/or Nutrient Delivery:   Meals and Snacks:  Diet: Continue current order  Medical Food Supplements:   Medical food supplement therapy:  Initiate Ensure High Protein (low calorie high protein) 160 calories, 16 grams protein per 8 ounce serving  Nutrition Related Medication Management:   Change miralax to daily.       Malnutrition Assessment:  Malnutrition Status: At risk for malnutrition (unintentional wt loss and variable oral intake)  Context: Chronic Illness  Findings of clinical characteristics of malnutrition:   Energy Intake:  Mild decrease in energy intake (pt relates waxing and waning oral intake)  Weight Loss:  Mild weight loss (7% over 6 months)     Body Fat Loss:  No body fat loss     Muscle Mass Loss:  No muscle mass loss      Nutrition Assessment:  Food/Nutrition Related History:   Pt reports decreased in oral intake more marked this past summer.  She notes her intake improved while staying with her son and daughter-in-law.  She has used ensure and pedialyte at times.  She relates overall, she hs gone from eating 3 meals per day to 2 meals daily.  At times she skips meals all together.  She relates varied appetite and depression as largest barriers to intake.          Weight History:   Pt reports wt loss started this summer with hospitalization regarding diverticulitis.  She notes continued trend down in wt since that time.  Recall of weight are consistent with outpt records below but notes \"losing 20 # wasn't a bad thing.\"    Wt hx per EMR review:  Surgical Office: 174# 7/11/24, 169# 8/15/2024, 166# 9/9/2024, 163# 9/23/24.  Urgent Care: 161# 12/20/24.   Remarkable for  7% loss over 6 months  Nutrition Background:       PMH remarkable for liver and colonic abscesses, diverticulitis, robotic washout of diverticular

## 2025-01-02 NOTE — PROGRESS NOTES
CBC with Auto Differential    Collection Time: 12/31/24  2:07 PM   Result Value Ref Range    WBC 5.5 4.3 - 11.1 K/uL    RBC 4.08 4.05 - 5.2 M/uL    Hemoglobin 12.7 11.7 - 15.4 g/dL    Hematocrit 37.8 35.8 - 46.3 %    MCV 92.6 82 - 102 FL    MCH 31.1 26.1 - 32.9 PG    MCHC 33.6 31.4 - 35.0 g/dL    RDW 14.1 11.9 - 14.6 %    Platelets 170 150 - 450 K/uL    MPV 8.4 (L) 9.4 - 12.3 FL    nRBC 0.00 0.0 - 0.2 K/uL    Differential Type AUTOMATED      Neutrophils % 61 43 - 78 %    Lymphocytes % 25 13 - 44 %    Monocytes % 8 4.0 - 12.0 %    Eosinophils % 4 0.5 - 7.8 %    Basophils % 1 0.0 - 2.0 %    Immature Granulocytes % 1 0.0 - 5.0 %    Neutrophils Absolute 3.4 1.7 - 8.2 K/UL    Lymphocytes Absolute 1.4 0.5 - 4.6 K/UL    Monocytes Absolute 0.4 0.1 - 1.3 K/UL    Eosinophils Absolute 0.2 0.0 - 0.8 K/UL    Basophils Absolute 0.1 0.0 - 0.2 K/UL    Immature Granulocytes Absolute 0.0 0.0 - 0.5 K/UL   CMP    Collection Time: 12/31/24  2:07 PM   Result Value Ref Range    Sodium 140 136 - 145 mmol/L    Potassium 3.5 3.5 - 5.1 mmol/L    Chloride 103 98 - 107 mmol/L    CO2 23 20 - 29 mmol/L    Anion Gap 14 7 - 16 mmol/L    Glucose 111 (H) 70 - 99 mg/dL    BUN 38 (H) 8 - 23 MG/DL    Creatinine 2.89 (H) 0.60 - 1.10 MG/DL    Est, Glom Filt Rate 17 (L) >60 ml/min/1.73m2    Calcium 9.6 8.8 - 10.2 MG/DL    Total Bilirubin 0.3 0.0 - 1.2 MG/DL    ALT 15 8 - 45 U/L    AST 24 15 - 37 U/L    Alk Phosphatase 95 35 - 104 U/L    Total Protein 6.8 6.3 - 8.2 g/dL    Albumin 3.2 3.2 - 4.6 g/dL    Globulin 3.6 (H) 2.3 - 3.5 g/dL    Albumin/Globulin Ratio 0.9 (L) 1.0 - 1.9     Magnesium    Collection Time: 12/31/24  2:07 PM   Result Value Ref Range    Magnesium 1.8 1.8 - 2.4 mg/dL   Urinalysis with Reflex to Culture    Collection Time: 12/31/24  2:08 PM    Specimen: Urine   Result Value Ref Range    Color, UA YELLOW/STRAW      Appearance CLEAR      Specific Gravity, UA 1.008 1.001 - 1.023      pH, Urine 6.0 5.0 - 9.0      Protein, UA TRACE (A) NEG  mg/dL    Glucose, Ur Negative NEG mg/dL    Ketones, Urine Negative NEG mg/dL    Bilirubin, Urine Negative NEG      Blood, Urine TRACE (A) NEG      Urobilinogen, Urine 0.2 0.2 - 1.0 EU/dL    Nitrite, Urine Negative NEG      Leukocyte Esterase, Urine LARGE (A) NEG      WBC, UA 20-50 0 /hpf    RBC, UA 0-3 0 /hpf    BACTERIA, URINE 0 0 /hpf    Urine Culture if Indicated URINE CULTURE ORDERED      Epithelial Cells, UA 5-10 0 /hpf    Casts 0 0 /lpf    Crystals 0 0 /LPF    Mucus, UA 0 0 /lpf    Other observations RESULTS VERIFIED MANUALLY     Culture, Urine    Collection Time: 12/31/24  2:08 PM    Specimen: Urine   Result Value Ref Range    Special Requests NO SPECIAL REQUESTS  Reflexed from [id number]        Culture        No growth after short period of incubation. Further results to follow after overnight incubation.   Basic Metabolic Panel w/ Reflex to MG    Collection Time: 01/01/25  6:25 AM   Result Value Ref Range    Sodium 140 136 - 145 mmol/L    Potassium 3.2 (L) 3.5 - 5.1 mmol/L    Chloride 107 98 - 107 mmol/L    CO2 21 20 - 29 mmol/L    Anion Gap 12 7 - 16 mmol/L    Glucose 94 70 - 99 mg/dL    BUN 35 (H) 8 - 23 MG/DL    Creatinine 2.72 (H) 0.60 - 1.10 MG/DL    Est, Glom Filt Rate 18 (L) >60 ml/min/1.73m2    Calcium 8.2 (L) 8.8 - 10.2 MG/DL   CBC with Auto Differential    Collection Time: 01/01/25  6:25 AM   Result Value Ref Range    WBC 4.2 (L) 4.3 - 11.1 K/uL    RBC 3.56 (L) 4.05 - 5.2 M/uL    Hemoglobin 11.0 (L) 11.7 - 15.4 g/dL    Hematocrit 32.8 (L) 35.8 - 46.3 %    MCV 92.1 82 - 102 FL    MCH 30.9 26.1 - 32.9 PG    MCHC 33.5 31.4 - 35.0 g/dL    RDW 14.2 11.9 - 14.6 %    Platelets 157 150 - 450 K/uL    MPV 8.6 (L) 9.4 - 12.3 FL    nRBC 0.00 0.0 - 0.2 K/uL    Differential Type AUTOMATED      Neutrophils % 50 43 - 78 %    Lymphocytes % 33 13 - 44 %    Monocytes % 8 4.0 - 12.0 %    Eosinophils % 8 (H) 0.5 - 7.8 %    Basophils % 1 0.0 - 2.0 %    Immature Granulocytes % 0 0.0 - 5.0 %    Neutrophils Absolute 2.1  1.7 - 8.2 K/UL    Lymphocytes Absolute 1.4 0.5 - 4.6 K/UL    Monocytes Absolute 0.3 0.1 - 1.3 K/UL    Eosinophils Absolute 0.3 0.0 - 0.8 K/UL    Basophils Absolute 0.1 0.0 - 0.2 K/UL    Immature Granulocytes Absolute 0.0 0.0 - 0.5 K/UL   Magnesium    Collection Time: 01/01/25  6:25 AM   Result Value Ref Range    Magnesium 1.5 (L) 1.8 - 2.4 mg/dL   Protein / creatinine ratio, urine    Collection Time: 01/01/25 10:38 AM   Result Value Ref Range    Protein, Urine, Random 9 mg/dL    Creatinine, Ur 42.70 28.00 - 217.00 mg/dL    PROTEIN/CREAT RATIO URINE RAN 0.2     Sodium, urine, random    Collection Time: 01/01/25 10:38 AM   Result Value Ref Range    SODIUM, RANDOM URINE 55 MMOL/L   Basic Metabolic Panel w/ Reflex to MG    Collection Time: 01/02/25  5:44 AM   Result Value Ref Range    Sodium 142 136 - 145 mmol/L    Potassium 4.1 3.5 - 5.1 mmol/L    Chloride 113 (H) 98 - 107 mmol/L    CO2 17 (L) 20 - 29 mmol/L    Anion Gap 12 7 - 16 mmol/L    Glucose 103 (H) 70 - 99 mg/dL    BUN 33 (H) 8 - 23 MG/DL    Creatinine 2.61 (H) 0.60 - 1.10 MG/DL    Est, Glom Filt Rate 19 (L) >60 ml/min/1.73m2    Calcium 8.8 8.8 - 10.2 MG/DL       No results for input(s): \"COVID19\" in the last 72 hours.    Current Meds:  Current Facility-Administered Medications   Medication Dose Route Frequency    0.9 % sodium chloride infusion   IntraVENous Continuous    albuterol (PROVENTIL) (2.5 MG/3ML) 0.083% nebulizer solution 2.5 mg  2.5 mg Nebulization Q4H PRN    DULoxetine (CYMBALTA) extended release capsule 30 mg  30 mg Oral Daily    folic acid (FOLVITE) tablet 1,000 mcg  1,000 mcg Oral Daily    montelukast (SINGULAIR) tablet 10 mg  10 mg Oral QPM    pantoprazole (PROTONIX) tablet 40 mg  40 mg Oral Daily    pravastatin (PRAVACHOL) tablet 20 mg  20 mg Oral Nightly    sodium chloride flush 0.9 % injection 5-40 mL  5-40 mL IntraVENous 2 times per day    sodium chloride flush 0.9 % injection 5-40 mL  5-40 mL IntraVENous PRN    0.9 % sodium chloride

## 2025-01-02 NOTE — PLAN OF CARE
Problem: Discharge Planning  Goal: Discharge to home or other facility with appropriate resources  1/1/2025 2237 by Sailaja Lawson, RN  Outcome: Progressing  1/1/2025 1534 by Sepideh Lombardo, RN  Outcome: Progressing     Problem: Safety - Adult  Goal: Free from fall injury  1/1/2025 2237 by Sailaja Lawson, RN  Outcome: Progressing  1/1/2025 1534 by Sepideh Lombardo, RN  Outcome: Progressing

## 2025-01-02 NOTE — PROGRESS NOTES
Hourly rounds performed this shift. Patient resting with no complaints at this time. Bed locked in lowest position, call light, and all personal belongings in pt reach. VS stable, IV patent. Patient ambulated to the bathroom numerous times during this shift to the bathroom. will give report to oncoming nurse.

## 2025-01-02 NOTE — PROGRESS NOTES
EOS     Pt resting in bed at this time. Pt denies any needs or pain. Call light and personal belongings left within reach. Pt encouraged to call with any needs. Hourly rounding completed during this shift. Bed in low/locked position.    70.3

## 2025-01-02 NOTE — CARE COORDINATION
SUAD met with Ms. Wyatt in room 616 to discuss discharge planning.  She is inpatient status for IRVIN and cystitis with hematuria.     Prior to admit, she was independent with ADLs, living in her house.  At discharge, her plan is home, no additional discharge needs voiced or identified.      01/02/25 0814   Service Assessment   Patient Orientation Alert and Oriented   Cognition Alert   History Provided By Patient   Primary Caregiver Self   Support Systems Family Members;Children   PCP Verified by CM Yes   Prior Functional Level Independent in ADLs/IADLs   Current Functional Level Independent in ADLs/IADLs   Can patient return to prior living arrangement Yes   Ability to make needs known: Good   Family able to assist with home care needs: Yes   Would you like for me to discuss the discharge plan with any other family members/significant others, and if so, who? No   Condition of Participation: Discharge Planning   The Plan for Transition of Care is related to the following treatment goals: home when stable

## 2025-01-02 NOTE — PROGRESS NOTES
Racheal Wyatt  Admission Date: 12/31/2024         [facility] Progress Note: 1/2/2025    Follow-up for:     The patient's chart is reviewed.    Subjective:     Seen in room, sitting up in chair. Appetite good, but did not eat much breakfast due to constipation. Reports blood in urine has clear up.     ROS:  Gen - no fever, no chills, appetite unchanged  CV - no chest pain, no palpitation  Lung - no shortness of breath, no cough  Abd - no tenderness, no nausea/vomiting, no diarrhea  Ext - no edema    Current Facility-Administered Medications   Medication Dose Route Frequency    0.9 % sodium chloride infusion   IntraVENous Continuous    albuterol (PROVENTIL) (2.5 MG/3ML) 0.083% nebulizer solution 2.5 mg  2.5 mg Nebulization Q4H PRN    DULoxetine (CYMBALTA) extended release capsule 30 mg  30 mg Oral Daily    folic acid (FOLVITE) tablet 1,000 mcg  1,000 mcg Oral Daily    montelukast (SINGULAIR) tablet 10 mg  10 mg Oral QPM    pantoprazole (PROTONIX) tablet 40 mg  40 mg Oral Daily    pravastatin (PRAVACHOL) tablet 20 mg  20 mg Oral Nightly    sodium chloride flush 0.9 % injection 5-40 mL  5-40 mL IntraVENous 2 times per day    sodium chloride flush 0.9 % injection 5-40 mL  5-40 mL IntraVENous PRN    0.9 % sodium chloride infusion   IntraVENous PRN    ondansetron (ZOFRAN-ODT) disintegrating tablet 4 mg  4 mg Oral Q8H PRN    Or    ondansetron (ZOFRAN) injection 4 mg  4 mg IntraVENous Q6H PRN    polyethylene glycol (GLYCOLAX) packet 17 g  17 g Oral Daily PRN    acetaminophen (TYLENOL) tablet 650 mg  650 mg Oral Q6H PRN    Or    acetaminophen (TYLENOL) suppository 650 mg  650 mg Rectal Q6H PRN    cefTRIAXone (ROCEPHIN) 1,000 mg in sterile water 10 mL IV syringe  1,000 mg IntraVENous Q24H         Objective:     Vitals:    01/01/25 1530 01/01/25 2030 01/02/25 0310 01/02/25 0700   BP: 117/72 106/79 131/64 137/74   Pulse: 79 74 73 77   Resp: 17 18 16 16   Temp: 98.1 °F (36.7 °C) 97.9 °F (36.6  °C) 97.9 °F (36.6 °C) 97.9 °F (36.6 °C)   TempSrc: Oral Oral Oral Oral   SpO2: 95% 95% 94% 95%     Intake and Output:   No intake/output data recorded.  No intake/output data recorded.    Physical Exam:   Constitutional:  the patient is well developed and in no acute distress  HEENT:  Sclera clear, pupils equal, oral mucosa moist  Lungs: CTAB, lungs expand symmetrically  Cardiovascular:  Regular rate, S1, S2, no Rub   Abd/GI: soft and non-tender; with positive bowel sounds.  Ext: warm without cyanosis. There is no lower leg edema.  Skin:  no jaundice or rashes  Neuro: no gross neuro deficits   Psychiatric: Calm.         LAB  Recent Labs     12/31/24  1407 01/01/25  0625   WBC 5.5 4.2*   HGB 12.7 11.0*   HCT 37.8 32.8*    157     Recent Labs     12/31/24  1407 01/01/25  0625 01/02/25  0544    140 142   K 3.5 3.2* 4.1    107 113*   CO2 23 21 17*   BUN 38* 35* 33*   CREATININE 2.89* 2.72* 2.61*   MG 1.8 1.5*  --      No results for input(s): \"PH\", \"PCO2\", \"PO2\", \"HCO3\" in the last 72 hours.      Assessment/Plan:  (Medical Decision Making)     Acute kidney injury  Complicated UTI with gross hematuria.  Trace proteinuria.        She has normal kidney function at baseline creatinine 0.8.  Now with acute rise with mild improvement with fluids. No obstruction noted on imaging.      As she is improving would continue IV fluids for now.     GN unlikely. Will repeat UA with micro to ascertain if microscopic hematuria has resolved. No persistent proteinuria.      Noted that urology has seen her with a plan for cystoscopy after the acute illness    Yodit Langley, APRN - CNP  [facility]

## 2025-01-02 NOTE — PLAN OF CARE
Problem: Discharge Planning  Goal: Discharge to home or other facility with appropriate resources  1/2/2025 1201 by Casi Boone, RN  Outcome: Progressing  1/1/2025 2237 by Sailaja Lawson RN  Outcome: Progressing     Problem: Safety - Adult  Goal: Free from fall injury  1/2/2025 1201 by Casi Boone, RN  Outcome: Progressing  1/1/2025 2237 by Sailaja Lawson, RN  Outcome: Progressing

## 2025-01-03 VITALS
OXYGEN SATURATION: 97 % | WEIGHT: 167.33 LBS | TEMPERATURE: 97.7 F | HEART RATE: 87 BPM | BODY MASS INDEX: 29.65 KG/M2 | HEIGHT: 63 IN | SYSTOLIC BLOOD PRESSURE: 135 MMHG | RESPIRATION RATE: 18 BRPM | DIASTOLIC BLOOD PRESSURE: 75 MMHG

## 2025-01-03 LAB
ANION GAP SERPL CALC-SCNC: 11 MMOL/L (ref 7–16)
ANION GAP SERPL CALC-SCNC: 12 MMOL/L (ref 7–16)
BACTERIA SPEC CULT: NORMAL
BASOPHILS # BLD: 0.1 K/UL (ref 0–0.2)
BASOPHILS NFR BLD: 1 % (ref 0–2)
BUN SERPL-MCNC: 26 MG/DL (ref 8–23)
BUN SERPL-MCNC: 27 MG/DL (ref 8–23)
CALCIUM SERPL-MCNC: 8.3 MG/DL (ref 8.8–10.2)
CALCIUM SERPL-MCNC: 8.8 MG/DL (ref 8.8–10.2)
CHLORIDE SERPL-SCNC: 111 MMOL/L (ref 98–107)
CHLORIDE SERPL-SCNC: 113 MMOL/L (ref 98–107)
CO2 SERPL-SCNC: 16 MMOL/L (ref 20–29)
CO2 SERPL-SCNC: 17 MMOL/L (ref 20–29)
CREAT SERPL-MCNC: 1.99 MG/DL (ref 0.6–1.1)
CREAT SERPL-MCNC: 2.11 MG/DL (ref 0.6–1.1)
DIFFERENTIAL METHOD BLD: ABNORMAL
EOSINOPHIL # BLD: 0.2 K/UL (ref 0–0.8)
EOSINOPHIL NFR BLD: 4 % (ref 0.5–7.8)
ERYTHROCYTE [DISTWIDTH] IN BLOOD BY AUTOMATED COUNT: 14.3 % (ref 11.9–14.6)
GLUCOSE SERPL-MCNC: 102 MG/DL (ref 70–99)
GLUCOSE SERPL-MCNC: 145 MG/DL (ref 70–99)
HCT VFR BLD AUTO: 32.5 % (ref 35.8–46.3)
HGB BLD-MCNC: 10.9 G/DL (ref 11.7–15.4)
IMM GRANULOCYTES # BLD AUTO: 0 K/UL (ref 0–0.5)
IMM GRANULOCYTES NFR BLD AUTO: 0 % (ref 0–5)
LYMPHOCYTES # BLD: 1.2 K/UL (ref 0.5–4.6)
LYMPHOCYTES NFR BLD: 21 % (ref 13–44)
MAGNESIUM SERPL-MCNC: 1.3 MG/DL (ref 1.8–2.4)
MAGNESIUM SERPL-MCNC: 3.5 MG/DL (ref 1.8–2.4)
MCH RBC QN AUTO: 31 PG (ref 26.1–32.9)
MCHC RBC AUTO-ENTMCNC: 33.5 G/DL (ref 31.4–35)
MCV RBC AUTO: 92.3 FL (ref 82–102)
MONOCYTES # BLD: 0.4 K/UL (ref 0.1–1.3)
MONOCYTES NFR BLD: 7 % (ref 4–12)
NEUTS SEG # BLD: 3.9 K/UL (ref 1.7–8.2)
NEUTS SEG NFR BLD: 67 % (ref 43–78)
NRBC # BLD: 0 K/UL (ref 0–0.2)
PLATELET # BLD AUTO: 167 K/UL (ref 150–450)
PMV BLD AUTO: 8.2 FL (ref 9.4–12.3)
POTASSIUM SERPL-SCNC: 3.5 MMOL/L (ref 3.5–5.1)
POTASSIUM SERPL-SCNC: 3.9 MMOL/L (ref 3.5–5.1)
RBC # BLD AUTO: 3.52 M/UL (ref 4.05–5.2)
SERVICE CMNT-IMP: NORMAL
SODIUM SERPL-SCNC: 139 MMOL/L (ref 136–145)
SODIUM SERPL-SCNC: 141 MMOL/L (ref 136–145)
WBC # BLD AUTO: 5.8 K/UL (ref 4.3–11.1)

## 2025-01-03 PROCEDURE — 36415 COLL VENOUS BLD VENIPUNCTURE: CPT

## 2025-01-03 PROCEDURE — 6370000000 HC RX 637 (ALT 250 FOR IP): Performed by: INTERNAL MEDICINE

## 2025-01-03 PROCEDURE — 97530 THERAPEUTIC ACTIVITIES: CPT

## 2025-01-03 PROCEDURE — 6360000002 HC RX W HCPCS: Performed by: INTERNAL MEDICINE

## 2025-01-03 PROCEDURE — 2580000003 HC RX 258: Performed by: INTERNAL MEDICINE

## 2025-01-03 PROCEDURE — 2500000003 HC RX 250 WO HCPCS: Performed by: INTERNAL MEDICINE

## 2025-01-03 PROCEDURE — 85025 COMPLETE CBC W/AUTO DIFF WBC: CPT

## 2025-01-03 PROCEDURE — 80048 BASIC METABOLIC PNL TOTAL CA: CPT

## 2025-01-03 PROCEDURE — 83735 ASSAY OF MAGNESIUM: CPT

## 2025-01-03 RX ORDER — CEPHALEXIN 500 MG/1
500 CAPSULE ORAL 2 TIMES DAILY
Qty: 2 CAPSULE | Refills: 0 | Status: SHIPPED | OUTPATIENT
Start: 2025-01-04 | End: 2025-01-05

## 2025-01-03 RX ORDER — MAGNESIUM SULFATE IN WATER 40 MG/ML
4000 INJECTION, SOLUTION INTRAVENOUS ONCE
Status: COMPLETED | OUTPATIENT
Start: 2025-01-03 | End: 2025-01-03

## 2025-01-03 RX ORDER — POTASSIUM CHLORIDE 1500 MG/1
40 TABLET, EXTENDED RELEASE ORAL ONCE
Status: COMPLETED | OUTPATIENT
Start: 2025-01-03 | End: 2025-01-03

## 2025-01-03 RX ADMIN — WATER 1000 MG: 1 INJECTION INTRAMUSCULAR; INTRAVENOUS; SUBCUTANEOUS at 00:16

## 2025-01-03 RX ADMIN — ACETAMINOPHEN 650 MG: 325 TABLET ORAL at 00:15

## 2025-01-03 RX ADMIN — SODIUM CHLORIDE, PRESERVATIVE FREE 10 ML: 5 INJECTION INTRAVENOUS at 09:51

## 2025-01-03 RX ADMIN — PANTOPRAZOLE SODIUM 40 MG: 40 TABLET, DELAYED RELEASE ORAL at 09:51

## 2025-01-03 RX ADMIN — SODIUM CHLORIDE: 9 INJECTION, SOLUTION INTRAVENOUS at 06:17

## 2025-01-03 RX ADMIN — MAGNESIUM SULFATE HEPTAHYDRATE 4000 MG: 40 INJECTION, SOLUTION INTRAVENOUS at 10:06

## 2025-01-03 RX ADMIN — POLYETHYLENE GLYCOL 3350 17 G: 17 POWDER, FOR SOLUTION ORAL at 09:51

## 2025-01-03 RX ADMIN — POTASSIUM CHLORIDE 40 MEQ: 1500 TABLET, EXTENDED RELEASE ORAL at 09:51

## 2025-01-03 RX ADMIN — DULOXETINE HYDROCHLORIDE 30 MG: 30 CAPSULE, DELAYED RELEASE ORAL at 09:51

## 2025-01-03 RX ADMIN — FOLIC ACID 1000 MCG: 1 TABLET ORAL at 09:51

## 2025-01-03 ASSESSMENT — PAIN SCALES - GENERAL: PAINLEVEL_OUTOF10: 3

## 2025-01-03 ASSESSMENT — PAIN - FUNCTIONAL ASSESSMENT: PAIN_FUNCTIONAL_ASSESSMENT: ACTIVITIES ARE NOT PREVENTED

## 2025-01-03 ASSESSMENT — PAIN DESCRIPTION - DESCRIPTORS: DESCRIPTORS: ACHING;THROBBING

## 2025-01-03 NOTE — PROGRESS NOTES
Outpatient Pharmacy Progress Note for Meds-to-Beds    Total number of Prescriptions Filled: 1    List of Medications (name,strength):  cephALEXin       Delivered to: elroy reddy       Co-pay: 0.30      Payment Type: cash      Additional Documentation:  Medication(s) were delivered to the patient's room prior to discharge      Thank you for letting us serve your patients.  [facility] [id number]- [place] [address]  Phone: [phone]  Fax: [phone]

## 2025-01-03 NOTE — DISCHARGE SUMMARY
Hospitalist Discharge Summary   Admit Date:  2024  1:57 PM   DC Note date: 1/3/2025  Name:  Racheal Wyatt   Age:  74 y.o.  Sex:  female  :  1950   MRN:  [id number]   Room:  [facility]  PCP:  Karthik Hatch MD    Presenting Complaint: Hematuria and Nausea     Initial Admission Diagnosis: IRVIN (acute kidney injury) (HCC) [N17.9]  Acute cystitis with hematuria [N30.01]     Problem List for this Hospitalization (present on admission):    Principal Problem:    IRVIN (acute kidney injury) (HCC)  Active Problems:    Major depressive disorder, recurrent, mild (HCC)    Colonic diverticular abscess    Colonic fistula    Hematuria    UTI (urinary tract infection)    Tobacco use  Resolved Problems:    * No resolved hospital problems. *      Hospital Course:  Please refer to the admission H&P for details of presentation. In summary, Racheal Wyatt is a 74 y.o. female with past medical history significant for liver and colonic abscesses, depression, tobacco use who presented to emergency room with sean hematuria, fever, chills, increased thirst, nausea.  Patient was been on outpatient Macrobid for 7 days.    Workup in the ED with urinary tract infection, IRVIN with creatinine of 2.89.  CT abdomen pelvis with small amount of free fluid and free air within the right lower quadrant of the abdomen extending to the pelvis measuring 3.5 x 3 cm posteriorly and 3.6 x 1.4 cm anteriorly.  ED provider discussed with surgery who reports that patient has been following with surgery and the area has not changed compared to prior study.    Urology was consulted.  Concern for colovesical fistula.  Plan for cystoscopy as outpatient.  Office will call for appointment.    Nephrology was also consulted in light of hematuria and IRVIN.  Renal function improved with IV fluids.  No signs of obstruction on imaging.  Patient will need to follow-up with urology upon discharge.    Patient is medically stable for discharge. Patient is

## 2025-01-03 NOTE — PROGRESS NOTES
Racheal Wyatt  Admission Date: 12/31/2024         [facility] Progress Note: 1/3/2025    Follow-up for:     The patient's chart is reviewed.    Subjective:     Seen walking in room, RN present. She reports feeling much better, and would like to go home.    ROS:  Gen - no fever, no chills, appetite unchanged  CV - no chest pain, no palpitation  Lung - no shortness of breath, no cough  Abd - no tenderness, no nausea/vomiting, no diarrhea  Ext - no edema    Current Facility-Administered Medications   Medication Dose Route Frequency    magnesium sulfate 4000 mg in 100 mL IVPB premix  4,000 mg IntraVENous Once    polyethylene glycol (GLYCOLAX) packet 17 g  17 g Oral Daily    0.9 % sodium chloride infusion   IntraVENous Continuous    albuterol (PROVENTIL) (2.5 MG/3ML) 0.083% nebulizer solution 2.5 mg  2.5 mg Nebulization Q4H PRN    DULoxetine (CYMBALTA) extended release capsule 30 mg  30 mg Oral Daily    folic acid (FOLVITE) tablet 1,000 mcg  1,000 mcg Oral Daily    montelukast (SINGULAIR) tablet 10 mg  10 mg Oral QPM    pantoprazole (PROTONIX) tablet 40 mg  40 mg Oral Daily    pravastatin (PRAVACHOL) tablet 20 mg  20 mg Oral Nightly    sodium chloride flush 0.9 % injection 5-40 mL  5-40 mL IntraVENous 2 times per day    sodium chloride flush 0.9 % injection 5-40 mL  5-40 mL IntraVENous PRN    0.9 % sodium chloride infusion   IntraVENous PRN    ondansetron (ZOFRAN-ODT) disintegrating tablet 4 mg  4 mg Oral Q8H PRN    Or    ondansetron (ZOFRAN) injection 4 mg  4 mg IntraVENous Q6H PRN    acetaminophen (TYLENOL) tablet 650 mg  650 mg Oral Q6H PRN    Or    acetaminophen (TYLENOL) suppository 650 mg  650 mg Rectal Q6H PRN    cefTRIAXone (ROCEPHIN) 1,000 mg in sterile water 10 mL IV syringe  1,000 mg IntraVENous Q24H         Objective:     Vitals:    01/02/25 1525 01/02/25 1921 01/03/25 0330 01/03/25 1007   BP: 139/68 133/68 136/71 126/69   Pulse: 76 79 73 80   Resp: 16 17 17 18   Temp: 97.7

## 2025-01-03 NOTE — PROGRESS NOTES
ACUTE PHYSICAL THERAPY GOALS:   (Developed with and agreed upon by patient and/or caregiver.)    LTG:  (1.)Ms. Wyatt will move from supine to sit and sit to supine , scoot up and down, and roll side to side in bed with INDEPENDENT within 7 treatment day(s).    (2.)Ms. Wyatt will transfer from bed to chair and chair to bed with MODIFIED INDEPENDENCE using the least restrictive device within 7 treatment day(s).    (3.)Ms. Wyatt will ambulate with MODIFIED INDEPENDENCE for 300+ feet with the least restrictive device within 7 treatment day(s).  (4.)Ms. Wyatt will tolerate at least 23 min of dynamic standing activity to assist standing ADLs with the least restrictive device within 7 treatment days.    PHYSICAL THERAPY: Daily Note AM   (Link to Caseload Tracking: PT Visit Days : 1  Time In/Out PT Charge Capture  Rehab Caseload Tracker  Orders    Racheal Wyatt is a 74 y.o. female   PRIMARY DIAGNOSIS: IRVIN (acute kidney injury) ([facility])  IRVIN (acute kidney injury) ([facility]) [N17.9]  Acute cystitis with hematuria [N30.01]       Inpatient: Payor: MEDICARE / Plan: MEDICARE PART A AND B / Product Type: *No Product type* /     ASSESSMENT:     REHAB RECOMMENDATIONS:   Recommendation to date pending progress:  Setting:  No further skilled physical therapy after discharge from hospital  Decline HHPT and no longer requires it    Equipment:    None     ASSESSMENT:  Ms. Wyatt presents in standing in bathroom, agreeable to session. She moves w/ SBA and utilized 2 seated rest breaks.       Upon entering, Pnt is agreeable to PT treatment.  she reports no pain  at rest. Pnt Sit > stand  using no DME. Gait x 500, 125 ft with , cues for step length.  Gait is noted to be slowed and shuffled. Stand > sit, followed by positioning for comfort. At end of session pt up in chair with all needs within reach, alarm activated for safety, RN notified. Overall, great progress today as pnt improved in ambulation distance. Pnt continues to present

## 2025-01-03 NOTE — CARE COORDINATION
Discharge note:  CM spoke to Ms. Wyatt in room 616 again about discharge planning.  Therapy is recommending home health OT and PT.  Patient declines.  No other discharge needs voiced or identified.

## 2025-01-06 ENCOUNTER — CARE COORDINATION (OUTPATIENT)
Dept: CARE COORDINATION | Facility: CLINIC | Age: 75
End: 2025-01-06

## 2025-01-06 DIAGNOSIS — N17.9 AKI (ACUTE KIDNEY INJURY) (HCC): Primary | ICD-10-CM

## 2025-01-06 PROCEDURE — 1111F DSCHRG MED/CURRENT MED MERGE: CPT | Performed by: INTERNAL MEDICINE

## 2025-01-06 NOTE — CARE COORDINATION
Care Transitions Note    Initial Call - Call within 2 business days of discharge: Yes    Patient Current Location:  Home: [address] [place] [id number]    Care Transition Nurse contacted the patient by telephone to perform post hospital discharge assessment, verified name and  as identifiers. Provided introduction to self, and explanation of the Care Transition Nurse role.     Patient: Racheal Wyatt    Patient : 1950   MRN: [id number]    Reason for Admission: IRVIN  Discharge Date: 1/3/25  RURS: Readmission Risk Score: 10.8      Last Discharge Facility       Date Complaint Diagnosis Description Type Department Provider    24 Hematuria; Nausea IRVIN (acute kidney injury) (HCC) ... ED to Hosp-Admission (Discharged) (ADMITTED) [id number] Akhil Carrasquillo MD; Hannah, ...            Was this an external facility discharge? No    Additional needs identified to be addressed with provider   Needs a hospital follow up with PCP             Method of communication with provider: chart routing.    Patients top risk factors for readmission: medical condition-liver and colonic abscesses, depression, tobacco use    Interventions to address risk factors:   Review of patient management of conditions/medications: verbalized understanding.    Care Summary Note: Patient states they are doing ok. Upcoming appointment with Urology for cystoscopy; upcoming appointment with Nephrlogy; pending with PCP office.   Has a good understanding of recent abscess and recent antibiotic therapy on discharge and while inpatient.    Care Transition Nurse reviewed discharge instructions with patient. The patient was given an opportunity to ask questions; all questions answered at this time.. The patient verbalized understanding.   Were discharge instructions available to patient? Yes.   Reviewed appropriate site of care based on symptoms and resources available to patient including: PCP  Specialist  Urgent care clinics  When to

## 2025-01-09 ENCOUNTER — CARE COORDINATION (OUTPATIENT)
Dept: CARE COORDINATION | Facility: CLINIC | Age: 75
End: 2025-01-09

## 2025-01-09 NOTE — CARE COORDINATION
Admission/discharge information sent to patient's PCP.     Appointment with Nephrology noted/scheduled.

## 2025-01-13 ENCOUNTER — CARE COORDINATION (OUTPATIENT)
Dept: CARE COORDINATION | Facility: CLINIC | Age: 75
End: 2025-01-13

## 2025-01-13 NOTE — CARE COORDINATION
Care Transitions Note    Follow Up Call     Patient Current Location:  Home:  [place] [address]    LPN Care Coordinator contacted the patient by telephone. Verified name and  as identifiers.    Additional needs identified to be addressed with provider   No needs identified                 Method of communication with provider: none.    Care Summary Note: States she is dong fairly well.  States she is calling her PCP today to schedule follow up appointment, declined offer of assistance.  Reports fair appetite and hydration.  Driving self.  States she feels she might have lingering UTI.  Educated on importance of follow up, risk of sepsis, states understanding.  Has contact information if needed, Nicole Lawson LPN -[phone].    Plan of care updates since last contact:  Education: Nicole Lawson LPN [phone].  All scheduled appointments.        Advance Care Planning:   Does patient have an Advance Directive: patient declined education and states she is not interested in ACP.  .    Medication Review:  No changes since last call.     Remote Patient Monitoring:  Offered patient enrollment in the Remote Patient Monitoring (RPM) program for in-home monitoring: Yes, but did not enroll at this time: na .    Assessments:  No changes since last call    Follow Up Appointment:   Reviewed upcoming appointment(s).  Future Appointments         Provider Specialty Dept Phone    2025 8:30 AM Richard Omer Jr., MD Urology [phone]            LPN Care Coordinator provided contact information.  Plan for follow-up call in 6-10 days based on severity of symptoms and risk factors.  Plan for next call: self management-diet, hydration, exercise, follow up appointments.    Patient returned call during documentation, has scheduled follow up appointment with Demi DE LA GARZA on 2025.    Nicole Lawson LPN

## 2025-01-14 NOTE — PROGRESS NOTES
Physician Progress Note      PATIENT:               ROSE MARIE SANTIAGO  CSN #:                  [id number]  :                       1950  ADMIT DATE:       2024 1:57 PM  DISCH DATE:        1/3/2025 4:04 PM  RESPONDING  PROVIDER #:        Akhil Carrasquillo MD          QUERY TEXT:    Patient admitted with IRVIN. Noted documentation of Colonic diverticular abscess   in DS dated 1/3/25. In order to support the diagnosis of colonic diverticular   abscess, please include additional clinical indicators in your documentation.    Or please document if the diagnosis of Colonic diverticular abscess has been   ruled out after further study.    The medical record reflects the following:  Risk Factors: IRVIN, Recent UTI, Diverticulitis  Clinical Indicators: In IM progress note dated 25 documented as Colonic   diverticular abscess  In DS dated 1/3/25- Concern for [place] vesical fistula.  Plan for cystoscopy as   outpatient.  In Urology Consult note dated 25- Recurrent UTI, hematuria.   Diverticulitis/possible abscess. Likely has [place] vesical fistula. Needs   cystoscopy after discharge.  CT Abdomen-Pelvis -Small amount of free fluid and free air within the   right lower quadrant of the abdomen extending into the pelvis may represent   contained rupture of sigmoid colon.  This area measures 3.5 x 3 cm posterior and more anteriorly measures 3.6x 1.4   cm.  Treatment: IV Rocephin, IV Zofran, IV sodium chloride 0.9 % bolus, Folic acid,   Urology consult      Thank you  KARIS Baptiste, CDS  Options provided:  -- Colonic diverticular abscess with intra-abdominal abscess as evidenced by,   Please document evidence.  -- Colonic diverticular abscess with Acute pelvic abscess as evidenced by,   Please document evidence.  -- Colonic diverticular abscess was ruled out  -- Other - I will add my own diagnosis  -- Disagree - Not applicable / Not valid  -- Disagree - Clinically unable to determine / Unknown  -- Refer to Clinical

## 2025-01-20 ENCOUNTER — CARE COORDINATION (OUTPATIENT)
Dept: CARE COORDINATION | Facility: CLINIC | Age: 75
End: 2025-01-20

## 2025-01-20 NOTE — CARE COORDINATION
Care Transitions Note    Follow Up Call     Attempted to reach patient for transitions of care follow up.  Unable to reach patient.      Outreach Attempts:   HIPAA compliant voicemail left for patient.     Care Summary Note: Unable to reach.     Follow Up Appointment:   Future Appointments         Provider Specialty Dept Phone    2/7/2025 8:30 AM Richard Omer Jr., MD Urology [phone]            Plan for follow-up call in 6-10 days based on severity of symptoms and risk factors. Plan for next call: self management-diet, hydration, exercise, follow up appointments.     Nicole Lawson LPN

## 2025-01-27 ENCOUNTER — CARE COORDINATION (OUTPATIENT)
Dept: CARE COORDINATION | Facility: CLINIC | Age: 75
End: 2025-01-27

## 2025-01-27 NOTE — CARE COORDINATION
Care Transitions Note    Follow Up Call     Attempted to reach patient for transitions of care follow up.  Unable to reach patient.      Outreach Attempts:   HIPAA compliant voicemail left for patient.     Care Summary Note: Unable to reach.     Follow Up Appointment:   Future Appointments         Provider Specialty Dept Phone    2/7/2025 8:30 AM Richard Omer Jr., MD Urology [phone]            Plan for follow-up call in 6-10 days based on severity of symptoms and risk factors. Plan for next call: self management-diet, hydration, exercise, follow up appointments,     Nicole Lawson LPN

## 2025-01-30 PROBLEM — N39.0 UTI (URINARY TRACT INFECTION): Status: RESOLVED | Noted: 2024-12-31 | Resolved: 2025-01-30

## 2025-02-03 ENCOUNTER — CARE COORDINATION (OUTPATIENT)
Dept: CARE COORDINATION | Facility: CLINIC | Age: 75
End: 2025-02-03

## 2025-02-03 NOTE — CARE COORDINATION
Care Transitions Note    Final Call     Attempted to reach patient for transitions of care follow up.  Unable to reach patient.      Outreach Attempts:   HIPAA compliant voicemail left for patient.     Patient closed (unable to reach patient) from the Care Transitions program on 02/03/2025.  Patient/family has the ability to self manage at this time..      Handoff:   Patient was not referred to the ACM team due to no additional needs identified.       Care Summary Note: Unable to reach, last 3 [place] outreach attempts were unsuccessful.     Assessments:  Unable to reach, last 3 [place] outreach attempts were unsuccessful.     Upcoming Appointments:    Future Appointments         Provider Specialty Dept Phone    2/7/2025 8:30 AM Richard Omer Jr., MD Urology [phone]            Nicole Lawson LPN

## 2025-02-20 NOTE — CONSULTS
Infectious Disease Consult    Impression:   · Alpha strep bacteremia (8/28) source liver mass vs abscess   · CT completed at [facility] (8/12) noted large complex 15x9 cm mass concerning for biliary malignancy vs abscess. No fever/leukocytosis/blood cultures. Outpatient GI follow up completed, 3-phase CT ordered-unable to tolerate MRI  · Large complex liver mass  · GNR UTI  · Fever/leukocytosis  · Stroke like symptoms on admission, MRI negative    Plan:   · Continue Zosyn  · Recommend general sx evaluation of large liver mass, possible abscess for drainage or biopsy  · Recheck blood cultures    Anti-infectives:   1. Zosyn 8/29-    Subjective:   Date of Consultation:  August 29, 2019  Date of Admission: 8/27/2019   Referring Physician: Maria C Luz  Reason for Consult:  bactermia with GPC and GNR in urine    Patient is a 77 y.o. female recently admitted to [facility] 8/10-8/17 for abdominal paina/N/D, CT abdomen and pelvis noting large complex left liver lobe mass measuring 15x9cm; there was mention that this could have been a biliary malignancy or possibly an abscess; she had no leukocytosis/fever from chart review, her transaminases were elevated: AST 50-75, ALT 44, Alk phos in upper 170-200 range with normal Bili. HIV and hepatitis A/B/C negative; GI did not see, but recommended outpatient follow up with MRI/MRCP-she was . She presented to [facility] from PCP on 8/27/2019 with complaints of headache, slurred speech and chest pain for about a day and some mild SOB. Also noted some right facial numbness; admitted or possible stroke-MRI negative, troponin elevated: felt to be demand ischemia. WBC 18.4. Pt reports she was having fever at home, and she eventually spiked a temp 24hr into her admission 101-102. Ua with 20-50 wbc, 0-3 RBC and negative nitrate and LE, Ucx with GNR pending. CXR with basilar atelectasis bilaterally, blood cx with alpha strep pending. Got a dose of CTX on 8/28 changed to Zosyn.  Id consulted to assist with treatment recommendations. She reports feeling much better today, no diarrhea/nausea, fevers have resolved, mild dysuria, no CP, mild SOB. Patient Active Problem List   Diagnosis Code    Rectal bleeding K62.5    Gastroesophageal reflux disease with esophagitis K21.0    Blood in the stool K92.1    Dysuria R30.0    Hemorrhoids K64.9    Arthritis M19.90    Chest pain R07.9    Elevated troponin R74.8    Fibromyalgia M79.7    Headache R51    Liver mass R16.0    Hypokalemia E87.6    Acute renal failure (ARF) (HCC) N17.9    TIA (transient ischemic attack) G45.9     History reviewed. No pertinent past medical history. History reviewed. No pertinent family history. Social History     Tobacco Use    Smoking status: Current Every Day Smoker     Packs/day: 0.50     Years: 30.00     Pack years: 15.00    Smokeless tobacco: Never Used   Substance Use Topics    Alcohol use: No     Frequency: Never     Past Surgical History:   Procedure Laterality Date    HX CHOLECYSTECTOMY      HX HYSTERECTOMY      HX TUBAL LIGATION        Prior to Admission medications    Medication Sig Start Date End Date Taking? Authorizing Provider   tacrolimus (PROTOPIC) 0.1 % ointment Apply  to affected area two (2) times a day. 6/6/19  Yes Diego Albarran MD   folic acid (FOLVITE) 1 mg tablet Take 1 Tab by mouth daily. 6/6/19  Yes Diego Albarran MD   esomeprazole (NEXIUM) 40 mg capsule Take 1 Cap by mouth daily. 6/6/19  Yes Diego Albarran MD   montelukast (SINGULAIR) 10 mg tablet Take 1 Tab by mouth every evening. 6/6/19  Yes Diego Albarran MD   DULoxetine (CYMBALTA) 30 mg capsule Take 1 Cap by mouth daily. 4/4/19  Yes Diego Albarran MD   gabapentin (NEURONTIN) 600 mg tablet Take 1 Tab by mouth three (3) times daily. 4/4/19  Yes Diego Albarran MD   pravastatin (PRAVACHOL) 20 mg tablet Take 1 Tab by mouth nightly. 4/4/19  Yes Diego Albarran MD   hydrocortisone (ANUSOL-HC) 2.5 % rectal cream Insert  into rectum four (4) times daily. 3/8/19  Yes Shaunna Bermudez MD     Allergies   Allergen Reactions    Sulfa (Sulfonamide Antibiotics) Rash    Aspirin Rash and Swelling    Celecoxib Rash and Swelling    Metronidazole Rash     Unknown; possibly a rash.  Morphine Unknown (comments)    Naproxen Rash        Review of Systems:  A comprehensive review of systems was negative except for that written in the History of Present Illness. Objective:   Blood pressure 103/68, pulse (!) 108, temperature 98.1 °F (36.7 °C), resp. rate 16, height 5' 1\" (1.549 m), weight 76.7 kg (169 lb), SpO2 94 %. Temp (24hrs), Av.9 °F (36.6 °C), Min:97.6 °F (36.4 °C), Max:98.2 °F (36.8 °C)       Lines: peripheral IVs bilaterally    Exam:     General: NC/AT, alert and cooperative, looks stated age, in NAD   HEENT: PERRL, non-icteric sclera, no splinter hemorrhages   Neck: supple, symmetric, no masses or lymphadenopathy   Cardiac:Nl S1/S2, no murmurs/rubs/gallops/clicks, no LE edema   Pulmonary:clear bilaterally no rales/wheezes, good air movement, NC O2    Abdomen: soft, NT, non-distended,  Mild tenderness to upper quadrants, LLQ, BS normal, no CVA tenderness   Gentital:external genitalia normal    Skin:no rashes, lesions or wounds   MSK:no enlarged or swollen joints in limbs or sternoclavicular area   Extremities: no cords/clots/cyanosis, pulses palpable and symmetric    Psychiatric: mood and affect appropriate to situation     Microbiology:    All Micro Results     Procedure Component Value Units Date/Time    CULTURE, BLOOD [[id number]] Collected:  19 0030    Order Status:  Completed Specimen:  Blood Updated:  19 8774     Special Requests: --        LEFT  Antecubital       GRAM STAIN GRAM POS COCCI IN CHAINS         ANAEROBIC BOTTLE POSITIVE               CRITICAL RESULT NOT CALLED DUE TO PREVIOUS NOTIFICATION OF CRITICAL RESULT WITHIN THE LAST 24 HOURS.            Culture result:       CULTURE IN PROGRESS,FURTHER UPDATES TO FOLLOW          BLOOD CULTURE ID PANEL [[id number]]  (Abnormal) Collected:  08/28/19 0030    Order Status:  Completed Specimen:  Blood Updated:  08/29/19 1246     Acc. no. from Micro Order [id number]     Streptococcus DETECTED        Comment: RESULTS VERIFIED, PHONED TO AND READ BACK BY  Manuela Ho RN ON 8/29/19 @1220, TA          INTERPRETATION       Gram positive cocci. Identified by realtime PCR as Streptococcus species (not agalactiae, pyogenes, or pneumoniae). Clinical Consideration       Consider discontinuation of IV vancomycin and using an anti-streptococcal beta-lactam (ceftriaxone/cefotaxime (peds))           Blood Culture PCR Testing       MULTIPLEX PCR NEGATIVE:  A negative FilmArray BCID result does not exclude the possibility of bloodstream infection. CULTURE, BLOOD [[id number]] Collected:  08/28/19 0030    Order Status:  Completed Specimen:  Blood Updated:  08/29/19 1224     Special Requests: --        LEFT  HAND       GRAM STAIN GRAM POS COCCI IN CHAINS         PEDIATRIC BOTTLE               RESULTS VERIFIED, PHONED TO AND READ BACK BY Manuela Ho RN ON 8/29/19 @1220, TA           Culture result:       CULTURE IN [address] UPDATES TO FOLLOW            REFER TO ACCESSION [id number] FOR BCID PANEL    CULTURE, URINE [[id number]]  (Abnormal) Collected:  08/28/19 0604    Order Status:  Completed Specimen:  Urine from Clean catch Updated:  08/29/19 0725     Special Requests: NO SPECIAL REQUESTS        Culture result:       >100,000 COLONIES/mL GRAM NEGATIVE RODS                  <10,000 COLONIES/mL MIXED SKIN LEISA ISOLATED          C. DIFFICILE AG & TOXIN A/B [[id number]]     Order Status:  Sent Specimen:  Stool           Studies/Imaging:      Status Exam Begun  Exam Ended    Final [99] 8/29/2019 11:00 8/29/2019 11:13   Study Result     CHEST X-RAY, one view.     HISTORY:  Cough and wheezing.     TECHNIQUE:  AP portable upright view     COMPARISON: exam 2 days prior.      FINDINGS:   No lobar consolidation. Decreased atelectasis at the bases. Heart  and pulmonary vasculature is unremarkable.     IMPRESSION  IMPRESSION:  Decreased basilar atelectasis. Signed by     Signed Date/Time  Phone Pager   Iveth Wilkins 8/29/2019 15:28 [phone]    Exam Information     Status Exam Begun  Exam Ended    Final [99] 8/29/2019 14:04 8/29/2019 14:07   Study Result     Clinical History: The patient is a 77years year old Female presenting with  symptoms of TIA with history of liver mass.     Comparison:  Head CT 8/27/2019     Technique:  Axial T2, axial FLAIR, axial diffusion-weighted,  sagittal T1 and  coronal gradient-echo scans were performed. Study is degraded by patient motion.     Findings:      There is no evidence of acute intracranial abnormality . Mild chronic  periventricular white matter changes are seen with scattered lacunae throughout  the corona radiata and centrum semiovale. Normal gray-white matter  differentiation is seen for age. There are no abnormal extra-axial fluid  collections. No evidence of mass or mass effect is seen. There is no diffusion  signal abnormality. Expected flow voids are maintained in the major intracranial  vessels.     The cerebellum and brainstem are unremarkable. There is no evidence of Chiari  malformation.     The ventricular system and CSF containing spaces are unremarkable in appearance.     Visualized extracranial soft tissues are unremarkable.     The paranasal sinuses are well pneumatized and aerated.     IMPRESSION  Impression:    Mild chronic microvascular disease without evidence of acute intracranial  abnormality.     CPT code(s) 50555          ADMISSION DATE: 08/10/2019 13:27          MULTIDETECTOR CONTRAST CT CHEST, CT ABDOMEN, AND CT PELVIS:    COMPARISON: CT abdomen pelvis for stone protocol of 07/21/2015    HISTORY: 70-year-old female with liver mass. Presumed metastatic disease. No known primary.     Technique:  Spiral acquisition of the chest, abdomen, and pelvis was obtained from above the thoracic inlets through the symphysis pubis with oral and 100 mL Omnipaque 350 IV contrast administration. FINDINGS:    On bone windows, spondylosis is seen in the thoracic and lumbar spine. No destructive bone lesion is seen. No definite fracture or malalignment is demonstrated. CT Chest:    Heart and Mediastinum: The heart is normal in size. Atherosclerotic calcifications are noted in the aorta, coronary arteries, and great vessels. 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 The anterior chest wall soft tissues are unremarkable. No definite thyroid nodule is seen. I see no axillary, hilar or mediastinal lymphadenopathy by size criteria. Lungs:  Opacification in the posterior lower lobes with air bronchograms more apparent on the right is seen. This may be related to dependent atelectasis or infiltrate. No pulmonary nodule or pneumothorax is seen. Chest Wall:  There are no chest wall abnormalities or pleural effusions. CT abdomen:     Liver:  A large 15.5 x 8.8 cm heterogeneous cystic lesion with peripheral nodular and septal enhancement is centered in the medial and lateral segments of the left hepatic lobe. Possible biliary enhancement near the hepatic duct is noted. No findings suggesting hepatocellular disease or cirrhosis are seen within the liver. Hepatocellular carcinoma is not likely. This may be related to a biliary malignancy such as cholangiocarcinoma or perhaps metastatic disease. Contrast MRCP is recommended for further characterization. This may also represent an abscess. Correlation with white blood cell count is recommended. If this represents an abscess, the patient would be very sickly. 2 other lesions are noted in segment 2 of the lateral segment of the left hepatic lobe. 1 on image 69 of series 2 most likely reflects a transient hepatic attenuation difference.   1 on image 61 of series 2 may be related to a small enhancing liver lesion or transient hepatic attenuation difference. A tiny peripheral enhancing possible lesion is noted in segment 5 of the right hepatic lobe on image 51 of series 2. Gallbladder:  The patient is status post cholecystectomy. Common duct is prominent. This may be related to the previous cholecystectomy. Spleen:  Normal.    Pancreas:  Normal.    Adrenal glands: The right adrenal gland is normal.    Nodularity in the left adrenal gland most likely reflects nodular hyperplasia. Kidneys:  Normal.    Small bowel:  Normal.    Colon:  Diverticulosis is most apparent in the sigmoid colon. No diverticulitis is seen. No definite lesion is identified. No adenopathy, pneumotosis, or free air is identified in the upper abdomen or pelvis. A few atherosclerotic calcifications are noted in the aorta and branching vessels. A small amount of free fluid is noted in the abdomen and pelvis. CT pelvis: The patient is status post appendectomy. The terminal ileum is unremarkable. The patient is status post hysterectomy. Normal appearing small ovaries are likely present bilaterally. Small free fluid is noted in the pelvis. Small air bubbles are noted in the anterior left pelvis and lower abdomen subcutaneous region. This may be related to injection sites. IMPRESSION:    Large complex nodular enhancing cystic lesion centered in the left hepatic lobe. May be related to a primary tumor of the liver such as a biliary tumor or abscess. Other smaller liver lesions or transient hepatic attenuation differences in the left and right hepatic lobes. Small free fluid in the abdomen and pelvis. No evidence of malignancy in the chest and pelvis. Status post cholecystectomy, hysterectomy and appendectomy. : marielle  TRANSCRIBE TIME/DATE: 08/12/2019 02:01 pm  READ BY: LARRY Bowles Sa, MD FAMILY HISTORY:  Father  Still living? No  FH: pancreatic cancer, Age at diagnosis: Age Unknown    Mother  Still living? No  FH: heart disease, Age at diagnosis: Age Unknown  FH: HTN (hypertension), Age at diagnosis: Age Unknown  FH: type 2 diabetes, Age at diagnosis: Age Unknown    Sibling  Still living? Yes, Estimated age: Age Unknown  FH: colon cancer, Age at diagnosis: Age Unknown  FH: type 2 diabetes, Age at diagnosis: Age Unknown

## 2025-03-10 ENCOUNTER — PROCEDURE VISIT (OUTPATIENT)
Dept: UROLOGY | Age: 75
End: 2025-03-10
Payer: MEDICARE

## 2025-03-10 DIAGNOSIS — N39.0 RECURRENT UTI: Primary | ICD-10-CM

## 2025-03-10 DIAGNOSIS — R82.81 PYURIA: ICD-10-CM

## 2025-03-10 DIAGNOSIS — N39.0 RECURRENT UTI: ICD-10-CM

## 2025-03-10 LAB
BILIRUBIN, URINE, POC: NEGATIVE
BLOOD URINE, POC: NORMAL
GLUCOSE URINE, POC: NEGATIVE MG/DL
KETONES, URINE, POC: NEGATIVE MG/DL
LEUKOCYTE ESTERASE, URINE, POC: NORMAL
NITRITE, URINE, POC: NEGATIVE
PH, URINE, POC: 6 (ref 4.6–8)
PROTEIN,URINE, POC: NORMAL MG/DL
SPECIFIC GRAVITY, URINE, POC: 1.01 (ref 1–1.03)
URINALYSIS CLARITY, POC: NORMAL
URINALYSIS COLOR, POC: NORMAL
UROBILINOGEN, POC: NORMAL MG/DL

## 2025-03-10 PROCEDURE — 81003 URINALYSIS AUTO W/O SCOPE: CPT | Performed by: UROLOGY

## 2025-03-10 PROCEDURE — 52000 CYSTOURETHROSCOPY: CPT | Performed by: UROLOGY

## 2025-03-10 NOTE — PROGRESS NOTES
[facility]  [address]   [address]  [place] [address]  [phone]    Racheal Wyatt  : 1950         HPI   74 y.o., female who was seen as hospital consult on 24, with medical history of liver and colonic abscess, diverticulitis, followed by surgery, depression, tobacco use who was evaluated with sean hematuria, fever, chills, increased thirst, anorexia, nausea.     Had been on outpatient 7 days macrobid per urgent care, EOT 24     UA showed large ashleigh, 20-5- wbc on 24. Urine culture showed mixed janie.   Creatinine 2.89   S/p CT AP wo showing \"  IMPRESSION:  Small amount of free fluid and free air within the right lower quadrant of the  abdomen extending into the pelvis may represent contained rupture of sigmoid  colon. This area measures 3.5 x 3 cm posterior and more anteriorly measures 3.6  x 1.4 cm.\"     Of note, she had this area previously and has been seen by surgery- the ED provider reached out to on call surgery and this area is not changed   Seen  and deferred surgery .      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 When seen by Dr. Quesada in , elective sigmoid colectomy was discussed, but pt deferred that.   She has had approx one week of bloody, cloudy urine. Got started on abx when she went to Urgent Care, came to ER because of continued sxs.   She c/o pneumaturia, fecaluria. Abdominal pain is currently better.   Cr 2.72, baseline of 0.84 in . Wbc 4.2.   UA lg ashleigh, 20-50 wbc, 0-3 wbc. Urine culture from 24 showing no growth.   She states that she has been having recurrent UTI throughout .   She has seen her gyn for bacterial vaginosis. This had improved after treatment.       presents for cystoscopy       Past Medical History:   Diagnosis Date    Bacterial liver abscess 2019    Borderline diabetes     diet controlled

## 2025-03-12 LAB
BACTERIA SPEC CULT: NORMAL
SERVICE CMNT-IMP: NORMAL

## 2025-06-02 ENCOUNTER — TRANSCRIBE ORDERS (OUTPATIENT)
Dept: SCHEDULING | Age: 75
End: 2025-06-02

## 2025-06-02 DIAGNOSIS — Z12.31 VISIT FOR SCREENING MAMMOGRAM: Primary | ICD-10-CM

## 2025-06-12 ENCOUNTER — HOSPITAL ENCOUNTER (OUTPATIENT)
Dept: MAMMOGRAPHY | Age: 75
Discharge: HOME | End: 2025-06-15
Attending: INTERNAL MEDICINE
Payer: MEDICARE

## 2025-06-12 VITALS — BODY MASS INDEX: 29.59 KG/M2 | WEIGHT: 167 LBS | HEIGHT: 63 IN

## 2025-06-12 DIAGNOSIS — Z12.31 VISIT FOR SCREENING MAMMOGRAM: ICD-10-CM

## 2025-06-12 PROCEDURE — 77063 BREAST TOMOSYNTHESIS BI: CPT

## 2025-07-10 ENCOUNTER — OFFICE VISIT (OUTPATIENT)
Dept: UROLOGY | Age: 75
End: 2025-07-10

## 2025-07-10 DIAGNOSIS — N39.0 RECURRENT UTI: Primary | ICD-10-CM

## 2025-07-10 DIAGNOSIS — N32.81 OAB (OVERACTIVE BLADDER): ICD-10-CM

## 2025-07-10 DIAGNOSIS — N95.2 VAGINAL ATROPHY: ICD-10-CM

## 2025-07-10 LAB
BILIRUBIN, URINE, POC: NEGATIVE
BLOOD URINE, POC: NORMAL
GLUCOSE URINE, POC: NEGATIVE MG/DL
KETONES, URINE, POC: NEGATIVE MG/DL
LEUKOCYTE ESTERASE, URINE, POC: NORMAL
NITRITE, URINE, POC: NEGATIVE
PH, URINE, POC: 5.5 (ref 4.6–8)
PROTEIN,URINE, POC: NEGATIVE MG/DL
PVR, POC: 28 CC
SPECIFIC GRAVITY, URINE, POC: 1.02 (ref 1–1.03)
URINALYSIS CLARITY, POC: NORMAL
URINALYSIS COLOR, POC: NORMAL
UROBILINOGEN, POC: NORMAL MG/DL

## 2025-07-10 RX ORDER — ESTRADIOL 0.1 MG/G
CREAM VAGINAL
Qty: 42.5 G | Refills: 3 | Status: SHIPPED | OUTPATIENT
Start: 2025-07-10

## 2025-07-10 NOTE — PROGRESS NOTES
irritants.    We discussed lifestyle/dietary modifications to reduce sx including avoiding bladder irritants, timed/double voiding, and stopping fluids 2 hrs prior to bedtime. We also discussed medication management including anticholinergics vs beta 3 agonist. I rec against use of anticholergics due to BEERS list criteria and hx of sig constipation.  Gemtesa is preferred. SE discussed. Samples provided. Script sent.     Will begin estrace. Directions for use explained.     ROV in 6 weeks for recheck. To call sooner if needed.     Makeda Webber, APRN - CNP  Dr. Amezquita is supervising physician today and he approves plan of care.

## 2025-07-12 LAB
BACTERIA SPEC CULT: NORMAL
SERVICE CMNT-IMP: NORMAL

## 2025-07-14 ENCOUNTER — RESULTS FOLLOW-UP (OUTPATIENT)
Dept: UROLOGY | Age: 75
End: 2025-07-14

## 2025-07-14 DIAGNOSIS — N32.81 OAB (OVERACTIVE BLADDER): Primary | ICD-10-CM

## 2025-08-21 ENCOUNTER — OFFICE VISIT (OUTPATIENT)
Dept: UROLOGY | Age: 75
End: 2025-08-21
Payer: MEDICARE

## 2025-08-21 DIAGNOSIS — N39.0 RECURRENT UTI: Primary | ICD-10-CM

## 2025-08-21 DIAGNOSIS — N95.2 VAGINAL ATROPHY: ICD-10-CM

## 2025-08-21 DIAGNOSIS — N39.0 RECURRENT UTI: ICD-10-CM

## 2025-08-21 DIAGNOSIS — N32.81 OAB (OVERACTIVE BLADDER): ICD-10-CM

## 2025-08-21 LAB
BILIRUBIN, URINE, POC: NEGATIVE
BLOOD URINE, POC: NORMAL
GLUCOSE URINE, POC: NEGATIVE MG/DL
KETONES, URINE, POC: NEGATIVE MG/DL
LEUKOCYTE ESTERASE, URINE, POC: NEGATIVE
NITRITE, URINE, POC: NEGATIVE
PH, URINE, POC: 6.5 (ref 4.6–8)
PROTEIN,URINE, POC: NEGATIVE MG/DL
SPECIFIC GRAVITY, URINE, POC: 1.02 (ref 1–1.03)
URINALYSIS CLARITY, POC: NORMAL
URINALYSIS COLOR, POC: NORMAL
UROBILINOGEN, POC: NORMAL MG/DL

## 2025-08-21 PROCEDURE — 1159F MED LIST DOCD IN RCRD: CPT | Performed by: NURSE PRACTITIONER

## 2025-08-21 PROCEDURE — 81003 URINALYSIS AUTO W/O SCOPE: CPT | Performed by: NURSE PRACTITIONER

## 2025-08-21 PROCEDURE — G8427 DOCREV CUR MEDS BY ELIG CLIN: HCPCS | Performed by: NURSE PRACTITIONER

## 2025-08-21 PROCEDURE — G8400 PT W/DXA NO RESULTS DOC: HCPCS | Performed by: NURSE PRACTITIONER

## 2025-08-21 PROCEDURE — G8417 CALC BMI ABV UP PARAM F/U: HCPCS | Performed by: NURSE PRACTITIONER

## 2025-08-21 PROCEDURE — 1160F RVW MEDS BY RX/DR IN RCRD: CPT | Performed by: NURSE PRACTITIONER

## 2025-08-21 PROCEDURE — 99214 OFFICE O/P EST MOD 30 MIN: CPT | Performed by: NURSE PRACTITIONER

## 2025-08-21 PROCEDURE — 3017F COLORECTAL CA SCREEN DOC REV: CPT | Performed by: NURSE PRACTITIONER

## 2025-08-21 PROCEDURE — 1123F ACP DISCUSS/DSCN MKR DOCD: CPT | Performed by: NURSE PRACTITIONER

## 2025-08-21 PROCEDURE — 1036F TOBACCO NON-USER: CPT | Performed by: NURSE PRACTITIONER

## 2025-08-21 PROCEDURE — 1090F PRES/ABSN URINE INCON ASSESS: CPT | Performed by: NURSE PRACTITIONER

## 2025-08-21 RX ORDER — CEPHALEXIN 500 MG/1
500 CAPSULE ORAL 3 TIMES DAILY
Qty: 21 CAPSULE | Refills: 0 | Status: SHIPPED | OUTPATIENT
Start: 2025-08-21 | End: 2025-08-28

## 2025-08-23 LAB
BACTERIA SPEC CULT: NORMAL
SERVICE CMNT-IMP: NORMAL

## (undated) DEVICE — ENDOSCOPIC KIT 1.1+ OP4 CA DE 2 GWN AAMI LEVEL 3

## (undated) DEVICE — NEEDLE SYR 18GA L1.5IN RED PLAS HUB S STL BLNT FILL W/O

## (undated) DEVICE — CANNULA NSL ORAL AD FOR CAPNOFLEX CO2 O2 AIRLFE

## (undated) DEVICE — SYRINGE MED 3ML CLR PLAS STD N CTRL LUERLOCK TIP DISP

## (undated) DEVICE — BLOCK BITE AD 60FR W/ VELC STRP ADDRESSES MOST PT AND

## (undated) DEVICE — SYRINGE, LUER SLIP, STERILE, 60ML: Brand: MEDLINE

## (undated) DEVICE — KENDALL RADIOLUCENT FOAM MONITORING ELECTRODE RECTANGULAR SHAPE: Brand: KENDALL

## (undated) DEVICE — LUBE JELLY FOIL PACK 1.4 OZ: Brand: MEDLINE INDUSTRIES, INC.

## (undated) DEVICE — FORCEPS BX L240CM JAW DIA2.8MM L CAP W/ NDL MIC MESH TOOTH

## (undated) DEVICE — CONTAINER FORMALIN PREFILLED 10% NBF 60ML

## (undated) DEVICE — SINGLE PORT MANIFOLD: Brand: NEPTUNE 2

## (undated) DEVICE — CONNECTOR TBNG OD5-7MM O2 END DISP

## (undated) DEVICE — YANKAUER,BULB TIP,W/O VENT,RIGID,STERILE: Brand: MEDLINE

## (undated) DEVICE — SYRINGE MED 10ML LUERLOCK TIP W/O SFTY DISP

## (undated) DEVICE — GAUZE,SPONGE,4"X4",12PLY,WOVEN,NS,LF: Brand: MEDLINE

## (undated) DEVICE — AIRLIFE™ OXYGEN TUBING 7 FEET (2.1 M) CRUSH RESISTANT OXYGEN TUBING, VINYL TIPPED: Brand: AIRLIFE™